# Patient Record
Sex: MALE | Race: WHITE | HISPANIC OR LATINO
[De-identification: names, ages, dates, MRNs, and addresses within clinical notes are randomized per-mention and may not be internally consistent; named-entity substitution may affect disease eponyms.]

---

## 2017-01-10 ENCOUNTER — APPOINTMENT (OUTPATIENT)
Dept: OTOLARYNGOLOGY | Facility: CLINIC | Age: 72
End: 2017-01-10

## 2017-01-10 VITALS — DIASTOLIC BLOOD PRESSURE: 73 MMHG | HEART RATE: 94 BPM | SYSTOLIC BLOOD PRESSURE: 152 MMHG | RESPIRATION RATE: 16 BRPM

## 2017-02-07 ENCOUNTER — APPOINTMENT (OUTPATIENT)
Dept: INTERNAL MEDICINE | Facility: CLINIC | Age: 72
End: 2017-02-07

## 2017-02-07 VITALS
OXYGEN SATURATION: 91 % | BODY MASS INDEX: 21.46 KG/M2 | RESPIRATION RATE: 16 BRPM | WEIGHT: 135 LBS | TEMPERATURE: 98.5 F | HEART RATE: 74 BPM | DIASTOLIC BLOOD PRESSURE: 56 MMHG | SYSTOLIC BLOOD PRESSURE: 93 MMHG

## 2017-02-28 ENCOUNTER — OTHER (OUTPATIENT)
Age: 72
End: 2017-02-28

## 2017-02-28 ENCOUNTER — MEDICATION RENEWAL (OUTPATIENT)
Age: 72
End: 2017-02-28

## 2017-02-28 LAB — BACTERIA SPT CULT: ABNORMAL

## 2017-02-28 RX ORDER — LEVOFLOXACIN 500 MG/1
500 TABLET, FILM COATED ORAL
Qty: 5 | Refills: 0 | Status: DISCONTINUED | COMMUNITY
Start: 2017-02-28 | End: 2017-02-28

## 2017-04-17 ENCOUNTER — OTHER (OUTPATIENT)
Age: 72
End: 2017-04-17

## 2017-04-17 ENCOUNTER — RX RENEWAL (OUTPATIENT)
Age: 72
End: 2017-04-17

## 2017-04-19 LAB — BACTERIA SPT CULT: ABNORMAL

## 2017-05-09 ENCOUNTER — APPOINTMENT (OUTPATIENT)
Dept: INTERNAL MEDICINE | Facility: CLINIC | Age: 72
End: 2017-05-09

## 2017-05-09 RX ORDER — ALENDRONATE SODIUM 70 MG/75ML
70 SOLUTION ORAL
Qty: 450 | Refills: 0 | Status: ACTIVE | COMMUNITY
Start: 2016-11-18

## 2017-05-09 RX ORDER — LEVOTHYROXINE SODIUM 0.1 MG/1
100 TABLET ORAL
Qty: 90 | Refills: 0 | Status: ACTIVE | COMMUNITY
Start: 2017-04-05

## 2017-05-09 RX ORDER — FERROUS SULFATE 220 (44)/5
220 (44 FE) SOLUTION, ORAL ORAL
Qty: 473 | Refills: 0 | Status: ACTIVE | COMMUNITY
Start: 2016-11-09

## 2017-05-10 VITALS
RESPIRATION RATE: 16 BRPM | DIASTOLIC BLOOD PRESSURE: 60 MMHG | HEART RATE: 86 BPM | SYSTOLIC BLOOD PRESSURE: 96 MMHG | OXYGEN SATURATION: 92 %

## 2017-05-22 ENCOUNTER — OTHER (OUTPATIENT)
Age: 72
End: 2017-05-22

## 2017-08-08 ENCOUNTER — APPOINTMENT (OUTPATIENT)
Dept: INTERNAL MEDICINE | Facility: CLINIC | Age: 72
End: 2017-08-08
Payer: MEDICARE

## 2017-08-08 PROCEDURE — 99214 OFFICE O/P EST MOD 30 MIN: CPT | Mod: 25

## 2017-08-08 PROCEDURE — 94727 GAS DIL/WSHOT DETER LNG VOL: CPT

## 2017-08-08 PROCEDURE — 94200 LUNG FUNCTION TEST (MBC/MVV): CPT | Mod: 59

## 2017-08-08 PROCEDURE — 94060 EVALUATION OF WHEEZING: CPT

## 2017-08-08 RX ORDER — PREDNISONE 10 MG/1
10 TABLET ORAL
Qty: 15 | Refills: 0 | Status: ACTIVE | COMMUNITY
Start: 2017-08-08 | End: 1900-01-01

## 2017-10-17 ENCOUNTER — APPOINTMENT (OUTPATIENT)
Dept: INTERNAL MEDICINE | Facility: CLINIC | Age: 72
End: 2017-10-17
Payer: MEDICARE

## 2017-10-17 ENCOUNTER — LABORATORY RESULT (OUTPATIENT)
Age: 72
End: 2017-10-17

## 2017-10-17 VITALS
HEART RATE: 78 BPM | OXYGEN SATURATION: 92 % | RESPIRATION RATE: 6 BRPM | DIASTOLIC BLOOD PRESSURE: 52 MMHG | SYSTOLIC BLOOD PRESSURE: 91 MMHG

## 2017-10-17 DIAGNOSIS — Z23 ENCOUNTER FOR IMMUNIZATION: ICD-10-CM

## 2017-10-17 PROCEDURE — G0008: CPT

## 2017-10-17 PROCEDURE — 94727 GAS DIL/WSHOT DETER LNG VOL: CPT

## 2017-10-17 PROCEDURE — 94200 LUNG FUNCTION TEST (MBC/MVV): CPT | Mod: 59

## 2017-10-17 PROCEDURE — 99214 OFFICE O/P EST MOD 30 MIN: CPT | Mod: 25

## 2017-10-17 PROCEDURE — 94060 EVALUATION OF WHEEZING: CPT

## 2017-10-17 PROCEDURE — G0009: CPT

## 2017-10-17 PROCEDURE — 90686 IIV4 VACC NO PRSV 0.5 ML IM: CPT

## 2017-10-17 PROCEDURE — 90732 PPSV23 VACC 2 YRS+ SUBQ/IM: CPT

## 2017-10-17 RX ORDER — PREDNISONE 10 MG/1
10 TABLET ORAL
Qty: 90 | Refills: 3 | Status: ACTIVE | COMMUNITY
Start: 2017-10-17 | End: 1900-01-01

## 2018-01-09 ENCOUNTER — APPOINTMENT (OUTPATIENT)
Dept: INTERNAL MEDICINE | Facility: CLINIC | Age: 73
End: 2018-01-09
Payer: MEDICARE

## 2018-01-09 ENCOUNTER — APPOINTMENT (OUTPATIENT)
Dept: INTERNAL MEDICINE | Facility: CLINIC | Age: 73
End: 2018-01-09

## 2018-01-09 VITALS
WEIGHT: 131 LBS | HEIGHT: 66.5 IN | DIASTOLIC BLOOD PRESSURE: 74 MMHG | TEMPERATURE: 97.9 F | HEART RATE: 106 BPM | SYSTOLIC BLOOD PRESSURE: 127 MMHG | OXYGEN SATURATION: 87 % | BODY MASS INDEX: 20.8 KG/M2

## 2018-01-09 DIAGNOSIS — J69.0 PNEUMONITIS DUE TO INHALATION OF FOOD AND VOMIT: ICD-10-CM

## 2018-01-09 PROCEDURE — 94200 LUNG FUNCTION TEST (MBC/MVV): CPT | Mod: 59

## 2018-01-09 PROCEDURE — 94727 GAS DIL/WSHOT DETER LNG VOL: CPT

## 2018-01-09 PROCEDURE — 99214 OFFICE O/P EST MOD 30 MIN: CPT | Mod: 25

## 2018-01-09 PROCEDURE — 94060 EVALUATION OF WHEEZING: CPT

## 2018-01-09 RX ORDER — LEVOFLOXACIN 500 MG/1
500 TABLET, FILM COATED ORAL
Qty: 8 | Refills: 0 | Status: DISCONTINUED | COMMUNITY
Start: 2017-02-07 | End: 2018-01-09

## 2018-01-09 RX ORDER — PREDNISONE 5 MG/1
5 TABLET ORAL DAILY
Qty: 60 | Refills: 3 | Status: ACTIVE | COMMUNITY
Start: 2018-01-09 | End: 1900-01-01

## 2018-01-09 RX ORDER — AMOXICILLIN AND CLAVULANATE POTASSIUM 600; 42.9 MG/5ML; MG/5ML
600-42.9 FOR SUSPENSION ORAL EVERY 8 HOURS
Qty: 125 | Refills: 0 | Status: DISCONTINUED | COMMUNITY
Start: 2017-02-21 | End: 2018-01-09

## 2018-01-09 RX ORDER — LEVOFLOXACIN 500 MG/1
500 TABLET, FILM COATED ORAL
Qty: 7 | Refills: 0 | Status: DISCONTINUED | COMMUNITY
Start: 2017-05-09 | End: 2018-01-09

## 2018-01-16 ENCOUNTER — APPOINTMENT (OUTPATIENT)
Dept: OTOLARYNGOLOGY | Facility: CLINIC | Age: 73
End: 2018-01-16
Payer: MEDICARE

## 2018-01-16 VITALS
SYSTOLIC BLOOD PRESSURE: 159 MMHG | BODY MASS INDEX: 20.96 KG/M2 | WEIGHT: 132 LBS | HEIGHT: 66.5 IN | RESPIRATION RATE: 16 BRPM | HEART RATE: 88 BPM | DIASTOLIC BLOOD PRESSURE: 76 MMHG

## 2018-01-16 DIAGNOSIS — J38.6 STENOSIS OF LARYNX: ICD-10-CM

## 2018-01-16 PROCEDURE — 31575 DIAGNOSTIC LARYNGOSCOPY: CPT

## 2018-01-16 PROCEDURE — 99214 OFFICE O/P EST MOD 30 MIN: CPT | Mod: 25

## 2019-01-18 ENCOUNTER — APPOINTMENT (OUTPATIENT)
Dept: OTOLARYNGOLOGY | Facility: CLINIC | Age: 74
End: 2019-01-18

## 2019-02-20 ENCOUNTER — APPOINTMENT (OUTPATIENT)
Dept: OTOLARYNGOLOGY | Facility: CLINIC | Age: 74
End: 2019-02-20
Payer: COMMERCIAL

## 2019-02-20 VITALS
BODY MASS INDEX: 22.23 KG/M2 | TEMPERATURE: 98.1 F | HEIGHT: 66.5 IN | HEART RATE: 97 BPM | SYSTOLIC BLOOD PRESSURE: 129 MMHG | WEIGHT: 140 LBS | DIASTOLIC BLOOD PRESSURE: 65 MMHG

## 2019-02-20 PROCEDURE — 99214 OFFICE O/P EST MOD 30 MIN: CPT | Mod: 25

## 2019-02-20 PROCEDURE — 31575 DIAGNOSTIC LARYNGOSCOPY: CPT

## 2019-02-20 NOTE — REASON FOR VISIT
[Subsequent Evaluation] : a subsequent evaluation for [FreeTextEntry2] : larynx cancer Pt completed chemo/RT 2001

## 2019-02-20 NOTE — PROCEDURE
[Topical Lidocaine] : topical lidocaine [Oxymetazoline HCl] : oxymetazoline HCl [Flexible Endoscope] : examined with the flexible endoscope [Serial Number: ___] : Serial Number: [unfilled] [Normal] : normal vallecula [Lesion(s)] : lesion(s) [de-identified] : aaron GARCIA   [de-identified] : laryngeal stenosis hx [de-identified] : stenosis w stable 2mm airway

## 2019-02-20 NOTE — HISTORY OF PRESENT ILLNESS
[de-identified] : larynx cancer Pt completed chemo/RT 2001. Pt O2 @Lnasal cannula for last 3+ years followed by pulmonologist . Pt peg dependent, stable weight.

## 2020-02-25 ENCOUNTER — APPOINTMENT (OUTPATIENT)
Dept: OTOLARYNGOLOGY | Facility: CLINIC | Age: 75
End: 2020-02-25

## 2020-06-23 ENCOUNTER — APPOINTMENT (OUTPATIENT)
Dept: OTOLARYNGOLOGY | Facility: CLINIC | Age: 75
End: 2020-06-23
Payer: MEDICARE

## 2020-06-23 VITALS
HEIGHT: 66.5 IN | HEART RATE: 82 BPM | TEMPERATURE: 97.8 F | BODY MASS INDEX: 21.12 KG/M2 | DIASTOLIC BLOOD PRESSURE: 67 MMHG | WEIGHT: 133 LBS | SYSTOLIC BLOOD PRESSURE: 195 MMHG

## 2020-06-23 PROCEDURE — 99214 OFFICE O/P EST MOD 30 MIN: CPT | Mod: 25

## 2020-06-23 PROCEDURE — 31575 DIAGNOSTIC LARYNGOSCOPY: CPT

## 2020-06-23 NOTE — PROCEDURE
[None] : none [Flexible Endoscope] : examined with the flexible endoscope [Normal] : normal [de-identified] : c LINCOLN\par \par no changes. 2-3 mm airway and cords forzen.  no masses [de-identified] : laryngeal stenosis [de-identified] : except as above

## 2021-06-08 ENCOUNTER — APPOINTMENT (OUTPATIENT)
Dept: OTOLARYNGOLOGY | Facility: CLINIC | Age: 76
End: 2021-06-08

## 2021-06-22 ENCOUNTER — APPOINTMENT (OUTPATIENT)
Dept: OTOLARYNGOLOGY | Facility: CLINIC | Age: 76
End: 2021-06-22
Payer: MEDICARE

## 2021-06-22 VITALS
RESPIRATION RATE: 18 BRPM | HEART RATE: 79 BPM | TEMPERATURE: 97.6 F | OXYGEN SATURATION: 96 % | DIASTOLIC BLOOD PRESSURE: 76 MMHG | SYSTOLIC BLOOD PRESSURE: 157 MMHG

## 2021-06-22 DIAGNOSIS — R13.12 DYSPHAGIA, OROPHARYNGEAL PHASE: ICD-10-CM

## 2021-06-22 DIAGNOSIS — J38.6 STENOSIS OF LARYNX: ICD-10-CM

## 2021-06-22 PROCEDURE — 99213 OFFICE O/P EST LOW 20 MIN: CPT | Mod: 25

## 2021-06-22 PROCEDURE — 99072 ADDL SUPL MATRL&STAF TM PHE: CPT

## 2021-06-22 PROCEDURE — 31575 DIAGNOSTIC LARYNGOSCOPY: CPT

## 2021-06-22 NOTE — HISTORY OF PRESENT ILLNESS
[None] : No associated symptoms are reported. [de-identified] : Mr. Caldera presents for follow up. History of larynx cancer Pt completed chemo/RT 2001.20 years out of treatment.\par  Pt O2L nasal cannula for  followed by pulmonologist . Pt peg dependent, stable weight.

## 2021-06-22 NOTE — PROCEDURE
[Hoarseness] : hoarseness not clearly evaluated by indirect laryngoscopy [Dysphagia] : dysphagia not clearly evaluated by indirect laryngoscopy [None] : none [Flexible Endoscope] : examined with the flexible endoscope [Normal] : normal vallecula [de-identified] : c LINCOLN\par \par  [de-identified] : stenosis stable

## 2021-07-07 ENCOUNTER — OUTPATIENT (OUTPATIENT)
Dept: OUTPATIENT SERVICES | Facility: HOSPITAL | Age: 76
LOS: 1 days | End: 2021-07-07
Payer: MEDICARE

## 2021-07-07 DIAGNOSIS — I25.10 ATHEROSCLEROTIC HEART DISEASE OF NATIVE CORONARY ARTERY WITHOUT ANGINA PECTORIS: ICD-10-CM

## 2021-07-07 DIAGNOSIS — I65.23 OCCLUSION AND STENOSIS OF BILATERAL CAROTID ARTERIES: ICD-10-CM

## 2021-07-07 DIAGNOSIS — R55 SYNCOPE AND COLLAPSE: ICD-10-CM

## 2021-07-07 PROCEDURE — 93306 TTE W/DOPPLER COMPLETE: CPT

## 2021-07-07 PROCEDURE — 93306 TTE W/DOPPLER COMPLETE: CPT | Mod: 26

## 2021-08-26 NOTE — H&P PST ADULT - ASSESSMENT
Assessment: 76y/o male former 90 pack/year smoker with history of COPD on home O2, B/L carotid artery stenosis, laryngeal cancer (S/P chemo and RT), PEG tube, recent aspiration pneumonia who stood up at home to go to the bathroom and had a syncopal episode, falling to the ground with LOC. He admits to severe SOB with severe PATEL, cough, wheeze, and BLE edema. His echo was unremarkable and B/L carotid dopplers showed moderate to severe B/L carotid artery stenosis.    ASA: 3  Mall: 2  ABR: 1.8%  COVID: Negative 10/25/2021    Problem List:   1. Unstable angina  ·	LHC and possible intervention. Consent obtained.  ·	Will start DAPT if PCI performed.  ·	IV: NS KVO  ·	Aspirin if not taken today.    2. Syncope with B/L carotid artery stenosis  ·	B/L carotid artery angiography.    Discharge Planning:   ·	Discharge today if no intervention performed.  ·	Discharge in AM if intervention performed.

## 2021-08-26 NOTE — H&P PST ADULT - OTHER CARE PROVIDERS
Dr Cheung Lopez Cheung ([facility], [address] [place] [id number], [phone])

## 2021-08-26 NOTE — H&P PST ADULT - PRIMARY CARE PROVIDER
Parish Beckman ([facility], [address] [place] [address], [place] [address], [phone])

## 2021-08-26 NOTE — H&P PST ADULT - NSICDXPASTMEDICALHX_GEN_ALL_CORE_FT
PAST MEDICAL HISTORY:  Encounter for gastrojejunal tube placement     Esophageal stricture     History of carotid stenosis     Prostate CA

## 2021-08-26 NOTE — H&P PST ADULT - HISTORY OF PRESENT ILLNESS
76 yo male with episode of syncope after standing up to go [place] bath room . His wife called 911 and gave him oxygen .   History of COPD O2 dependent.      Recent Carotid Doppler Bilateral internal artery stenosis of 50-69% on right and >70 % carotid stenosis.  Now presents for carotid angiogram.    74 yo male with episode of syncope after standing up to go [place] bath room . His wife called 911 and gave him oxygen .   History of COPD O2 dependent.      Recent Carotid Doppler Bilateral internal artery stenosis of 50-69% on right and >70 % carotid stenosis.  Now presents for carotid angiogram.       Symptoms:        Angina (Class):        Ischemic Symptoms:     Heart Failure:        Systolic/Diastolic/Combined:        NYHA Class (within 2 weeks):     Assessment of LVEF:       EF:        Assessed by:        Date:     Prior Cardiac Interventions:       PCI's:        CABG:     Noninvasive Testing:   Stress Test:        Protocol:        Duration of Exercise:        Symptoms:        EKG Changes:        DTS:        Myocardial Imaging:        Risk Assessment:     Echo:        LV:        RV:        LA:        RA:        Mitral Valve:        Aortic Valve:        Tricuspid Valve:        Pulmonic Valve:        Pericardium:     Antianginal Therapies:        Beta Blockers: N/A       Calcium Channel Blockers: N/A       Long Acting Nitrates: N/A       Ranexa: N/A    Associated Risk Factors:        Cerebrovascular Disease: N/A       Chronic Lung Disease: N/A       Peripheral Arterial Disease: N/A       Chronic Kidney Disease (if yes, what is GFR): N/A       Uncontrolled Diabetes (if yes, what is HgbA1C or FBS): N/A       Poorly Controlled Hypertension (if yes, what is SBP): N/A       Morbid Obesity (if yes, what is BMI): N/A       History of Recent Ventricular Arrhythmia: N/A       Inability to Ambulate Safely: N/A       Need for Therapeutic Anticoagulation: N/A       Antiplatelet or Contrast Allergy: N/A    Social History:        Marital:        Tobacco:        ETOH:        Caffeine:     ROS:   General: No fevers/chills, no fatigue  HEENT: No visual disturbances, no hearing loss, no headaches, no epistaxis.  CV: No chest pain, no PATEL, no palpitations, no edema, no orthopnea, no PND.  Respiratory: No dyspnea, no wheeze, no cough.  GI: no nausea/vomiting, no black/bloody stools.  : No hematuria  Musculoskeletal: No myalgias, no arthralgias, no back pain.  Neurologic: No weakness, no hemiparesis, no paresthesias, no seizures, no syncope/near syncope.    Vital Signs Last 24 Hrs  T(C): --  T(F): --  HR: --  BP: --  BP(mean): --  RR: --  SpO2: --    Physical Examination:   General: Awake, alert, speech clear, no acute distress.  HEENT: PERRL, EOMI.  Neck: No elevated JVP, no bruit, trachea midline.  Chest: CTA B/L, S1, S2, no murmur, RRR.  Abdomen: Soft, nontender, nondistended, normal bowel sounds.  Extremities: No edema, 2+ pulses X 4 extremities.  Neurologic: A&OX3, CN 2-12 grossly intact. 76y/o male former 90 pack/year smoker with history of COPD on home O2, B/L carotid artery stenosis, laryngeal cancer (S/P chemo and RT), PEG tube, recent aspiration pneumonia who stood up at home to go to the bathroom and had a syncopal episode, falling to the ground with LOC. He admits to severe SOB with severe PATEL, cough, wheeze, and BLE edema. His echo was unremarkable and B/L carotid dopplers showed moderate to severe B/L carotid artery stenosis.    Symptoms:        Angina (Class): N/A       Ischemic Symptoms: PATEL and fatigue (CCS class 3-4 anginal equivalent)    Heart Failure: N/A    Assessment of LVEF:       EF: 60-65%       Assessed by: Echo       Date: 7/7/2021    Prior Cardiac Interventions:       PCI's: N/A       CABG: N/A    Noninvasive Testing:   Stress Test: N/A    Echo: 7/7/2021  Left Ventricle: The left ventricular internal cavity size is normal. Global LV systolic function was normal. Left ventricular ejection fraction, by visual estimation, is 60 to 65%. Spectral Doppler shows impaired relaxation pattern of left ventricular myocardial filling (Grade I diastolic dysfunction).  Right Ventricle: Normal right ventricular size and function.  Left Atrium: The left atrium is normal in size.  Right Atrium: The right atrium is normal in size.  Pericardium: There is no evidence of pericardial effusion.  Mitral Valve: Mild thickening of the anterior and posterior mitral valve leaflets. Mild mitral valve regurgitation is seen.  Tricuspid Valve: Trivial tricuspid regurgitation is visualized. Adequate TR velocity was not obtained to accurately assess RVSP.  Aortic Valve: Normal trileaflet aortic valve with normal opening. The aortic valve is trileaflet. No evidence of aortic valve regurgitation is seen.  Pulmonic Valve: Structurally normal pulmonic valve, with normal leaflet excursion. Trace pulmonic valve regurgitation.  Aorta: The aortic root is normal in size and structure.  Pulmonary Artery: The main pulmonary artery is normal in size.  Venous: The inferior vena cava was normal sized, with respiratory size variation greater than 50%.    B/L Carotid Dopplers: 5/2021  ·	Elevated B/L ICA velocity with spectral broadening (L>R)c/w moderate to severe stenosis (approximately 50-69%) on the right and probably > 70% on the left.  ·	Antegrade flow is identified within both vertebral arteries.  ·	Elevated velocity in the left ECA c/w moderate to high grade stenosis.  ·	Significant B/L plaque (R>L).    Antianginal Therapies:        Beta Blockers: Metoprolol 12.5 mg BID       Calcium Channel Blockers: N/A       Long Acting Nitrates: N/A       Ranexa: N/A    Associated Risk Factors:        Cerebrovascular Disease: N/A       Chronic Lung Disease: N/A       Peripheral Arterial Disease: N/A       Chronic Kidney Disease (if yes, what is GFR): N/A       Uncontrolled Diabetes (if yes, what is HgbA1C or FBS): N/A       Poorly Controlled Hypertension (if yes, what is SBP): N/A       Morbid Obesity (if yes, what is BMI): N/A       History of Recent Ventricular Arrhythmia: N/A       Inability to Ambulate Safely: N/A       Need for Therapeutic Anticoagulation: N/A       Antiplatelet or Contrast Allergy: N/A    Social History:        Marital: , lives with wife       Tobacco: Former 3 PPD smoker for 30 years, quit 2000.       ETOH: None       Caffeine: None    ROS:   General: No fevers/chills, + fatigue  HEENT: No visual disturbances, no hearing loss, no headaches, occasional epistaxis.  CV: No chest pain, + PATEL, no palpitations, + edema, no orthopnea, no PND.  Respiratory: + dyspnea, + wheeze, + cough.  GI: no nausea/vomiting, no black/bloody stools.  : No hematuria  Musculoskeletal: No myalgias, no arthralgias, no back pain.  Neurologic: No weakness, no hemiparesis, + paresthesias, no seizures, + syncope.    T(C): 37.2 (10-28-21 @ 08:25), Max: 37.2 (10-28-21 @ 08:25)  HR: 107 (10-28-21 @ 08:25)  BP: 147/67 (10-28-21 @ 08:25)  RR: 22 (10-28-21 @ 08:25)  SpO2: 98% (10-28-21 @ 08:25)    Physical Examination:   General: Awake, alert, speech clear, no acute distress.  HEENT: PERRL, EOMI.  Neck: No elevated JVP, no bruit, trachea midline.  Chest: Rhonchi B/L, S1, S2, no murmur, RRR.  Abdomen: Soft, nontender, nondistended, normal bowel sounds. + PEG  Extremities: No edema, 2+ pulses X 4 extremities.  Neurologic: A&OX3, CN 2-12 grossly intact.

## 2021-10-20 PROBLEM — Z86.79 PERSONAL HISTORY OF OTHER DISEASES OF THE CIRCULATORY SYSTEM: Chronic | Status: ACTIVE | Noted: 2021-08-26

## 2021-10-20 PROBLEM — C61 MALIGNANT NEOPLASM OF PROSTATE: Chronic | Status: ACTIVE | Noted: 2021-08-26

## 2021-10-20 PROBLEM — K22.2 ESOPHAGEAL OBSTRUCTION: Chronic | Status: ACTIVE | Noted: 2021-08-26

## 2021-10-25 ENCOUNTER — APPOINTMENT (OUTPATIENT)
Dept: DISASTER EMERGENCY | Facility: CLINIC | Age: 76
End: 2021-10-25

## 2021-10-25 DIAGNOSIS — Z01.818 ENCOUNTER FOR OTHER PREPROCEDURAL EXAMINATION: ICD-10-CM

## 2021-10-26 LAB — SARS-COV-2 N GENE NPH QL NAA+PROBE: NOT DETECTED

## 2021-10-28 ENCOUNTER — TRANSCRIPTION ENCOUNTER (OUTPATIENT)
Age: 76
End: 2021-10-28

## 2021-10-28 ENCOUNTER — OUTPATIENT (OUTPATIENT)
Dept: OUTPATIENT SERVICES | Facility: HOSPITAL | Age: 76
LOS: 1 days | Discharge: HOME | End: 2021-10-28
Payer: MEDICARE

## 2021-10-28 ENCOUNTER — RESULT REVIEW (OUTPATIENT)
Age: 76
End: 2021-10-28

## 2021-10-28 VITALS
SYSTOLIC BLOOD PRESSURE: 143 MMHG | OXYGEN SATURATION: 97 % | RESPIRATION RATE: 18 BRPM | HEART RATE: 96 BPM | DIASTOLIC BLOOD PRESSURE: 64 MMHG

## 2021-10-28 VITALS
HEIGHT: 67 IN | WEIGHT: 184.97 LBS | HEART RATE: 107 BPM | RESPIRATION RATE: 22 BRPM | DIASTOLIC BLOOD PRESSURE: 67 MMHG | OXYGEN SATURATION: 98 % | TEMPERATURE: 99 F | SYSTOLIC BLOOD PRESSURE: 147 MMHG

## 2021-10-28 DIAGNOSIS — S72.90XA UNSPECIFIED FRACTURE OF UNSPECIFIED FEMUR, INITIAL ENCOUNTER FOR CLOSED FRACTURE: Chronic | ICD-10-CM

## 2021-10-28 DIAGNOSIS — Z98.890 OTHER SPECIFIED POSTPROCEDURAL STATES: Chronic | ICD-10-CM

## 2021-10-28 DIAGNOSIS — S36.113A LACERATION OF LIVER, UNSPECIFIED DEGREE, INITIAL ENCOUNTER: Chronic | ICD-10-CM

## 2021-10-28 DIAGNOSIS — Z93.1 GASTROSTOMY STATUS: Chronic | ICD-10-CM

## 2021-10-28 DIAGNOSIS — I65.23 OCCLUSION AND STENOSIS OF BILATERAL CAROTID ARTERIES: ICD-10-CM

## 2021-10-28 DIAGNOSIS — R94.39 ABNORMAL RESULT OF OTHER CARDIOVASCULAR FUNCTION STUDY: ICD-10-CM

## 2021-10-28 DIAGNOSIS — Z96.653 PRESENCE OF ARTIFICIAL KNEE JOINT, BILATERAL: Chronic | ICD-10-CM

## 2021-10-28 LAB
ALBUMIN SERPL ELPH-MCNC: 3.8 G/DL — SIGNIFICANT CHANGE UP (ref 3.3–5.2)
ALP SERPL-CCNC: 94 U/L — SIGNIFICANT CHANGE UP (ref 40–120)
ALT FLD-CCNC: 48 U/L — HIGH
ANION GAP SERPL CALC-SCNC: 13 MMOL/L — SIGNIFICANT CHANGE UP (ref 5–17)
AST SERPL-CCNC: 33 U/L — SIGNIFICANT CHANGE UP
BASOPHILS # BLD AUTO: 0.03 K/UL — SIGNIFICANT CHANGE UP (ref 0–0.2)
BASOPHILS NFR BLD AUTO: 0.4 % — SIGNIFICANT CHANGE UP (ref 0–2)
BILIRUB SERPL-MCNC: <0.2 MG/DL — LOW (ref 0.4–2)
BUN SERPL-MCNC: 27.4 MG/DL — HIGH (ref 8–20)
CALCIUM SERPL-MCNC: 9.3 MG/DL — SIGNIFICANT CHANGE UP (ref 8.6–10.2)
CHLORIDE SERPL-SCNC: 89 MMOL/L — LOW (ref 98–107)
CO2 SERPL-SCNC: 33 MMOL/L — HIGH (ref 22–29)
CREAT SERPL-MCNC: 1.1 MG/DL — SIGNIFICANT CHANGE UP (ref 0.5–1.3)
EOSINOPHIL # BLD AUTO: 0.13 K/UL — SIGNIFICANT CHANGE UP (ref 0–0.5)
EOSINOPHIL NFR BLD AUTO: 1.9 % — SIGNIFICANT CHANGE UP (ref 0–6)
GLUCOSE SERPL-MCNC: 130 MG/DL — HIGH (ref 70–99)
HCT VFR BLD CALC: 35.2 % — LOW (ref 39–50)
HGB BLD-MCNC: 10.9 G/DL — LOW (ref 13–17)
IMM GRANULOCYTES NFR BLD AUTO: 0.7 % — SIGNIFICANT CHANGE UP (ref 0–1.5)
LYMPHOCYTES # BLD AUTO: 1.36 K/UL — SIGNIFICANT CHANGE UP (ref 1–3.3)
LYMPHOCYTES # BLD AUTO: 20.2 % — SIGNIFICANT CHANGE UP (ref 13–44)
MAGNESIUM SERPL-MCNC: 2.1 MG/DL — SIGNIFICANT CHANGE UP (ref 1.6–2.6)
MCHC RBC-ENTMCNC: 28.9 PG — SIGNIFICANT CHANGE UP (ref 27–34)
MCHC RBC-ENTMCNC: 31 GM/DL — LOW (ref 32–36)
MCV RBC AUTO: 93.4 FL — SIGNIFICANT CHANGE UP (ref 80–100)
MONOCYTES # BLD AUTO: 0.66 K/UL — SIGNIFICANT CHANGE UP (ref 0–0.9)
MONOCYTES NFR BLD AUTO: 9.8 % — SIGNIFICANT CHANGE UP (ref 2–14)
NEUTROPHILS # BLD AUTO: 4.51 K/UL — SIGNIFICANT CHANGE UP (ref 1.8–7.4)
NEUTROPHILS NFR BLD AUTO: 67 % — SIGNIFICANT CHANGE UP (ref 43–77)
PLATELET # BLD AUTO: 272 K/UL — SIGNIFICANT CHANGE UP (ref 150–400)
POTASSIUM SERPL-MCNC: 4 MMOL/L — SIGNIFICANT CHANGE UP (ref 3.5–5.3)
POTASSIUM SERPL-SCNC: 4 MMOL/L — SIGNIFICANT CHANGE UP (ref 3.5–5.3)
PROT SERPL-MCNC: 9.3 G/DL — HIGH (ref 6.6–8.7)
RBC # BLD: 3.77 M/UL — LOW (ref 4.2–5.8)
RBC # FLD: 13.7 % — SIGNIFICANT CHANGE UP (ref 10.3–14.5)
SODIUM SERPL-SCNC: 135 MMOL/L — SIGNIFICANT CHANGE UP (ref 135–145)
WBC # BLD: 6.74 K/UL — SIGNIFICANT CHANGE UP (ref 3.8–10.5)
WBC # FLD AUTO: 6.74 K/UL — SIGNIFICANT CHANGE UP (ref 3.8–10.5)

## 2021-10-28 PROCEDURE — C1894: CPT

## 2021-10-28 PROCEDURE — 36415 COLL VENOUS BLD VENIPUNCTURE: CPT

## 2021-10-28 PROCEDURE — 36222 PLACE CATH CAROTID/INOM ART: CPT | Mod: 50

## 2021-10-28 PROCEDURE — C1760: CPT

## 2021-10-28 PROCEDURE — 83735 ASSAY OF MAGNESIUM: CPT

## 2021-10-28 PROCEDURE — C1887: CPT

## 2021-10-28 PROCEDURE — 93458 L HRT ARTERY/VENTRICLE ANGIO: CPT

## 2021-10-28 PROCEDURE — C1769: CPT

## 2021-10-28 PROCEDURE — 71045 X-RAY EXAM CHEST 1 VIEW: CPT

## 2021-10-28 PROCEDURE — 93005 ELECTROCARDIOGRAM TRACING: CPT

## 2021-10-28 PROCEDURE — 93458 L HRT ARTERY/VENTRICLE ANGIO: CPT | Mod: 26

## 2021-10-28 PROCEDURE — 93010 ELECTROCARDIOGRAM REPORT: CPT

## 2021-10-28 PROCEDURE — 85025 COMPLETE CBC W/AUTO DIFF WBC: CPT

## 2021-10-28 PROCEDURE — 80053 COMPREHEN METABOLIC PANEL: CPT

## 2021-10-28 PROCEDURE — 71045 X-RAY EXAM CHEST 1 VIEW: CPT | Mod: 26

## 2021-10-28 RX ORDER — ATORVASTATIN CALCIUM 80 MG/1
1 TABLET, FILM COATED ORAL
Qty: 30 | Refills: 0
Start: 2021-10-28 | End: 2021-11-26

## 2021-10-28 RX ORDER — SODIUM CHLORIDE 9 MG/ML
1000 INJECTION INTRAMUSCULAR; INTRAVENOUS; SUBCUTANEOUS
Refills: 0 | Status: DISCONTINUED | OUTPATIENT
Start: 2021-10-28 | End: 2021-11-11

## 2021-10-28 RX ORDER — LEVOTHYROXINE SODIUM 125 MCG
1 TABLET ORAL
Qty: 0 | Refills: 0 | DISCHARGE

## 2021-10-28 RX ORDER — ESOMEPRAZOLE MAGNESIUM 40 MG/1
1 CAPSULE, DELAYED RELEASE ORAL
Qty: 0 | Refills: 0 | DISCHARGE

## 2021-10-28 RX ADMIN — SODIUM CHLORIDE 50 MILLILITER(S): 9 INJECTION INTRAMUSCULAR; INTRAVENOUS; SUBCUTANEOUS at 13:36

## 2021-10-28 NOTE — DISCHARGE NOTE PROVIDER - CARE PROVIDER_API CALL
Mau Marroquin  [facility]  [address] [place] [address]  Phone: [phone]  Fax: (   )    -  Established Patient  Follow Up Time: 2 weeks

## 2021-10-28 NOTE — DISCHARGE NOTE PROVIDER - NSDCMRMEDTOKEN_GEN_ALL_CORE_FT
albuterol 2.5 mg/3 mL (0.083%) inhalation solution: 3 milliliter(s) inhaled every 6 hours, As Needed  aspirin 81 mg oral tablet, chewable: 1 tab(s) by gastrostomy tube once a day  atorvastatin 40 mg oral tablet: 1 tab(s) orally once a day   Cod Liver oral oil: 5 milliliter(s) by gastrostomy tube once a day  enteral feeding bolus  of jevity  1.2 aliza: 480 milliliter(s) by gastrostomy tube 4 times a day  ferrous sulfate 200 mg/5 mL oral elixir: 5 milliliter(s) by gastrostomy tube once a day  Flector Patch 1.3% topical film, extended release: Apply topically to affected area once a day  Florastor 250 mg oral capsule: 1 cap(s) by gastrostomy tube 4 times a day  guaiFENesin 200 mg/5 mL oral liquid: 400 milligram(s) by gastrostomy tube  levothyroxine 112 mcg (0.112 mg) oral tablet: 1 tab(s) by gastrostomy tube once a day  Metoprolol Tartrate 25 mg oral tablet: 0.5 tab(s) by gastrostomy tube 2 times a day  NexIUM 40 mg oral delayed release capsule: 1 cap(s) by gastrostomy tube once a day  Cactus Essentials Basic 1400 mg/5 mL oral liquid: 5 milliliter(s) by gastrostomy tube once a day  oxybutynin 5 mg/5 mL oral syrup: 5 milliliter(s) by gastrostomy tube 2 times a day  oxygen  2  to 3  liters/min via nasal cannula  as  needed:   predniSONE 5 mg oral tablet: 1 tab(s) by gastrostomy tube once a day  Spiriva HandiHaler 18 mcg inhalation capsule: 1 cap(s) inhaled once a day  Vitamin D3 10 mcg/mL (400 intl units/mL) oral liquid: 1 milliliter(s) by gastrostomy tube once a day

## 2021-10-28 NOTE — DISCHARGE NOTE PROVIDER - NSDCCPCAREPLAN_GEN_ALL_CORE_FT
PRINCIPAL DISCHARGE DIAGNOSIS  Diagnosis: Bilateral carotid artery stenosis  Assessment and Plan of Treatment:   Add Lipitor 40 mg daily  Continue aspirin 81 mg daily      SECONDARY DISCHARGE DIAGNOSES  Diagnosis: Nonocclusive coronary atherosclerosis of native coronary artery  Assessment and Plan of Treatment:   Add Lipitor 40 mg daily  Continue aspirin 81 mg daily

## 2021-10-28 NOTE — DISCHARGE NOTE NURSING/CASE MANAGEMENT/SOCIAL WORK - NSDPDISTO_GEN_ALL_CORE
Home Surgeon/Pathologist Verbiage (Will Incorporate Name Of Surgeon From Intro If Not Blank): operated in two distinct and integrated capacities as the surgeon and pathologist.

## 2021-10-28 NOTE — DISCHARGE NOTE PROVIDER - HOSPITAL COURSE
74y/o male former 90 pack/year smoker with history of COPD on home O2, B/L carotid artery stenosis, laryngeal cancer (S/P chemo and RT), PEG tube, recent aspiration pneumonia who stood up at home to go to the bathroom and had a syncopal episode, falling to the ground with LOC. He admits to severe SOB with severe PATEL, cough, wheeze, and BLE edema. His echo was unremarkable and B/L carotid dopplers showed moderate to severe B/L carotid artery stenosis. He had a LHC which showed nonobstructive CAD and a B/L carotid artery angiography which showed nonobstructive B/L carotid artery disease.

## 2021-10-28 NOTE — PROGRESS NOTE ADULT - SUBJECTIVE AND OBJECTIVE BOX
Department of Cardiology                                                                  [facility]/[place] [address] [address]                                                            Telephone: [phone]. Fax:[phone]                                                                           Cardiac Catheterization Note       Subjective:  75y  Male who had a left heart catheterization which showed (official report pending):       LM: No significant CAD       LAD: 20% proximal stenosis       LCX: No significant CAD       RCA: No significant CAD  Aortic Arch:        Type III aortic arch       Innominate: No significant atherosclerotic disease       Left Subclavian: No significant atherosclerotic disease       Left vertebral: No significant atherosclerotic disease       Right Proximal Subclavian: No significant atherosclerotic disease       Left Vertebral: No significant atherosclerotic disease  Left Carotid System:        LCCA: No significant atherosclerotic disease       LICA: 50-60% stenosis of the proximal segment       LECA: 60% stenosis of the proximal segment       Left Middle Cerebral: No significant atherosclerotic disease       Left Posterior Cerebral: Small, no significant atherosclerotic disease  Right Carotid System:        RCCA: No significant atherosclerotic disease       VICTOR MANUEL: 30-40% stenosis       RECA: Small, no significant atherosclerotic disease       Right Middle Cerebral Artery: No significant atherosclerotic disease       Right Anterior Cerebral Artery: No significant atherosclerotic disease         Access: Right femoral artery       Hemostasis: Mynx       Total Contrast: 123 mL Omnipaque    PAST MEDICAL & SURGICAL HISTORY:  Esophageal stricture  Prostate CA  History of carotid stenosis  Laryngeal carcinoma  COPD (chronic obstructive pulmonary disease)  Hypothyroidism  Aspiration pneumonia  Traumatic pneumothorax  Benign prostatic hyperplasia with urinary obstruction  Syncope and collapse  Microalbuminuria  Anemia  Hyperlipidemia, unspecified hyperlipidemia type  Femoral fracture  S/P percutaneous endoscopic gastrostomy (PEG) tube placement  Liver laceration  History of exploratory laparotomy  History of total bilateral knee replacement    FAMILY HISTORY:  Family history of hypertension (Mother)  Family history of cerebrovascular accident (CVA) (Mother)    Home Medications:  albuterol 2.5 mg/3 mL (0.083%) inhalation solution: 3 milliliter(s) inhaled every 6 hours, As Needed (28 Oct 2021 08:16)  aspirin 81 mg oral tablet, chewable: 1 tab(s) by gastrostomy tube once a day (28 Oct 2021 08:16)  Cod Liver oral oil: 5 milliliter(s) by gastrostomy tube once a day (28 Oct 2021 08:16)  enteral feeding bolus  of jevity  1.2 aliza: 480 milliliter(s) by gastrostomy tube 4 times a day (28 Oct 2021 08:16)  ferrous sulfate 200 mg/5 mL oral elixir: 5 milliliter(s) by gastrostomy tube once a day (28 Oct 2021 08:16)  Flector Patch 1.3% topical film, extended release: Apply topically to affected area once a day (28 Oct 2021 08:16)  Florastor 250 mg oral capsule: 1 cap(s) by gastrostomy tube 4 times a day (28 Oct 2021 08:16)  guaiFENesin 200 mg/5 mL oral liquid: 400 milligram(s) by gastrostomy tube (28 Oct 2021 08:16)  levothyroxine 112 mcg (0.112 mg) oral tablet: 1 tab(s) by gastrostomy tube once a day (28 Oct 2021 08:16)  Metoprolol Tartrate 25 mg oral tablet: 0.5 tab(s) by gastrostomy tube 2 times a day (28 Oct 2021 08:16)  NexIUM 40 mg oral delayed release capsule: 1 cap(s) by gastrostomy tube once a day (28 Oct 2021 08:16)  [place] Essentials Basic 1400 mg/5 mL oral liquid: 5 milliliter(s) by gastrostomy tube once a day (28 Oct 2021 08:16)  oxybutynin 5 mg/5 mL oral syrup: 5 milliliter(s) by gastrostomy tube 2 times a day (28 Oct 2021 08:16)  oxygen  2  to 3  liters/min via nasal cannula  as  needed:  (28 Oct 2021 08:16)  predniSONE 5 mg oral tablet: 1 tab(s) by gastrostomy tube once a day (28 Oct 2021 08:16)  Spiriva HandiHaler 18 mcg inhalation capsule: 1 cap(s) inhaled once a day (28 Oct 2021 08:16)  Vitamin D3 10 mcg/mL (400 intl units/mL) oral liquid: 1 milliliter(s) by gastrostomy tube once a day (28 Oct 2021 08:16)    HPI: 76y/o male former 90 pack/year smoker with history of COPD on home O2, B/L carotid artery stenosis, laryngeal cancer (S/P chemo and RT), PEG tube, recent aspiration pneumonia who stood up at home to go to the bathroom and had a syncopal episode, falling to the ground with LOC. He admits to severe SOB with severe PATEL, cough, wheeze, and BLE edema. His echo was unremarkable and B/L carotid dopplers showed moderate to severe B/L carotid artery stenosis.    General: No fatigue, no fevers/chills  Respiratory: No dyspnea, no cough, no wheeze  CV: No chest pain, no palpitations  Abd: No nausea  Neuro: No headache, no dizziness    No Known Allergies    Objective:  Vital Signs Last 24 Hrs  T(C): 37.2 (28 Oct 2021 08:25), Max: 37.2 (28 Oct 2021 08:25)  T(F): 98.9 (28 Oct 2021 08:25), Max: 98.9 (28 Oct 2021 08:25)  HR: 102 (28 Oct 2021 10:45) (102 - 107)  BP: 112/59 (28 Oct 2021 10:45) (105/60 - 147/67)  RR: 19 (28 Oct 2021 10:45) (19 - 22)  SpO2: 92% (28 Oct 2021 10:45) (88% - 98%)    General: Awake, alert, speech clear, in no acute distress.  Chest: CTA, S1, S2, no murmurs.  Abdomen, Soft, nondistended  Right Groin: Soft, no bleeding, no hematoma.  Extremities: No edema, + distal pulses.                          10.9   6.74  )-----------( 272      ( 28 Oct 2021 08:21 )             35.2     10-28    135  |  89    |  27.4  ----------------------------<  130  4.0   |  33.0  |  1.10    Ca    9.3      28 Oct 2021 08:21  Mg     2.1     10-28    TPro  9.3  /  Alb  3.8  /  TBili  <0.2  /  DBili  x   /  AST  33  /  ALT  48  /  AlkPhos  94  10-28                                                                              Department of Cardiology                                                                  [facility]/[place] [address] [address]                                                            Telephone: [phone]. Fax:[phone]                                                                           Cardiac Catheterization Note       Subjective:  75y  Male who had a left heart catheterization which showed (official report pending):       LM: No significant CAD       LAD: 20% proximal stenosis       LCX: No significant CAD       RCA: No significant CAD  Aortic Arch:        Type III aortic arch       Innominate: No significant atherosclerotic disease       Left Subclavian: No significant atherosclerotic disease       Left vertebral: No significant atherosclerotic disease       Right Proximal Subclavian: No significant atherosclerotic disease       Left Vertebral: No significant atherosclerotic disease  Left Carotid System:        LCCA: No significant atherosclerotic disease       LICA: 50-60% stenosis of the proximal segment       LECA: 60% stenosis of the proximal segment       Left Middle Cerebral: No significant atherosclerotic disease       Left Posterior Cerebral: Small, no significant atherosclerotic disease  Right Carotid System:        RCCA: No significant atherosclerotic disease       VICTOR MANUEL: 30-40% stenosis       RECA: Small, no significant atherosclerotic disease       Right Middle Cerebral Artery: No significant atherosclerotic disease       Right Anterior Cerebral Artery: No significant atherosclerotic disease         Access: Right femoral artery       Hemostasis: Mynx       Total Contrast: 123 mL Omnipaque    PAST MEDICAL & SURGICAL HISTORY:  Esophageal stricture  Prostate CA  History of carotid stenosis  Laryngeal carcinoma  COPD (chronic obstructive pulmonary disease)  Hypothyroidism  Aspiration pneumonia  Traumatic pneumothorax  Benign prostatic hyperplasia with urinary obstruction  Syncope and collapse  Microalbuminuria  Anemia  Hyperlipidemia, unspecified hyperlipidemia type  Femoral fracture  S/P percutaneous endoscopic gastrostomy (PEG) tube placement  Liver laceration  History of exploratory laparotomy  History of total bilateral knee replacement    FAMILY HISTORY:  Family history of hypertension (Mother)  Family history of cerebrovascular accident (CVA) (Mother)    Home Medications:  albuterol 2.5 mg/3 mL (0.083%) inhalation solution: 3 milliliter(s) inhaled every 6 hours, As Needed (28 Oct 2021 08:16)  aspirin 81 mg oral tablet, chewable: 1 tab(s) by gastrostomy tube once a day (28 Oct 2021 08:16)  Cod Liver oral oil: 5 milliliter(s) by gastrostomy tube once a day (28 Oct 2021 08:16)  enteral feeding bolus  of jevity  1.2 aliza: 480 milliliter(s) by gastrostomy tube 4 times a day (28 Oct 2021 08:16)  ferrous sulfate 200 mg/5 mL oral elixir: 5 milliliter(s) by gastrostomy tube once a day (28 Oct 2021 08:16)  Flector Patch 1.3% topical film, extended release: Apply topically to affected area once a day (28 Oct 2021 08:16)  Florastor 250 mg oral capsule: 1 cap(s) by gastrostomy tube 4 times a day (28 Oct 2021 08:16)  guaiFENesin 200 mg/5 mL oral liquid: 400 milligram(s) by gastrostomy tube (28 Oct 2021 08:16)  levothyroxine 112 mcg (0.112 mg) oral tablet: 1 tab(s) by gastrostomy tube once a day (28 Oct 2021 08:16)  Metoprolol Tartrate 25 mg oral tablet: 0.5 tab(s) by gastrostomy tube 2 times a day (28 Oct 2021 08:16)  NexIUM 40 mg oral delayed release capsule: 1 cap(s) by gastrostomy tube once a day (28 Oct 2021 08:16)  [place] Essentials Basic 1400 mg/5 mL oral liquid: 5 milliliter(s) by gastrostomy tube once a day (28 Oct 2021 08:16)  oxybutynin 5 mg/5 mL oral syrup: 5 milliliter(s) by gastrostomy tube 2 times a day (28 Oct 2021 08:16)  oxygen  2  to 3  liters/min via nasal cannula  as  needed:  (28 Oct 2021 08:16)  predniSONE 5 mg oral tablet: 1 tab(s) by gastrostomy tube once a day (28 Oct 2021 08:16)  Spiriva HandiHaler 18 mcg inhalation capsule: 1 cap(s) inhaled once a day (28 Oct 2021 08:16)  Vitamin D3 10 mcg/mL (400 intl units/mL) oral liquid: 1 milliliter(s) by gastrostomy tube once a day (28 Oct 2021 08:16)    HPI: 74y/o male former 90 pack/year smoker with history of COPD on home O2, B/L carotid artery stenosis, laryngeal cancer (S/P chemo and RT), PEG tube, recent aspiration pneumonia who stood up at home to go to the bathroom and had a syncopal episode, falling to the ground with LOC. He admits to severe SOB with severe PATEL, cough, wheeze, and BLE edema. His echo was unremarkable and B/L carotid dopplers showed moderate to severe B/L carotid artery stenosis.    General: No fatigue, no fevers/chills  Respiratory: + dyspnea, + cough, no wheeze  CV: No chest pain, no palpitations  Abd: No nausea  Neuro: No headache, no dizziness    No Known Allergies    Objective:  Vital Signs Last 24 Hrs  T(C): 37.2 (28 Oct 2021 08:25), Max: 37.2 (28 Oct 2021 08:25)  T(F): 98.9 (28 Oct 2021 08:25), Max: 98.9 (28 Oct 2021 08:25)  HR: 102 (28 Oct 2021 10:45) (102 - 107)  BP: 112/59 (28 Oct 2021 10:45) (105/60 - 147/67)  RR: 19 (28 Oct 2021 10:45) (19 - 22)  SpO2: 92% (28 Oct 2021 10:45) (88% - 98%)    General: Awake, alert, speech clear, in no acute distress.  Chest: + B/L rhonchi, S1, S2, no murmurs.  Abdomen, Soft, nondistended  Right Groin: Soft, no bleeding, no hematoma.  Extremities: No edema, + distal pulses.    CXR: B/L lower lobe infiltrates                        10.9   6.74  )-----------( 272      ( 28 Oct 2021 08:21 )             35.2     10-28    135  |  89    |  27.4  ----------------------------<  130  4.0   |  33.0  |  1.10    Ca    9.3      28 Oct 2021 08:21  Mg     2.1     10-28    TPro  9.3  /  Alb  3.8  /  TBili  <0.2  /  DBili  x   /  AST  33  /  ALT  48  /  AlkPhos  94  10-28

## 2021-10-28 NOTE — DISCHARGE NOTE PROVIDER - NSDCQMCOGNITION_NEU_ALL_CORE
----- Message from Marguerite Mendez sent at 2/22/2018  4:00 PM EST -----  Needs psychiatrist prescribed Clonazepam, Sertraline, Ambien and Seroquel    Marie Cazares
No difficulties

## 2021-10-28 NOTE — DISCHARGE NOTE PROVIDER - NSDCCPTREATMENT_GEN_ALL_CORE_FT
PRINCIPAL PROCEDURE  Procedure: Angiography of carotid and cerebral arteries  Findings and Treatment:   Aortic Arch:        Type III aortic arch       Innominate: No significant atherosclerotic disease       Left Subclavian: No significant atherosclerotic disease       Left vertebral: No significant atherosclerotic disease       Right Proximal Subclavian: No significant atherosclerotic disease       Left Vertebral: No significant atherosclerotic disease  Left Carotid System:        LCCA: No significant atherosclerotic disease       LICA: 50-60% stenosis of the proximal segment       LECA: 60% stenosis of the proximal segment       Left Middle Cerebral: No significant atherosclerotic disease       Left Posterior Cerebral: Small, no significant atherosclerotic disease  Right Carotid System:        RCCA: No significant atherosclerotic disease       VICTOR MANUEL: 30-40% stenosis       RECA: Small, no significant atherosclerotic disease       Right Middle Cerebral Artery: No significant atherosclerotic disease       Right Anterior Cerebral Artery: No significant atherosclerotic disease      SECONDARY PROCEDURE  Procedure: Left heart catheterization  Findings and Treatment:   LM: No significant CAD  LAD: 20% proximal stenosis  LCX: No significant CAD  RCA: No significant CAD

## 2021-10-28 NOTE — PROGRESS NOTE ADULT - ASSESSMENT
75y Male  Procedure: LHC    1. S/P LHC:   ·	Ambulate at: 12:30PM  ·	Groin precautions explained.  ·	Medications: Continue current medications.    2. Pneumonia  ·	Portable CXR    Discharge Planning:   ·	If OK, discharge home at: 2:30PM  ·	Follow up with: Dr. Mayo at [facility]     75y Male  Procedure: LHC    1. S/P LHC: Nonobstructive CAD  ·	Ambulate at: 12:30PM  ·	Groin precautions explained.  ·	Add Lipitor 40 mg daily  ·	Continue aspirin 81 mg daily    2. S/P carotid angiography, nonobstructive B/L carotid artery disease.  ·	Add Lipitor 40 mg daily  ·	Continue aspirin 81 mg daily    3. Pneumonia  ·	B/L lower lobe infiltrates seen of CXR, probably resolving aspiration pneumonia. Patient clinically stable.  ·	Will follow up with PMD and Pulmonary as an outpatient.     Discharge Planning:   ·	If OK, discharge home at: 2:30PM  ·	Follow up with: Dr. Mayo at [facility]

## 2021-10-28 NOTE — DISCHARGE NOTE NURSING/CASE MANAGEMENT/SOCIAL WORK - PATIENT PORTAL LINK FT
You can access the FollowMyHealth Patient Portal offered by [facility] by registering at the following website: [email or web address]. By joining [facility]’s FollowMyHealth portal, you will also be able to view your health information using other applications (apps) compatible with our system.

## 2021-10-28 NOTE — DISCHARGE NOTE PROVIDER - PROVIDER TOKENS
FREE:[LAST:[Lopez],FIRST:[Mau],PHONE:[phone]],FAX:[phone]   [phone]    -],ADDRESS:[[place] [address]],FOLLOWUP:[2 weeks],ESTABLISHEDPATIENT:[T]]

## 2022-01-11 ENCOUNTER — INPATIENT (INPATIENT)
Facility: HOSPITAL | Age: 77
LOS: 2 days | Discharge: HOME | DRG: 871 | End: 2022-01-14
Attending: INTERNAL MEDICINE | Admitting: INTERNAL MEDICINE
Payer: MEDICARE

## 2022-01-11 VITALS
SYSTOLIC BLOOD PRESSURE: 128 MMHG | DIASTOLIC BLOOD PRESSURE: 79 MMHG | RESPIRATION RATE: 12 BRPM | HEART RATE: 142 BPM | OXYGEN SATURATION: 97 % | HEIGHT: 67 IN | WEIGHT: 175.05 LBS

## 2022-01-11 DIAGNOSIS — Z96.653 PRESENCE OF ARTIFICIAL KNEE JOINT, BILATERAL: Chronic | ICD-10-CM

## 2022-01-11 DIAGNOSIS — J18.9 PNEUMONIA, UNSPECIFIED ORGANISM: ICD-10-CM

## 2022-01-11 DIAGNOSIS — Z98.890 OTHER SPECIFIED POSTPROCEDURAL STATES: Chronic | ICD-10-CM

## 2022-01-11 DIAGNOSIS — Z93.1 GASTROSTOMY STATUS: Chronic | ICD-10-CM

## 2022-01-11 DIAGNOSIS — S72.90XA UNSPECIFIED FRACTURE OF UNSPECIFIED FEMUR, INITIAL ENCOUNTER FOR CLOSED FRACTURE: Chronic | ICD-10-CM

## 2022-01-11 DIAGNOSIS — S36.113A LACERATION OF LIVER, UNSPECIFIED DEGREE, INITIAL ENCOUNTER: Chronic | ICD-10-CM

## 2022-01-11 PROBLEM — E78.5 HYPERLIPIDEMIA, UNSPECIFIED: Chronic | Status: ACTIVE | Noted: 2021-10-28

## 2022-01-11 PROBLEM — D64.9 ANEMIA, UNSPECIFIED: Chronic | Status: ACTIVE | Noted: 2021-10-28

## 2022-01-11 PROBLEM — E03.9 HYPOTHYROIDISM, UNSPECIFIED: Chronic | Status: ACTIVE | Noted: 2021-10-28

## 2022-01-11 PROBLEM — J69.0 PNEUMONITIS DUE TO INHALATION OF FOOD AND VOMIT: Chronic | Status: ACTIVE | Noted: 2021-10-28

## 2022-01-11 PROBLEM — N40.1 BENIGN PROSTATIC HYPERPLASIA WITH LOWER URINARY TRACT SYMPTOMS: Chronic | Status: ACTIVE | Noted: 2021-10-28

## 2022-01-11 PROBLEM — R55 SYNCOPE AND COLLAPSE: Chronic | Status: ACTIVE | Noted: 2021-10-28

## 2022-01-11 PROBLEM — R80.9 PROTEINURIA, UNSPECIFIED: Chronic | Status: ACTIVE | Noted: 2021-10-28

## 2022-01-11 PROBLEM — S27.0XXA TRAUMATIC PNEUMOTHORAX, INITIAL ENCOUNTER: Chronic | Status: ACTIVE | Noted: 2021-10-28

## 2022-01-11 PROBLEM — J44.9 CHRONIC OBSTRUCTIVE PULMONARY DISEASE, UNSPECIFIED: Chronic | Status: ACTIVE | Noted: 2021-10-28

## 2022-01-11 PROBLEM — C32.9 MALIGNANT NEOPLASM OF LARYNX, UNSPECIFIED: Chronic | Status: ACTIVE | Noted: 2021-10-28

## 2022-01-11 LAB
ALBUMIN SERPL ELPH-MCNC: 3.9 G/DL — SIGNIFICANT CHANGE UP (ref 3.3–5.2)
ALP SERPL-CCNC: 90 U/L — SIGNIFICANT CHANGE UP (ref 40–120)
ALT FLD-CCNC: 25 U/L — SIGNIFICANT CHANGE UP
ANION GAP SERPL CALC-SCNC: 12 MMOL/L — SIGNIFICANT CHANGE UP (ref 5–17)
ANION GAP SERPL CALC-SCNC: 12 MMOL/L — SIGNIFICANT CHANGE UP (ref 5–17)
APPEARANCE UR: CLEAR — SIGNIFICANT CHANGE UP
APTT BLD: 26.5 SEC — LOW (ref 27.5–35.5)
AST SERPL-CCNC: 38 U/L — SIGNIFICANT CHANGE UP
BASE EXCESS BLDA CALC-SCNC: 11.8 MMOL/L — HIGH (ref -2–3)
BASE EXCESS BLDV CALC-SCNC: 7.9 MMOL/L — HIGH (ref -2–3)
BASOPHILS # BLD AUTO: 0 K/UL — SIGNIFICANT CHANGE UP (ref 0–0.2)
BASOPHILS NFR BLD AUTO: 0 % — SIGNIFICANT CHANGE UP (ref 0–2)
BILIRUB SERPL-MCNC: 0.3 MG/DL — LOW (ref 0.4–2)
BILIRUB UR-MCNC: NEGATIVE — SIGNIFICANT CHANGE UP
BLOOD GAS COMMENTS ARTERIAL: SIGNIFICANT CHANGE UP
BUN SERPL-MCNC: 59.2 MG/DL — HIGH (ref 8–20)
BUN SERPL-MCNC: 61.9 MG/DL — HIGH (ref 8–20)
CA-I SERPL-SCNC: 1.07 MMOL/L — LOW (ref 1.15–1.33)
CALCIUM SERPL-MCNC: 8.2 MG/DL — LOW (ref 8.6–10.2)
CALCIUM SERPL-MCNC: 8.9 MG/DL — SIGNIFICANT CHANGE UP (ref 8.6–10.2)
CHLORIDE BLDV-SCNC: 98 MMOL/L — SIGNIFICANT CHANGE UP (ref 98–107)
CHLORIDE SERPL-SCNC: 93 MMOL/L — LOW (ref 98–107)
CHLORIDE SERPL-SCNC: 95 MMOL/L — LOW (ref 98–107)
CO2 SERPL-SCNC: 29 MMOL/L — SIGNIFICANT CHANGE UP (ref 22–29)
CO2 SERPL-SCNC: 29 MMOL/L — SIGNIFICANT CHANGE UP (ref 22–29)
COLOR SPEC: YELLOW — SIGNIFICANT CHANGE UP
CREAT SERPL-MCNC: 1.89 MG/DL — HIGH (ref 0.5–1.3)
CREAT SERPL-MCNC: 1.95 MG/DL — HIGH (ref 0.5–1.3)
DIFF PNL FLD: ABNORMAL
EOSINOPHIL # BLD AUTO: 0 K/UL — SIGNIFICANT CHANGE UP (ref 0–0.5)
EOSINOPHIL NFR BLD AUTO: 0 % — SIGNIFICANT CHANGE UP (ref 0–6)
EPI CELLS # UR: NEGATIVE — SIGNIFICANT CHANGE UP
GAS PNL BLDV: 135 MMOL/L — LOW (ref 136–145)
GAS PNL BLDV: SIGNIFICANT CHANGE UP
GLUCOSE BLDV-MCNC: 117 MG/DL — HIGH (ref 70–99)
GLUCOSE SERPL-MCNC: 128 MG/DL — HIGH (ref 70–99)
GLUCOSE SERPL-MCNC: 170 MG/DL — HIGH (ref 70–99)
GLUCOSE UR QL: NEGATIVE MG/DL — SIGNIFICANT CHANGE UP
HCO3 BLDA-SCNC: 35 MMOL/L — HIGH (ref 21–28)
HCO3 BLDV-SCNC: 33 MMOL/L — HIGH (ref 22–29)
HCT VFR BLD CALC: 37.6 % — LOW (ref 39–50)
HCT VFR BLDA CALC: 30 % — LOW (ref 39–51)
HGB BLD CALC-MCNC: 10 G/DL — LOW (ref 12.6–17.4)
HGB BLD-MCNC: 11.6 G/DL — LOW (ref 13–17)
HOROWITZ INDEX BLDA+IHG-RTO: SIGNIFICANT CHANGE UP
INR BLD: 1.15 RATIO — SIGNIFICANT CHANGE UP (ref 0.88–1.16)
KETONES UR-MCNC: NEGATIVE — SIGNIFICANT CHANGE UP
LACTATE BLDV-MCNC: 2.2 MMOL/L — HIGH (ref 0.5–2)
LACTATE BLDV-MCNC: 2.4 MMOL/L — HIGH (ref 0.5–2)
LACTATE BLDV-MCNC: 3.1 MMOL/L — HIGH (ref 0.5–2)
LEUKOCYTE ESTERASE UR-ACNC: NEGATIVE — SIGNIFICANT CHANGE UP
LYMPHOCYTES # BLD AUTO: 0.87 K/UL — LOW (ref 1–3.3)
LYMPHOCYTES # BLD AUTO: 7 % — LOW (ref 13–44)
MANUAL SMEAR VERIFICATION: SIGNIFICANT CHANGE UP
MCHC RBC-ENTMCNC: 28.9 PG — SIGNIFICANT CHANGE UP (ref 27–34)
MCHC RBC-ENTMCNC: 30.9 GM/DL — LOW (ref 32–36)
MCV RBC AUTO: 93.5 FL — SIGNIFICANT CHANGE UP (ref 80–100)
MONOCYTES # BLD AUTO: 0.74 K/UL — SIGNIFICANT CHANGE UP (ref 0–0.9)
MONOCYTES NFR BLD AUTO: 6 % — SIGNIFICANT CHANGE UP (ref 2–14)
NEUTROPHILS # BLD AUTO: 10.8 K/UL — HIGH (ref 1.8–7.4)
NEUTROPHILS NFR BLD AUTO: 86 % — HIGH (ref 43–77)
NEUTS BAND # BLD: 1 % — SIGNIFICANT CHANGE UP (ref 0–8)
NITRITE UR-MCNC: NEGATIVE — SIGNIFICANT CHANGE UP
NRBC # BLD: 0 /100 — SIGNIFICANT CHANGE UP (ref 0–0)
PCO2 BLDA: 46 MMHG — SIGNIFICANT CHANGE UP (ref 35–48)
PCO2 BLDV: 59 MMHG — HIGH (ref 42–55)
PH BLDA: 7.49 — HIGH (ref 7.35–7.45)
PH BLDV: 7.36 — SIGNIFICANT CHANGE UP (ref 7.32–7.43)
PH UR: 6 — SIGNIFICANT CHANGE UP (ref 5–8)
PLAT MORPH BLD: NORMAL — SIGNIFICANT CHANGE UP
PLATELET # BLD AUTO: 232 K/UL — SIGNIFICANT CHANGE UP (ref 150–400)
PO2 BLDA: 176 MMHG — HIGH (ref 83–108)
PO2 BLDV: 53 MMHG — HIGH (ref 25–45)
POTASSIUM BLDV-SCNC: 5 MMOL/L — SIGNIFICANT CHANGE UP (ref 3.5–5.1)
POTASSIUM SERPL-MCNC: 5 MMOL/L — SIGNIFICANT CHANGE UP (ref 3.5–5.3)
POTASSIUM SERPL-MCNC: 5.8 MMOL/L — HIGH (ref 3.5–5.3)
POTASSIUM SERPL-SCNC: 5 MMOL/L — SIGNIFICANT CHANGE UP (ref 3.5–5.3)
POTASSIUM SERPL-SCNC: 5.8 MMOL/L — HIGH (ref 3.5–5.3)
PROT SERPL-MCNC: 9.3 G/DL — HIGH (ref 6.6–8.7)
PROT UR-MCNC: 15
PROTHROM AB SERPL-ACNC: 13.3 SEC — SIGNIFICANT CHANGE UP (ref 10.6–13.6)
RAPID RVP RESULT: SIGNIFICANT CHANGE UP
RBC # BLD: 4.02 M/UL — LOW (ref 4.2–5.8)
RBC # FLD: 14.2 % — SIGNIFICANT CHANGE UP (ref 10.3–14.5)
RBC BLD AUTO: NORMAL — SIGNIFICANT CHANGE UP
RBC CASTS # UR COMP ASSIST: SIGNIFICANT CHANGE UP /HPF (ref 0–4)
SAO2 % BLDA: 99.3 % — HIGH (ref 94–98)
SAO2 % BLDV: 87.2 % — SIGNIFICANT CHANGE UP
SARS-COV-2 RNA SPEC QL NAA+PROBE: SIGNIFICANT CHANGE UP
SODIUM SERPL-SCNC: 134 MMOL/L — LOW (ref 135–145)
SODIUM SERPL-SCNC: 136 MMOL/L — SIGNIFICANT CHANGE UP (ref 135–145)
SP GR SPEC: 1.01 — SIGNIFICANT CHANGE UP (ref 1.01–1.02)
TROPONIN T SERPL-MCNC: 0.04 NG/ML — SIGNIFICANT CHANGE UP (ref 0–0.06)
TSH SERPL-MCNC: 3.71 UIU/ML — SIGNIFICANT CHANGE UP (ref 0.27–4.2)
UROBILINOGEN FLD QL: NEGATIVE MG/DL — SIGNIFICANT CHANGE UP
WBC # BLD: 12.41 K/UL — HIGH (ref 3.8–10.5)
WBC # FLD AUTO: 12.41 K/UL — HIGH (ref 3.8–10.5)
WBC UR QL: SIGNIFICANT CHANGE UP

## 2022-01-11 PROCEDURE — 99285 EMERGENCY DEPT VISIT HI MDM: CPT

## 2022-01-11 PROCEDURE — 74176 CT ABD & PELVIS W/O CONTRAST: CPT | Mod: 26,MA

## 2022-01-11 PROCEDURE — 99223 1ST HOSP IP/OBS HIGH 75: CPT

## 2022-01-11 PROCEDURE — 71045 X-RAY EXAM CHEST 1 VIEW: CPT | Mod: 26

## 2022-01-11 PROCEDURE — 93010 ELECTROCARDIOGRAM REPORT: CPT

## 2022-01-11 PROCEDURE — 71250 CT THORAX DX C-: CPT | Mod: 26,MA

## 2022-01-11 RX ORDER — ONDANSETRON 8 MG/1
4 TABLET, FILM COATED ORAL ONCE
Refills: 0 | Status: COMPLETED | OUTPATIENT
Start: 2022-01-11 | End: 2022-01-11

## 2022-01-11 RX ORDER — CEFEPIME 1 G/1
2000 INJECTION, POWDER, FOR SOLUTION INTRAMUSCULAR; INTRAVENOUS ONCE
Refills: 0 | Status: COMPLETED | OUTPATIENT
Start: 2022-01-11 | End: 2022-01-11

## 2022-01-11 RX ORDER — SODIUM CHLORIDE 9 MG/ML
1000 INJECTION, SOLUTION INTRAVENOUS
Refills: 0 | Status: COMPLETED | OUTPATIENT
Start: 2022-01-11 | End: 2022-01-12

## 2022-01-11 RX ORDER — CEFEPIME 1 G/1
INJECTION, POWDER, FOR SOLUTION INTRAMUSCULAR; INTRAVENOUS
Refills: 0 | Status: DISCONTINUED | OUTPATIENT
Start: 2022-01-11 | End: 2022-01-14

## 2022-01-11 RX ORDER — ACETAMINOPHEN 500 MG
975 TABLET ORAL ONCE
Refills: 0 | Status: COMPLETED | OUTPATIENT
Start: 2022-01-11 | End: 2022-01-11

## 2022-01-11 RX ORDER — PIPERACILLIN AND TAZOBACTAM 4; .5 G/20ML; G/20ML
3.38 INJECTION, POWDER, LYOPHILIZED, FOR SOLUTION INTRAVENOUS ONCE
Refills: 0 | Status: COMPLETED | OUTPATIENT
Start: 2022-01-11 | End: 2022-01-11

## 2022-01-11 RX ORDER — HYDROCORTISONE 20 MG
100 TABLET ORAL ONCE
Refills: 0 | Status: COMPLETED | OUTPATIENT
Start: 2022-01-11 | End: 2022-01-11

## 2022-01-11 RX ORDER — ACETAMINOPHEN 500 MG
325 TABLET ORAL ONCE
Refills: 0 | Status: DISCONTINUED | OUTPATIENT
Start: 2022-01-11 | End: 2022-01-11

## 2022-01-11 RX ORDER — SODIUM CHLORIDE 9 MG/ML
2500 INJECTION, SOLUTION INTRAVENOUS ONCE
Refills: 0 | Status: COMPLETED | OUTPATIENT
Start: 2022-01-11 | End: 2022-01-11

## 2022-01-11 RX ORDER — CEFEPIME 1 G/1
2000 INJECTION, POWDER, FOR SOLUTION INTRAMUSCULAR; INTRAVENOUS EVERY 12 HOURS
Refills: 0 | Status: DISCONTINUED | OUTPATIENT
Start: 2022-01-12 | End: 2022-01-14

## 2022-01-11 RX ADMIN — Medication 975 MILLIGRAM(S): at 09:45

## 2022-01-11 RX ADMIN — Medication 975 MILLIGRAM(S): at 10:15

## 2022-01-11 RX ADMIN — PIPERACILLIN AND TAZOBACTAM 3.38 GRAM(S): 4; .5 INJECTION, POWDER, LYOPHILIZED, FOR SOLUTION INTRAVENOUS at 11:00

## 2022-01-11 RX ADMIN — CEFEPIME 100 MILLIGRAM(S): 1 INJECTION, POWDER, FOR SOLUTION INTRAMUSCULAR; INTRAVENOUS at 18:49

## 2022-01-11 RX ADMIN — SODIUM CHLORIDE 2500 MILLILITER(S): 9 INJECTION, SOLUTION INTRAVENOUS at 10:21

## 2022-01-11 RX ADMIN — SODIUM CHLORIDE 2500 MILLILITER(S): 9 INJECTION, SOLUTION INTRAVENOUS at 11:30

## 2022-01-11 RX ADMIN — Medication 100 MILLIGRAM(S): at 15:05

## 2022-01-11 RX ADMIN — ONDANSETRON 4 MILLIGRAM(S): 8 TABLET, FILM COATED ORAL at 12:10

## 2022-01-11 RX ADMIN — PIPERACILLIN AND TAZOBACTAM 200 GRAM(S): 4; .5 INJECTION, POWDER, LYOPHILIZED, FOR SOLUTION INTRAVENOUS at 09:55

## 2022-01-11 NOTE — ED PROVIDER NOTE - NSICDXPASTMEDICALHX_GEN_ALL_CORE_FT
PAST MEDICAL HISTORY:  Anemia     Aspiration pneumonia     Benign prostatic hyperplasia with urinary obstruction     COPD (chronic obstructive pulmonary disease)     Esophageal stricture     History of carotid stenosis     Hyperlipidemia, unspecified hyperlipidemia type     Hypothyroidism     Laryngeal carcinoma     Microalbuminuria     Prostate CA     Syncope and collapse     Traumatic pneumothorax

## 2022-01-11 NOTE — ED PROVIDER NOTE - PHYSICAL EXAMINATION
Gen: increased work of breathing; lethargic  Head: normocephalic, atraumatic  EENT: EOMI, dry mucois membranes  Lung: +increased work of breathing; b/l crackles at the bases  CV: +tachy; no peripheral edema  Abd: soft, non-tender, non-distended, no rebound tenderness or guarding; midline abdominal scar; peg tube site without drainage  MSK:  no visible deformities, full range of motion in all 4 extremities  Neuro: A&Ox2, answering questions, following commands

## 2022-01-11 NOTE — H&P ADULT - HISTORY OF PRESENT ILLNESS
75 yo male former smoker with history of COPD on home O2, B/L carotid artery stenosis, laryngeal cancer (S/P chemo and RT), PEG tube, recent aspiration pneumonia (pseudomonas) treated at [facility] with zosyn/vanco for 7 days who presents from home after acute onset SOB. Yesterday had coughed up sputum followed by emesis. Today in acute respiratory distress. Patient came via EMS and initially placed on bipap 10/5/50% for spO2 of 80% but vomited again, was taken off and placed on HFNC at 50% now % SpO2. Patient with fever on arrival, leukocytosis and bilateral infiltrate on CT scan. Now being admitted for pneumonia.

## 2022-01-11 NOTE — ED PROVIDER NOTE - OBJECTIVE STATEMENT
76yM with pmh COPD on 2.5L home O2, laryngeal cancer s/p chemo and radiation, esophageal strictures, PEG tube, htn presenting with AMS. Patient found by family this morning with increased work of breathing and lethargic. Placed patient on 4L without improvement. Was noted by EMS to be satting in the 80s so placed on non-rebreather. Arrived to ED ~96%. Family reports patient was fine the night before. At baseline ambulated with walker unassisted and is A&Ox3 without dementia. Of note patient is poor historian regarding medical care per daughter. [place] [facility]. Has h/o ex-lab from [facility]. Vaccinated for COVID. No recent sick contacts.   PCP: Dr. Lucy Beckman

## 2022-01-11 NOTE — H&P ADULT - NSHPPHYSICALEXAM_GEN_ALL_CORE
PHYSICAL EXAM:    General: in no acute distress  Eyes: PERRLA, EOMI; conjunctiva and sclera clear  Head: Normocephalic; atraumatic  ENMT: No nasal discharge; airway clear  Neck: Supple; non tender; no masses  Respiratory: No wheezes, rales or rhonchi  Cardiovascular: Regular rate and rhythm. S1 and S2 Normal; No murmurs, gallops or rubs  Gastrointestinal: Soft non-tender non-distended; Normal bowel sounds  Genitourinary: No costovertebral angle tenderness  Extremities: Normal range of motion, No clubbing, cyanosis or edema  Vascular: Peripheral pulses palpable 2+ bilaterally  Neurological: Alert and oriented x4  Skin: Warm and dry. No acute rash  Lymph Nodes: No acute cervical adenopathy  Musculoskeletal: Normal gait, tone, without deformities  Psychiatric: Cooperative and appropriate PHYSICAL EXAM:  General: in no acute distress; frail appearing  Eyes: PERRLA, EOMI; conjunctiva and sclera clear  Head: Normocephalic; atraumatic  ENMT: No nasal discharge; airway clear; HFNC in place; good dentition  Neck: Supple; non tender; no masses  Respiratory: bilateral rhonchi  Cardiovascular: tachycardic rate and rhythm. No murmurs, gallops or rubs  Gastrointestinal: distended; decreased bowel sounds; non-tender  Genitourinary: No costovertebral angle tenderness  Extremities: Normal range of motion, No clubbing, cyanosis or edema  Vascular: Peripheral pulses palpable 2+ bilaterally  Neurological: Alert and oriented to place, year and , and name  Psychiatric: Cooperative and appropriate

## 2022-01-11 NOTE — H&P ADULT - ASSESSMENT
77 yo male former smoker with history of COPD on home O2, B/L carotid artery stenosis, laryngeal cancer (S/P chemo and RT), PEG tube, recent aspiration pneumonia (pseudomonas) treated at [facility] with zosyn/vanco for 7 days who presents from home after acute onset SOB. Yesterday had coughed up sputum followed by emesis. Today in acute respiratory distress. Patient came via EMS and initially placed on bipap 10/5/50% for spO2 of 80% but vomited again, was taken off and placed on HFNC at 50% now % SpO2. Patient with fever on arrival, leukocytosis and bilateral infiltrate on CT scan. Now being admitted for pneumonia.    #acute hypoxemic resp failure 2/2 bilateral pneumonia likely aspiration, possibly pseudomonas  - admit to tele  - continue cefepime for now  - MRSA swab - if positive add vancomycin coverage  - sputum culture if able  - blood cultures  - currently HFNC at 50% - wean as tolerates    #COPD on home O2  - will add methylprednisolone 40mg BID for now  - anticipate quick taper to prednisone    #MATHIEU possible ATN  - IV fluids for now  - repeat in the AM    #PEG due to esophageal strictures from prior chemo/radiation of laryngeal cancer  - on glucerna 1.2 aliza 5 feeds of 8oz a day  - will continue while here continuous rate for now    #bilateral carotid stenosis  - continue statin    #HTN  - continue home meds    #DVT ppx  - lovenox daily  #GI ppx  - protonix IV 40mg daily    confirmed full code with wife  wife is HCP as well - number in chart is accurate

## 2022-01-11 NOTE — ED ADULT NURSE REASSESSMENT NOTE - NS ED NURSE REASSESS COMMENT FT1
At 1200 pt signed he needed to vomit.  Bipap mask removed, pt vomited yellow thick emesis.  Rakes bilat, BP is low but stable, Dr Bañuelos aware.

## 2022-01-11 NOTE — ED ADULT TRIAGE NOTE - CHIEF COMPLAINT QUOTE
patient  was found by family at home with respiratory distress and AMS at 0600, GCS 13, patient has an extensive medical hx including peg tube in place, patient responds to voice but confused. MD called to bedside and brought to critical care.

## 2022-01-11 NOTE — ED ADULT NURSE NOTE - OBJECTIVE STATEMENT
Pt biba from home, as per family member pt has been very lethargic, at baseline pt amb and speaks with no problem (as per family).  Pt has a Gtube.  Pt's skin is dry and flaky.  Abd soft nondistended nontender, moving all ext well.  CM in place, ST at 130's.  Pt placed on Bipap, FiO2 is 40%, O2 satis 93-95%.

## 2022-01-11 NOTE — ED PROVIDER NOTE - NSICDXPASTSURGICALHX_GEN_ALL_CORE_FT
PAST SURGICAL HISTORY:  Femoral fracture     History of exploratory laparotomy     History of total bilateral knee replacement     Liver laceration     S/P percutaneous endoscopic gastrostomy (PEG) tube placement

## 2022-01-11 NOTE — ED PROVIDER NOTE - ATTENDING CONTRIBUTION TO CARE
I have personally performed a face to face medical and diagnostic evaluation of the patient. I have discussed with and reviewed the resident's note and agree with the History, ROS, Physical Exam and MDM unless otherwise indicated. A brief summary of my personal evaluation and impression can be found below.    75yo man PMH COPD on 2-3L NC, laryngeal cancer s/p chemo and radiation requiring trach, now decannulated, hx of multiple aspiration pna s/p PEG placement, HTN, HLD, hypothyroidism, possible prostate cancer being monitored without treatment presents with lethargy and increased work of breathing with minimal improvement with 4L NC and was brought in by EMS who reports pt was hypoxic to low 80% and placed on NRB with improvement. Pt was recently admitted to [facility] for bilateral aspiration PNA about 3 months ago. No sick contacts. Pt is vaccinated for covid.  At baseline, pt is A&Ox3 and ambulates independently    On exam, initial vitals were reviewed with tachycardia and hypoxia  Pt is chronically ill appearing in respiratory distress with retractions and belly breathing, NC/AT, clear eyes, dry mucous membranes, supple neck, RRR, pulses 2+ in all extremities, lungs with crackles bilaterally, abdomen soft, nontender, nondistended, no obvious joint deformities, pt is awake and alert and moving all extremities, no rash noted, well-healed midline abdominal scar    Concern for covid/URI vs PNA vs PE vs ACS. Pt was febrile. Likely sepsis 2/2 pna and blood work, xr, UA, abx and fluids started. Pt was placed on bipap for work of breathing with improvement.

## 2022-01-11 NOTE — ED ADULT NURSE NOTE - NSIMPLEMENTINTERV_GEN_ALL_ED
Implemented All Fall with Harm Risk Interventions:  [place] to call system. Call bell, personal items and telephone within reach. Instruct patient to call for assistance. Room bathroom lighting operational. Non-slip footwear when patient is off stretcher. Physically safe environment: no spills, clutter or unnecessary equipment. Stretcher in lowest position, wheels locked, appropriate side rails in place. Provide visual cue, wrist band, yellow gown, etc. Monitor gait and stability. Monitor for mental status changes and reorient to person, place, and time. Review medications for side effects contributing to fall risk. Reinforce activity limits and safety measures with patient and family. Provide visual clues: red socks.

## 2022-01-11 NOTE — ED PROVIDER NOTE - NSICDXFAMILYHX_GEN_ALL_CORE_FT
FAMILY HISTORY:  Mother  Still living? No  Family history of cerebrovascular accident (CVA), Age at diagnosis: Age Unknown  Family history of hypertension, Age at diagnosis: Age Unknown

## 2022-01-11 NOTE — H&P ADULT - NSHPREVIEWOFSYSTEMS_GEN_ALL_CORE
REVIEW OF SYSTEMS  General: denies weakness, malaise  Skin/Breast: no new rash  Ophthalmologic: no change in vision  ENMT: no dysphagia, throat pain or change in hearing  Respiratory and Thorax: no difficulty breathing or chest pain  Cardiovascular: no palpitations or PND, orthopnea  Gastrointestinal: no abdominal pain, normal bowel movement, no dark stools  Genitourinary: no difficulty urinating, no burning urination  Musculoskeletal: no myalgia/arthrlagia  Neurological: no weakness, numbness, change in gait  Psychiatric: no depression, anxiety  Hematology/Lymphatics: denies easy bruising or bleeding  Endocrine: no polyuria, polydipsia REVIEW OF SYSTEMS  General: denies weakness, malaise  Skin/Breast: no new rash  Ophthalmologic: no change in vision  ENMT: no dysphagia, throat pain or change in hearing  Respiratory and Thorax: +difficulty breathing; no chest pain  Cardiovascular: no palpitations or PND, orthopnea  Gastrointestinal: no abdominal pain, normal bowel movement, no dark stools  Genitourinary: no difficulty urinating, no burning urination  Musculoskeletal: no myalgia/arthrlagia  Neurological: no weakness, numbness, change in gait  Psychiatric: no depression, anxiety  Hematology/Lymphatics: denies easy bruising or bleeding  Endocrine: no polyuria, polydipsia

## 2022-01-11 NOTE — ED PROVIDER NOTE - CLINICAL SUMMARY MEDICAL DECISION MAKING FREE TEXT BOX
76yM with pmh COPD on 2.5L home O2, laryngeal cancer s/p chemo and radiation, esophageal strictures, PEG tube, htn presenting with AMS. Patient with AMS 2/2 acute respiratory distress and sepsis. Will obtain sepsis work-up, start zosyn for presumed pneumonia.

## 2022-01-11 NOTE — CONSULT NOTE ADULT - SUBJECTIVE AND OBJECTIVE BOX
Patient is a 76y old  Male who presents with a chief complaint of     HPI/BRIEF HOSPITAL COURSE: (Hx mostly obtained from ED physician and chart)     75 y/o  male was in [facility] until earlier this morning, when Family found him altered and his O2 low and increased home O2 and called 911. Upon arrival to [facility], he was placed on NRB and then on BiPAP while he received Zosyn, 2500 ml IV LR, Tylenol, Solu-cortef 100 mgIV, Zofran 4 mg IV. Patient had episode of Vomiting but jared, RN bedside and he was taken off of NIV on time prior to him aspirating; and placed on HFNC. WOB improved.   During our assessment, he claimed that he felt better, unsure what was going on but able to answer simple questions. Denied any fever, chills, SOB prior to arrival.   He has had 3 pneumonia in last 3-4 months.   ICU called to evaluate the patient.       PAST MEDICAL & SURGICAL HISTORY:  Esophageal stricture    Prostate CA    History of carotid stenosis    Laryngeal carcinoma    COPD (chronic obstructive pulmonary disease)    Hypothyroidism    Aspiration pneumonia    Traumatic pneumothorax    Benign prostatic hyperplasia with urinary obstruction    Syncope and collapse    Microalbuminuria    Anemia    Hyperlipidemia, unspecified hyperlipidemia type    Femoral fracture    S/P percutaneous endoscopic gastrostomy (PEG) tube placement    Liver laceration    History of exploratory laparotomy    History of total bilateral knee replacement    Allergies    No Known Allergies    FAMILY HISTORY:  Family history of hypertension (Mother)    Family history of cerebrovascular accident (CVA) (Mother)    Social History:  former smoker  no alcohol  lives with wife and children (11 Jan 2022 14:53)        Review of Systems: Besides being SOB and some nausea   CONSTITUTIONAL: No fever, chills, or fatigue  EYES: No eye pain, visual disturbances, or discharge  ENMT:  No difficulty hearing, tinnitus, vertigo; No sinus or throat pain  NECK: No pain or stiffness  RESPIRATORY: No cough, wheezing, chills or hemoptysis;  CARDIOVASCULAR: No chest pain, palpitations, dizziness, or leg swelling  GASTROINTESTINAL: No abdominal or epigastric pain. No hematemesis; No diarrhea or constipation. No melena or hematochezia.  GENITOURINARY: No dysuria, frequency, hematuria, or incontinence  NEUROLOGICAL: No headaches, memory loss, loss of strength, numbness, or tremors  SKIN: No itching, burning, rashes, or lesions   MUSCULOSKELETAL: No joint pain or swelling; No muscle, back, or extremity pain  PSYCHIATRIC: No depression, anxiety, mood swings, or difficulty sleeping        Physical Examination:    ICU Vital Signs Last 24 Hrs  T(C): 39.4 (11 Jan 2022 09:40), Max: 39.4 (11 Jan 2022 09:40)  T(F): 102.9 (11 Jan 2022 09:40), Max: 102.9 (11 Jan 2022 09:40)  HR: 115 (11 Jan 2022 12:10) (113 - 142)  BP: 115/62 (11 Jan 2022 12:10) (89/46 - 128/79)  BP(mean): --  ABP: --  ABP(mean): --  RR: 20 (11 Jan 2022 12:10) (12 - 22)  SpO2: 95% (11 Jan 2022 12:10) (91% - 97%)      General: Mild resp distress while being transitioned from BiPAP to HFNC    Neuro: AAO*2-3, No motor, sensory, or cranial nerve deficit    HEENT: Pupils equal, reactive to light, Oral mucosa moist     PULM: Clear to auscultation bilaterally, fine crackles at bases    CVS: Regular rhythm and increased rate    ABD: Soft, distended, nontender, normoactive bowel sounds, no CVA tenderness. Peg+    EXT: No b/l LE edema, nontender with pedal pulse palpable     SKIN: Warm and well perfused, no acute rashes           RADIOLOGY/ Microbiology/ Labs: reviewed     I have personally provided 60 minutes of critical care time excluding time spent on separate procedures

## 2022-01-11 NOTE — ED PROVIDER NOTE - PROGRESS NOTE DETAILS
MD Richi: spoke with Dr. Raymundo (pulmonologist) who states that due to laryngeal cancer he has stenosis and has hypoxia to 89% on room air and will desaturate to 79% with exertion. He had trach in place in past but no trach or stoma on arrival to ED. He also has possible pulmonary fibrosis.  Pt had nausea while on bipap and was given zofran, will transition pt to high flow NC.  MICU was consulted for evaluation. MD Richi: spoke with Dr. Raymundo (pulmonologist) who states that due to laryngeal cancer he has stenosis and has hypoxia to 89% on room air and will desaturate to 79% with exertion. He had trach in place in past but no trach or stoma on arrival to ED. He also has possible pulmonary fibrosis.  Pt had nausea while on bipap and was given zofran, will transition pt to high flow NC. Pt was transiently hypotensive. will check cortisol level and give dose of hydrocortisone as pt is chronically on steroids.  MICU was consulted for evaluation. MD Richi: spoke with Dr. Raymundo (pulmonologist) who states that due to laryngeal cancer he has stenosis and has hypoxia to 89% on room air and will desaturate to 79% with exertion. He had trach in place in past but no trach or stoma on arrival to ED. He also has possible pulmonary fibrosis.  Pt had nausea while on bipap and was given zofran, will transition pt to high flow NC. Pt was transiently hypotensive. will check cortisol level and give dose of hydrocortisone as pt is chronically on steroids.  Pt also has some abdominal distension, will obtain CT c/a/p.  MICU was consulted for evaluation. MICU recommending step down. CT with b/l pna. COVID pending. Patient admitted for sepsis. Currently sating 95% on High Flow. SBP 140s and HR improved to 100. Lyndsay Palafox. PGY-1

## 2022-01-12 LAB
ANION GAP SERPL CALC-SCNC: 11 MMOL/L — SIGNIFICANT CHANGE UP (ref 5–17)
BUN SERPL-MCNC: 43.2 MG/DL — HIGH (ref 8–20)
CALCIUM SERPL-MCNC: 8.9 MG/DL — SIGNIFICANT CHANGE UP (ref 8.6–10.2)
CHLORIDE SERPL-SCNC: 99 MMOL/L — SIGNIFICANT CHANGE UP (ref 98–107)
CO2 SERPL-SCNC: 30 MMOL/L — HIGH (ref 22–29)
CREAT SERPL-MCNC: 1.08 MG/DL — SIGNIFICANT CHANGE UP (ref 0.5–1.3)
CULTURE RESULTS: NO GROWTH — SIGNIFICANT CHANGE UP
GLUCOSE SERPL-MCNC: 109 MG/DL — HIGH (ref 70–99)
HCT VFR BLD CALC: 31.9 % — LOW (ref 39–50)
HCV AB S/CO SERPL IA: 0.31 S/CO — SIGNIFICANT CHANGE UP (ref 0–0.99)
HCV AB SERPL-IMP: SIGNIFICANT CHANGE UP
HGB BLD-MCNC: 9.8 G/DL — LOW (ref 13–17)
MCHC RBC-ENTMCNC: 28.2 PG — SIGNIFICANT CHANGE UP (ref 27–34)
MCHC RBC-ENTMCNC: 30.7 GM/DL — LOW (ref 32–36)
MCV RBC AUTO: 91.9 FL — SIGNIFICANT CHANGE UP (ref 80–100)
PLATELET # BLD AUTO: 184 K/UL — SIGNIFICANT CHANGE UP (ref 150–400)
POTASSIUM SERPL-MCNC: 4.5 MMOL/L — SIGNIFICANT CHANGE UP (ref 3.5–5.3)
POTASSIUM SERPL-SCNC: 4.5 MMOL/L — SIGNIFICANT CHANGE UP (ref 3.5–5.3)
RBC # BLD: 3.47 M/UL — LOW (ref 4.2–5.8)
RBC # FLD: 14.4 % — SIGNIFICANT CHANGE UP (ref 10.3–14.5)
SODIUM SERPL-SCNC: 140 MMOL/L — SIGNIFICANT CHANGE UP (ref 135–145)
SPECIMEN SOURCE: SIGNIFICANT CHANGE UP
WBC # BLD: 11.51 K/UL — HIGH (ref 3.8–10.5)
WBC # FLD AUTO: 11.51 K/UL — HIGH (ref 3.8–10.5)

## 2022-01-12 PROCEDURE — 99497 ADVNCD CARE PLAN 30 MIN: CPT | Mod: 25

## 2022-01-12 PROCEDURE — 74018 RADEX ABDOMEN 1 VIEW: CPT | Mod: 26

## 2022-01-12 PROCEDURE — 99222 1ST HOSP IP/OBS MODERATE 55: CPT

## 2022-01-12 PROCEDURE — 99233 SBSQ HOSP IP/OBS HIGH 50: CPT

## 2022-01-12 RX ORDER — LISINOPRIL 2.5 MG/1
10 TABLET ORAL DAILY
Refills: 0 | Status: DISCONTINUED | OUTPATIENT
Start: 2022-01-12 | End: 2022-01-14

## 2022-01-12 RX ORDER — OXYBUTYNIN CHLORIDE 5 MG
5 TABLET ORAL DAILY
Refills: 0 | Status: DISCONTINUED | OUTPATIENT
Start: 2022-01-12 | End: 2022-01-14

## 2022-01-12 RX ORDER — ACETAMINOPHEN 500 MG
650 TABLET ORAL EVERY 6 HOURS
Refills: 0 | Status: DISCONTINUED | OUTPATIENT
Start: 2022-01-12 | End: 2022-01-14

## 2022-01-12 RX ORDER — ATORVASTATIN CALCIUM 80 MG/1
40 TABLET, FILM COATED ORAL AT BEDTIME
Refills: 0 | Status: DISCONTINUED | OUTPATIENT
Start: 2022-01-12 | End: 2022-01-14

## 2022-01-12 RX ORDER — LIDOCAINE 4 G/100G
1 CREAM TOPICAL DAILY
Refills: 0 | Status: DISCONTINUED | OUTPATIENT
Start: 2022-01-12 | End: 2022-01-14

## 2022-01-12 RX ORDER — OXYBUTYNIN CHLORIDE 5 MG
5 TABLET ORAL DAILY
Refills: 0 | Status: DISCONTINUED | OUTPATIENT
Start: 2022-01-12 | End: 2022-01-12

## 2022-01-12 RX ORDER — PANTOPRAZOLE SODIUM 20 MG/1
40 TABLET, DELAYED RELEASE ORAL DAILY
Refills: 0 | Status: DISCONTINUED | OUTPATIENT
Start: 2022-01-12 | End: 2022-01-14

## 2022-01-12 RX ORDER — METOPROLOL TARTRATE 50 MG
12.5 TABLET ORAL
Refills: 0 | Status: DISCONTINUED | OUTPATIENT
Start: 2022-01-12 | End: 2022-01-14

## 2022-01-12 RX ORDER — ONDANSETRON 8 MG/1
4 TABLET, FILM COATED ORAL EVERY 4 HOURS
Refills: 0 | Status: DISCONTINUED | OUTPATIENT
Start: 2022-01-12 | End: 2022-01-14

## 2022-01-12 RX ORDER — ASPIRIN/CALCIUM CARB/MAGNESIUM 324 MG
81 TABLET ORAL DAILY
Refills: 0 | Status: DISCONTINUED | OUTPATIENT
Start: 2022-01-12 | End: 2022-01-14

## 2022-01-12 RX ORDER — TIOTROPIUM BROMIDE 18 UG/1
1 CAPSULE ORAL; RESPIRATORY (INHALATION) DAILY
Refills: 0 | Status: DISCONTINUED | OUTPATIENT
Start: 2022-01-12 | End: 2022-01-14

## 2022-01-12 RX ORDER — LEVOTHYROXINE SODIUM 125 MCG
112 TABLET ORAL DAILY
Refills: 0 | Status: DISCONTINUED | OUTPATIENT
Start: 2022-01-12 | End: 2022-01-14

## 2022-01-12 RX ADMIN — SODIUM CHLORIDE 100 MILLILITER(S): 9 INJECTION, SOLUTION INTRAVENOUS at 11:28

## 2022-01-12 RX ADMIN — Medication 10 MILLIGRAM(S): at 21:48

## 2022-01-12 RX ADMIN — PANTOPRAZOLE SODIUM 40 MILLIGRAM(S): 20 TABLET, DELAYED RELEASE ORAL at 14:40

## 2022-01-12 RX ADMIN — Medication 5 MILLIGRAM(S): at 12:49

## 2022-01-12 RX ADMIN — TIOTROPIUM BROMIDE 1 CAPSULE(S): 18 CAPSULE ORAL; RESPIRATORY (INHALATION) at 08:17

## 2022-01-12 RX ADMIN — LIDOCAINE 1 PATCH: 4 CREAM TOPICAL at 21:47

## 2022-01-12 RX ADMIN — SODIUM CHLORIDE 100 MILLILITER(S): 9 INJECTION, SOLUTION INTRAVENOUS at 03:57

## 2022-01-12 RX ADMIN — CEFEPIME 100 MILLIGRAM(S): 1 INJECTION, POWDER, FOR SOLUTION INTRAMUSCULAR; INTRAVENOUS at 18:48

## 2022-01-12 RX ADMIN — Medication 81 MILLIGRAM(S): at 12:49

## 2022-01-12 RX ADMIN — LISINOPRIL 10 MILLIGRAM(S): 2.5 TABLET ORAL at 14:58

## 2022-01-12 RX ADMIN — ATORVASTATIN CALCIUM 40 MILLIGRAM(S): 80 TABLET, FILM COATED ORAL at 21:48

## 2022-01-12 RX ADMIN — CEFEPIME 100 MILLIGRAM(S): 1 INJECTION, POWDER, FOR SOLUTION INTRAMUSCULAR; INTRAVENOUS at 06:30

## 2022-01-12 RX ADMIN — Medication 12.5 MILLIGRAM(S): at 18:48

## 2022-01-12 RX ADMIN — Medication 112 MICROGRAM(S): at 10:37

## 2022-01-12 NOTE — PROGRESS NOTE ADULT - SUBJECTIVE AND OBJECTIVE BOX
CC: Dyspnea  HPI:  77 yo male former smoker with history of COPD on home O2, B/L carotid artery stenosis, laryngeal cancer (S/P chemo and RT), PEG tube, recent aspiration pneumonia (pseudomonas) treated at [facility] with zosyn/vanco for 7 days who presents from home after acute onset SOB. Yesterday had coughed up sputum followed by emesis. Today in acute respiratory distress. Patient came via EMS and initially placed on bipap 10/5/50% for spO2 of 80% but vomited again, was taken off and placed on HFNC at 50% now % SpO2. Patient with fever on arrival, leukocytosis and bilateral infiltrate on CT scan. Now being admitted for pneumonia. (2022 14:53)    INTERVAL HPI/OVERNIGHT EVENTS:    Vital Signs Last 24 Hrs  T(C): 37.3 (2022 08:04), Max: 37.3 (2022 08:04)  T(F): 99.1 (2022 08:04), Max: 99.1 (2022 08:04)  HR: 125 (2022 09:20) (94 - 125)  BP: 198/80 (2022 08:04) (89/46 - 198/80)  BP(mean): --  RR: 20 (2022 09:20) (20 - 24)  SpO2: 95% (2022 09:20) (91% - 95%)  PHYSICAL EXAM:  General: in no acute distress  Eyes: PERRLA, EOMI; conjunctiva and sclera clear  Head: Normocephalic; atraumatic  ENMT: No nasal discharge; airway clear  Neck: Supple; non tender; no masses  Respiratory: No wheezes, rales or rhonchi  Cardiovascular: Regular rate and rhythm. S1 and S2 Normal; No murmurs, gallops or rubs  Gastrointestinal: Soft non-tender non-distended; Normal bowel sounds  Genitourinary: No costovertebral angle tenderness  Extremities: Normal range of motion, No clubbing, cyanosis or edema  Vascular: Peripheral pulses palpable 2+ bilaterally  Neurological: Alert and oriented x4  Skin: Warm and dry. No acute rash  Lymph Nodes: No acute cervical adenopathy  Musculoskeletal: Normal gait, tone, without deformities  Psychiatric: Cooperative and appropriate    I&O's Detail    2022 07:01  -  2022 07:00  --------------------------------------------------------  IN:    Lactated Ringers: 800 mL  Total IN: 800 mL    OUT:  Total OUT: 0 mL    Total NET: 800 mL      2022 07:01  -  2022 10:44  --------------------------------------------------------  IN:    Enteral Tube Flush: 30 mL    Jevity 1.2: 237 mL    Lactated Ringers: 300 mL  Total IN: 567 mL    OUT:    Voided (mL): 300 mL  Total OUT: 300 mL    Total NET: 267 mL          CARDIAC MARKERS ( 2022 09:35 )  x     / 0.04 ng/mL / x     / x     / x                                9.8    11.51 )-----------( 184      ( 2022 07:56 )             31.9     2022 07:56    140    |  99     |  43.2   ----------------------------<  109    4.5     |  30.0   |  1.08     Ca    8.9        2022 07:56    TPro  9.3    /  Alb  3.9    /  TBili  0.3    /  DBili  x      /  AST  38     /  ALT  25     /  AlkPhos  90     2022 09:35    PT/INR - ( 2022 09:35 )   PT: 13.3 sec;   INR: 1.15 ratio         PTT - ( 2022 09:35 )  PTT:26.5 sec  CAPILLARY BLOOD GLUCOSE        LIVER FUNCTIONS - ( 2022 09:35 )  Alb: 3.9 g/dL / Pro: 9.3 g/dL / ALK PHOS: 90 U/L / ALT: 25 U/L / AST: 38 U/L / GGT: x           Urinalysis Basic - ( 2022 12:27 )    Color: Yellow / Appearance: Clear / S.010 / pH: x  Gluc: x / Ketone: Negative  / Bili: Negative / Urobili: Negative mg/dL   Blood: x / Protein: 15 / Nitrite: Negative   Leuk Esterase: Negative / RBC: 0-2 /HPF / WBC 0-2   Sq Epi: x / Non Sq Epi: Negative / Bacteria: x        MEDICATIONS  (STANDING):  aspirin  chewable 81 milliGRAM(s) Oral daily  atorvastatin 40 milliGRAM(s) Oral at bedtime  cefepime   IVPB 2000 milliGRAM(s) IV Intermittent every 12 hours  cefepime   IVPB      lactated ringers. 1000 milliLiter(s) (100 mL/Hr) IV Continuous <Continuous>  levothyroxine 112 MICROGram(s) Oral daily  lidocaine   4% Patch 1 Patch Transdermal daily  metoprolol tartrate 12.5 milliGRAM(s) Oral two times a day  oxybutynin 5 milliGRAM(s) Oral daily  pantoprazole   Suspension 40 milliGRAM(s) Oral daily  predniSONE   Tablet 5 milliGRAM(s) Oral daily  tiotropium 18 MICROgram(s) Capsule 1 Capsule(s) Inhalation daily    MEDICATIONS  (PRN):      RADIOLOGY & ADDITIONAL TESTS: CC: Dyspnea  HPI:  74Patient is a 76 y o former smoker with history of COPD on home O2, B/L carotid artery stenosis, laryngeal cancer (S/P chemo and RT), PEG tube, recent aspiration pneumonia (pseudomonas tx w/ 7 days Zosyn) who presented from home after acute onset SOB and respiratory distress with an SpO2 of 80%. He was initially placed on bipap 10/5/50% but could not tolerate due to emesis.  He was placed on HFNC at 50% now % SpO2. Patient with fever on arrival, leukocytosis and bilateral infiltrate on CT scan. Now being admitted for pneumonia.    INTERVAL HPI/OVERNIGHT EVENTS:  Patient seen and examined at bedside, no acute events overnight.  Patient i mentating well, complains of pain at insertion site of PEG tube.  Attempt was made to down titrate high flow NC but patient did not tolerate.  Patient continues to endorse cough productive of sputum and emesis resembling tube feeds.  He reports abdominal distension but states it is common for him.      Vital Signs Last 24 Hrs  T(C): 37.3 (2022 08:04), Max: 37.3 (2022 08:04)  T(F): 99.1 (2022 08:04), Max: 99.1 (2022 08:04)  HR: 125 (2022 09:20) (94 - 125)  BP: 198/80 (2022 08:04) (89/46 - 198/80)  RR: 20 (2022 09:20) (20 - 24)  SpO2: 95% (2022 09:20) (91% - 95%)    PHYSICAL EXAM:  General: in no acute distress  Eyes: PERRLA, EOMI; conjunctiva and sclera clear  Head: Normocephalic; atraumatic  ENMT: No nasal discharge; airway clear  Neck: Supple; non tender; no masses  Respiratory: High Flow NC in place, Diffuse ronchi and crackles   Cardiovascular: Tachycardic, Regular rhythm. S1 and S2 Normal; No murmurs, gallops or rubs  Gastrointestinal: Distended abdomen, no tenderness on palpation, bowel sounds present.    Genitourinary: No costovertebral angle tenderness  Extremities: Normal range of motion, No clubbing, cyanosis or edema  Vascular: Peripheral pulses palpable 2+ bilaterally  Neurological: Alert and oriented x4  Skin: Warm and dry. No acute rash  Lymph Nodes: No acute cervical adenopathy  Musculoskeletal: Normal tone, without deformities  Psychiatric: Cooperative and appropriate    I&O's Detail    2022 07:01  -  2022 07:00  --------------------------------------------------------  IN:    Lactated Ringers: 800 mL  Total IN: 800 mL    OUT:  Total OUT: 0 mL    Total NET: 800 mL      2022 07:01  -  2022 10:44  --------------------------------------------------------  IN:    Enteral Tube Flush: 30 mL    Jevity 1.2: 237 mL    Lactated Ringers: 300 mL  Total IN: 567 mL    OUT:    Voided (mL): 300 mL  Total OUT: 300 mL    Total NET: 267 mL          CARDIAC MARKERS ( 2022 09:35 )  x     / 0.04 ng/mL / x     / x     / x                                9.8    11.51 )-----------( 184      ( 2022 07:56 )             31.9     2022 07:56    140    |  99     |  43.2   ----------------------------<  109    4.5     |  30.0   |  1.08     Ca    8.9        2022 07:56    TPro  9.3    /  Alb  3.9    /  TBili  0.3    /  DBili  x      /  AST  38     /  ALT  25     /  AlkPhos  90     2022 09:35    PT/INR - ( 2022 09:35 )   PT: 13.3 sec;   INR: 1.15 ratio         PTT - ( 2022 09:35 )  PTT:26.5 sec  CAPILLARY BLOOD GLUCOSE        LIVER FUNCTIONS - ( 2022 09:35 )  Alb: 3.9 g/dL / Pro: 9.3 g/dL / ALK PHOS: 90 U/L / ALT: 25 U/L / AST: 38 U/L / GGT: x           Urinalysis Basic - ( 2022 12:27 )    Color: Yellow / Appearance: Clear / S.010 / pH: x  Gluc: x / Ketone: Negative  / Bili: Negative / Urobili: Negative mg/dL   Blood: x / Protein: 15 / Nitrite: Negative   Leuk Esterase: Negative / RBC: 0-2 /HPF / WBC 0-2   Sq Epi: x / Non Sq Epi: Negative / Bacteria: x        MEDICATIONS  (STANDING):  aspirin  chewable 81 milliGRAM(s) Oral daily  atorvastatin 40 milliGRAM(s) Oral at bedtime  cefepime   IVPB 2000 milliGRAM(s) IV Intermittent every 12 hours  cefepime   IVPB      lactated ringers. 1000 milliLiter(s) (100 mL/Hr) IV Continuous <Continuous>  levothyroxine 112 MICROGram(s) Oral daily  lidocaine   4% Patch 1 Patch Transdermal daily  metoprolol tartrate 12.5 milliGRAM(s) Oral two times a day  oxybutynin 5 milliGRAM(s) Oral daily  pantoprazole   Suspension 40 milliGRAM(s) Oral daily  predniSONE   Tablet 5 milliGRAM(s) Oral daily  tiotropium 18 MICROgram(s) Capsule 1 Capsule(s) Inhalation daily    MEDICATIONS  (PRN):      RADIOLOGY & ADDITIONAL TESTS:

## 2022-01-12 NOTE — PATIENT PROFILE ADULT - FALL HARM RISK - RISK INTERVENTIONS

## 2022-01-12 NOTE — CONSULT NOTE ADULT - ASSESSMENT
1: Acute on chronic respiratory failure   2: Possible recurrent aspiration pneumonia/ pneumonitis with underlying esophageal strictures and also laryngeal cancer hx along with PEG+  3: Acute metabolic encephalopathy   4: MATHIEU on CKD-2  5: Hyperkalemia   6: Hyperglycemia   7: Metabolic acidosis       Recommend :     - Fall, aspiration precautions; Avoid sedative as possible; might need small dose anxiolytic while under duress   - S/p IVF bolus, would benefit form maintenance fluid; TTE (Hx of CAD, non obstructing); ASA and Lipitor  - HFNC, chest PT, bronchodilator therapy, IV steroid, PT, incentive spirometry as possible; early mobilization as possible   - NO tube feeds for now, Consider CT abd pelvis; Antiemetic as needed   - Avoid Nephrotoxic agents, Adjust medications for renal function, Close urinary output monitoring, UA/Renal imaging reviewed, Replacing electrolytes as needed with Goal K> 4, PO> 3, Mg> 2  - Blood cx, Sputum Cx, Legionella and Strep urinary ag, PCT, RVP with SARS COV-2 PCR; Empiric Meropenem and Vancomycin with multiple hospitalization and de-escalate based on cx  - Palliative care consult for addressing GOC; I tried asking patient but he got more anxious and hence wanted us to talk to his family  - Admit to SDU    Thank you for your consult.   Please call us back with any worsening clinical status or with concerns & questions.   We will Sign Off for now.     Shar Elena MD   Division of Critical Care Medicine  Department of Medicine   [facility]   Cell [phone]        
76M with oxygen dependent COPD, bilateral carotid stenosis, laryngeal cancer s/p chemo/RT, s/p PEG, recent aspiration pneumonia, now here with acute hypoxic respiratory failure due to recurrent pneumonia, on high flow, MATHIEU improving.     #1 Acute hypoxic respiratory failure - due to pneumonia with underlying copd. on antibiotics and steroids.   #2 Pneumonia - on antibiotics  #3 Dysphagia - PEG tube in place patient with potential aspiration events. NPO for now but will need to restart soon  #4 Encounter for palliative care - see goals of care. will reach out to wife as well in the next 24-48 hours.

## 2022-01-12 NOTE — PROGRESS NOTE ADULT - ASSESSMENT
1: Acute on chronic respiratory failure   - Hx of COPD on home O2 2L  - O2 Supplementation required to maintain SpO2> 88%; Continue HFNC  - Continue chest PT, and incentive spirometry  - Continue IV methylprednisone     2: Possible recurrent aspiration pneumonia/ pneumonitis   - Hx of laryngeal cancer with PEG tube  - Hx of pseudomonal Aspiration PNA tx w/ Zosyn  - Continues to have episodes of emesis, emesis resembles tube feed consistency  - Patient placed on Aspiration precautions  - Will keep patient NPO except for meds at this time  - FU KUB abdominal XRay  - Antiemetic as needed  - Continue Cefepime for G- and pseudomonal coverage  - Consider vancomycin if MRSA swab is positive        3: Acute metabolic encephalopathy   4: MATHIEU on CKD-2  5: Hyperkalemia   6: Hyperglycemia   7: Metabolic acidosis      Avoid sedative as possible; might need small dose anxiolytic while under duress   - S/p IVF bolus, would benefit form maintenance fluid; TTE (Hx of CAD, non obstructing); ASA and Lipitor      - Avoid Nephrotoxic agents, Adjust medications for renal function, Close urinary output monitoring, UA/Renal imaging reviewed, Replacing electrolytes as needed with Goal K> 4, PO> 3, Mg> 2  - Blood cx, Sputum Cx, Legionella and Strep urinary ag, PCT, RVP with SARS COV-2 PCR; Empiric Meropenem and Vancomycin with multiple hospitalization and de-escalate based on cx    #acute hypoxemic resp failure 2/2 bilateral pneumonia likely aspiration, possibly pseudomonas  - admit to tele  - continue cefepime for now  - MRSA swab - if positive add vancomycin coverage  - sputum culture if able  - blood cultures  - currently HFNC at 50% - wean as tolerates    #COPD on home O2  - will add methylprednisolone 40mg BID for now  - anticipate quick taper to prednisone    #MATHIEU possible ATN  - IV fluids for now  - repeat in the AM    #PEG due to esophageal strictures from prior chemo/radiation of laryngeal cancer  - on glucerna 1.2 aliza 5 feeds of 8oz a day  - will continue while here continuous rate for now    #bilateral carotid stenosis  - continue statin    #HTN  - continue home meds    #DVT ppx  - lovenox daily  #GI ppx  - protonix IV 40mg daily     1: Acute on chronic respiratory failure, COPD  - Hx of COPD on home O2 2L  - O2 Supplementation required to maintain SpO2> 88%; Continue HFNC, currently 50% FIO2, will wean as tolerated  - Continue chest PT, and incentive spirometry  - Continue prednisone 5 mg through PEG tube    2: Possible recurrent aspiration pneumonia/ pneumonitis   - Hx of laryngeal cancer with PEG tube  - Hx of pseudomonal Aspiration PNA tx w/ Zosyn  - Continues to have episodes of emesis, emesis resembles tube feed consistency  - Patient placed on Aspiration precautions  - Will keep patient NPO except for meds at this time  - FU KUB abdominal XRay  - Antiemetic as needed  - Continue Cefepime for G- and pseudomonal coverage  - Consider vancomycin if MRSA swab is positive, will defer at this time since WBC are trending down  - FU Blood Cx and Sputum Cx      3: Acute metabolic encephalopathy (Resolved)  - Avoid sedative as possible  - Likely due to acute hypoxia/ infection    4: MATHIEU, Hyperkalemia (Resolved)  - Avoid Nephrotoxic agents, Adjust medications for renal function  - Monitor Is & Os   - UA/Renal imaging reviewed  - K: 4.5, Cr: 1.08  - Replace electrolytes as needed with Goal K> 4, PO> 3, Mg> 2    5. PEG due to esophageal strictures from prior chemo/radiation of laryngeal cancer  - NPO due to likely aspiration PNA    6. bilateral carotid stenosis  - continue statin    7. HTN  - continue home meds  - Stable     8. DVT ppx  - lovenox daily    9. GI ppx  - protonix IV 40mg daily     1: Acute on chronic respiratory failure, COPD  - Hx of COPD on home O2 2L  - O2 Supplementation required to maintain SpO2> 88%; Continue HFNC, currently 50% FIO2, will wean as tolerated  - Continue chest PT, and incentive spirometry  - Continue prednisone 5 mg through PEG tube    2: Possible recurrent aspiration pneumonia/ pneumonitis   - Hx of laryngeal cancer with PEG tube  - Hx of pseudomonal Aspiration PNA tx w/ Zosyn  - Continues to have episodes of emesis, emesis resembles tube feed consistency  - Patient placed on Aspiration precautions  - Will keep patient NPO except for meds at this time  - FU KUB abdominal XRay  - Antiemetic as needed  - Continue Cefepime for G- and pseudomonal coverage  - Consider vancomycin if MRSA swab is positive, will defer at this time since WBC are trending down  - FU Blood Cx and Sputum Cx    3: Acute metabolic encephalopathy (Resolved)  - Avoid sedative as possible  - Likely due to acute hypoxia/ infection    4: MATHIEU, Hyperkalemia (Resolved)  - Avoid Nephrotoxic agents, Adjust medications for renal function  - Monitor Is & Os   - UA/Renal imaging reviewed  - K: 4.5, Cr: 1.08  - Replace electrolytes as needed with Goal K> 4, PO> 3, Mg> 2    5. PEG due to esophageal strictures from prior chemo/radiation of laryngeal cancer  - NPO due to likely aspiration PNA    6. bilateral carotid stenosis  - continue statin    7. HTN  - continue home meds  - Stable     8. DVT ppx  - lovenox daily    9. GI ppx  - protonix IV 40mg daily

## 2022-01-12 NOTE — GOALS OF CARE CONVERSATION - ADVANCED CARE PLANNING - CONVERSATION DETAILS
Conversation held with patient at bedside. he is alert and oriented x 4 and was able to tell me about his history and overall condition. I explored with him his thoughts reading advanced care planning and directives, he states at this time he is unsure and would need to further discuss with his family - wife in particular. He did given me permission to call his wife. Will touch base with her in the next 24-48 hours. He is clinically improving, remains on high flow but mental state improved. will continue to follow course.

## 2022-01-13 ENCOUNTER — TRANSCRIPTION ENCOUNTER (OUTPATIENT)
Age: 77
End: 2022-01-13

## 2022-01-13 LAB
ALBUMIN SERPL ELPH-MCNC: 2.7 G/DL — LOW (ref 3.3–5.2)
ALP SERPL-CCNC: 56 U/L — SIGNIFICANT CHANGE UP (ref 40–120)
ALT FLD-CCNC: 14 U/L — SIGNIFICANT CHANGE UP
ANION GAP SERPL CALC-SCNC: 11 MMOL/L — SIGNIFICANT CHANGE UP (ref 5–17)
AST SERPL-CCNC: 30 U/L — SIGNIFICANT CHANGE UP
BILIRUB SERPL-MCNC: <0.2 MG/DL — LOW (ref 0.4–2)
BUN SERPL-MCNC: 30.5 MG/DL — HIGH (ref 8–20)
CALCIUM SERPL-MCNC: 9.2 MG/DL — SIGNIFICANT CHANGE UP (ref 8.6–10.2)
CHLORIDE SERPL-SCNC: 99 MMOL/L — SIGNIFICANT CHANGE UP (ref 98–107)
CO2 SERPL-SCNC: 29 MMOL/L — SIGNIFICANT CHANGE UP (ref 22–29)
CREAT SERPL-MCNC: 0.95 MG/DL — SIGNIFICANT CHANGE UP (ref 0.5–1.3)
GLUCOSE SERPL-MCNC: 101 MG/DL — HIGH (ref 70–99)
HCT VFR BLD CALC: 29 % — LOW (ref 39–50)
HGB BLD-MCNC: 8.9 G/DL — LOW (ref 13–17)
MCHC RBC-ENTMCNC: 28.4 PG — SIGNIFICANT CHANGE UP (ref 27–34)
MCHC RBC-ENTMCNC: 30.7 GM/DL — LOW (ref 32–36)
MCV RBC AUTO: 92.7 FL — SIGNIFICANT CHANGE UP (ref 80–100)
MRSA PCR RESULT.: SIGNIFICANT CHANGE UP
PLATELET # BLD AUTO: 161 K/UL — SIGNIFICANT CHANGE UP (ref 150–400)
POTASSIUM SERPL-MCNC: 4.9 MMOL/L — SIGNIFICANT CHANGE UP (ref 3.5–5.3)
POTASSIUM SERPL-SCNC: 4.9 MMOL/L — SIGNIFICANT CHANGE UP (ref 3.5–5.3)
PROT SERPL-MCNC: 7.1 G/DL — SIGNIFICANT CHANGE UP (ref 6.6–8.7)
RBC # BLD: 3.13 M/UL — LOW (ref 4.2–5.8)
RBC # FLD: 14.5 % — SIGNIFICANT CHANGE UP (ref 10.3–14.5)
S AUREUS DNA NOSE QL NAA+PROBE: SIGNIFICANT CHANGE UP
SODIUM SERPL-SCNC: 139 MMOL/L — SIGNIFICANT CHANGE UP (ref 135–145)
WBC # BLD: 9.93 K/UL — SIGNIFICANT CHANGE UP (ref 3.8–10.5)
WBC # FLD AUTO: 9.93 K/UL — SIGNIFICANT CHANGE UP (ref 3.8–10.5)

## 2022-01-13 PROCEDURE — 99233 SBSQ HOSP IP/OBS HIGH 50: CPT

## 2022-01-13 PROCEDURE — 99497 ADVNCD CARE PLAN 30 MIN: CPT | Mod: 25

## 2022-01-13 RX ADMIN — CEFEPIME 100 MILLIGRAM(S): 1 INJECTION, POWDER, FOR SOLUTION INTRAMUSCULAR; INTRAVENOUS at 17:41

## 2022-01-13 RX ADMIN — Medication 81 MILLIGRAM(S): at 11:51

## 2022-01-13 RX ADMIN — Medication 12.5 MILLIGRAM(S): at 17:41

## 2022-01-13 RX ADMIN — TIOTROPIUM BROMIDE 1 CAPSULE(S): 18 CAPSULE ORAL; RESPIRATORY (INHALATION) at 09:32

## 2022-01-13 RX ADMIN — CEFEPIME 100 MILLIGRAM(S): 1 INJECTION, POWDER, FOR SOLUTION INTRAMUSCULAR; INTRAVENOUS at 05:46

## 2022-01-13 RX ADMIN — Medication 5 MILLIGRAM(S): at 11:51

## 2022-01-13 RX ADMIN — LIDOCAINE 1 PATCH: 4 CREAM TOPICAL at 11:50

## 2022-01-13 RX ADMIN — Medication 5 MILLIGRAM(S): at 05:27

## 2022-01-13 RX ADMIN — Medication 112 MICROGRAM(S): at 05:26

## 2022-01-13 RX ADMIN — ATORVASTATIN CALCIUM 40 MILLIGRAM(S): 80 TABLET, FILM COATED ORAL at 21:01

## 2022-01-13 RX ADMIN — Medication 12.5 MILLIGRAM(S): at 05:27

## 2022-01-13 NOTE — PROGRESS NOTE ADULT - ATTENDING COMMENTS
Attending Attestation:    I saw and examined the patient, and was physically present for the key portions of the Evaluation and Management service provided. I agree with the above history, physical, and plan with the following additions:    General: in no acute distress; frail appearing  Eyes: PERRLA, EOMI; conjunctiva and sclera clear  Head: Normocephalic; atraumatic  ENMT: No nasal discharge; airway clear; HFNC in place; good dentition  Neck: Supple; non tender; no masses  Respiratory: bilateral rhonchi  Cardiovascular: tachycardic rate and rhythm. No murmurs, gallops or rubs  Gastrointestinal: distended; decreased bowel sounds; non-tender  Genitourinary: No costovertebral angle tenderness  Extremities: Normal range of motion, No clubbing, cyanosis or edema  Vascular: Peripheral pulses palpable 2+ bilaterally  Neurological: Alert and oriented to place, year and , and name  Psychiatric: Cooperative and appropriate    Brief A/P:  77 yo male with history of laryngeal cancer s/p chemo/radiation complicated with esophageal stricture now with PEG, recurrent aspiration pneumonia (recently hospitalized in 2021 at [facility]). Patient now admitted with aspiration pneumonia on cefepime and vancomycin. Can discontinue vancomycin if MRSA negative. patient passing gas; will restart tube feeds but at smaller amount. Titrate O2 as allowed, discussed with RT.
Attending Attestation:    I saw and examined the patient, and was physically present for the key portions of the Evaluation and Management service provided. I agree with the above history, physical, and plan with the following additions:    General: in no acute distress; frail appearing  Eyes: PERRLA, EOMI; conjunctiva and sclera clear  Head: Normocephalic; atraumatic  ENMT: No nasal discharge; airway clear; HFNC in place; good dentition  Neck: Supple; non tender; no masses  Respiratory: bilateral rhonchi  Cardiovascular: tachycardic rate and rhythm. No murmurs, gallops or rubs  Gastrointestinal: distended; decreased bowel sounds; non-tender  Genitourinary: No costovertebral angle tenderness  Extremities: Normal range of motion, No clubbing, cyanosis or edema  Vascular: Peripheral pulses palpable 2+ bilaterally  Neurological: Alert and oriented to place, year and , and name  Psychiatric: Cooperative and appropriate    Brief A/P:  77 yo male with history of laryngeal cancer s/p chemo/radiation complicated with esophageal stricture now with PEG, recurrent aspiration pneumonia (recently hospitalized in 2021 at [facility]). Patient now admitted with aspiration pneumonia on cefepime and vancomycin. Can discontinue vancomycin if MRSA negative. KUB today with some stool in colon. Will give suppository tonite. Continue to wean O2 as allows. Palliative consult appreciated.

## 2022-01-13 NOTE — PROGRESS NOTE ADULT - SUBJECTIVE AND OBJECTIVE BOX
CC: Respiratory Distress/ PNA (12 Jan 2022 10:42)    HPI:  77 yo male former smoker with history of COPD on home O2, B/L carotid artery stenosis, laryngeal cancer (S/P chemo and RT), PEG tube, recent aspiration pneumonia (pseudomonas) treated at [facility] with zosyn/vanco for 7 days who presents from home after acute onset SOB. Yesterday had coughed up sputum followed by emesis. Today in acute respiratory distress. Patient came via EMS and initially placed on bipap 10/5/50% for spO2 of 80% but vomited again, was taken off and placed on HFNC at 50% now % SpO2. Patient with fever on arrival, leukocytosis and bilateral infiltrate on CT scan.  admitted for pneumonia. (11 Jan 2022 14:53)    INTERVAL HPI/OVERNIGHT EVENTS: No acute events overnight.  Patient seen and examined at bedside in no acute distress.  Patient states that he wants to go home but he understands that he cannot go home until oxygen has been weaned.  Currently still requiring HFNC.  Patient has not had a bowel movement since admission, but he refused suppository stating that he has not eaten so there would be nothing to stool. He is passing gas.  Patient denies abdominal pain, nausea.      Vital Signs Last 24 Hrs  T(C): 37.2 (13 Jan 2022 11:52), Max: 37.2 (12 Jan 2022 14:55)  T(F): 98.9 (13 Jan 2022 11:52), Max: 99 (12 Jan 2022 14:55)  HR: 93 (13 Jan 2022 11:52) (77 - 105)  BP: 154/67 (13 Jan 2022 11:52) (102/61 - 165/78)  RR: 18 (13 Jan 2022 11:52) (18 - 20)  SpO2: 95% (13 Jan 2022 11:52) (94% - 99%)    PHYSICAL EXAM:  General: in no acute distress  Eyes: PERRLA, EOMI; conjunctiva and sclera clear  Head: Normocephalic; atraumatic  ENMT: No nasal discharge; airway clear  Neck: Supple; non tender; no masses  Respiratory: HFNC in place, Diffuse ronchi and crackles  Cardiovascular: Regular rate and rhythm. S1 and S2 Normal; No murmurs, gallops or rubs  Gastrointestinal: Mildly distended, no tenderness on palpation.    Genitourinary: No costovertebral angle tenderness  Extremities: Normal range of motion, No clubbing, cyanosis or edema  Vascular: Peripheral pulses palpable 2+ bilaterally  Neurological: Alert and oriented x4  Skin: Warm and dry. No acute rash  Lymph Nodes: No acute cervical adenopathy  Musculoskeletal: Normal gait, tone, without deformities  Psychiatric: Cooperative and appropriate    I&O's Detail    12 Jan 2022 07:01  -  13 Jan 2022 07:00  --------------------------------------------------------  IN:    Enteral Tube Flush: 30 mL    Jevity 1.2: 237 mL    Lactated Ringers: 300 mL  Total IN: 567 mL    OUT:    Voided (mL): 300 mL  Total OUT: 300 mL    Total NET: 267 mL                                    8.9    9.93  )-----------( 161      ( 13 Jan 2022 11:20 )             29.0     13 Jan 2022 11:20    139    |  99     |  30.5   ----------------------------<  101    4.9     |  29.0   |  0.95     Ca    9.2        13 Jan 2022 11:20    TPro  7.1    /  Alb  2.7    /  TBili  <0.2   /  DBili  x      /  AST  30     /  ALT  14     /  AlkPhos  56     13 Jan 2022 11:20      CAPILLARY BLOOD GLUCOSE        LIVER FUNCTIONS - ( 13 Jan 2022 11:20 )  Alb: 2.7 g/dL / Pro: 7.1 g/dL / ALK PHOS: 56 U/L / ALT: 14 U/L / AST: 30 U/L / GGT: x               MEDICATIONS  (STANDING):  aspirin  chewable 81 milliGRAM(s) Oral daily  atorvastatin 40 milliGRAM(s) Oral at bedtime  bisacodyl Suppository 10 milliGRAM(s) Rectal at bedtime  cefepime   IVPB 2000 milliGRAM(s) IV Intermittent every 12 hours  cefepime   IVPB      levothyroxine 112 MICROGram(s) Oral daily  lidocaine   4% Patch 1 Patch Transdermal daily  lisinopril 10 milliGRAM(s) Enteral Tube daily  metoprolol tartrate 12.5 milliGRAM(s) Oral two times a day  oxybutynin 5 milliGRAM(s) Oral daily  pantoprazole   Suspension 40 milliGRAM(s) Oral daily  predniSONE   Tablet 5 milliGRAM(s) Oral daily  tiotropium 18 MICROgram(s) Capsule 1 Capsule(s) Inhalation daily    MEDICATIONS  (PRN):  acetaminophen  Suppository .. 650 milliGRAM(s) Rectal every 6 hours PRN Temp greater or equal to 38C (100.4F), Mild Pain (1 - 3)  ondansetron Injectable 4 milliGRAM(s) IV Push every 4 hours PRN Nausea and/or Vomiting      RADIOLOGY & ADDITIONAL TESTS:

## 2022-01-13 NOTE — DISCHARGE NOTE NURSING/CASE MANAGEMENT/SOCIAL WORK - NSDCFUADDAPPT_GEN_ALL_CORE_FT
AGREES TO MEDS THROUGH [facility] AND MEDS TO BEDS  FAMILY WOULD LIKE TO MAKE THEIR OWN PULMONARY FOLLOW UP APPT.

## 2022-01-13 NOTE — DISCHARGE NOTE NURSING/CASE MANAGEMENT/SOCIAL WORK - NSDCPEFALRISK_GEN_ALL_CORE
For information on Fall & Injury Prevention, visit: [email or web address] OR  [email or web address] OR  https://www.cdc.gov/steadi/patient.html

## 2022-01-13 NOTE — PROGRESS NOTE ADULT - ASSESSMENT
1: Acute on chronic respiratory failure, COPD  - Hx of COPD on home O2 2L  - O2 Supplementation required to maintain SpO2> 88%; Continue HFNC, currently 50% FIO2, will wean as tolerated, attempt was made yesterday but patient could not tolerate  - Continue chest PT, and incentive spirometry  - Continue prednisone 5 mg through PEG tube    2: Possible recurrent aspiration pneumonia/ pneumonitis, Sepsis present on admission  - Hx of laryngeal cancer with PEG tube  - Hx of pseudomonal Aspiration PNA tx w/ Zosyn  - No episodes of emesis today, will restart tube feed in bolus fashion to minimize risk of aspiration  - Continue Aspiration precautions  - KUB abdominal XRay shows nonobstructive bowel pattern  - Antiemetic as needed  - Continue Cefepime for G- and pseudomonal coverage  - Consider vancomycin if MRSA swab is positive, will defer at this time since WBC are trending down  - FU Blood Cx and Sputum Cx    3: Acute metabolic encephalopathy (Resolved)  - Avoid sedative as possible  - Likely due to acute hypoxia/ infection    4: MATHIEU, Hyperkalemia (Resolved)  - Avoid Nephrotoxic agents, Adjust medications for renal function  - Monitor Is & Os   - UA/Renal imaging reviewed  - K: 4.9, Cr: 0.95  - Replace electrolytes as needed with Goal K> 4, PO> 3, Mg> 2    5. PEG due to esophageal strictures from prior chemo/radiation of laryngeal cancer  - Tube feeds with jevity restarted today in bolus fashion 4x daily    6. bilateral carotid stenosis  - continue statin    7. HTN  - continue home meds  - Stable     8. DVT ppx  - lovenox daily    9. GI ppx  - protonix IV 40mg daily

## 2022-01-13 NOTE — DISCHARGE NOTE NURSING/CASE MANAGEMENT/SOCIAL WORK - PATIENT PORTAL LINK FT
You can access the FollowMyHealth Patient Portal offered by [facility] by registering at the following website: [email or web address]. By joining [facility]’s FollowMyHealth portal, you will also be able to view your health information using other applications (apps) compatible with our system.

## 2022-01-13 NOTE — CDI QUERY NOTE - NSCDIOTHERTXTBX_GEN_ALL_CORE_HH
Admitted with T 102.9, P 128, R 22, WBC 12.41, AMS    ED- Impression:  Principal Discharge Dx Sepsis due to pneumonia.  Patient with AMS 2/2 acute respiratory distress and sepsis. Will obtain sepsis work-up, start zosyn for presumed pneumonia.  SEPSIS – was this patient treated for sepsis? Yes.    H&P- #acute hypoxemic resp failure 2/2 bilateral pneumonia likely aspiration, possibly pseudomonas    1/12 Hospitalist- : Acute on chronic respiratory failure, COPD  Possible recurrent aspiration pneumonia/ pneumonitis   Acute metabolic encephalopathy (Resolved)  MATHIEU, Hyperkalemia (Resolved)    Please clarify the status of sepsis    1) Sepsis present on admission  2) Sepsis ruled out  3) Other

## 2022-01-14 ENCOUNTER — TRANSCRIPTION ENCOUNTER (OUTPATIENT)
Age: 77
End: 2022-01-14

## 2022-01-14 VITALS
TEMPERATURE: 99 F | RESPIRATION RATE: 18 BRPM | DIASTOLIC BLOOD PRESSURE: 52 MMHG | HEART RATE: 90 BPM | SYSTOLIC BLOOD PRESSURE: 96 MMHG | OXYGEN SATURATION: 99 %

## 2022-01-14 LAB
ANION GAP SERPL CALC-SCNC: 14 MMOL/L — SIGNIFICANT CHANGE UP (ref 5–17)
BUN SERPL-MCNC: 35 MG/DL — HIGH (ref 8–20)
CALCIUM SERPL-MCNC: 9 MG/DL — SIGNIFICANT CHANGE UP (ref 8.6–10.2)
CHLORIDE SERPL-SCNC: 98 MMOL/L — SIGNIFICANT CHANGE UP (ref 98–107)
CO2 SERPL-SCNC: 28 MMOL/L — SIGNIFICANT CHANGE UP (ref 22–29)
CREAT SERPL-MCNC: 0.92 MG/DL — SIGNIFICANT CHANGE UP (ref 0.5–1.3)
GLUCOSE BLDC GLUCOMTR-MCNC: 107 MG/DL — HIGH (ref 70–99)
GLUCOSE SERPL-MCNC: 168 MG/DL — HIGH (ref 70–99)
HCT VFR BLD CALC: 30.4 % — LOW (ref 39–50)
HGB BLD-MCNC: 9.2 G/DL — LOW (ref 13–17)
MAGNESIUM SERPL-MCNC: 1.8 MG/DL — SIGNIFICANT CHANGE UP (ref 1.8–2.6)
MCHC RBC-ENTMCNC: 28.1 PG — SIGNIFICANT CHANGE UP (ref 27–34)
MCHC RBC-ENTMCNC: 30.3 GM/DL — LOW (ref 32–36)
MCV RBC AUTO: 93 FL — SIGNIFICANT CHANGE UP (ref 80–100)
PHOSPHATE SERPL-MCNC: 2.7 MG/DL — SIGNIFICANT CHANGE UP (ref 2.4–4.7)
PLATELET # BLD AUTO: 190 K/UL — SIGNIFICANT CHANGE UP (ref 150–400)
POTASSIUM SERPL-MCNC: 4.4 MMOL/L — SIGNIFICANT CHANGE UP (ref 3.5–5.3)
POTASSIUM SERPL-SCNC: 4.4 MMOL/L — SIGNIFICANT CHANGE UP (ref 3.5–5.3)
RBC # BLD: 3.27 M/UL — LOW (ref 4.2–5.8)
RBC # FLD: 14.2 % — SIGNIFICANT CHANGE UP (ref 10.3–14.5)
SARS-COV-2 RNA SPEC QL NAA+PROBE: SIGNIFICANT CHANGE UP
SODIUM SERPL-SCNC: 140 MMOL/L — SIGNIFICANT CHANGE UP (ref 135–145)
WBC # BLD: 6.09 K/UL — SIGNIFICANT CHANGE UP (ref 3.8–10.5)
WBC # FLD AUTO: 6.09 K/UL — SIGNIFICANT CHANGE UP (ref 3.8–10.5)

## 2022-01-14 PROCEDURE — U0003: CPT

## 2022-01-14 PROCEDURE — 94760 N-INVAS EAR/PLS OXIMETRY 1: CPT

## 2022-01-14 PROCEDURE — 82435 ASSAY OF BLOOD CHLORIDE: CPT

## 2022-01-14 PROCEDURE — 82803 BLOOD GASES ANY COMBINATION: CPT

## 2022-01-14 PROCEDURE — 82962 GLUCOSE BLOOD TEST: CPT

## 2022-01-14 PROCEDURE — 99233 SBSQ HOSP IP/OBS HIGH 50: CPT

## 2022-01-14 PROCEDURE — 82533 TOTAL CORTISOL: CPT

## 2022-01-14 PROCEDURE — 87040 BLOOD CULTURE FOR BACTERIA: CPT

## 2022-01-14 PROCEDURE — 87641 MR-STAPH DNA AMP PROBE: CPT

## 2022-01-14 PROCEDURE — 85018 HEMOGLOBIN: CPT

## 2022-01-14 PROCEDURE — 85730 THROMBOPLASTIN TIME PARTIAL: CPT

## 2022-01-14 PROCEDURE — 71250 CT THORAX DX C-: CPT | Mod: MA

## 2022-01-14 PROCEDURE — 84100 ASSAY OF PHOSPHORUS: CPT

## 2022-01-14 PROCEDURE — 74018 RADEX ABDOMEN 1 VIEW: CPT

## 2022-01-14 PROCEDURE — 93005 ELECTROCARDIOGRAM TRACING: CPT

## 2022-01-14 PROCEDURE — 86803 HEPATITIS C AB TEST: CPT

## 2022-01-14 PROCEDURE — 80053 COMPREHEN METABOLIC PANEL: CPT

## 2022-01-14 PROCEDURE — 36415 COLL VENOUS BLD VENIPUNCTURE: CPT

## 2022-01-14 PROCEDURE — 96361 HYDRATE IV INFUSION ADD-ON: CPT

## 2022-01-14 PROCEDURE — 87640 STAPH A DNA AMP PROBE: CPT

## 2022-01-14 PROCEDURE — 84132 ASSAY OF SERUM POTASSIUM: CPT

## 2022-01-14 PROCEDURE — 84443 ASSAY THYROID STIM HORMONE: CPT

## 2022-01-14 PROCEDURE — 96365 THER/PROPH/DIAG IV INF INIT: CPT

## 2022-01-14 PROCEDURE — 71045 X-RAY EXAM CHEST 1 VIEW: CPT

## 2022-01-14 PROCEDURE — 85027 COMPLETE CBC AUTOMATED: CPT

## 2022-01-14 PROCEDURE — 99285 EMERGENCY DEPT VISIT HI MDM: CPT | Mod: 25

## 2022-01-14 PROCEDURE — 83735 ASSAY OF MAGNESIUM: CPT

## 2022-01-14 PROCEDURE — T1013: CPT

## 2022-01-14 PROCEDURE — 85610 PROTHROMBIN TIME: CPT

## 2022-01-14 PROCEDURE — 80048 BASIC METABOLIC PNL TOTAL CA: CPT

## 2022-01-14 PROCEDURE — U0005: CPT

## 2022-01-14 PROCEDURE — 96375 TX/PRO/DX INJ NEW DRUG ADDON: CPT

## 2022-01-14 PROCEDURE — 74176 CT ABD & PELVIS W/O CONTRAST: CPT | Mod: MA

## 2022-01-14 PROCEDURE — 84484 ASSAY OF TROPONIN QUANT: CPT

## 2022-01-14 PROCEDURE — 85014 HEMATOCRIT: CPT

## 2022-01-14 PROCEDURE — 83605 ASSAY OF LACTIC ACID: CPT

## 2022-01-14 PROCEDURE — 94640 AIRWAY INHALATION TREATMENT: CPT

## 2022-01-14 PROCEDURE — 85025 COMPLETE CBC W/AUTO DIFF WBC: CPT

## 2022-01-14 PROCEDURE — 0225U NFCT DS DNA&RNA 21 SARSCOV2: CPT

## 2022-01-14 PROCEDURE — 84295 ASSAY OF SERUM SODIUM: CPT

## 2022-01-14 PROCEDURE — 87086 URINE CULTURE/COLONY COUNT: CPT

## 2022-01-14 PROCEDURE — 81001 URINALYSIS AUTO W/SCOPE: CPT

## 2022-01-14 PROCEDURE — 82947 ASSAY GLUCOSE BLOOD QUANT: CPT

## 2022-01-14 PROCEDURE — 94660 CPAP INITIATION&MGMT: CPT

## 2022-01-14 PROCEDURE — 82330 ASSAY OF CALCIUM: CPT

## 2022-01-14 RX ORDER — LISINOPRIL 2.5 MG/1
1 TABLET ORAL
Qty: 30 | Refills: 0
Start: 2022-01-14 | End: 2022-02-12

## 2022-01-14 RX ORDER — CIPROFLOXACIN LACTATE 400MG/40ML
1 VIAL (ML) INTRAVENOUS
Qty: 3 | Refills: 0
Start: 2022-01-14 | End: 2022-01-16

## 2022-01-14 RX ORDER — ALBUTEROL 90 UG/1
3 AEROSOL, METERED ORAL
Qty: 0 | Refills: 0 | DISCHARGE

## 2022-01-14 RX ADMIN — LISINOPRIL 10 MILLIGRAM(S): 2.5 TABLET ORAL at 05:08

## 2022-01-14 RX ADMIN — Medication 81 MILLIGRAM(S): at 13:26

## 2022-01-14 RX ADMIN — Medication 5 MILLIGRAM(S): at 18:02

## 2022-01-14 RX ADMIN — Medication 5 MILLIGRAM(S): at 05:04

## 2022-01-14 RX ADMIN — CEFEPIME 100 MILLIGRAM(S): 1 INJECTION, POWDER, FOR SOLUTION INTRAMUSCULAR; INTRAVENOUS at 17:48

## 2022-01-14 RX ADMIN — CEFEPIME 100 MILLIGRAM(S): 1 INJECTION, POWDER, FOR SOLUTION INTRAMUSCULAR; INTRAVENOUS at 05:03

## 2022-01-14 RX ADMIN — PANTOPRAZOLE SODIUM 40 MILLIGRAM(S): 20 TABLET, DELAYED RELEASE ORAL at 13:30

## 2022-01-14 RX ADMIN — Medication 12.5 MILLIGRAM(S): at 17:48

## 2022-01-14 RX ADMIN — Medication 12.5 MILLIGRAM(S): at 05:08

## 2022-01-14 RX ADMIN — Medication 112 MICROGRAM(S): at 05:04

## 2022-01-14 RX ADMIN — LIDOCAINE 1 PATCH: 4 CREAM TOPICAL at 13:25

## 2022-01-14 NOTE — DISCHARGE NOTE PROVIDER - NSDCMRMEDTOKEN_GEN_ALL_CORE_FT
aspirin 81 mg oral tablet, chewable: 1 tab(s) by gastrostomy tube once a day  atorvastatin 40 mg oral tablet: 1 tab(s) orally once a day   bisacodyl 10 mg rectal suppository: 1 suppository(ies) rectal once a day (at bedtime)  Cod Liver oral oil: 5 milliliter(s) by gastrostomy tube once a day  enteral feeding bolus  of jevity  1.2 aliza: 237 milliliter(s) by gastrostomy tube 4 times a day  ferrous sulfate 200 mg/5 mL oral elixir: 5 milliliter(s) by gastrostomy tube once a day  Flector Patch 1.3% topical film, extended release: Apply topically to affected area once a day  Florastor 250 mg oral capsule: 1 cap(s) by gastrostomy tube 4 times a day  guaiFENesin 200 mg/5 mL oral liquid: 400 milligram(s) by gastrostomy tube  levoFLOXacin 750 mg oral tablet: 1 tab(s) orally once a day   levothyroxine 112 mcg (0.112 mg) oral tablet: 1 tab(s) by gastrostomy tube once a day  lisinopril 10 mg oral tablet: 1 tab(s) orally once a day  Metoprolol Tartrate 25 mg oral tablet: 0.5 tab(s) by gastrostomy tube 2 times a day  NexIUM 40 mg oral delayed release capsule: 1 cap(s) by gastrostomy tube once a day  Peterson Essentials Basic 1400 mg/5 mL oral liquid: 5 milliliter(s) by gastrostomy tube once a day  oxybutynin 5 mg/5 mL oral syrup: 5 milliliter(s) by gastrostomy tube 2 times a day  oxygen  2  to 3  liters/min via nasal cannula  as  needed:   predniSONE 5 mg oral tablet: 1 tab(s) by gastrostomy tube once a day  Spiriva HandiHaler 18 mcg inhalation capsule: 1 cap(s) inhaled once a day  Vitamin D3 10 mcg/mL (400 intl units/mL) oral liquid: 1 milliliter(s) by gastrostomy tube once a day

## 2022-01-14 NOTE — CHART NOTE - NSCHARTNOTEFT_GEN_A_CORE
palliative care social work note.    LIDIA contacted spouse Devorah to offer support and address coping with patients health issues as well as engage in advance care planning discussions. Devorah reviewed that she and patient have been dealing with Peg tube since 2011 after laryngeal Ca dx and treatments both chemo and RT. Devorah reports that he was functioning at home with home oxygen but since October and hospitalization at [facility] for aspiration there have been some changes. LIDIA inquired if during prior admissions she and spouse may have had discussions regarding end of life decisions in event of needing intubation or CPR. Devorah states that will not happen and he is coming home. LIDIA addressed the importance of the MOLST form for directives in the event of an emergency. Devorah states that she would then need to be called in lilia emergency but not willing to put into place directives at this time.

## 2022-01-14 NOTE — DISCHARGE NOTE PROVIDER - HOSPITAL COURSE
75 yo male former smoker with history of COPD on home O2, B/L carotid artery stenosis, laryngeal cancer (S/P chemo and RT), PEG tube, recent aspiration pneumonia (pseudomonas) treated at [facility] with zosyn/vanco for 7 days who presents from home after acute onset SOB. Yesterday had coughed up sputum followed by emesis. Today in acute respiratory distress. Patient came via EMS and initially placed on bipap 10/5/50% for spO2 of 80% but vomited again, was taken off and placed on HFNC at 50% now % SpO2. Patient with fever on arrival, leukocytosis and bilateral infiltrate on CT scan.  Patient was admitted for pneumonia.  He was treated with Cefepime and prednisone.  Patient was weaned off of HFNC and is currently tolerating 2L NC which is his baseline at home.  Of note patient had hyperkalemia and MATHIEU that resolved with fluid administration.      Patient is mentating well, no longer has SOB, and is breathing without difficulty.  He is tolerating his tube feeds without complication.  Patient is hemdodynamically stable for discharge and advised to follow up with PCP and Pulmonology.

## 2022-01-14 NOTE — DISCHARGE NOTE PROVIDER - NSDCCPCAREPLAN_GEN_ALL_CORE_FT
PRINCIPAL DISCHARGE DIAGNOSIS  Diagnosis: Sepsis due to pneumonia  Assessment and Plan of Treatment: You were treated with antibiotics.  Please follow up with your PCP and pulmonology for further management.  You will be discharged on oral antibiotics, Levofloxacin 750 mg.  You have also been prescribed prednisone 5 mg daily.      SECONDARY DISCHARGE DIAGNOSES  Diagnosis: Acute metabolic encephalopathy  Assessment and Plan of Treatment: You initially presented with confusion likely due to your infection.  This has resolved.    Diagnosis: MATHIEU (acute kidney injury)  Assessment and Plan of Treatment: Your kidney function was decreased initially likely due to your infection.  This has resolved

## 2022-01-14 NOTE — DISCHARGE NOTE PROVIDER - CARE PROVIDERS DIRECT ADDRESSES
[email or web address]

## 2022-01-14 NOTE — PROGRESS NOTE ADULT - ASSESSMENT
1: Acute on chronic respiratory failure, COPD  - Hx of COPD on home O2 2L  - O2 Supplementation required to maintain SpO2> 88%; Continue NC 2 L  - Continue chest PT, and incentive spirometry  - Continue prednisone 5 mg through PEG tube    2: Possible recurrent aspiration pneumonia/ pneumonitis, Sepsis present on admission  - Hx of laryngeal cancer with PEG tube  - Hx of pseudomonal Aspiration PNA tx w/ Zosyn  - No episodes of emesis today, will restart tube feed in bolus fashion to minimize risk of aspiration  - Continue Aspiration precautions  - KUB abdominal XRay shows nonobstructive bowel pattern  - Antiemetic as needed  - Continue Cefepime for G- and pseudomonal coverage, will be discharged on levofloxacin  - MRSA swab negative  - FU Blood Cx and Sputum Cx    3: Acute metabolic encephalopathy (Resolved)  - Avoid sedative as possible  - Likely due to acute hypoxia/ infection    4: MATHIEU, Hyperkalemia (Resolved)  - Avoid Nephrotoxic agents, Adjust medications for renal function  - Monitor Is & Os   - UA/Renal imaging reviewed  - Replace electrolytes as needed with Goal K> 4, PO> 3, Mg> 2    5. PEG due to esophageal strictures from prior chemo/radiation of laryngeal cancer  - Tube feeds with jevity restarted today in bolus fashion 4x daily  - tolerating well    6. bilateral carotid stenosis  - continue statin    7. HTN  - continue home meds  - Stable     8. DVT ppx  - lovenox daily    9. GI ppx  - protonix IV 40mg daily

## 2022-01-14 NOTE — DISCHARGE NOTE PROVIDER - ATTENDING DISCHARGE PHYSICAL EXAMINATION:
General: in no acute distress; frail appearing  Eyes: PERRLA, EOMI; conjunctiva and sclera clear  Head: Normocephalic; atraumatic  ENMT: No nasal discharge; airway clear; NC in place; good dentition  Neck: Supple; non tender; no masses  Respiratory: bilateral rhonchi  Cardiovascular: tachycardic rate and rhythm. No murmurs, gallops or rubs  Gastrointestinal: distended; decreased bowel sounds; non-tender  Genitourinary: No costovertebral angle tenderness  Extremities: Normal range of motion, No clubbing, cyanosis or edema  Vascular: Peripheral pulses palpable 2+ bilaterally  Neurological: Alert and oriented to place, year and , and name  Psychiatric: Cooperative and appropriate

## 2022-01-14 NOTE — DISCHARGE NOTE PROVIDER - CARE PROVIDER_API CALL
Reynaldo Raymundo  INTERNAL MEDICINE  [address] [place] [address]  Phone: [phone]  Fax: [phone]  Follow Up Time:    Wesley Robertson (DO)  Critical Care Medicine; Internal Medicine; Pulmonary Disease  [address] [place] [address]  Phone: [phone]  Fax: [phone]  Follow Up Time:

## 2022-01-16 LAB
CORTICOSTEROID BINDING GLOBULIN RESULT: 1.7 MG/DL — SIGNIFICANT CHANGE UP
CORTIS F/TOTAL MFR SERPL: 54 % — SIGNIFICANT CHANGE UP
CORTIS SERPL-MCNC: 25 UG/DL — HIGH
CORTISOL, FREE RESULT: 14 UG/DL — HIGH
CULTURE RESULTS: SIGNIFICANT CHANGE UP
CULTURE RESULTS: SIGNIFICANT CHANGE UP
SPECIMEN SOURCE: SIGNIFICANT CHANGE UP
SPECIMEN SOURCE: SIGNIFICANT CHANGE UP

## 2022-04-07 NOTE — ASU PATIENT PROFILE, ADULT - TEACHING/LEARNING LEARNING PREFERENCES
verbal instruction/written material Hydroxyzine Counseling: Patient advised that the medication is sedating and not to drive a car after taking this medication.  Patient informed of potential adverse effects including but not limited to dry mouth, urinary retention, and blurry vision.  The patient verbalized understanding of the proper use and possible adverse effects of hydroxyzine.  All of the patient's questions and concerns were addressed.

## 2022-04-08 ENCOUNTER — INPATIENT (INPATIENT)
Facility: HOSPITAL | Age: 77
LOS: 18 days | Discharge: HOME | DRG: 871 | End: 2022-04-27
Attending: FAMILY MEDICINE | Admitting: INTERNAL MEDICINE
Payer: MEDICARE

## 2022-04-08 VITALS — HEIGHT: 67 IN

## 2022-04-08 DIAGNOSIS — S36.113A LACERATION OF LIVER, UNSPECIFIED DEGREE, INITIAL ENCOUNTER: Chronic | ICD-10-CM

## 2022-04-08 DIAGNOSIS — S72.90XA UNSPECIFIED FRACTURE OF UNSPECIFIED FEMUR, INITIAL ENCOUNTER FOR CLOSED FRACTURE: Chronic | ICD-10-CM

## 2022-04-08 DIAGNOSIS — Z93.1 GASTROSTOMY STATUS: Chronic | ICD-10-CM

## 2022-04-08 DIAGNOSIS — Z98.890 OTHER SPECIFIED POSTPROCEDURAL STATES: Chronic | ICD-10-CM

## 2022-04-08 DIAGNOSIS — J96.01 ACUTE RESPIRATORY FAILURE WITH HYPOXIA: ICD-10-CM

## 2022-04-08 DIAGNOSIS — Z96.653 PRESENCE OF ARTIFICIAL KNEE JOINT, BILATERAL: Chronic | ICD-10-CM

## 2022-04-08 LAB
ALBUMIN SERPL ELPH-MCNC: 3.5 G/DL — SIGNIFICANT CHANGE UP (ref 3.3–5.2)
ALP SERPL-CCNC: 107 U/L — SIGNIFICANT CHANGE UP (ref 40–120)
ALT FLD-CCNC: 31 U/L — SIGNIFICANT CHANGE UP
ANION GAP SERPL CALC-SCNC: 11 MMOL/L — SIGNIFICANT CHANGE UP (ref 5–17)
ANION GAP SERPL CALC-SCNC: 13 MMOL/L — SIGNIFICANT CHANGE UP (ref 5–17)
ANION GAP SERPL CALC-SCNC: 13 MMOL/L — SIGNIFICANT CHANGE UP (ref 5–17)
APPEARANCE UR: CLEAR — SIGNIFICANT CHANGE UP
AST SERPL-CCNC: 33 U/L — SIGNIFICANT CHANGE UP
BACTERIA # UR AUTO: ABNORMAL
BASE EXCESS BLDA CALC-SCNC: 8.1 MMOL/L — HIGH (ref -2–3)
BASE EXCESS BLDV CALC-SCNC: 8.5 MMOL/L — HIGH (ref -2–3)
BASOPHILS # BLD AUTO: 0.03 K/UL — SIGNIFICANT CHANGE UP (ref 0–0.2)
BASOPHILS NFR BLD AUTO: 0.3 % — SIGNIFICANT CHANGE UP (ref 0–2)
BILIRUB SERPL-MCNC: <0.2 MG/DL — LOW (ref 0.4–2)
BILIRUB UR-MCNC: NEGATIVE — SIGNIFICANT CHANGE UP
BLD GP AB SCN SERPL QL: SIGNIFICANT CHANGE UP
BLOOD GAS COMMENTS ARTERIAL: SIGNIFICANT CHANGE UP
BUN SERPL-MCNC: 64.3 MG/DL — HIGH (ref 8–20)
BUN SERPL-MCNC: 68.8 MG/DL — HIGH (ref 8–20)
BUN SERPL-MCNC: 75.3 MG/DL — HIGH (ref 8–20)
CA-I SERPL-SCNC: 1.13 MMOL/L — LOW (ref 1.15–1.33)
CALCIUM SERPL-MCNC: 8.1 MG/DL — LOW (ref 8.6–10.2)
CALCIUM SERPL-MCNC: 8.2 MG/DL — LOW (ref 8.6–10.2)
CALCIUM SERPL-MCNC: 8.9 MG/DL — SIGNIFICANT CHANGE UP (ref 8.6–10.2)
CHLORIDE BLDV-SCNC: 95 MMOL/L — LOW (ref 98–107)
CHLORIDE SERPL-SCNC: 92 MMOL/L — LOW (ref 98–107)
CHLORIDE SERPL-SCNC: 94 MMOL/L — LOW (ref 98–107)
CHLORIDE SERPL-SCNC: 95 MMOL/L — LOW (ref 98–107)
CO2 SERPL-SCNC: 28 MMOL/L — SIGNIFICANT CHANGE UP (ref 22–29)
CO2 SERPL-SCNC: 29 MMOL/L — SIGNIFICANT CHANGE UP (ref 22–29)
CO2 SERPL-SCNC: 31 MMOL/L — HIGH (ref 22–29)
COLOR SPEC: YELLOW — SIGNIFICANT CHANGE UP
CREAT SERPL-MCNC: 1.5 MG/DL — HIGH (ref 0.5–1.3)
CREAT SERPL-MCNC: 1.53 MG/DL — HIGH (ref 0.5–1.3)
CREAT SERPL-MCNC: 1.57 MG/DL — HIGH (ref 0.5–1.3)
D DIMER BLD IA.RAPID-MCNC: 304 NG/ML DDU — HIGH
DIFF PNL FLD: ABNORMAL
EGFR: 46 ML/MIN/1.73M2 — LOW
EGFR: 47 ML/MIN/1.73M2 — LOW
EGFR: 49 ML/MIN/1.73M2 — LOW
EOSINOPHIL # BLD AUTO: 0 K/UL — SIGNIFICANT CHANGE UP (ref 0–0.5)
EOSINOPHIL NFR BLD AUTO: 0 % — SIGNIFICANT CHANGE UP (ref 0–6)
EPI CELLS # UR: SIGNIFICANT CHANGE UP
GAS PNL BLDA: SIGNIFICANT CHANGE UP
GAS PNL BLDV: 136 MMOL/L — SIGNIFICANT CHANGE UP (ref 136–145)
GAS PNL BLDV: SIGNIFICANT CHANGE UP
GLUCOSE BLDC GLUCOMTR-MCNC: 104 MG/DL — HIGH (ref 70–99)
GLUCOSE BLDV-MCNC: 171 MG/DL — HIGH (ref 70–99)
GLUCOSE SERPL-MCNC: 174 MG/DL — HIGH (ref 70–99)
GLUCOSE SERPL-MCNC: 181 MG/DL — HIGH (ref 70–99)
GLUCOSE SERPL-MCNC: 230 MG/DL — HIGH (ref 70–99)
GLUCOSE UR QL: NEGATIVE MG/DL — SIGNIFICANT CHANGE UP
HCO3 BLDA-SCNC: 34 MMOL/L — HIGH (ref 21–28)
HCO3 BLDV-SCNC: 35 MMOL/L — HIGH (ref 22–29)
HCT VFR BLD CALC: 30.6 % — LOW (ref 39–50)
HCT VFR BLDA CALC: 29 % — SIGNIFICANT CHANGE UP
HGB BLD CALC-MCNC: 9.5 G/DL — LOW (ref 12.6–17.4)
HGB BLD-MCNC: 9.1 G/DL — LOW (ref 13–17)
HOROWITZ INDEX BLDA+IHG-RTO: SIGNIFICANT CHANGE UP
IMM GRANULOCYTES NFR BLD AUTO: 0.7 % — SIGNIFICANT CHANGE UP (ref 0–1.5)
KETONES UR-MCNC: NEGATIVE — SIGNIFICANT CHANGE UP
LACTATE BLDV-MCNC: 3.7 MMOL/L — HIGH (ref 0.5–2)
LEUKOCYTE ESTERASE UR-ACNC: NEGATIVE — SIGNIFICANT CHANGE UP
LYMPHOCYTES # BLD AUTO: 0.35 K/UL — LOW (ref 1–3.3)
LYMPHOCYTES # BLD AUTO: 3.1 % — LOW (ref 13–44)
MAGNESIUM SERPL-MCNC: 1.8 MG/DL — SIGNIFICANT CHANGE UP (ref 1.6–2.6)
MCHC RBC-ENTMCNC: 28.4 PG — SIGNIFICANT CHANGE UP (ref 27–34)
MCHC RBC-ENTMCNC: 29.7 GM/DL — LOW (ref 32–36)
MCV RBC AUTO: 95.6 FL — SIGNIFICANT CHANGE UP (ref 80–100)
MONOCYTES # BLD AUTO: 0.93 K/UL — HIGH (ref 0–0.9)
MONOCYTES NFR BLD AUTO: 8.3 % — SIGNIFICANT CHANGE UP (ref 2–14)
NEUTROPHILS # BLD AUTO: 9.76 K/UL — HIGH (ref 1.8–7.4)
NEUTROPHILS NFR BLD AUTO: 87.6 % — HIGH (ref 43–77)
NITRITE UR-MCNC: NEGATIVE — SIGNIFICANT CHANGE UP
NT-PROBNP SERPL-SCNC: 394 PG/ML — HIGH (ref 0–300)
PCO2 BLDA: 61 MMHG — HIGH (ref 35–48)
PCO2 BLDV: 71 MMHG — HIGH (ref 42–55)
PH BLDA: 7.35 — SIGNIFICANT CHANGE UP (ref 7.35–7.45)
PH BLDV: 7.3 — LOW (ref 7.32–7.43)
PH UR: 6.5 — SIGNIFICANT CHANGE UP (ref 5–8)
PHOSPHATE SERPL-MCNC: 3 MG/DL — SIGNIFICANT CHANGE UP (ref 2.4–4.7)
PLATELET # BLD AUTO: 191 K/UL — SIGNIFICANT CHANGE UP (ref 150–400)
PO2 BLDA: 74 MMHG — LOW (ref 83–108)
PO2 BLDV: 82 MMHG — HIGH (ref 25–45)
POTASSIUM BLDV-SCNC: 5.2 MMOL/L — HIGH (ref 3.5–5.1)
POTASSIUM SERPL-MCNC: 5.3 MMOL/L — SIGNIFICANT CHANGE UP (ref 3.5–5.3)
POTASSIUM SERPL-MCNC: 5.5 MMOL/L — HIGH (ref 3.5–5.3)
POTASSIUM SERPL-MCNC: 6 MMOL/L — HIGH (ref 3.5–5.3)
POTASSIUM SERPL-SCNC: 5.3 MMOL/L — SIGNIFICANT CHANGE UP (ref 3.5–5.3)
POTASSIUM SERPL-SCNC: 5.5 MMOL/L — HIGH (ref 3.5–5.3)
POTASSIUM SERPL-SCNC: 6 MMOL/L — HIGH (ref 3.5–5.3)
PROT SERPL-MCNC: 8 G/DL — SIGNIFICANT CHANGE UP (ref 6.6–8.7)
PROT UR-MCNC: 30 MG/DL
RAPID RVP RESULT: SIGNIFICANT CHANGE UP
RBC # BLD: 3.2 M/UL — LOW (ref 4.2–5.8)
RBC # FLD: 14.6 % — HIGH (ref 10.3–14.5)
RBC CASTS # UR COMP ASSIST: SIGNIFICANT CHANGE UP /HPF (ref 0–4)
SAO2 % BLDA: 97.6 % — SIGNIFICANT CHANGE UP (ref 94–98)
SAO2 % BLDV: 97.4 % — SIGNIFICANT CHANGE UP
SARS-COV-2 RNA SPEC QL NAA+PROBE: SIGNIFICANT CHANGE UP
SODIUM SERPL-SCNC: 135 MMOL/L — SIGNIFICANT CHANGE UP (ref 135–145)
SODIUM SERPL-SCNC: 135 MMOL/L — SIGNIFICANT CHANGE UP (ref 135–145)
SODIUM SERPL-SCNC: 136 MMOL/L — SIGNIFICANT CHANGE UP (ref 135–145)
SP GR SPEC: 1.01 — SIGNIFICANT CHANGE UP (ref 1.01–1.02)
TROPONIN T SERPL-MCNC: 0.03 NG/ML — SIGNIFICANT CHANGE UP (ref 0–0.06)
UROBILINOGEN FLD QL: NEGATIVE MG/DL — SIGNIFICANT CHANGE UP
WBC # BLD: 11.15 K/UL — HIGH (ref 3.8–10.5)
WBC # FLD AUTO: 11.15 K/UL — HIGH (ref 3.8–10.5)
WBC UR QL: SIGNIFICANT CHANGE UP /HPF (ref 0–5)

## 2022-04-08 PROCEDURE — 36556 INSERT NON-TUNNEL CV CATH: CPT

## 2022-04-08 PROCEDURE — 93010 ELECTROCARDIOGRAM REPORT: CPT

## 2022-04-08 PROCEDURE — 36680 INSERT NEEDLE BONE CAVITY: CPT

## 2022-04-08 PROCEDURE — 74176 CT ABD & PELVIS W/O CONTRAST: CPT | Mod: 26,MA

## 2022-04-08 PROCEDURE — 95720 EEG PHY/QHP EA INCR W/VEEG: CPT

## 2022-04-08 PROCEDURE — 99291 CRITICAL CARE FIRST HOUR: CPT | Mod: 25

## 2022-04-08 PROCEDURE — 71250 CT THORAX DX C-: CPT | Mod: 26,MA

## 2022-04-08 PROCEDURE — 70450 CT HEAD/BRAIN W/O DYE: CPT | Mod: 26,MA

## 2022-04-08 PROCEDURE — 31500 INSERT EMERGENCY AIRWAY: CPT

## 2022-04-08 PROCEDURE — 76937 US GUIDE VASCULAR ACCESS: CPT | Mod: 26

## 2022-04-08 PROCEDURE — 72125 CT NECK SPINE W/O DYE: CPT | Mod: 26,MA

## 2022-04-08 PROCEDURE — 71045 X-RAY EXAM CHEST 1 VIEW: CPT | Mod: 26

## 2022-04-08 RX ORDER — CHLORHEXIDINE GLUCONATE 213 G/1000ML
1 SOLUTION TOPICAL
Refills: 0 | Status: DISCONTINUED | OUTPATIENT
Start: 2022-04-08 | End: 2022-04-27

## 2022-04-08 RX ORDER — SODIUM CHLORIDE 9 MG/ML
10 INJECTION INTRAMUSCULAR; INTRAVENOUS; SUBCUTANEOUS
Refills: 0 | Status: DISCONTINUED | OUTPATIENT
Start: 2022-04-08 | End: 2022-04-22

## 2022-04-08 RX ORDER — SODIUM CHLORIDE 9 MG/ML
1000 INJECTION, SOLUTION INTRAVENOUS
Refills: 0 | Status: DISCONTINUED | OUTPATIENT
Start: 2022-04-08 | End: 2022-04-14

## 2022-04-08 RX ORDER — PIPERACILLIN AND TAZOBACTAM 4; .5 G/20ML; G/20ML
3.38 INJECTION, POWDER, LYOPHILIZED, FOR SOLUTION INTRAVENOUS EVERY 8 HOURS
Refills: 0 | Status: COMPLETED | OUTPATIENT
Start: 2022-04-08 | End: 2022-04-15

## 2022-04-08 RX ORDER — LEVOTHYROXINE SODIUM 125 MCG
80 TABLET ORAL AT BEDTIME
Refills: 0 | Status: DISCONTINUED | OUTPATIENT
Start: 2022-04-08 | End: 2022-04-08

## 2022-04-08 RX ORDER — LEVOTHYROXINE SODIUM 125 MCG
112 TABLET ORAL AT BEDTIME
Refills: 0 | Status: DISCONTINUED | OUTPATIENT
Start: 2022-04-08 | End: 2022-04-08

## 2022-04-08 RX ORDER — ENOXAPARIN SODIUM 100 MG/ML
40 INJECTION SUBCUTANEOUS EVERY 24 HOURS
Refills: 0 | Status: DISCONTINUED | OUTPATIENT
Start: 2022-04-08 | End: 2022-04-08

## 2022-04-08 RX ORDER — INSULIN LISPRO 100/ML
VIAL (ML) SUBCUTANEOUS EVERY 6 HOURS
Refills: 0 | Status: DISCONTINUED | OUTPATIENT
Start: 2022-04-08 | End: 2022-04-27

## 2022-04-08 RX ORDER — LEVOTHYROXINE SODIUM 125 MCG
112 TABLET ORAL DAILY
Refills: 0 | Status: DISCONTINUED | OUTPATIENT
Start: 2022-04-08 | End: 2022-04-21

## 2022-04-08 RX ORDER — SODIUM CHLORIDE 9 MG/ML
500 INJECTION, SOLUTION INTRAVENOUS ONCE
Refills: 0 | Status: COMPLETED | OUTPATIENT
Start: 2022-04-08 | End: 2022-04-08

## 2022-04-08 RX ORDER — DICLOFENAC SODIUM 30 MG/G
1 GEL TOPICAL
Qty: 0 | Refills: 0 | DISCHARGE

## 2022-04-08 RX ORDER — INSULIN HUMAN 100 [IU]/ML
10 INJECTION, SOLUTION SUBCUTANEOUS ONCE
Refills: 0 | Status: COMPLETED | OUTPATIENT
Start: 2022-04-08 | End: 2022-04-08

## 2022-04-08 RX ORDER — SODIUM CHLORIDE 9 MG/ML
1000 INJECTION INTRAMUSCULAR; INTRAVENOUS; SUBCUTANEOUS ONCE
Refills: 0 | Status: COMPLETED | OUTPATIENT
Start: 2022-04-08 | End: 2022-04-08

## 2022-04-08 RX ORDER — TIOTROPIUM BROMIDE 18 UG/1
1 CAPSULE ORAL; RESPIRATORY (INHALATION)
Qty: 0 | Refills: 0 | DISCHARGE

## 2022-04-08 RX ORDER — VANCOMYCIN HCL 1 G
1000 VIAL (EA) INTRAVENOUS ONCE
Refills: 0 | Status: COMPLETED | OUTPATIENT
Start: 2022-04-08 | End: 2022-04-08

## 2022-04-08 RX ORDER — SODIUM CHLORIDE 9 MG/ML
2100 INJECTION INTRAMUSCULAR; INTRAVENOUS; SUBCUTANEOUS ONCE
Refills: 0 | Status: COMPLETED | OUTPATIENT
Start: 2022-04-08 | End: 2022-04-08

## 2022-04-08 RX ORDER — DEXTROSE 50 % IN WATER 50 %
25 SYRINGE (ML) INTRAVENOUS ONCE
Refills: 0 | Status: DISCONTINUED | OUTPATIENT
Start: 2022-04-08 | End: 2022-04-27

## 2022-04-08 RX ORDER — ETOMIDATE 2 MG/ML
20 INJECTION INTRAVENOUS ONCE
Refills: 0 | Status: COMPLETED | OUTPATIENT
Start: 2022-04-08 | End: 2022-04-08

## 2022-04-08 RX ORDER — SODIUM CHLORIDE 9 MG/ML
1000 INJECTION, SOLUTION INTRAVENOUS
Refills: 0 | Status: DISCONTINUED | OUTPATIENT
Start: 2022-04-08 | End: 2022-04-27

## 2022-04-08 RX ORDER — FENTANYL CITRATE 50 UG/ML
50 INJECTION INTRAVENOUS ONCE
Refills: 0 | Status: DISCONTINUED | OUTPATIENT
Start: 2022-04-08 | End: 2022-04-08

## 2022-04-08 RX ORDER — DEXTROSE 50 % IN WATER 50 %
50 SYRINGE (ML) INTRAVENOUS ONCE
Refills: 0 | Status: COMPLETED | OUTPATIENT
Start: 2022-04-08 | End: 2022-04-08

## 2022-04-08 RX ORDER — DEXTROSE 50 % IN WATER 50 %
12.5 SYRINGE (ML) INTRAVENOUS ONCE
Refills: 0 | Status: DISCONTINUED | OUTPATIENT
Start: 2022-04-08 | End: 2022-04-27

## 2022-04-08 RX ORDER — SACCHAROMYCES BOULARDII 250 MG
1 POWDER IN PACKET (EA) ORAL
Qty: 0 | Refills: 0 | DISCHARGE

## 2022-04-08 RX ORDER — GLUCAGON INJECTION, SOLUTION 0.5 MG/.1ML
1 INJECTION, SOLUTION SUBCUTANEOUS ONCE
Refills: 0 | Status: DISCONTINUED | OUTPATIENT
Start: 2022-04-08 | End: 2022-04-27

## 2022-04-08 RX ORDER — CHOLECALCIFEROL (VITAMIN D3) 125 MCG
1 CAPSULE ORAL
Qty: 0 | Refills: 0 | DISCHARGE

## 2022-04-08 RX ORDER — DEXTROSE 50 % IN WATER 50 %
15 SYRINGE (ML) INTRAVENOUS ONCE
Refills: 0 | Status: DISCONTINUED | OUTPATIENT
Start: 2022-04-08 | End: 2022-04-27

## 2022-04-08 RX ORDER — METOPROLOL TARTRATE 50 MG
0.5 TABLET ORAL
Qty: 0 | Refills: 0 | DISCHARGE

## 2022-04-08 RX ORDER — VASOPRESSIN 20 [USP'U]/ML
0.04 INJECTION INTRAVENOUS
Qty: 50 | Refills: 0 | Status: DISCONTINUED | OUTPATIENT
Start: 2022-04-08 | End: 2022-04-08

## 2022-04-08 RX ORDER — ESOMEPRAZOLE MAGNESIUM 40 MG/1
1 CAPSULE, DELAYED RELEASE ORAL
Qty: 0 | Refills: 0 | DISCHARGE

## 2022-04-08 RX ORDER — CHLORHEXIDINE GLUCONATE 213 G/1000ML
15 SOLUTION TOPICAL EVERY 12 HOURS
Refills: 0 | Status: DISCONTINUED | OUTPATIENT
Start: 2022-04-08 | End: 2022-04-09

## 2022-04-08 RX ORDER — NOREPINEPHRINE BITARTRATE/D5W 8 MG/250ML
0.05 PLASTIC BAG, INJECTION (ML) INTRAVENOUS
Qty: 8 | Refills: 0 | Status: DISCONTINUED | OUTPATIENT
Start: 2022-04-08 | End: 2022-04-08

## 2022-04-08 RX ORDER — LEVOTHYROXINE SODIUM 125 MCG
112 TABLET ORAL DAILY
Refills: 0 | Status: DISCONTINUED | OUTPATIENT
Start: 2022-04-08 | End: 2022-04-08

## 2022-04-08 RX ORDER — ACETAMINOPHEN 500 MG
650 TABLET ORAL EVERY 6 HOURS
Refills: 0 | Status: DISCONTINUED | OUTPATIENT
Start: 2022-04-08 | End: 2022-04-27

## 2022-04-08 RX ORDER — CHLORHEXIDINE GLUCONATE 213 G/1000ML
1 SOLUTION TOPICAL DAILY
Refills: 0 | Status: DISCONTINUED | OUTPATIENT
Start: 2022-04-08 | End: 2022-04-22

## 2022-04-08 RX ORDER — PROPOFOL 10 MG/ML
10 INJECTION, EMULSION INTRAVENOUS
Qty: 1000 | Refills: 0 | Status: DISCONTINUED | OUTPATIENT
Start: 2022-04-08 | End: 2022-04-08

## 2022-04-08 RX ORDER — SODIUM ZIRCONIUM CYCLOSILICATE 10 G/10G
10 POWDER, FOR SUSPENSION ORAL DAILY
Refills: 0 | Status: DISCONTINUED | OUTPATIENT
Start: 2022-04-08 | End: 2022-04-10

## 2022-04-08 RX ORDER — PANTOPRAZOLE SODIUM 20 MG/1
40 TABLET, DELAYED RELEASE ORAL DAILY
Refills: 0 | Status: DISCONTINUED | OUTPATIENT
Start: 2022-04-08 | End: 2022-04-10

## 2022-04-08 RX ORDER — HYDROCORTISONE 20 MG
50 TABLET ORAL EVERY 6 HOURS
Refills: 0 | Status: DISCONTINUED | OUTPATIENT
Start: 2022-04-08 | End: 2022-04-10

## 2022-04-08 RX ORDER — NOREPINEPHRINE BITARTRATE/D5W 8 MG/250ML
0.2 PLASTIC BAG, INJECTION (ML) INTRAVENOUS
Qty: 16 | Refills: 0 | Status: DISCONTINUED | OUTPATIENT
Start: 2022-04-08 | End: 2022-04-08

## 2022-04-08 RX ORDER — ROCURONIUM BROMIDE 10 MG/ML
100 VIAL (ML) INTRAVENOUS ONCE
Refills: 0 | Status: COMPLETED | OUTPATIENT
Start: 2022-04-08 | End: 2022-04-08

## 2022-04-08 RX ORDER — SODIUM BICARBONATE 1 MEQ/ML
50 SYRINGE (ML) INTRAVENOUS ONCE
Refills: 0 | Status: COMPLETED | OUTPATIENT
Start: 2022-04-08 | End: 2022-04-08

## 2022-04-08 RX ORDER — PIPERACILLIN AND TAZOBACTAM 4; .5 G/20ML; G/20ML
3.38 INJECTION, POWDER, LYOPHILIZED, FOR SOLUTION INTRAVENOUS ONCE
Refills: 0 | Status: COMPLETED | OUTPATIENT
Start: 2022-04-08 | End: 2022-04-08

## 2022-04-08 RX ORDER — HEPARIN SODIUM 5000 [USP'U]/ML
5000 INJECTION INTRAVENOUS; SUBCUTANEOUS EVERY 12 HOURS
Refills: 0 | Status: DISCONTINUED | OUTPATIENT
Start: 2022-04-08 | End: 2022-04-17

## 2022-04-08 RX ORDER — ASPIRIN/CALCIUM CARB/MAGNESIUM 324 MG
1 TABLET ORAL
Qty: 0 | Refills: 0 | DISCHARGE

## 2022-04-08 RX ORDER — FENTANYL CITRATE 50 UG/ML
50 INJECTION INTRAVENOUS EVERY 4 HOURS
Refills: 0 | Status: DISCONTINUED | OUTPATIENT
Start: 2022-04-08 | End: 2022-04-09

## 2022-04-08 RX ADMIN — PANTOPRAZOLE SODIUM 40 MILLIGRAM(S): 20 TABLET, DELAYED RELEASE ORAL at 13:15

## 2022-04-08 RX ADMIN — PIPERACILLIN AND TAZOBACTAM 25 GRAM(S): 4; .5 INJECTION, POWDER, LYOPHILIZED, FOR SOLUTION INTRAVENOUS at 20:10

## 2022-04-08 RX ADMIN — CHLORHEXIDINE GLUCONATE 1 APPLICATION(S): 213 SOLUTION TOPICAL at 13:37

## 2022-04-08 RX ADMIN — Medication 100 MILLIGRAM(S): at 08:40

## 2022-04-08 RX ADMIN — SODIUM CHLORIDE 2100 MILLILITER(S): 9 INJECTION INTRAMUSCULAR; INTRAVENOUS; SUBCUTANEOUS at 11:28

## 2022-04-08 RX ADMIN — SODIUM CHLORIDE 1000 MILLILITER(S): 9 INJECTION INTRAMUSCULAR; INTRAVENOUS; SUBCUTANEOUS at 09:04

## 2022-04-08 RX ADMIN — CHLORHEXIDINE GLUCONATE 15 MILLILITER(S): 213 SOLUTION TOPICAL at 18:11

## 2022-04-08 RX ADMIN — FENTANYL CITRATE 50 MICROGRAM(S): 50 INJECTION INTRAVENOUS at 23:52

## 2022-04-08 RX ADMIN — Medication 2: at 20:00

## 2022-04-08 RX ADMIN — Medication 50 MILLIGRAM(S): at 13:15

## 2022-04-08 RX ADMIN — SODIUM ZIRCONIUM CYCLOSILICATE 10 GRAM(S): 10 POWDER, FOR SUSPENSION ORAL at 20:56

## 2022-04-08 RX ADMIN — PIPERACILLIN AND TAZOBACTAM 200 GRAM(S): 4; .5 INJECTION, POWDER, LYOPHILIZED, FOR SOLUTION INTRAVENOUS at 11:28

## 2022-04-08 RX ADMIN — Medication 50 MILLILITER(S): at 20:08

## 2022-04-08 RX ADMIN — Medication 6.44 MICROGRAM(S)/KG/MIN: at 17:45

## 2022-04-08 RX ADMIN — FENTANYL CITRATE 50 MICROGRAM(S): 50 INJECTION INTRAVENOUS at 23:47

## 2022-04-08 RX ADMIN — INSULIN HUMAN 10 UNIT(S): 100 INJECTION, SOLUTION SUBCUTANEOUS at 20:13

## 2022-04-08 RX ADMIN — PROPOFOL 4.12 MICROGRAM(S)/KG/MIN: 10 INJECTION, EMULSION INTRAVENOUS at 21:38

## 2022-04-08 RX ADMIN — HEPARIN SODIUM 5000 UNIT(S): 5000 INJECTION INTRAVENOUS; SUBCUTANEOUS at 18:09

## 2022-04-08 RX ADMIN — Medication 650 MILLIGRAM(S): at 16:53

## 2022-04-08 RX ADMIN — Medication 50 MILLIEQUIVALENT(S): at 20:08

## 2022-04-08 RX ADMIN — PROPOFOL 4.12 MICROGRAM(S)/KG/MIN: 10 INJECTION, EMULSION INTRAVENOUS at 09:04

## 2022-04-08 RX ADMIN — Medication 50 MILLIGRAM(S): at 18:10

## 2022-04-08 RX ADMIN — Medication 650 MILLIGRAM(S): at 18:00

## 2022-04-08 RX ADMIN — SODIUM CHLORIDE 500 MILLILITER(S): 9 INJECTION, SOLUTION INTRAVENOUS at 13:50

## 2022-04-08 RX ADMIN — SODIUM CHLORIDE 75 MILLILITER(S): 9 INJECTION, SOLUTION INTRAVENOUS at 20:44

## 2022-04-08 RX ADMIN — SODIUM CHLORIDE 500 MILLILITER(S): 9 INJECTION, SOLUTION INTRAVENOUS at 17:44

## 2022-04-08 RX ADMIN — Medication 250 MILLIGRAM(S): at 13:36

## 2022-04-08 RX ADMIN — ETOMIDATE 20 MILLIGRAM(S): 2 INJECTION INTRAVENOUS at 08:39

## 2022-04-08 NOTE — ED PROVIDER NOTE - PHYSICAL EXAMINATION
Constitutional: Ill appearing, unresponsive  ENMT: Airway patent nasal mucosa clear. Mouth with copious secretions. Throat has no vesicles no oropharyngeal exudates and uvula is midline. No blood in the oropharynx  EYES: clear bilaterally, pupils equal, round and reactive to light  Cardiac: Regular rate, regular rhythm. Heart sounds S1, S2. No murmurs, rubs or gallops. Good capillary refill, 2+ pulses, no peripheral edema  Respiratory: Lungs with diffuse coarse rhonchi, no use of accessory muscles, satting 85% on RA in no distress  Gastrointestinal: Abdomen nondistended, non-tender, no rebound guarding or peritoneal signs, pEG tube without signs of erythma edema or fluctuance  Genitourinary: No CVA tenderness, pelvis nontender, bladder nondistended  Musculoskeletal: Spine appears normal, range of motion is not limited, no muscle or joint tenderness  Neurological: Unresponsive, intubated  Skin: Skin normal color for race, warm, dry and intact, No evidence of rash

## 2022-04-08 NOTE — H&P ADULT - NS PANP COMMENT GEN_ALL_CORE FT
77 yo male with hx of laryngeal cancer s/p chemotherapy/ RT, dysphagia s/p button G-tube, COPD, carotid stenosis, presented to the ED with acute respiratory failure due to bibasilar pneumonia, likely aspiration.  Patient was intubated in the ED.  Will admit to MICU, empiric abx, mechanical ventilation, will try to liberate from vent in next 24-48 hours if possible.   Daughter updated at bedside, all questions answered.

## 2022-04-08 NOTE — ED PROVIDER NOTE - ATTENDING CONTRIBUTION TO CARE
76yoM; with PMH signif for COPD(on Home O2), b/l Carotid Artery Stenosis, Laryngeal Ca(s/p chemo and radiation), s/p PEG, recent aspiration PNA; now p/w unresponsive episode. found unresponsive this morning, found to be hypoxic.   General:   severe resp distress  Head:     NC/AT, EOMI, oral mucosa dry/tachy  Neck:     trachea midline  Lungs:   decreased bs b/l lungs with diffuse rhonchi  CVS:     tachycardic, no m/g/r  Abd:     +BS, s/nt/nd, no organomegaly  Ext:    central pulses intact  Neuro: unresponsive  A/P:  76yoM; with PMH signif COPD(on Home O2), b/l Carotid Artery Stenosis, Laryngeal Ca(s/p chemo and radiation), s/p PEG, recent aspiration PNA; now p/w unresponsive with respiratory failure  -intubated immediately upon arrival  -stat ct showed acute ich, found multifocal pna, will admit to icu for further care.    Upon my evaluation, this patient had a high probability of imminent or life-threatening deterioration due to ACUTE RESPIRATORY FAILURE , which required my direct attention, intervention, and personal management.    I have personally provided 40 minutes of critical care time exclusive of time spent on separately billable procedures.  Time includes review of laboratory data, radiology results, discussion with consultants, and monitoring for potential decompensation.  Interventions were performed as documented.

## 2022-04-08 NOTE — ED PROVIDER NOTE - CLINICAL SUMMARY MEDICAL DECISION MAKING FREE TEXT BOX
Patient is a 77 yo male with PMHx COPD on home O2, b/l carotid artery stenosis, laryngeal cancer s/p chemo and RT, PEG tube, aspiration PNA BIBA from home after being found unresponsive. Patient in respiratory failure, arrives being ventilated via ambu bag, GCS 6. Patient unresponsive, intubated with Etomidate and Brandon, Propofol started, confirmed with CXR, bilateral breath sounds heard, bilateral coarse rhonchi. ECG WNL, troponin to r/o ACS. Pan scan CT to r/o acute intracranial hemorrhage vs. fractures vs. PNA vs. Cancer. Sepsis labs ordered, Vanc/Zosyn given, BCX/UCX/UA to r/o infection, IVF given. BNP to r/o HF, RVP sent. Will continue to monitor. MICU consulted.

## 2022-04-08 NOTE — PATIENT PROFILE ADULT - FALL HARM RISK - HARM RISK INTERVENTIONS

## 2022-04-08 NOTE — ED PROVIDER NOTE - CARE PLAN
1 Principal Discharge DX:	Acute respiratory failure with hypoxia   Principal Discharge DX:	Acute respiratory failure with hypoxia  Secondary Diagnosis:	Multifocal pneumonia

## 2022-04-08 NOTE — ED PROVIDER NOTE - OBJECTIVE STATEMENT
Patient is a 77 yo male with PMHx COPD on home O2, b/l carotid artery stenosis, laryngeal cancer s/p chemo and RT, PEG tube, aspiration PNA BIBA from home after being found unresponsive. Patient in respiratory failure, arrives being ventilated via ambu bag, GCS 6. Patient unresponsive, intubated with Etomidate and Brandon, Propofol started.

## 2022-04-08 NOTE — H&P ADULT - NSHPPHYSICALEXAM_GEN_ALL_CORE
General: Laying in hospital bed in no acute distress, intubated and sedated on vent.   HEENT: PERRLA, oral mucosa moist  Heart: S1 & S2  Lung: Rhonchi at the bases  Abdominal: Soft, non tender to palpation, peg tube in place   EXT: Nontender, no edema   Skin: Cool and dry  Neuro: Unable to assess sedated on vent

## 2022-04-08 NOTE — H&P ADULT - HISTORY OF PRESENT ILLNESS
Pt is a 77 y/o M pmhx of COPD on home O2, B/l carotid artery stenosis, laryngeal CA s/p chemo and radiation, PEG tube, who was BIBA after being found unresponsive at home w/ SpO2 in the 30's, Intubated upon arrival to ED and started on propofol gtt for sedation. ICU consulted for hypoxic respiratory failure requiring intubation.

## 2022-04-08 NOTE — ED PROCEDURE NOTE - CPROC ED INFUS LINE DETAIL1
Ultrasound guidance was used during placement./The guidewire was recovered./All lumen(s) aspirated and flushed without difficulty.

## 2022-04-08 NOTE — H&P ADULT - ASSESSMENT
Pt is a 77 y/o M pmhx of COPD on home O2, B/l carotid artery stenosis, laryngeal CA s/p chemo and radiation, PEG tube, who was BIBA after being found unresponsive at home w/ SpO2 in the 30's, Intubated upon arrival to ED and started on propofol gtt for sedation. ICU consulted for hypoxic respiratory failure requiring intubation.     1. Acute hypoxic respiratory failure secondary to suspected ASP PNA   2. MATHIEU     Plan:   Admit to ICU for further management and eval  Neuro: Sedated on vent with propofol gtt, Tylenol ordered for fever PRN. CTH negative for any acute pathology.   CV: No active issues, continue to monitor MAP, maintain >65 for optimal perfusion.   Pulm: Intubated in ED for oxygenation, will titrate FiO2 as tolerated to maintain SpO2 >92%. Sedated on vent, Received 1 dose of vancomycin and zosyn in ED. Will continue on zosyn for suspected ASP PNA.   GI: Diet: NPO Protonix for GI PPX   Renal: MATHIEU avoid nephrotoxic meds, monitor U/O >0.5cc/kg  Endo: Glucose control <180, Mag >2, phos>4 for adequate arrythmia suppression   Heme: Heparin for DVT ppx in setting of MATHIEU  ID: Suspected ASP PNA, blood, urine and sputum cultures sent, received 1 dose of vancomycin and zosyn in ED will continue on Zosyn moving forward, narrow based off cultures.       48 minutes assessing presenting problems of acute illness, which pose high probability of life threatening deterioration or end organ damage/dysfunction, as well as medical decision making including initiating plan of care, reviewing data, reviewing radiologic exams, discussing with multidisciplinary team,  discussing goals of care with patient/family, and writing this note.  Non-inclusive of procedures performed.       Case discussed w/ attending Dr. Rowell.

## 2022-04-09 LAB
A1C WITH ESTIMATED AVERAGE GLUCOSE RESULT: 6.1 % — HIGH (ref 4–5.6)
ANION GAP SERPL CALC-SCNC: 12 MMOL/L — SIGNIFICANT CHANGE UP (ref 5–17)
BUN SERPL-MCNC: 50.1 MG/DL — HIGH (ref 8–20)
CALCIUM SERPL-MCNC: 8.6 MG/DL — SIGNIFICANT CHANGE UP (ref 8.6–10.2)
CHLORIDE SERPL-SCNC: 96 MMOL/L — LOW (ref 98–107)
CO2 SERPL-SCNC: 31 MMOL/L — HIGH (ref 22–29)
CREAT SERPL-MCNC: 1.17 MG/DL — SIGNIFICANT CHANGE UP (ref 0.5–1.3)
CULTURE RESULTS: NO GROWTH — SIGNIFICANT CHANGE UP
EGFR: 65 ML/MIN/1.73M2 — SIGNIFICANT CHANGE UP
ESTIMATED AVERAGE GLUCOSE: 128 MG/DL — HIGH (ref 68–114)
GLUCOSE BLDC GLUCOMTR-MCNC: 128 MG/DL — HIGH (ref 70–99)
GLUCOSE BLDC GLUCOMTR-MCNC: 147 MG/DL — HIGH (ref 70–99)
GLUCOSE BLDC GLUCOMTR-MCNC: 159 MG/DL — HIGH (ref 70–99)
GLUCOSE BLDC GLUCOMTR-MCNC: 160 MG/DL — HIGH (ref 70–99)
GLUCOSE SERPL-MCNC: 135 MG/DL — HIGH (ref 70–99)
GRAM STN FLD: SIGNIFICANT CHANGE UP
HCT VFR BLD CALC: 27 % — LOW (ref 39–50)
HGB BLD-MCNC: 8.4 G/DL — LOW (ref 13–17)
HIV 1 & 2 AB SERPL IA.RAPID: SIGNIFICANT CHANGE UP
MAGNESIUM SERPL-MCNC: 2 MG/DL — SIGNIFICANT CHANGE UP (ref 1.6–2.6)
MCHC RBC-ENTMCNC: 28.6 PG — SIGNIFICANT CHANGE UP (ref 27–34)
MCHC RBC-ENTMCNC: 31.1 GM/DL — LOW (ref 32–36)
MCV RBC AUTO: 91.8 FL — SIGNIFICANT CHANGE UP (ref 80–100)
MRSA PCR RESULT.: SIGNIFICANT CHANGE UP
PHOSPHATE SERPL-MCNC: 2.1 MG/DL — LOW (ref 2.4–4.7)
PLATELET # BLD AUTO: 173 K/UL — SIGNIFICANT CHANGE UP (ref 150–400)
POTASSIUM SERPL-MCNC: 4.4 MMOL/L — SIGNIFICANT CHANGE UP (ref 3.5–5.3)
POTASSIUM SERPL-SCNC: 4.4 MMOL/L — SIGNIFICANT CHANGE UP (ref 3.5–5.3)
RBC # BLD: 2.94 M/UL — LOW (ref 4.2–5.8)
RBC # FLD: 14.9 % — HIGH (ref 10.3–14.5)
S AUREUS DNA NOSE QL NAA+PROBE: SIGNIFICANT CHANGE UP
SODIUM SERPL-SCNC: 139 MMOL/L — SIGNIFICANT CHANGE UP (ref 135–145)
SPECIMEN SOURCE: SIGNIFICANT CHANGE UP
SPECIMEN SOURCE: SIGNIFICANT CHANGE UP
WBC # BLD: 11.45 K/UL — HIGH (ref 3.8–10.5)
WBC # FLD AUTO: 11.45 K/UL — HIGH (ref 3.8–10.5)

## 2022-04-09 PROCEDURE — 95718 EEG PHYS/QHP 2-12 HR W/VEEG: CPT

## 2022-04-09 RX ORDER — LABETALOL HCL 100 MG
100 TABLET ORAL EVERY 6 HOURS
Refills: 0 | Status: DISCONTINUED | OUTPATIENT
Start: 2022-04-09 | End: 2022-04-11

## 2022-04-09 RX ORDER — HYDROMORPHONE HYDROCHLORIDE 2 MG/ML
1 INJECTION INTRAMUSCULAR; INTRAVENOUS; SUBCUTANEOUS EVERY 4 HOURS
Refills: 0 | Status: DISCONTINUED | OUTPATIENT
Start: 2022-04-09 | End: 2022-04-09

## 2022-04-09 RX ORDER — DEXMEDETOMIDINE HYDROCHLORIDE IN 0.9% SODIUM CHLORIDE 4 UG/ML
0.1 INJECTION INTRAVENOUS
Qty: 200 | Refills: 0 | Status: DISCONTINUED | OUTPATIENT
Start: 2022-04-09 | End: 2022-04-09

## 2022-04-09 RX ORDER — LABETALOL HCL 100 MG
10 TABLET ORAL ONCE
Refills: 0 | Status: COMPLETED | OUTPATIENT
Start: 2022-04-09 | End: 2022-04-09

## 2022-04-09 RX ORDER — HYDROMORPHONE HYDROCHLORIDE 2 MG/ML
0.5 INJECTION INTRAMUSCULAR; INTRAVENOUS; SUBCUTANEOUS EVERY 4 HOURS
Refills: 0 | Status: DISCONTINUED | OUTPATIENT
Start: 2022-04-09 | End: 2022-04-09

## 2022-04-09 RX ADMIN — HYDROMORPHONE HYDROCHLORIDE 1 MILLIGRAM(S): 2 INJECTION INTRAMUSCULAR; INTRAVENOUS; SUBCUTANEOUS at 05:00

## 2022-04-09 RX ADMIN — Medication 100 MILLIGRAM(S): at 17:29

## 2022-04-09 RX ADMIN — SODIUM ZIRCONIUM CYCLOSILICATE 10 GRAM(S): 10 POWDER, FOR SUSPENSION ORAL at 12:05

## 2022-04-09 RX ADMIN — CHLORHEXIDINE GLUCONATE 1 APPLICATION(S): 213 SOLUTION TOPICAL at 11:19

## 2022-04-09 RX ADMIN — Medication 10 MILLIGRAM(S): at 14:50

## 2022-04-09 RX ADMIN — PIPERACILLIN AND TAZOBACTAM 25 GRAM(S): 4; .5 INJECTION, POWDER, LYOPHILIZED, FOR SOLUTION INTRAVENOUS at 21:32

## 2022-04-09 RX ADMIN — DEXMEDETOMIDINE HYDROCHLORIDE IN 0.9% SODIUM CHLORIDE 1.72 MICROGRAM(S)/KG/HR: 4 INJECTION INTRAVENOUS at 02:30

## 2022-04-09 RX ADMIN — HYDROMORPHONE HYDROCHLORIDE 1 MILLIGRAM(S): 2 INJECTION INTRAMUSCULAR; INTRAVENOUS; SUBCUTANEOUS at 04:45

## 2022-04-09 RX ADMIN — Medication 10 MILLIGRAM(S): at 10:07

## 2022-04-09 RX ADMIN — Medication 50 MILLIGRAM(S): at 00:27

## 2022-04-09 RX ADMIN — PIPERACILLIN AND TAZOBACTAM 25 GRAM(S): 4; .5 INJECTION, POWDER, LYOPHILIZED, FOR SOLUTION INTRAVENOUS at 13:50

## 2022-04-09 RX ADMIN — Medication 112 MICROGRAM(S): at 05:15

## 2022-04-09 RX ADMIN — CHLORHEXIDINE GLUCONATE 1 APPLICATION(S): 213 SOLUTION TOPICAL at 05:14

## 2022-04-09 RX ADMIN — FENTANYL CITRATE 50 MICROGRAM(S): 50 INJECTION INTRAVENOUS at 00:28

## 2022-04-09 RX ADMIN — Medication 50 MILLIGRAM(S): at 17:28

## 2022-04-09 RX ADMIN — CHLORHEXIDINE GLUCONATE 15 MILLILITER(S): 213 SOLUTION TOPICAL at 05:14

## 2022-04-09 RX ADMIN — Medication 2: at 11:30

## 2022-04-09 RX ADMIN — PANTOPRAZOLE SODIUM 40 MILLIGRAM(S): 20 TABLET, DELAYED RELEASE ORAL at 11:19

## 2022-04-09 RX ADMIN — HEPARIN SODIUM 5000 UNIT(S): 5000 INJECTION INTRAVENOUS; SUBCUTANEOUS at 05:15

## 2022-04-09 RX ADMIN — Medication 50 MILLIGRAM(S): at 05:15

## 2022-04-09 RX ADMIN — FENTANYL CITRATE 50 MICROGRAM(S): 50 INJECTION INTRAVENOUS at 00:40

## 2022-04-09 RX ADMIN — HEPARIN SODIUM 5000 UNIT(S): 5000 INJECTION INTRAVENOUS; SUBCUTANEOUS at 17:28

## 2022-04-09 RX ADMIN — Medication 50 MILLIGRAM(S): at 11:19

## 2022-04-09 RX ADMIN — PIPERACILLIN AND TAZOBACTAM 25 GRAM(S): 4; .5 INJECTION, POWDER, LYOPHILIZED, FOR SOLUTION INTRAVENOUS at 05:18

## 2022-04-09 NOTE — PROGRESS NOTE ADULT - SUBJECTIVE AND OBJECTIVE BOX
Patient is a 76y old  Male who presents with a chief complaint of Hypoxic respiratory failure (2022 11:18)      BRIEF HOSPITAL COURSE: Pt is a 75 y/o M pmhx of COPD on home O2, B/l carotid artery stenosis, laryngeal CA s/p chemo and radiation, PEG tube, who was BIBA after being found unresponsive at home w/ SpO2 in the 30's, Intubated upon arrival to ED and started on propofol gtt for sedation. ICU consulted for hypoxic respiratory failure requiring intubation.     Events last 24 hours: Started on Levophed to maintain MAP > 65, A line placed, titrated down now off pressors, following commands off sedation. HD stable.     PAST MEDICAL & SURGICAL HISTORY:  Esophageal stricture    Prostate CA    History of carotid stenosis    Laryngeal carcinoma    COPD (chronic obstructive pulmonary disease)    Hypothyroidism    Aspiration pneumonia    Traumatic pneumothorax    Benign prostatic hyperplasia with urinary obstruction    Syncope and collapse    Microalbuminuria    Anemia    Hyperlipidemia, unspecified hyperlipidemia type    Femoral fracture    S/P percutaneous endoscopic gastrostomy (PEG) tube placement    Liver laceration    History of exploratory laparotomy    History of total bilateral knee replacement  b/l femur        Review of Systems: Unable to perform due to pt intubated on vent.       Medications:  piperacillin/tazobactam IVPB.. 3.375 Gram(s) IV Intermittent every 8 hours  acetaminophen     Tablet .. 650 milliGRAM(s) Oral every 6 hours PRN  dexMEDEtomidine Infusion 0.1 MICROgram(s)/kG/Hr IV Continuous <Continuous>  HYDROmorphone  Injectable 0.5 milliGRAM(s) IV Push every 4 hours PRN  HYDROmorphone  Injectable 1 milliGRAM(s) IV Push every 4 hours PRN  heparin   Injectable 5000 Unit(s) SubCutaneous every 12 hours  pantoprazole  Injectable 40 milliGRAM(s) IV Push daily  dextrose 50% Injectable 25 Gram(s) IV Push once  dextrose 50% Injectable 12.5 Gram(s) IV Push once  dextrose 50% Injectable 25 Gram(s) IV Push once  dextrose Oral Gel 15 Gram(s) Oral once PRN  glucagon  Injectable 1 milliGRAM(s) IntraMuscular once  hydrocortisone sodium succinate Injectable 50 milliGRAM(s) IV Push every 6 hours  insulin lispro (ADMELOG) corrective regimen sliding scale   SubCutaneous every 6 hours  levothyroxine 112 MICROGram(s) Oral daily  dextrose 5%. 1000 milliLiter(s) IV Continuous <Continuous>  dextrose 5%. 1000 milliLiter(s) IV Continuous <Continuous>  multiple electrolytes Injection Type 1 1000 milliLiter(s) IV Continuous <Continuous>  sodium chloride 0.9% lock flush 10 milliLiter(s) IV Push every 1 hour PRN  chlorhexidine 0.12% Liquid 15 milliLiter(s) Oral Mucosa every 12 hours  chlorhexidine 2% Cloths 1 Application(s) Topical <User Schedule>  chlorhexidine 2% Cloths 1 Application(s) Topical daily  sodium zirconium cyclosilicate 10 Gram(s) Oral daily      Mode: CPAP with PS  FiO2: 30  PEEP: 5  PS: 7      ICU Vital Signs Last 24 Hrs  T(C): 37.4 (2022 10:50), Max: 38.5 (2022 17:15)  T(F): 99.3 (2022 10:50), Max: 101.3 (2022 17:15)  HR: 95 (2022 10:50) (77 - 110)  BP: 198/116 (2022 10:00) (44/33 - 198/116)  BP(mean): 136 (2022 10:00) (39 - 149)  ABP: 172/62 (2022 10:50) (105/40 - 200/65)  ABP(mean): 106 (2022 10:50) (61 - 125)  RR: 21 (2022 10:50) (16 - 28)  SpO2: 88% (2022 10:50) (88% - 100%)      ABG - ( 2022 14:56 )  pH, Arterial: 7.350 pH, Blood: x     /  pCO2: 61    /  pO2: 74    / HCO3: 34    / Base Excess: 8.1   /  SaO2: 97.6                I&O's Detail    2022 07:01  -  2022 07:00  --------------------------------------------------------  IN:    IV PiggyBack: 175 mL    IV PiggyBack: 250 mL    Lactated Ringers Bolus: 1000 mL    multiple electrolytes Injection Type 1.: 825 mL    Norepinephrine: 132.7 mL    Propofol: 2.1 mL  Total IN: 2384.8 mL    OUT:    Indwelling Catheter - Urethral (mL): 1850 mL  Total OUT: 1850 mL    Total NET: 534.8 mL      2022 07:01  -  2022 11:01  --------------------------------------------------------  IN:    IV PiggyBack: 25 mL    multiple electrolytes Injection Type 1.: 225 mL  Total IN: 250 mL    OUT:    Dexmedetomidine: 0 mL    Indwelling Catheter - Urethral (mL): 600 mL  Total OUT: 600 mL    Total NET: -350 mL            LABS:                        8.4    11.45 )-----------( 173      ( 2022 02:51 )             27.0     04-09    139  |  96<L>  |  50.1<H>  ----------------------------<  135<H>  4.4   |  31.0<H>  |  1.17    Ca    8.6      2022 02:51  Phos  2.1     04-09  Mg     2.0     04-09    TPro  8.0  /  Alb  3.5  /  TBili  <0.2<L>  /  DBili  x   /  AST  33  /  ALT  31  /  AlkPhos  107  04-08      CARDIAC MARKERS ( 2022 09:02 )  x     / 0.03 ng/mL / x     / x     / x          CAPILLARY BLOOD GLUCOSE      POCT Blood Glucose.: 128 mg/dL (2022 05:22)      Urinalysis Basic - ( 2022 10:48 )    Color: Yellow / Appearance: Clear / S.010 / pH: x  Gluc: x / Ketone: Negative  / Bili: Negative / Urobili: Negative mg/dL   Blood: x / Protein: 30 mg/dL / Nitrite: Negative   Leuk Esterase: Negative / RBC: 0-2 /HPF / WBC 0-2 /HPF   Sq Epi: x / Non Sq Epi: Occasional / Bacteria: Few      CULTURES:  Rapid RVP Result: NotDetec (22 @ 09:01)      Physical Examination:    General: Pt laying in hospital bed in NAD, follows commands, alert.     HEENT: PERRLA, oral mucosa moist     PULM: Rhonchi at the bases     CVS: S1 & S2     ABD: Soft, nondistended, nontender, normoactive bowel sounds, no masses    EXT: No edema, nontender    SKIN: Warm and well perfused    NEURO: On vent off sedation following commands       RADIOLOGY:   < from: CT Abdomen and Pelvis No Cont (22 @ 09:52) >  ACC: [id number] EXAM:  CT ABDOMEN AND PELVIS                        ACC: [id number] EXAM:  CT CHEST                        PROCEDURE DATE:  2022    INTERPRETATION:  CLINICAL INFORMATION: Sepsis. Hypoxia.  COMPARISON: 2022.  CONTRAST/COMPLICATIONS:  IV Contrast: NONE  Oral Contrast: NONE  Complications: None reported at time of study completion  PROCEDURE:  CT of the Chest, Abdomen and Pelvis was performed.  Sagittal and coronal reformats were performed.  FINDINGS:  CHEST:  LUNGS AND LARGE AIRWAYS: Endotracheal tube with tip above the idania.   Patchy and nodular opacities in both lungs, predominantly in the lower   lobes. Post radiation changes in the medial lung apices.  PLEURA: Unchanged large extrapleural nodules, measuring up to 1.7 x 0.8   cm on the left (series 4, image 76).  VESSELS: Atherosclerotic changes of the aorta.  HEART: Heart size is normal. No pericardial effusion.  MEDIASTINUM AND NIECY: Similar appearance of prominent mediastinal lymph   nodes.  CHEST WALL AND LOWER NECK: Within normal limits.  ABDOMEN AND PELVIS:  LIVER: Within normal limits.  BILE DUCTS: Left-sided pneumobilia.  GALLBLADDER: Unchanged large amount of high density material in the lumen   in the gallbladder, possiblylarge stones.  SPLEEN: Capsular calcification.  PANCREAS: Within normal limits.  ADRENALS: Within normal limits.  KIDNEYS/URETERS: Within normal limits.  BLADDER: Contains air, small amount of fluid, and a Hood catheter   balloon.  REPRODUCTIVE ORGANS: Prostate is enlarged.  BOWEL: Enteric tube with tip terminating in the stomach. Percutaneous   gastrostomy tube. No bowel obstruction. Appendix is normal.  PERITONEUM: No ascites.  VESSELS: Atherosclerotic changes. Left femoral artery approachcentral   venous catheter terminating in the left common iliac vein.  RETROPERITONEUM/LYMPH NODES: No lymphadenopathy.  ABDOMINAL WALL: Within normal limits.  BONES: Degenerative changes. Intramedullary rods in both femurs.  IMPRESSION:  Multifocalpneumonia, worse in the lower lobes.  Unchanged large left extrapleural nodules.  --- End of Report ---  JAYLA SMITH MD; Attending Radiologist  This document has been electronically signed. 2022 10:05AM

## 2022-04-09 NOTE — CHART NOTE - NSCHARTNOTEFT_GEN_A_CORE
Pt is a 75 y/o M pmhx of COPD on home O2, B/l carotid artery stenosis, laryngeal CA s/p chemo and radiation, PEG tube, who was BIBA after being found unresponsive at home w/ SpO2 in the 30's, Intubated upon arrival to ED and started on propofol gtt for sedation. ICU consulted for hypoxic respiratory failure requiring intubation. Started on Levophed to maintain MAP > 65, A line placed, titrated down now off pressors, off sedation. Extubated this morning. Now awake, alert, oriented. CT shows multifocal PNA, Sputum cultures showed PMNs, few gram-negative rods. Pt on zosyn for aspiration PNA. EEG preliminary report abnormal, concerning for epileptogenic foci. Stable for downgrade to medical floor bed.

## 2022-04-09 NOTE — PROGRESS NOTE ADULT - ASSESSMENT
Pt is a 75 y/o M pmhx of COPD on home O2, B/l carotid artery stenosis, laryngeal CA s/p chemo and radiation, PEG tube, who was BIBA after being found unresponsive at home w/ SpO2 in the 30's, Intubated upon arrival to ED and started on propofol gtt for sedation. ICU consulted for hypoxic respiratory failure requiring intubation.     1. Acute hypoxic respiratory failure secondary to ASP PNA   2. MATHIEU secondary to septic shock state   3. Septic shock since resolved    Plan:     Neuro: Off sedation following commands, hold sedation in prep for extubation, Will use IV Tylenol as needed for fevers  CV: Off pressors since last evening, will continue to monitor and restart if needed to maintain a MAP >65.   Pulm: On vent, SAT/ABT this AM, consider extubation if pt tolerates SBT.   GI: Diet: NPO , Protonix for GI PPX   Renal: MATHIEU in setting of RF and septic shock since resolved, continue to trend, maintain U/O >0.5cc/kg   Endo: Glucose control <180  Heme: Heparin SQ for DVT PPX   ID: Zosyn for suspected aspiration PNA, continue to trend cultures for growth and narrow abx.       Case discussed w/ attending Dr. Rowell

## 2022-04-09 NOTE — EEG REPORT - NS EEG TEXT BOX
[facility]   COMPREHENSIVE EPILEPSY CENTER   REPORT OF LONG-TERM VIDEO EEG     [facility]: [address], [place] [address], Ph#: [phone]  [facility]: [address], [place] [address], Ph#: [phone]  [facility]: [address] [place] [address], Ph#: [phone]    Patient Name: KIMBERLY LAI  Age and : 76y ([birth year])  MRN #: [id number]  Location: [place] [address]  Referring Physician: Rosalio Rowell    Start Time/Date: 15:47 on 2022  End Time/Date: 08:00 on 2022  Duration: 16:10    _____________________________________________________________  STUDY INFORMATION    EEG Recording Technique:  The patient underwent continuous Video-EEG monitoring, using Telemetry System hardware on the XLTek Digital System. EEG and video data were stored on a computer hard drive with important events saved in digital archive files. The material was reviewed by a physician (electroencephalographer / epileptologist) on a daily basis. David and seizure detection algorithms were utilized and reviewed. An EEG Technician attended to the patient, and was available throughout daytime work hours.  The epilepsy center neurologist was available in person or on call 24-hours per day.    EEG Placement and Labeling of Electrodes:  The EEG was performed utilizing 20 channel referential EEG connections (coronal over temporal over parasagittal montage) using all standard 10-20 electrode placements with EKG, with additional electrodes placed in the inferior temporal region using the modified 10-10 montage electrode placements for elective admissions, or if deemed necessary. Recording was at a sampling rate of 256 samples per second per channel. Time synchronized digital video recording was done simultaneously with EEG recording. A low light infrared camera was used for low light recording.     _____________________________________________________________  HISTORY    Patient is a 76y old  Male who presents with a chief complaint of Hypoxic respiratory failure (2022 10:51)      PERTINENT MEDICATION:  none  _____________________________________________________________  STUDY INTERPRETATION    Findings: The background was continuous, spontaneously variable and reactive. for most of the recording diffuse theta and polymorphic delta activity was present. During periodis of maximal arousal, 7 Hz PDR could be seen    Background Slowing:  Diffuse theta and polymorphic delta slowing.    Focal Slowing:   None were present.    Sleep Background:  Stage II sleep transients were not recorded.    Other Non-Epileptiform Findings:  None were present    Interictal Epileptiform Activity:   occasional left frontal and frontotemporal sharp waves   occasional right frontotemporal sharp waves  rare bifrontal sharp waves    Events:  Clinical events: None recorded.  Seizures: None recorded.    Activation Procedures:   Hyperventilation was not performed.    Photic stimulation was not performed.     Artifacts:  Intermittent myogenic and movement artifacts were noted.    ECG:  The heart rate on single channel ECG was predominantly between 100-110 BPM.    _____________________________________________________________  EEG SUMMARY/CLASSIFICATION      Abnormal EEG in an intubated patient.  - occasional left frontal and frontotemporal sharp waves   - occasional right frontotemporal sharp waves  - rare bifrontal sharp waves  - Moderate generalized slowing.    _____________________________________________________________  EEG IMPRESSION/CLINICAL CORRELATE    Abnormal EEG study.  Potential multifocal epileptogenic foci  Moderate nonspecific diffuse or multifocal cerebral dysfunction.   No seizure seen.    Juan Carlos Garcia MD PGY-5  Epilepsy Fellow    This Preliminary report is based on fellow review. Final report pending attending review.    Reading Room: [phone]  On Call Service After Hours: [phone] [facility]   COMPREHENSIVE EPILEPSY CENTER   REPORT OF LONG-TERM VIDEO EEG     [facility]: [address], [place] [address], Ph#: [phone]  [facility]: [address], [place] [address], Ph#: [phone]  [facility]: [address] [place] [address], Ph#: [phone]    Patient Name: KIMBERLY LAI  Age and : 76y ([birth year])  MRN #: [id number]  Location: [place] [address]  Referring Physician: Rosalio Rowell    Start Time/Date: 15:47 on 2022  End Time/Date: 08:00 on 2022  Duration: 16:10    _____________________________________________________________  STUDY INFORMATION    EEG Recording Technique:  The patient underwent continuous Video-EEG monitoring, using Telemetry System hardware on the XLTek Digital System. EEG and video data were stored on a computer hard drive with important events saved in digital archive files. The material was reviewed by a physician (electroencephalographer / epileptologist) on a daily basis. David and seizure detection algorithms were utilized and reviewed. An EEG Technician attended to the patient, and was available throughout daytime work hours.  The epilepsy center neurologist was available in person or on call 24-hours per day.    EEG Placement and Labeling of Electrodes:  The EEG was performed utilizing 20 channel referential EEG connections (coronal over temporal over parasagittal montage) using all standard 10-20 electrode placements with EKG, with additional electrodes placed in the inferior temporal region using the modified 10-10 montage electrode placements for elective admissions, or if deemed necessary. Recording was at a sampling rate of 256 samples per second per channel. Time synchronized digital video recording was done simultaneously with EEG recording. A low light infrared camera was used for low light recording.     _____________________________________________________________  HISTORY    Patient is a 76y old  Male who presents with a chief complaint of Hypoxic respiratory failure (2022 10:51)      PERTINENT MEDICATION:  none  _____________________________________________________________  STUDY INTERPRETATION    Findings: The background was continuous, spontaneously variable and reactive. for most of the recording diffuse theta and polymorphic delta activity was present. During periodis of maximal arousal, 7 Hz PDR could be seen    Background Slowing:  Diffuse theta and polymorphic delta slowing.    Focal Slowing:   None were present.    Sleep Background:  Stage II sleep transients were not recorded.    Other Non-Epileptiform Findings:  None were present    Interictal Epileptiform Activity:   occasional left frontal and frontotemporal sharp waves   occasional right frontotemporal sharp waves  rare bifrontal sharp waves    Events:  Clinical events: None recorded.  Seizures: None recorded.    Activation Procedures:   Hyperventilation was not performed.    Photic stimulation was not performed.     Artifacts:  Intermittent myogenic and movement artifacts were noted.    ECG:  The heart rate on single channel ECG was predominantly between 100-110 BPM.    _____________________________________________________________  EEG SUMMARY/CLASSIFICATION      Abnormal EEG in an intubated patient.  - occasional left frontal and frontotemporal sharp waves   - occasional right frontotemporal sharp waves  - rare bifrontal sharp waves  - Moderate generalized slowing.    _____________________________________________________________  EEG IMPRESSION/CLINICAL CORRELATE    Abnormal EEG study.  Potential multifocal epileptogenic foci  Moderate nonspecific diffuse or multifocal cerebral dysfunction.   No seizure seen.    Juan Carlos Garcia MD PGY-5  Epilepsy Fellow        Reading Room: [phone]  On Call Service After Hours: [phone]

## 2022-04-10 LAB
-  COAGULASE NEGATIVE STAPHYLOCOCCUS: SIGNIFICANT CHANGE UP
ANION GAP SERPL CALC-SCNC: 12 MMOL/L — SIGNIFICANT CHANGE UP (ref 5–17)
BASOPHILS # BLD AUTO: 0.02 K/UL — SIGNIFICANT CHANGE UP (ref 0–0.2)
BASOPHILS NFR BLD AUTO: 0.2 % — SIGNIFICANT CHANGE UP (ref 0–2)
BUN SERPL-MCNC: 34.7 MG/DL — HIGH (ref 8–20)
CALCIUM SERPL-MCNC: 8.8 MG/DL — SIGNIFICANT CHANGE UP (ref 8.6–10.2)
CHLORIDE SERPL-SCNC: 96 MMOL/L — LOW (ref 98–107)
CO2 SERPL-SCNC: 33 MMOL/L — HIGH (ref 22–29)
CREAT SERPL-MCNC: 1.03 MG/DL — SIGNIFICANT CHANGE UP (ref 0.5–1.3)
EGFR: 76 ML/MIN/1.73M2 — SIGNIFICANT CHANGE UP
EOSINOPHIL # BLD AUTO: 0 K/UL — SIGNIFICANT CHANGE UP (ref 0–0.5)
EOSINOPHIL NFR BLD AUTO: 0 % — SIGNIFICANT CHANGE UP (ref 0–6)
GLUCOSE BLDC GLUCOMTR-MCNC: 134 MG/DL — HIGH (ref 70–99)
GLUCOSE BLDC GLUCOMTR-MCNC: 135 MG/DL — HIGH (ref 70–99)
GLUCOSE BLDC GLUCOMTR-MCNC: 143 MG/DL — HIGH (ref 70–99)
GLUCOSE BLDC GLUCOMTR-MCNC: 154 MG/DL — HIGH (ref 70–99)
GLUCOSE SERPL-MCNC: 134 MG/DL — HIGH (ref 70–99)
GRAM STN FLD: SIGNIFICANT CHANGE UP
HCT VFR BLD CALC: 28.2 % — LOW (ref 39–50)
HGB BLD-MCNC: 8.4 G/DL — LOW (ref 13–17)
IMM GRANULOCYTES NFR BLD AUTO: 0.9 % — SIGNIFICANT CHANGE UP (ref 0–1.5)
LYMPHOCYTES # BLD AUTO: 0.97 K/UL — LOW (ref 1–3.3)
LYMPHOCYTES # BLD AUTO: 9.8 % — LOW (ref 13–44)
MAGNESIUM SERPL-MCNC: 1.8 MG/DL — SIGNIFICANT CHANGE UP (ref 1.6–2.6)
MCHC RBC-ENTMCNC: 28.2 PG — SIGNIFICANT CHANGE UP (ref 27–34)
MCHC RBC-ENTMCNC: 29.8 GM/DL — LOW (ref 32–36)
MCV RBC AUTO: 94.6 FL — SIGNIFICANT CHANGE UP (ref 80–100)
METHOD TYPE: SIGNIFICANT CHANGE UP
MONOCYTES # BLD AUTO: 0.83 K/UL — SIGNIFICANT CHANGE UP (ref 0–0.9)
MONOCYTES NFR BLD AUTO: 8.4 % — SIGNIFICANT CHANGE UP (ref 2–14)
NEUTROPHILS # BLD AUTO: 8.02 K/UL — HIGH (ref 1.8–7.4)
NEUTROPHILS NFR BLD AUTO: 80.7 % — HIGH (ref 43–77)
ORGANISM # SPEC MICROSCOPIC CNT: SIGNIFICANT CHANGE UP
PHOSPHATE SERPL-MCNC: 3.5 MG/DL — SIGNIFICANT CHANGE UP (ref 2.4–4.7)
PLATELET # BLD AUTO: 173 K/UL — SIGNIFICANT CHANGE UP (ref 150–400)
POTASSIUM SERPL-MCNC: 3.9 MMOL/L — SIGNIFICANT CHANGE UP (ref 3.5–5.3)
POTASSIUM SERPL-SCNC: 3.9 MMOL/L — SIGNIFICANT CHANGE UP (ref 3.5–5.3)
RBC # BLD: 2.98 M/UL — LOW (ref 4.2–5.8)
RBC # FLD: 15 % — HIGH (ref 10.3–14.5)
SODIUM SERPL-SCNC: 141 MMOL/L — SIGNIFICANT CHANGE UP (ref 135–145)
SPECIMEN SOURCE: SIGNIFICANT CHANGE UP
WBC # BLD: 9.93 K/UL — SIGNIFICANT CHANGE UP (ref 3.8–10.5)
WBC # FLD AUTO: 9.93 K/UL — SIGNIFICANT CHANGE UP (ref 3.8–10.5)

## 2022-04-10 PROCEDURE — 99233 SBSQ HOSP IP/OBS HIGH 50: CPT

## 2022-04-10 PROCEDURE — 95720 EEG PHY/QHP EA INCR W/VEEG: CPT

## 2022-04-10 RX ORDER — OXYBUTYNIN CHLORIDE 5 MG
5 TABLET ORAL
Refills: 0 | Status: DISCONTINUED | OUTPATIENT
Start: 2022-04-10 | End: 2022-04-10

## 2022-04-10 RX ORDER — HYDRALAZINE HCL 50 MG
10 TABLET ORAL EVERY 6 HOURS
Refills: 0 | Status: DISCONTINUED | OUTPATIENT
Start: 2022-04-10 | End: 2022-04-22

## 2022-04-10 RX ORDER — PANTOPRAZOLE SODIUM 20 MG/1
40 TABLET, DELAYED RELEASE ORAL DAILY
Refills: 0 | Status: DISCONTINUED | OUTPATIENT
Start: 2022-04-10 | End: 2022-04-10

## 2022-04-10 RX ORDER — OXYBUTYNIN CHLORIDE 5 MG
5 TABLET ORAL
Refills: 0 | Status: DISCONTINUED | OUTPATIENT
Start: 2022-04-10 | End: 2022-04-27

## 2022-04-10 RX ORDER — ATORVASTATIN CALCIUM 80 MG/1
40 TABLET, FILM COATED ORAL AT BEDTIME
Refills: 0 | Status: DISCONTINUED | OUTPATIENT
Start: 2022-04-10 | End: 2022-04-27

## 2022-04-10 RX ORDER — FERROUS SULFATE 325(65) MG
300 TABLET ORAL DAILY
Refills: 0 | Status: DISCONTINUED | OUTPATIENT
Start: 2022-04-10 | End: 2022-04-27

## 2022-04-10 RX ORDER — ASPIRIN/CALCIUM CARB/MAGNESIUM 324 MG
81 TABLET ORAL DAILY
Refills: 0 | Status: DISCONTINUED | OUTPATIENT
Start: 2022-04-10 | End: 2022-04-20

## 2022-04-10 RX ORDER — CHOLECALCIFEROL (VITAMIN D3) 125 MCG
1000 CAPSULE ORAL DAILY
Refills: 0 | Status: DISCONTINUED | OUTPATIENT
Start: 2022-04-10 | End: 2022-04-27

## 2022-04-10 RX ADMIN — PIPERACILLIN AND TAZOBACTAM 25 GRAM(S): 4; .5 INJECTION, POWDER, LYOPHILIZED, FOR SOLUTION INTRAVENOUS at 06:28

## 2022-04-10 RX ADMIN — PIPERACILLIN AND TAZOBACTAM 25 GRAM(S): 4; .5 INJECTION, POWDER, LYOPHILIZED, FOR SOLUTION INTRAVENOUS at 22:31

## 2022-04-10 RX ADMIN — Medication 112 MICROGRAM(S): at 05:04

## 2022-04-10 RX ADMIN — SODIUM ZIRCONIUM CYCLOSILICATE 10 GRAM(S): 10 POWDER, FOR SUSPENSION ORAL at 11:46

## 2022-04-10 RX ADMIN — Medication 1000 UNIT(S): at 13:29

## 2022-04-10 RX ADMIN — PANTOPRAZOLE SODIUM 40 MILLIGRAM(S): 20 TABLET, DELAYED RELEASE ORAL at 11:46

## 2022-04-10 RX ADMIN — Medication 50 MILLIGRAM(S): at 00:01

## 2022-04-10 RX ADMIN — CHLORHEXIDINE GLUCONATE 1 APPLICATION(S): 213 SOLUTION TOPICAL at 13:23

## 2022-04-10 RX ADMIN — Medication 100 MILLIGRAM(S): at 00:02

## 2022-04-10 RX ADMIN — Medication 81 MILLIGRAM(S): at 13:28

## 2022-04-10 RX ADMIN — ATORVASTATIN CALCIUM 40 MILLIGRAM(S): 80 TABLET, FILM COATED ORAL at 22:30

## 2022-04-10 RX ADMIN — Medication 5 MILLIGRAM(S): at 17:24

## 2022-04-10 RX ADMIN — Medication 100 MILLIGRAM(S): at 05:04

## 2022-04-10 RX ADMIN — Medication 100 MILLIGRAM(S): at 11:47

## 2022-04-10 RX ADMIN — Medication 50 MILLIGRAM(S): at 06:29

## 2022-04-10 RX ADMIN — CHLORHEXIDINE GLUCONATE 1 APPLICATION(S): 213 SOLUTION TOPICAL at 06:33

## 2022-04-10 RX ADMIN — PANTOPRAZOLE SODIUM 40 MILLIGRAM(S): 20 TABLET, DELAYED RELEASE ORAL at 13:29

## 2022-04-10 RX ADMIN — HEPARIN SODIUM 5000 UNIT(S): 5000 INJECTION INTRAVENOUS; SUBCUTANEOUS at 05:05

## 2022-04-10 RX ADMIN — Medication 50 MILLIGRAM(S): at 18:30

## 2022-04-10 RX ADMIN — HEPARIN SODIUM 5000 UNIT(S): 5000 INJECTION INTRAVENOUS; SUBCUTANEOUS at 17:24

## 2022-04-10 RX ADMIN — Medication 300 MILLIGRAM(S): at 13:25

## 2022-04-10 RX ADMIN — Medication 50 MILLIGRAM(S): at 13:24

## 2022-04-10 RX ADMIN — Medication 2: at 00:01

## 2022-04-10 RX ADMIN — PIPERACILLIN AND TAZOBACTAM 25 GRAM(S): 4; .5 INJECTION, POWDER, LYOPHILIZED, FOR SOLUTION INTRAVENOUS at 13:24

## 2022-04-10 NOTE — PROGRESS NOTE ADULT - SUBJECTIVE AND OBJECTIVE BOX
TRANSFER NOTE / ICU DOWNGRADE    Acute respiratory failure with hypoxia    HPI:  Pt is a 77 y/o M pmhx of COPD on home O2, B/l carotid artery stenosis, laryngeal CA s/p chemo and radiation, PEG tube, who was BIBA after being found unresponsive at home w/ SpO2 in the 30's, Intubated upon arrival to ED and started on propofol gtt for sedation. ICU consulted for hypoxic respiratory failure requiring intubation.  (08 Apr 2022 11:18)    Hospital Course:  76 year old male with history of Laryngeal Cancer s/p Chemo + RT, PEG Tube, COPD on home oxygen, Carotid Stenosis, Non Obstructive CAD, HTN, HLD, Prediabetes / ? DM 2 and Hypothyroidism presented with unresponsiveness. Oxygen saturation at home in 30s. Intubated in ER and admitted to ICU with Hypoxic Respiratory Failure secondary. Found to be in Septic Shock secondary to Aspiration Pneumonia. Started on Levophed and later weaned off. He was extubated on 4/9/22. EEG preliminary report with potential multifocal epileptogenic foci. He is medically stable for downgrade to Medicine Service.     Interval History:  Patient was seen and examined at bedside around 8:15 am with .   Slightly lethargic.   Breathing is improving.   Denies chest pain, palpitations, headache, dizziness, visual symptoms, nausea, vomiting or abdominal pain.    ROS:  As per interval history otherwise unremarkable.    PHYSICAL EXAM:  Vital Signs   T(C): 36.9 (10 Apr 2022 11:06), Max: 37.7 (09 Apr 2022 16:00)  T(F): 98.5 (10 Apr 2022 11:06), Max: 99.9 (09 Apr 2022 16:00)  HR: 79 (10 Apr 2022 11:06) (79 - 99)  BP: 175/76 (10 Apr 2022 11:06) (95/44 - 184/78)  BP(mean): 63 (09 Apr 2022 19:00) (59 - 95)  RR: 18 (10 Apr 2022 11:06) (18 - 21)  SpO2: 98% (10 Apr 2022 11:06) (90% - 100%)  General: Elderly male lying in bed comfortably. No acute distress  HEENT: EOMI. Clear conjunctivae. Moist mucus membrane. NGT in place.   Neck: Supple.   Chest: Decreased air entry at bases. Fine rhonchi. No wheezing or rales. No chest wall tenderness.  Heart: Normal S1 & S2. RRR.   Abdomen: Soft. Non-tender. Non-distended. + BS. G-tube in place.   Ext: No pedal edema. No calf tenderness   Neuro: Awake but slightly lethargic. No focal deficit. Speech soft.   Skin: Warm and Dry  Psychiatry: Normal mood and affect    I&O's Summary    09 Apr 2022 07:01  -  10 Apr 2022 07:00  --------------------------------------------------------  IN: 450 mL / OUT: 2825 mL / NET: -2375 mL    LABS:  CAPILLARY BLOOD GLUCOSE  POCT Blood Glucose.: 135 mg/dL (10 Apr 2022 11:42)  POCT Blood Glucose.: 143 mg/dL (10 Apr 2022 08:22)  POCT Blood Glucose.: 134 mg/dL (10 Apr 2022 06:34)  POCT Blood Glucose.: 160 mg/dL (09 Apr 2022 23:53)  POCT Blood Glucose.: 147 mg/dL (09 Apr 2022 17:40)                      8.4    9.93  )-----------( 173      ( 10 Apr 2022 06:45 )             28.2     04-10    141  |  96<L>  |  34.7<H>  ----------------------------<  134<H>  3.9   |  33.0<H>  |  1.03    Ca    8.8      10 Apr 2022 06:45  Phos  3.5     04-10  Mg     1.8     04-10    RADIOLOGY & ADDITIONAL STUDIES:  Reviewed     MEDICATIONS  (STANDING):  chlorhexidine 2% Cloths 1 Application(s) Topical <User Schedule>  chlorhexidine 2% Cloths 1 Application(s) Topical daily  dextrose 5%. 1000 milliLiter(s) (100 mL/Hr) IV Continuous <Continuous>  dextrose 5%. 1000 milliLiter(s) (50 mL/Hr) IV Continuous <Continuous>  dextrose 50% Injectable 25 Gram(s) IV Push once  dextrose 50% Injectable 12.5 Gram(s) IV Push once  dextrose 50% Injectable 25 Gram(s) IV Push once  glucagon  Injectable 1 milliGRAM(s) IntraMuscular once  heparin   Injectable 5000 Unit(s) SubCutaneous every 12 hours  hydrocortisone sodium succinate Injectable 50 milliGRAM(s) IV Push every 6 hours  insulin lispro (ADMELOG) corrective regimen sliding scale   SubCutaneous every 6 hours  labetalol 100 milliGRAM(s) Oral every 6 hours  levothyroxine 112 MICROGram(s) Oral daily  multiple electrolytes Injection Type 1 1000 milliLiter(s) (75 mL/Hr) IV Continuous <Continuous>  pantoprazole  Injectable 40 milliGRAM(s) IV Push daily  piperacillin/tazobactam IVPB.. 3.375 Gram(s) IV Intermittent every 8 hours  sodium zirconium cyclosilicate 10 Gram(s) Oral daily    MEDICATIONS  (PRN):  acetaminophen     Tablet .. 650 milliGRAM(s) Oral every 6 hours PRN Temp greater or equal to 38C (100.4F)  dextrose Oral Gel 15 Gram(s) Oral once PRN Blood Glucose LESS THAN 70 milliGRAM(s)/deciliter  sodium chloride 0.9% lock flush 10 milliLiter(s) IV Push every 1 hour PRN Pre/post blood products, medications, blood draw, and to maintain line patency

## 2022-04-10 NOTE — PROGRESS NOTE ADULT - ASSESSMENT
76 year old male with history of Laryngeal Cancer s/p Chemo + RT, PEG Tube, COPD on home oxygen, Carotid Stenosis, Non Obstructive CAD, HTN, HLD, Prediabetes / ? DM 2 and Hypothyroidism presented with unresponsiveness. Oxygen saturation at home in 30s. Intubated in ER and admitted to ICU with Hypoxic Respiratory Failure secondary. Found to be in Septic Shock secondary to Aspiration Pneumonia. Started on Levophed and later weaned off. He was extubated on 4/9/22. EEG preliminary report with potential multifocal epileptogenic foci. He is medically stable for downgrade to Medicine Service.     1) Acute Respiratory Failure with Hypoxia   - Likely due to aspiration pneumonia  - s/p extubation   - Aspiration precautions  - Continue Zosyn     2) Septic Shock  - Likely due to aspiration pneumonia  - Resolved  - Off Levophed  - Continue Zosyn    3) MATHIEU  - Likely due to above  - Resolving   - Monitor renal function     4) History of Laryngeal Cancer  - s/p Chemo + RT  - Outpatient follow up  - Resume tube feeding     5) COPD  - No exacerbation   - Continue supplemental oxygen     6) Prediabetes / ? DM 2  - HbA1c 6.1  - Accu checks and ISS    7) HTN  - Continue Labetalol    8) Hypothyroidism  - Continue Synthroid     DVT Prophylaxis -- Heparin SC    Dispo: Home in 2-3 days.     Updated patient's daughter.  76 year old male with history of Laryngeal Cancer s/p Chemo + RT, PEG Tube, COPD on home oxygen, Carotid Stenosis, Non Obstructive CAD, HTN, HLD, Prediabetes / ? DM 2 and Hypothyroidism presented with unresponsiveness. Oxygen saturation at home in 30s. Intubated in ER and admitted to ICU with Hypoxic Respiratory Failure secondary. Found to be in Septic Shock secondary to Aspiration Pneumonia. Started on Levophed and later weaned off. He was extubated on 4/9/22. EEG preliminary report with potential multifocal epileptogenic foci. He is medically stable for downgrade to Medicine Service.     1) Acute Respiratory Failure with Hypoxia   - Likely due to aspiration pneumonia  - s/p extubation   - Aspiration precautions  - Continue Zosyn     2) Septic Shock  - Likely due to aspiration pneumonia  - Resolved  - Off Levophed  - Continue Zosyn    3) MATHIEU  - Likely due to above  - Resolving   - Monitor renal function     4) Hyperkalemia  - Hold ACEI  - s/p Lokelma  - Resolved     5) History of Laryngeal Cancer  - s/p Chemo + RT  - Outpatient follow up  - Resume tube feeding     6) COPD  - No exacerbation   - Continue supplemental oxygen     7) Prediabetes / ? DM 2  - HbA1c 6.1  - Accu checks and ISS    8) HTN  - Continue Labetalol    9) Hypothyroidism  - Continue Synthroid     10) Abnormal EEG  - cvEEG  - Epilepsy Consult     DVT Prophylaxis -- Heparin SC    Dispo: Home in 2-3 days.     Updated patient's daughter.

## 2022-04-10 NOTE — EEG REPORT - NS EEG TEXT BOX
[facility]   COMPREHENSIVE EPILEPSY CENTER   REPORT OF LONG-TERM VIDEO EEG     [facility]: [address], [place] [address], Ph#: [phone]  [facility]: [address], [place] [address], Ph#: [phone]  [facility]: [address] [place] [address], Ph#: [phone]    Patient Name: KIMBERLY LAI  Age and : 76y ([birth year])  MRN #: [id number]  Location: [place] [address]  Referring Physician: Rosalio Rowell    Start Time/Date: 08:00 on 2022  End Time/Date: 19: on 2022  Duration: 11:19    _____________________________________________________________  STUDY INFORMATION    EEG Recording Technique:  The patient underwent continuous Video-EEG monitoring, using Telemetry System hardware on the XLTek Digital System. EEG and video data were stored on a computer hard drive with important events saved in digital archive files. The material was reviewed by a physician (electroencephalographer / epileptologist) on a daily basis. David and seizure detection algorithms were utilized and reviewed. An EEG Technician attended to the patient, and was available throughout daytime work hours.  The epilepsy center neurologist was available in person or on call 24-hours per day.    EEG Placement and Labeling of Electrodes:  The EEG was performed utilizing 20 channel referential EEG connections (coronal over temporal over parasagittal montage) using all standard 10-20 electrode placements with EKG, with additional electrodes placed in the inferior temporal region using the modified 10-10 montage electrode placements for elective admissions, or if deemed necessary. Recording was at a sampling rate of 256 samples per second per channel. Time synchronized digital video recording was done simultaneously with EEG recording. A low light infrared camera was used for low light recording.     _____________________________________________________________  HISTORY    Patient is a 76y old  Male who presents with a chief complaint of Hypoxic respiratory failure (2022 10:51)      PERTINENT MEDICATION:  none  _____________________________________________________________  STUDY INTERPRETATION    Findings: The background was continuous, spontaneously variable and reactive. for most of the recording diffuse theta and polymorphic delta activity was present. During periodis of maximal arousal, 7 Hz PDR could be seen    Background Slowing:  Diffuse theta and polymorphic delta slowing.    Focal Slowing:   None were present.    Sleep Background:  Stage II sleep transients were not recorded.    Other Non-Epileptiform Findings:  None were present    Interictal Epileptiform Activity:   occasional left frontal and frontotemporal sharp waves   occasional right frontotemporal sharp waves  rare bifrontal sharp waves    Events:  Clinical events: None recorded.  Seizures: None recorded.    Activation Procedures:   Hyperventilation was not performed.    Photic stimulation was not performed.     Artifacts:  Intermittent myogenic and movement artifacts were noted.    ECG:  The heart rate on single channel ECG was predominantly between 100-110 BPM.    _____________________________________________________________  EEG SUMMARY/CLASSIFICATION      Abnormal EEG in an intubated patient.  - occasional left frontal and frontotemporal sharp waves   - occasional right frontotemporal sharp waves  - rare bifrontal sharp waves  - Moderate generalized slowing.    _____________________________________________________________  EEG IMPRESSION/CLINICAL CORRELATE    Abnormal EEG study.  Potential multifocal epileptogenic foci  Moderate nonspecific diffuse or multifocal cerebral dysfunction.   No seizure seen.    Juan Carlos Garcia MD PGY-5  Epilepsy Fellow    This Preliminary report is based on fellow review. Final report pending attending review.    Reading Room: [phone]  On Call Service After Hours: [phone] [facility]   COMPREHENSIVE EPILEPSY CENTER   REPORT OF LONG-TERM VIDEO EEG     [facility]: [address], [place] [address], Ph#: [phone]  [facility]: [address], [place] [address], Ph#: [phone]  [facility]: [address] [place] [address], Ph#: [phone]    Patient Name: KIMBERLY LAI  Age and : 76y ([birth year])  MRN #: [id number]  Location: [place] [address]  Referring Physician: Rosalio Rowell    Start Time/Date: 08:00 on 2022  End Time/Date: 19: on 2022  Duration: 11:19    _____________________________________________________________  STUDY INFORMATION    EEG Recording Technique:  The patient underwent continuous Video-EEG monitoring, using Telemetry System hardware on the XLTek Digital System. EEG and video data were stored on a computer hard drive with important events saved in digital archive files. The material was reviewed by a physician (electroencephalographer / epileptologist) on a daily basis. David and seizure detection algorithms were utilized and reviewed. An EEG Technician attended to the patient, and was available throughout daytime work hours.  The epilepsy center neurologist was available in person or on call 24-hours per day.    EEG Placement and Labeling of Electrodes:  The EEG was performed utilizing 20 channel referential EEG connections (coronal over temporal over parasagittal montage) using all standard 10-20 electrode placements with EKG, with additional electrodes placed in the inferior temporal region using the modified 10-10 montage electrode placements for elective admissions, or if deemed necessary. Recording was at a sampling rate of 256 samples per second per channel. Time synchronized digital video recording was done simultaneously with EEG recording. A low light infrared camera was used for low light recording.     _____________________________________________________________  HISTORY    Patient is a 76y old  Male who presents with a chief complaint of Hypoxic respiratory failure (2022 10:51)      PERTINENT MEDICATION:  none  _____________________________________________________________  STUDY INTERPRETATION    Findings: The background was continuous, spontaneously variable and reactive. for most of the recording diffuse theta and polymorphic delta activity was present. During periodis of maximal arousal, 7 Hz PDR could be seen    Background Slowing:  Diffuse theta and polymorphic delta slowing.    Focal Slowing:   None were present.    Sleep Background:  Stage II sleep transients were not recorded.    Other Non-Epileptiform Findings:  None were present    Interictal Epileptiform Activity:   occasional left frontal and frontotemporal sharp waves   occasional right frontotemporal sharp waves  rare bifrontal sharp waves    Events:  Clinical events: None recorded.  Seizures: None recorded.    Activation Procedures:   Hyperventilation was not performed.    Photic stimulation was not performed.     Artifacts:  Intermittent myogenic and movement artifacts were noted.    ECG:  The heart rate on single channel ECG was predominantly between 100-110 BPM.    _____________________________________________________________  EEG SUMMARY/CLASSIFICATION      Abnormal EEG in an intubated patient.  - occasional left frontal and frontotemporal sharp waves   - occasional right frontotemporal sharp waves  - rare bifrontal sharp waves  - Moderate generalized slowing.    _____________________________________________________________  EEG IMPRESSION/CLINICAL CORRELATE    Abnormal EEG study.  Potential multifocal epileptogenic foci  Moderate nonspecific diffuse or multifocal cerebral dysfunction.   No seizure seen.    Juan Carlos Garcia MD PGY-5  Epilepsy Fellow        Reading Room: [phone]  On Call Service After Hours: [phone]

## 2022-04-11 LAB
-  AMIKACIN: SIGNIFICANT CHANGE UP
-  AMOXICILLIN/CLAVULANIC ACID: SIGNIFICANT CHANGE UP
-  AMPICILLIN/SULBACTAM: SIGNIFICANT CHANGE UP
-  AMPICILLIN: SIGNIFICANT CHANGE UP
-  AZTREONAM: SIGNIFICANT CHANGE UP
-  CEFAZOLIN: SIGNIFICANT CHANGE UP
-  CEFEPIME: SIGNIFICANT CHANGE UP
-  CEFOXITIN: SIGNIFICANT CHANGE UP
-  CEFTRIAXONE: SIGNIFICANT CHANGE UP
-  CIPROFLOXACIN: SIGNIFICANT CHANGE UP
-  ERTAPENEM: SIGNIFICANT CHANGE UP
-  GENTAMICIN: SIGNIFICANT CHANGE UP
-  IMIPENEM: SIGNIFICANT CHANGE UP
-  LEVOFLOXACIN: SIGNIFICANT CHANGE UP
-  MEROPENEM: SIGNIFICANT CHANGE UP
-  PIPERACILLIN/TAZOBACTAM: SIGNIFICANT CHANGE UP
-  TOBRAMYCIN: SIGNIFICANT CHANGE UP
-  TRIMETHOPRIM/SULFAMETHOXAZOLE: SIGNIFICANT CHANGE UP
ANION GAP SERPL CALC-SCNC: 13 MMOL/L — SIGNIFICANT CHANGE UP (ref 5–17)
BASOPHILS # BLD AUTO: 0.02 K/UL — SIGNIFICANT CHANGE UP (ref 0–0.2)
BASOPHILS NFR BLD AUTO: 0.3 % — SIGNIFICANT CHANGE UP (ref 0–2)
BUN SERPL-MCNC: 44.1 MG/DL — HIGH (ref 8–20)
CALCIUM SERPL-MCNC: 8.5 MG/DL — LOW (ref 8.6–10.2)
CHLORIDE SERPL-SCNC: 95 MMOL/L — LOW (ref 98–107)
CO2 SERPL-SCNC: 33 MMOL/L — HIGH (ref 22–29)
CREAT SERPL-MCNC: 1.01 MG/DL — SIGNIFICANT CHANGE UP (ref 0.5–1.3)
CULTURE RESULTS: SIGNIFICANT CHANGE UP
EGFR: 78 ML/MIN/1.73M2 — SIGNIFICANT CHANGE UP
EOSINOPHIL # BLD AUTO: 0 K/UL — SIGNIFICANT CHANGE UP (ref 0–0.5)
EOSINOPHIL NFR BLD AUTO: 0 % — SIGNIFICANT CHANGE UP (ref 0–6)
GLUCOSE BLDC GLUCOMTR-MCNC: 122 MG/DL — HIGH (ref 70–99)
GLUCOSE BLDC GLUCOMTR-MCNC: 123 MG/DL — HIGH (ref 70–99)
GLUCOSE BLDC GLUCOMTR-MCNC: 131 MG/DL — HIGH (ref 70–99)
GLUCOSE BLDC GLUCOMTR-MCNC: 139 MG/DL — HIGH (ref 70–99)
GLUCOSE BLDC GLUCOMTR-MCNC: 169 MG/DL — HIGH (ref 70–99)
GLUCOSE SERPL-MCNC: 130 MG/DL — HIGH (ref 70–99)
GRAM STN FLD: SIGNIFICANT CHANGE UP
HCT VFR BLD CALC: 29.2 % — LOW (ref 39–50)
HGB BLD-MCNC: 8.9 G/DL — LOW (ref 13–17)
IMM GRANULOCYTES NFR BLD AUTO: 1.2 % — SIGNIFICANT CHANGE UP (ref 0–1.5)
LYMPHOCYTES # BLD AUTO: 0.89 K/UL — LOW (ref 1–3.3)
LYMPHOCYTES # BLD AUTO: 11.7 % — LOW (ref 13–44)
MAGNESIUM SERPL-MCNC: 1.9 MG/DL — SIGNIFICANT CHANGE UP (ref 1.6–2.6)
MCHC RBC-ENTMCNC: 28.6 PG — SIGNIFICANT CHANGE UP (ref 27–34)
MCHC RBC-ENTMCNC: 30.5 GM/DL — LOW (ref 32–36)
MCV RBC AUTO: 93.9 FL — SIGNIFICANT CHANGE UP (ref 80–100)
METHOD TYPE: SIGNIFICANT CHANGE UP
MONOCYTES # BLD AUTO: 0.93 K/UL — HIGH (ref 0–0.9)
MONOCYTES NFR BLD AUTO: 12.2 % — SIGNIFICANT CHANGE UP (ref 2–14)
NEUTROPHILS # BLD AUTO: 5.67 K/UL — SIGNIFICANT CHANGE UP (ref 1.8–7.4)
NEUTROPHILS NFR BLD AUTO: 74.6 % — SIGNIFICANT CHANGE UP (ref 43–77)
ORGANISM # SPEC MICROSCOPIC CNT: SIGNIFICANT CHANGE UP
ORGANISM # SPEC MICROSCOPIC CNT: SIGNIFICANT CHANGE UP
PLATELET # BLD AUTO: 198 K/UL — SIGNIFICANT CHANGE UP (ref 150–400)
POTASSIUM SERPL-MCNC: 3.1 MMOL/L — LOW (ref 3.5–5.3)
POTASSIUM SERPL-SCNC: 3.1 MMOL/L — LOW (ref 3.5–5.3)
RBC # BLD: 3.11 M/UL — LOW (ref 4.2–5.8)
RBC # FLD: 15.1 % — HIGH (ref 10.3–14.5)
SODIUM SERPL-SCNC: 141 MMOL/L — SIGNIFICANT CHANGE UP (ref 135–145)
SPECIMEN SOURCE: SIGNIFICANT CHANGE UP
WBC # BLD: 7.6 K/UL — SIGNIFICANT CHANGE UP (ref 3.8–10.5)
WBC # FLD AUTO: 7.6 K/UL — SIGNIFICANT CHANGE UP (ref 3.8–10.5)

## 2022-04-11 PROCEDURE — 95813 EEG EXTND MNTR 61-119 MIN: CPT | Mod: 26

## 2022-04-11 PROCEDURE — 99222 1ST HOSP IP/OBS MODERATE 55: CPT

## 2022-04-11 PROCEDURE — 99233 SBSQ HOSP IP/OBS HIGH 50: CPT

## 2022-04-11 RX ORDER — POTASSIUM CHLORIDE 20 MEQ
40 PACKET (EA) ORAL EVERY 4 HOURS
Refills: 0 | Status: COMPLETED | OUTPATIENT
Start: 2022-04-11 | End: 2022-04-11

## 2022-04-11 RX ORDER — SODIUM CHLORIDE 9 MG/ML
1000 INJECTION, SOLUTION INTRAVENOUS
Refills: 0 | Status: COMPLETED | OUTPATIENT
Start: 2022-04-11 | End: 2022-04-11

## 2022-04-11 RX ORDER — PANTOPRAZOLE SODIUM 20 MG/1
40 TABLET, DELAYED RELEASE ORAL ONCE
Refills: 0 | Status: COMPLETED | OUTPATIENT
Start: 2022-04-11 | End: 2022-04-11

## 2022-04-11 RX ORDER — METOPROLOL TARTRATE 50 MG
25 TABLET ORAL
Refills: 0 | Status: DISCONTINUED | OUTPATIENT
Start: 2022-04-11 | End: 2022-04-12

## 2022-04-11 RX ADMIN — Medication 1000 UNIT(S): at 11:38

## 2022-04-11 RX ADMIN — PIPERACILLIN AND TAZOBACTAM 25 GRAM(S): 4; .5 INJECTION, POWDER, LYOPHILIZED, FOR SOLUTION INTRAVENOUS at 06:29

## 2022-04-11 RX ADMIN — HEPARIN SODIUM 5000 UNIT(S): 5000 INJECTION INTRAVENOUS; SUBCUTANEOUS at 18:29

## 2022-04-11 RX ADMIN — CHLORHEXIDINE GLUCONATE 1 APPLICATION(S): 213 SOLUTION TOPICAL at 06:33

## 2022-04-11 RX ADMIN — PIPERACILLIN AND TAZOBACTAM 25 GRAM(S): 4; .5 INJECTION, POWDER, LYOPHILIZED, FOR SOLUTION INTRAVENOUS at 14:30

## 2022-04-11 RX ADMIN — Medication 300 MILLIGRAM(S): at 11:38

## 2022-04-11 RX ADMIN — Medication 100 MILLIGRAM(S): at 00:40

## 2022-04-11 RX ADMIN — Medication 40 MILLIEQUIVALENT(S): at 11:38

## 2022-04-11 RX ADMIN — HEPARIN SODIUM 5000 UNIT(S): 5000 INJECTION INTRAVENOUS; SUBCUTANEOUS at 06:28

## 2022-04-11 RX ADMIN — Medication 81 MILLIGRAM(S): at 11:38

## 2022-04-11 RX ADMIN — Medication 25 MILLIGRAM(S): at 11:38

## 2022-04-11 RX ADMIN — ATORVASTATIN CALCIUM 40 MILLIGRAM(S): 80 TABLET, FILM COATED ORAL at 21:17

## 2022-04-11 RX ADMIN — Medication 100 MILLIGRAM(S): at 06:29

## 2022-04-11 RX ADMIN — Medication 40 MILLIEQUIVALENT(S): at 15:50

## 2022-04-11 RX ADMIN — Medication 5 MILLIGRAM(S): at 06:29

## 2022-04-11 RX ADMIN — PANTOPRAZOLE SODIUM 40 MILLIGRAM(S): 20 TABLET, DELAYED RELEASE ORAL at 18:29

## 2022-04-11 RX ADMIN — CHLORHEXIDINE GLUCONATE 1 APPLICATION(S): 213 SOLUTION TOPICAL at 11:37

## 2022-04-11 RX ADMIN — Medication 112 MICROGRAM(S): at 06:28

## 2022-04-11 RX ADMIN — PIPERACILLIN AND TAZOBACTAM 25 GRAM(S): 4; .5 INJECTION, POWDER, LYOPHILIZED, FOR SOLUTION INTRAVENOUS at 21:17

## 2022-04-11 RX ADMIN — Medication 5 MILLIGRAM(S): at 18:29

## 2022-04-11 RX ADMIN — SODIUM CHLORIDE 75 MILLILITER(S): 9 INJECTION, SOLUTION INTRAVENOUS at 12:02

## 2022-04-11 RX ADMIN — Medication 2: at 00:36

## 2022-04-11 NOTE — PROGRESS NOTE ADULT - ASSESSMENT
76 year old male with history of Laryngeal Cancer s/p Chemo + RT, PEG Tube, COPD on home oxygen, Carotid Stenosis, Non Obstructive CAD, HTN, HLD, Prediabetes / ? DM 2 and Hypothyroidism presented with unresponsiveness. Oxygen saturation at home in 30s. Intubated in ER and admitted to ICU with Hypoxic Respiratory Failure secondary. Found to be in Septic Shock secondary to Aspiration Pneumonia. Started on Levophed and later weaned off. He was extubated on 4/9/22. EEG preliminary report with potential multifocal epileptogenic foci. He is medically stable for downgrade to Medicine Service.     1) Acute Respiratory Failure with Hypoxia   - Likely due to aspiration pneumonia  - Sputum culture with E. Coli  - s/p extubation on 4/9/22  - Aspiration precautions  - Continue Zosyn     2) Septic Shock  - Likely due to aspiration pneumonia  - Resolved  - Off Levophed and Hydrocortisone   - Continue Zosyn    3) MATHIEU  - Likely due to above  - Resolving   - Monitor renal function     4) Hyperkalemia  - Now with hypokalemia  - ACEI on hold   - s/p Lokelma  - KCl 40 mEq x 2    5) History of Laryngeal Cancer  - s/p Chemo + RT  - Outpatient follow up  - Resumed tube feeding     6) COPD  - No exacerbation   - Continue supplemental oxygen     7) Prediabetes / ? DM 2  - HbA1c 6.1  - Accu checks and ISS    8) HTN  - Switched Labetalol to Metoprolol (home med)  - Enalapril on hold due to hyperkalemia     9) Hypothyroidism  - Continue Synthroid     10) Abnormal EEG  - Repeat EEG with mild to moderate nonspecific diffuse or multifocal cerebral dysfunction. No epileptiform pattern or seizure seen.  - No intervention as per Epilepsy.     11) Coag Neg Staph Bacteremia  - Likely contaminant  - Follow repeat cultures     DVT Prophylaxis -- Heparin SC    Dispo: Home in 48 hours.      Updated patient's daughter at bedside.

## 2022-04-11 NOTE — CONSULT NOTE ADULT - SUBJECTIVE AND OBJECTIVE BOX
[facility]                                                                     MD Katrin Maya,                                               Epilepsy Consult #: [phone])                                               Office: [address] [place] [address], [address]                                                 Phone: [phone]; Fax: [phone]                            ==============================================    EPILEPSY INITIAL CONSULT NOTE      ADMITTING DIAGNOSIS: Acute respiratory failure with hypoxia        HPI:  Pt is a 77 y/o M pmhx of COPD on home O2, B/l carotid artery stenosis, laryngeal CA s/p chemo and radiation, PEG tube, who was BIBA after being found unresponsive at home w/ SpO2 in the 30's, Intubated upon arrival to ED and started on propofol gtt for sedation. ICU consulted for hypoxic respiratory failure requiring intubation.  (08 Apr 2022 11:18)    Hospital Course:  76 year old male with history of Laryngeal Cancer s/p Chemo + RT, PEG Tube, COPD on home oxygen, Carotid Stenosis, Non Obstructive CAD, HTN, HLD, Prediabetes / ? DM 2 and Hypothyroidism presented with unresponsiveness. Oxygen saturation at home in 30s. Intubated in ER and admitted to ICU with Hypoxic Respiratory Failure secondary. Found to be in Septic Shock secondary to Aspiration Pneumonia. Started on Levophed and later weaned off. He was extubated on 4/9/22. EEG preliminary report with potential multifocal epileptogenic foci. He is medically stable for downgrade to Medicine Service. (Internal Medicine)      Epilepsy consulted for multifocal sharp wave discharges seen on cvEEG during the initial 24hours of recording, but resolved on subsequent cvEEG 2 days later. No seizure on cvEEG per EEG report. Patient and daughter denied any history of seizure or seizure-like activity in the past.         PMH:  Esophageal stricture    Encounter for gastrojejunal tube placement    Prostate CA    History of carotid stenosis    Laryngeal carcinoma    Pneumonia    COPD (chronic obstructive pulmonary disease)    Hypothyroidism    Aspiration pneumonia    Traumatic pneumothorax    Femoral fracture    Syncope    Benign prostatic hyperplasia with urinary obstruction    Syncope and collapse    Microalbuminuria    Anemia    Hyperlipidemia, unspecified hyperlipidemia type        PSH:  Femoral fracture    S/P percutaneous endoscopic gastrostomy (PEG) tube placement    Liver laceration    History of exploratory laparotomy    History of total bilateral knee replacement  b/l femur        MEDICATION:  acetaminophen     Tablet .. 650 milliGRAM(s) Oral every 6 hours PRN Temp greater or equal to 38C (100.4F)  aspirin  chewable 81 milliGRAM(s) Oral daily  atorvastatin 40 milliGRAM(s) Oral at bedtime  chlorhexidine 2% Cloths 1 Application(s) Topical <User Schedule>  chlorhexidine 2% Cloths 1 Application(s) Topical daily  cholecalciferol 1000 Unit(s) Oral daily  dextrose 5%. 1000 milliLiter(s) (100 mL/Hr) IV Continuous <Continuous>  dextrose 5%. 1000 milliLiter(s) (50 mL/Hr) IV Continuous <Continuous>  dextrose 50% Injectable 25 Gram(s) IV Push once  dextrose 50% Injectable 12.5 Gram(s) IV Push once  dextrose 50% Injectable 25 Gram(s) IV Push once  dextrose Oral Gel 15 Gram(s) Oral once PRN Blood Glucose LESS THAN 70 milliGRAM(s)/deciliter  ferrous    sulfate Liquid 300 milliGRAM(s) Enteral Tube daily  glucagon  Injectable 1 milliGRAM(s) IntraMuscular once  heparin   Injectable 5000 Unit(s) SubCutaneous every 12 hours  hydrALAZINE Injectable 10 milliGRAM(s) IV Push every 6 hours PRN If SBP > 160  insulin lispro (ADMELOG) corrective regimen sliding scale   SubCutaneous every 6 hours  levothyroxine 112 MICROGram(s) Oral daily  metoprolol tartrate 25 milliGRAM(s) Oral two times a day  multiple electrolytes Injection Type 1 1000 milliLiter(s) (75 mL/Hr) IV Continuous <Continuous>  oxybutynin 5 milliGRAM(s) Oral two times a day  piperacillin/tazobactam IVPB.. 3.375 Gram(s) IV Intermittent every 8 hours  potassium chloride   Powder 40 milliEquivalent(s) Oral every 4 hours  sodium chloride 0.9% lock flush 10 milliLiter(s) IV Push every 1 hour PRN Pre/post blood products, medications, blood draw, and to maintain line patency    ALLERGIES:  No Known Allergies    FH:  Family history of hypertension (Mother)  Family history of cerebrovascular accident (CVA) (Mother)      SH:  No EtOH, tobacco, illicit drugs.      ROS:  Generalized fatigue and SOB, otherwise negative for skin, eyes, ENT, cardiovascular, gastrointestinal, genitourinary, musculoskeletal, neurological, psychiatric, hematology/lymphatics, endocrine, allergic/immunologic.    VITALS:  T(C): 36.8 (04-11-22 @ 11:30), Max: 36.8 (04-10-22 @ 20:03)  HR: 81 (04-11-22 @ 11:30) (73 - 86)  BP: 146/71 (04-11-22 @ 11:30) (110/57 - 159/70)  ABP: --  RR: 18 (04-11-22 @ 04:00) (18 - 18)  SpO2: 96% (04-11-22 @ 04:00) (95% - 97%)  CVP(cm H2O): --    GENERAL PHYSICAL EXAM:  GEN: no distress  HEENT: NCAT, OP clear  EYES: sclera white, conjunctiva clear, no nystagmus  NECK: supple  CV: RRR, no murmur     		  PULM: CTAB, no wheezing  ABD: soft, +BS, NT  EXT: peripheral pulse intact, no cyanosis  SKIN: warm, dry    NEUROLOGICAL EXAM:  Mental Status  Orientation: awake and alert   Language: clear     Cranial Nerves  II: full visual fields intact   III, IV, VI: PERRL, EOMI  V, VII: facial sensation and movement intact and symmetric   VIII: hearing intact   IX, X: uvula midline, soft palate elevates normally   XI: BL shoulder shrug intact   XII: tongue midline    Motor  4+ in all BUE and BLE                 No pronator drift    Sensation  Intact to light touch and pinprick in all 4 EXTs    Reflex  2+ in BL biceps and patella                                    Plantar responses downward bilaterally    Coordination  Normal FTN bilaterally    Gait  Deferred      LABS:                          8.9    7.60  )-----------( 198      ( 11 Apr 2022 05:43 )             29.2     04-11    141  |  95<L>  |  44.1<H>  ----------------------------<  130<H>  3.1<L>   |  33.0<H>  |  1.01    Ca    8.5<L>      11 Apr 2022 05:43  Phos  3.5     04-10  Mg     1.9     04-11      CAPILLARY BLOOD GLUCOSE      POCT Blood Glucose.: 131 mg/dL (11 Apr 2022 11:36)  POCT Blood Glucose.: 122 mg/dL (11 Apr 2022 08:00)  POCT Blood Glucose.: 123 mg/dL (11 Apr 2022 06:32)  POCT Blood Glucose.: 169 mg/dL (11 Apr 2022 00:34)  POCT Blood Glucose.: 154 mg/dL (10 Apr 2022 17:19)        OTHER IMAGING AND STUDIES:    [facility] [place] 4/10 - 4/11/22:  EEG SUMMARY/CLASSIFICATION    Abnormal EEG in the awake, drowsy and asleep states.  - Mild to moderate generalized slowing.    _____________________________________________________________  EEG IMPRESSION/CLINICAL CORRELATE    Abnormal EEG study.  1. Mild to moderate nonspecific diffuse or multifocal cerebral dysfunction.   2. No epileptiform pattern or seizure seen.        [facility] [place] 4/8 - 4/9/22:  EEG SUMMARY/CLASSIFICATION      Abnormal EEG in an intubated patient.  - occasional left frontal and frontotemporal sharp waves   - occasional right frontotemporal sharp waves  - rare bifrontal sharp waves  - Moderate generalized slowing.    _____________________________________________________________  EEG IMPRESSION/CLINICAL CORRELATE    Abnormal EEG study.  Potential multifocal epileptogenic foci  Moderate nonspecific diffuse or multifocal cerebral dysfunction.   No seizure seen.      < from: CT Head No Cont (04.08.22 @ 09:46) >  IMPRESSION:  NO EVIDENCE OF AN ACUTE INTRACRANIAL HEMORRHAGE, MIDLINE SHIFT, OR   HYDROCEPHALUS. MODERATE ATROPHY WITH MILD WHITE MATTER ISCHEMIC CHANGES.  NO EVIDENCE OF AN ACUTE CERVICAL SPINE FRACTURE.

## 2022-04-11 NOTE — EEG REPORT - NS EEG TEXT BOX
[facility]   COMPREHENSIVE EPILEPSY CENTER   REPORT OF LONG-TERM VIDEO EEG     [facility]: [address], [place] [address], Ph#: [phone]  [facility]: [id number], [place] [address], Ph#: [phone]  [facility]: [address] [place] [address], Ph#: [phone]    Patient Name: KIMBERLY LAI  Age and : 76y ([birth year])  MRN #: [id number]  Location: [facility] 5TWR 5206 02  Referring Physician: Dennis Mercado    Start Time/Date: 08:00 on 22  End Time/Date: 11:15 on 22  Duration: 3h 13m     _____________________________________________________________  STUDY INFORMATION    EEG Recording Technique:  The patient underwent continuous Video-EEG monitoring, using Telemetry System hardware on the XLTek Digital System. EEG and video data were stored on a computer hard drive with important events saved in digital archive files. The material was reviewed by a physician (electroencephalographer / epileptologist) on a daily basis. David and seizure detection algorithms were utilized and reviewed. An EEG Technician attended to the patient, and was available throughout daytime work hours.  The epilepsy center neurologist was available in person or on call 24-hours per day.    EEG Placement and Labeling of Electrodes:  The EEG was performed utilizing 20 channel referential EEG connections (coronal over temporal over parasagittal montage) using all standard 10-20 electrode placements with EKG, with additional electrodes placed in the inferior temporal region using the modified 10-10 montage electrode placements for elective admissions, or if deemed necessary. Recording was at a sampling rate of 256 samples per second per channel. Time synchronized digital video recording was done simultaneously with EEG recording. A low light infrared camera was used for low light recording.     _____________________________________________________________  HISTORY    Patient is a 76y old  Male who presents with a chief complaint of Hypoxic respiratory failure (10 Apr 2022 12:04)      PERTINENT MEDICATION:  none    _____________________________________________________________  STUDY INTERPRETATION    Findings: The background was continuous and reactive. During wakefulness, the posterior dominant rhythm consisted of symmetric, well-modulated 7 Hz activity, with amplitude to 30 uV, that attenuated to eye opening.      Background Slowing:  None were present.    Focal Slowing:   None were present.    Sleep Background:  Drowsiness was characterized by fragmentation, attenuation, and slowing of the background activity.    Stage II sleep transients were not recorded.     Other Non-Epileptiform Findings:  None were present.    Interictal Epileptiform Activity:   None were present.    Events:  No event or seizure recorded.    Activation Procedures:   Hyperventilation was not performed.    Photic stimulation was not performed.     Artifacts:  Intermittent myogenic and movement artifacts were noted.    ECG:  The heart rate on single channel ECG was predominantly between 70-80 BPM.    _____________________________________________________________  EEG SUMMARY/CLASSIFICATION    Abnormal EEG in the awake, drowsy states.  - Mild generalized slowing.    _____________________________________________________________  EEG IMPRESSION/CLINICAL CORRELATE    Abnormal EEG study.  1. Mild nonspecific diffuse or multifocal cerebral dysfunction.   2. No epileptiform pattern or seizure seen.    _____________________________________________________________    Gabriel James MD  Director, Epilepsy/EMU - [facility]

## 2022-04-11 NOTE — PROGRESS NOTE ADULT - SUBJECTIVE AND OBJECTIVE BOX
Acute respiratory failure with hypoxia    HPI:  Pt is a 75 y/o M pmhx of COPD on home O2, B/l carotid artery stenosis, laryngeal CA s/p chemo and radiation, PEG tube, who was BIBA after being found unresponsive at home w/ SpO2 in the 30's, Intubated upon arrival to ED and started on propofol gtt for sedation. ICU consulted for hypoxic respiratory failure requiring intubation.  (08 Apr 2022 11:18)    Interval History:  Patient was seen and examined at bedside around 10 am. Daughter at bedside who helped with translation.   Feels better.  Breathing is improving.   Denies chest pain, palpitations, headache, dizziness, visual symptoms, nausea, vomiting or abdominal pain.  Successful voiding trial.     ROS:  As per interval history otherwise unremarkable.    PHYSICAL EXAM:  Vital Signs   T(C): 36.6 (11 Apr 2022 04:00), Max: 36.8 (10 Apr 2022 20:03)  T(F): 97.9 (11 Apr 2022 04:00), Max: 98.3 (10 Apr 2022 20:03)  HR: 76 (11 Apr 2022 04:00) (73 - 86)  BP: 159/70 (11 Apr 2022 04:00) (110/57 - 159/70)  RR: 18 (11 Apr 2022 04:00) (18 - 18)  SpO2: 96% (11 Apr 2022 04:00) (95% - 97%)  General: Elderly male lying in bed comfortably. No acute distress  HEENT: EOMI. Clear conjunctivae. Moist mucus membrane.   Neck: Supple.   Chest: Decreased air entry at bases. No wheezing, rales or rhonchi. No chest wall tenderness.  Heart: Normal S1 & S2. RRR.   Abdomen: Soft. Non-tender. Non-distended. + BS. G-tube in place.   Ext: No pedal edema. No calf tenderness   Neuro: Awake and alert. No focal deficit. Speech soft.   Skin: Warm and Dry  Psychiatry: Normal mood and affect    I&O's Summary    10 Apr 2022 07:01  -  11 Apr 2022 07:00  --------------------------------------------------------  IN: 360 mL / OUT: 0 mL / NET: 360 mL    LABS:  CAPILLARY BLOOD GLUCOSE  POCT Blood Glucose.: 131 mg/dL (11 Apr 2022 11:36)  POCT Blood Glucose.: 122 mg/dL (11 Apr 2022 08:00)  POCT Blood Glucose.: 123 mg/dL (11 Apr 2022 06:32)  POCT Blood Glucose.: 169 mg/dL (11 Apr 2022 00:34)  POCT Blood Glucose.: 154 mg/dL (10 Apr 2022 17:19)                      8.9    7.60  )-----------( 198      ( 11 Apr 2022 05:43 )             29.2     04-11    141  |  95<L>  |  44.1<H>  ----------------------------<  130<H>  3.1<L>   |  33.0<H>  |  1.01    Ca    8.5<L>      11 Apr 2022 05:43  Phos  3.5     04-10  Mg     1.9     04-11    Blood Culture: 04-08 @ 21:50  Organism --  Gram Stain Blood -- Gram Stain   Numerous polymorphonuclear leukocytes per low power field  Rare Squamous epithelial cells per low power field  Few Gram Negative Rods seen per oil power field  Specimen Source .Sputum Sputum  Culture-Blood --    04-08 @ 16:21  Organism --  Gram Stain Blood -- Gram Stain --  Specimen Source Clean Catch Clean Catch (Midstream)  Culture-Blood --    04-08 @ 10:13  Organism --  Gram Stain Blood -- Gram Stain --  Specimen Source .Blood Blood-Peripheral  Culture-Blood --    04-08 @ 10:12  Organism Blood Culture PCR  Gram Stain Blood -- Gram Stain   Growth in aerobic bottle: Gram Positive Cocci in Clusters  ***Blood Panel PCR results on this specimen are available  approximately 3 hours after the Gram stain result.***  Gram stain, PCR, and/or culture results may not always  correspond due to difference in methodologies.  ************************************************************  This PCR assay was performed using April[employer].  The following targets are tested for: Enterococcus,  vancomycin resistant enterococci, Listeria monocytogenes,  coagulase negative staphylococci, S. aureus,  methicillin resistant S. aureus, Streptococcus agalactiae  (Group B), S. pneumoniae, S. pyogenes (Group A),  Acinetobacter baumannii, Enterobacter cloacae, E. coli,  Klebsiella oxytoca, K. pneumoniae, Proteus sp.,  Serratia marcescens, Haemophilus influenzae,  Neisseria meningitidis, Pseudomonas aeruginosa, Candida  albicans, C. glabrata, C krusei, C parapsilosis,  C. tropicalis and the KPC resistance gene.  Gram Stain and BCID performed by:  [facility]  [address] [place] [id number]  .  TYPE: (C=Critical, N=Notification, A=Abnormal) C  TESTS:  _ GS  DATE/TIME CALLED: _ 04/10/2022 09:12:58  CALLED TO: Iraida Alexander RN  READ BACK (2 Patient Identifiers)(Y/N): _ Y  READ BACK VALUES (Y/N): _ Y  CALLED BY: Iraida Negron  Specimen Source .Blood Blood-Peripheral  Culture-Blood --    RADIOLOGY & ADDITIONAL STUDIES:  Reviewed     MEDICATIONS  (STANDING):  aspirin  chewable 81 milliGRAM(s) Oral daily  atorvastatin 40 milliGRAM(s) Oral at bedtime  chlorhexidine 2% Cloths 1 Application(s) Topical <User Schedule>  chlorhexidine 2% Cloths 1 Application(s) Topical daily  cholecalciferol 1000 Unit(s) Oral daily  dextrose 5%. 1000 milliLiter(s) (100 mL/Hr) IV Continuous <Continuous>  dextrose 5%. 1000 milliLiter(s) (50 mL/Hr) IV Continuous <Continuous>  dextrose 50% Injectable 25 Gram(s) IV Push once  dextrose 50% Injectable 12.5 Gram(s) IV Push once  dextrose 50% Injectable 25 Gram(s) IV Push once  ferrous    sulfate Liquid 300 milliGRAM(s) Enteral Tube daily  glucagon  Injectable 1 milliGRAM(s) IntraMuscular once  heparin   Injectable 5000 Unit(s) SubCutaneous every 12 hours  insulin lispro (ADMELOG) corrective regimen sliding scale   SubCutaneous every 6 hours  levothyroxine 112 MICROGram(s) Oral daily  metoprolol tartrate 25 milliGRAM(s) Oral two times a day  multiple electrolytes Injection Type 1 1000 milliLiter(s) (75 mL/Hr) IV Continuous <Continuous>  oxybutynin 5 milliGRAM(s) Oral two times a day  piperacillin/tazobactam IVPB.. 3.375 Gram(s) IV Intermittent every 8 hours  potassium chloride   Powder 40 milliEquivalent(s) Oral every 4 hours    MEDICATIONS  (PRN):  acetaminophen     Tablet .. 650 milliGRAM(s) Oral every 6 hours PRN Temp greater or equal to 38C (100.4F)  dextrose Oral Gel 15 Gram(s) Oral once PRN Blood Glucose LESS THAN 70 milliGRAM(s)/deciliter  hydrALAZINE Injectable 10 milliGRAM(s) IV Push every 6 hours PRN If SBP > 160  sodium chloride 0.9% lock flush 10 milliLiter(s) IV Push every 1 hour PRN Pre/post blood products, medications, blood draw, and to maintain line patency

## 2022-04-11 NOTE — CHART NOTE - NSCHARTNOTEFT_GEN_A_CORE
Date 4/11/2022    [place] [address] [place] [address]      To Whom It May Concern,     I'm writing on behalf of KIMBERLY LAI to inform you that he was been admitted to [facility], since 4/8/22. I can not disclose the nature of his admission. For any questions/inquiries please call [phone].     Wesly Rosales PA-C

## 2022-04-11 NOTE — EEG REPORT - NS EEG TEXT BOX
[facility]   COMPREHENSIVE EPILEPSY CENTER   REPORT OF LONG-TERM VIDEO EEG     [facility]: [address], [place] [address], Ph#: [phone]  [facility]: [id number], [place] [address], Ph#: [phone]  [facility]: [address] [place] [address], Ph#: [phone]    Patient Name: KIMBERLY LAI  Age and : 76y ([birth year])  MRN #: [id number]  Location: [facility] 5TWR 5206 02  Referring Physician: Dennis Mercado    Start Time/Date: 10:45 on 04-10-22  End Time/Date: 08:00 on 22  Duration: 21h 13m     _____________________________________________________________  STUDY INFORMATION    EEG Recording Technique:  The patient underwent continuous Video-EEG monitoring, using Telemetry System hardware on the XLTek Digital System. EEG and video data were stored on a computer hard drive with important events saved in digital archive files. The material was reviewed by a physician (electroencephalographer / epileptologist) on a daily basis. David and seizure detection algorithms were utilized and reviewed. An EEG Technician attended to the patient, and was available throughout daytime work hours.  The epilepsy center neurologist was available in person or on call 24-hours per day.    EEG Placement and Labeling of Electrodes:  The EEG was performed utilizing 20 channel referential EEG connections (coronal over temporal over parasagittal montage) using all standard 10-20 electrode placements with EKG, with additional electrodes placed in the inferior temporal region using the modified 10-10 montage electrode placements for elective admissions, or if deemed necessary. Recording was at a sampling rate of 256 samples per second per channel. Time synchronized digital video recording was done simultaneously with EEG recording. A low light infrared camera was used for low light recording.     _____________________________________________________________  HISTORY    Patient is a 76y old  Male who presents with a chief complaint of Hypoxic respiratory failure (10 Apr 2022 12:04)      PERTINENT MEDICATION:  none    _____________________________________________________________  STUDY INTERPRETATION    Findings: The background was continuous and reactive. During wakefulness, the posterior dominant rhythm consisted of symmetric, well-modulated 7 Hz activity, with amplitude to 30 uV, that attenuated to eye opening.      Background Slowing:  Excess diffuse polymorphic delta slowing.    Focal Slowing:   None were present.    Sleep Background:  Drowsiness was characterized by fragmentation, attenuation, and slowing of the background activity.    Sleep was characterized by the presence of vertex waves, symmetric sleep spindles and K-complexes.    Other Non-Epileptiform Findings:  None were present.    Interictal Epileptiform Activity:   None were present.    Events:  No event or seizure recorded.    Activation Procedures:   Hyperventilation was not performed.    Photic stimulation was not performed.     Artifacts:  Intermittent myogenic and movement artifacts were noted.    ECG:  The heart rate on single channel ECG was predominantly between 70-80 BPM.    _____________________________________________________________  EEG SUMMARY/CLASSIFICATION    Abnormal EEG in the awake, drowsy and asleep states.  - Mild to moderate generalized slowing.    _____________________________________________________________  EEG IMPRESSION/CLINICAL CORRELATE    Abnormal EEG study.  1. Mild to moderate nonspecific diffuse or multifocal cerebral dysfunction.   2. No epileptiform pattern or seizure seen.    _____________________________________________________________    Gabriel James MD  Director, Epilepsy/EMU - [facility]

## 2022-04-11 NOTE — CONSULT NOTE ADULT - ASSESSMENT
This is a 77yo RH male with a history of COPD on home O2, B/l carotid artery stenosis, laryngeal CA s/p chemo and radiation, PEG tube who was BIBA after being found unresponsive at home w/ SpO2 in the 30's. Personally reviewed all imagines, labs, EEG and other studies.    Impression:  1. Abnormal EEG: Multifocal sharp wave discharges seen on cvEEG during the initial 24hours of recording, but resolved on subsequent cvEEG 2 days later. No seizure on cvEEG per EEG report. Transient multifocal sharp wave discharges likely related to hypoxic brain injury in setting of acute respiratory failure.  2. Acute respiratory failure likely d/t aspiration pna  3. Septic shock: Resolved.    Recommendation:  - discontinue cvEEG  - no indication for antiseizure medication at this time  - abx  - medical management and support care per primary team       Thank you for allowing Epilepsy to participate in the care of this patient.   ______________________  Gabriel James MD   Director, Epilepsy/EMU - [facility]   Epilepsy Consult #: [phone] ([phone])

## 2022-04-12 LAB
ANION GAP SERPL CALC-SCNC: 10 MMOL/L — SIGNIFICANT CHANGE UP (ref 5–17)
BUN SERPL-MCNC: 38.3 MG/DL — HIGH (ref 8–20)
CALCIUM SERPL-MCNC: 8.9 MG/DL — SIGNIFICANT CHANGE UP (ref 8.6–10.2)
CHLORIDE SERPL-SCNC: 101 MMOL/L — SIGNIFICANT CHANGE UP (ref 98–107)
CO2 SERPL-SCNC: 34 MMOL/L — HIGH (ref 22–29)
CREAT SERPL-MCNC: 0.93 MG/DL — SIGNIFICANT CHANGE UP (ref 0.5–1.3)
CULTURE RESULTS: SIGNIFICANT CHANGE UP
CULTURE RESULTS: SIGNIFICANT CHANGE UP
EGFR: 86 ML/MIN/1.73M2 — SIGNIFICANT CHANGE UP
GLUCOSE BLDC GLUCOMTR-MCNC: 124 MG/DL — HIGH (ref 70–99)
GLUCOSE BLDC GLUCOMTR-MCNC: 149 MG/DL — HIGH (ref 70–99)
GLUCOSE BLDC GLUCOMTR-MCNC: 152 MG/DL — HIGH (ref 70–99)
GLUCOSE BLDC GLUCOMTR-MCNC: 173 MG/DL — HIGH (ref 70–99)
GLUCOSE BLDC GLUCOMTR-MCNC: 196 MG/DL — HIGH (ref 70–99)
GLUCOSE SERPL-MCNC: 163 MG/DL — HIGH (ref 70–99)
MAGNESIUM SERPL-MCNC: 1.9 MG/DL — SIGNIFICANT CHANGE UP (ref 1.6–2.6)
ORGANISM # SPEC MICROSCOPIC CNT: SIGNIFICANT CHANGE UP
POTASSIUM SERPL-MCNC: 4.2 MMOL/L — SIGNIFICANT CHANGE UP (ref 3.5–5.3)
POTASSIUM SERPL-SCNC: 4.2 MMOL/L — SIGNIFICANT CHANGE UP (ref 3.5–5.3)
SODIUM SERPL-SCNC: 145 MMOL/L — SIGNIFICANT CHANGE UP (ref 135–145)
SPECIMEN SOURCE: SIGNIFICANT CHANGE UP

## 2022-04-12 PROCEDURE — 99233 SBSQ HOSP IP/OBS HIGH 50: CPT

## 2022-04-12 PROCEDURE — 71045 X-RAY EXAM CHEST 1 VIEW: CPT | Mod: 26

## 2022-04-12 RX ORDER — ACETYLCYSTEINE 200 MG/ML
4 VIAL (ML) MISCELLANEOUS EVERY 6 HOURS
Refills: 0 | Status: COMPLETED | OUTPATIENT
Start: 2022-04-12 | End: 2022-04-12

## 2022-04-12 RX ORDER — PANTOPRAZOLE SODIUM 20 MG/1
40 TABLET, DELAYED RELEASE ORAL DAILY
Refills: 0 | Status: DISCONTINUED | OUTPATIENT
Start: 2022-04-12 | End: 2022-04-12

## 2022-04-12 RX ORDER — PANTOPRAZOLE SODIUM 20 MG/1
40 TABLET, DELAYED RELEASE ORAL DAILY
Refills: 0 | Status: DISCONTINUED | OUTPATIENT
Start: 2022-04-12 | End: 2022-04-23

## 2022-04-12 RX ORDER — LACTOBACILLUS ACIDOPHILUS 100MM CELL
1 CAPSULE ORAL
Refills: 0 | Status: DISCONTINUED | OUTPATIENT
Start: 2022-04-12 | End: 2022-04-25

## 2022-04-12 RX ORDER — METOPROLOL TARTRATE 50 MG
25 TABLET ORAL EVERY 8 HOURS
Refills: 0 | Status: DISCONTINUED | OUTPATIENT
Start: 2022-04-12 | End: 2022-04-14

## 2022-04-12 RX ORDER — ALBUTEROL 90 UG/1
2.5 AEROSOL, METERED ORAL EVERY 6 HOURS
Refills: 0 | Status: DISCONTINUED | OUTPATIENT
Start: 2022-04-12 | End: 2022-04-13

## 2022-04-12 RX ADMIN — Medication 4 MILLILITER(S): at 21:54

## 2022-04-12 RX ADMIN — ATORVASTATIN CALCIUM 40 MILLIGRAM(S): 80 TABLET, FILM COATED ORAL at 22:08

## 2022-04-12 RX ADMIN — CHLORHEXIDINE GLUCONATE 1 APPLICATION(S): 213 SOLUTION TOPICAL at 05:16

## 2022-04-12 RX ADMIN — Medication 5 MILLIGRAM(S): at 05:16

## 2022-04-12 RX ADMIN — Medication 4 MILLILITER(S): at 09:56

## 2022-04-12 RX ADMIN — HEPARIN SODIUM 5000 UNIT(S): 5000 INJECTION INTRAVENOUS; SUBCUTANEOUS at 05:15

## 2022-04-12 RX ADMIN — Medication 112 MICROGRAM(S): at 05:16

## 2022-04-12 RX ADMIN — Medication 2: at 05:15

## 2022-04-12 RX ADMIN — Medication 300 MILLIGRAM(S): at 13:23

## 2022-04-12 RX ADMIN — ALBUTEROL 2.5 MILLIGRAM(S): 90 AEROSOL, METERED ORAL at 15:33

## 2022-04-12 RX ADMIN — HEPARIN SODIUM 5000 UNIT(S): 5000 INJECTION INTRAVENOUS; SUBCUTANEOUS at 18:12

## 2022-04-12 RX ADMIN — Medication 81 MILLIGRAM(S): at 13:23

## 2022-04-12 RX ADMIN — PIPERACILLIN AND TAZOBACTAM 25 GRAM(S): 4; .5 INJECTION, POWDER, LYOPHILIZED, FOR SOLUTION INTRAVENOUS at 05:16

## 2022-04-12 RX ADMIN — PIPERACILLIN AND TAZOBACTAM 25 GRAM(S): 4; .5 INJECTION, POWDER, LYOPHILIZED, FOR SOLUTION INTRAVENOUS at 22:08

## 2022-04-12 RX ADMIN — Medication 1000 UNIT(S): at 13:22

## 2022-04-12 RX ADMIN — Medication 25 MILLIGRAM(S): at 18:15

## 2022-04-12 RX ADMIN — ALBUTEROL 2.5 MILLIGRAM(S): 90 AEROSOL, METERED ORAL at 09:56

## 2022-04-12 RX ADMIN — PIPERACILLIN AND TAZOBACTAM 25 GRAM(S): 4; .5 INJECTION, POWDER, LYOPHILIZED, FOR SOLUTION INTRAVENOUS at 14:26

## 2022-04-12 RX ADMIN — CHLORHEXIDINE GLUCONATE 1 APPLICATION(S): 213 SOLUTION TOPICAL at 13:22

## 2022-04-12 RX ADMIN — Medication 1 TABLET(S): at 18:12

## 2022-04-12 RX ADMIN — Medication 4 MILLILITER(S): at 15:33

## 2022-04-12 RX ADMIN — Medication 5 MILLIGRAM(S): at 18:13

## 2022-04-12 RX ADMIN — Medication 2: at 00:05

## 2022-04-12 RX ADMIN — Medication 2: at 23:29

## 2022-04-12 RX ADMIN — Medication 25 MILLIGRAM(S): at 05:16

## 2022-04-12 RX ADMIN — ALBUTEROL 2.5 MILLIGRAM(S): 90 AEROSOL, METERED ORAL at 21:54

## 2022-04-12 NOTE — CHART NOTE - NSCHARTNOTEFT_GEN_A_CORE
Pt O2 dependent at home 2L  Pt on 3 L NC in hospital  Pt desat to 86%   O2 put up to 4 L NC O2 sat 93%   used  Pt without complaints, denies SOB or difficulty breathing   Breathing easy and unlabored  NAD A+OX3  VSS B/P 142/63 P 91 R 18 PO 93% T 98.6  Pt declines exam, CXR, labs  Continue to monitor

## 2022-04-12 NOTE — PROGRESS NOTE ADULT - ASSESSMENT
76 year old male with history of Laryngeal Cancer s/p Chemo + RT, PEG Tube, COPD on home oxygen, Carotid Stenosis, Non Obstructive CAD, HTN, HLD, Prediabetes / ? DM 2 and Hypothyroidism presented with unresponsiveness. Oxygen saturation at home in 30s. Intubated in ER and admitted to ICU with Hypoxic Respiratory Failure secondary. Found to be in Septic Shock secondary to Aspiration Pneumonia. Started on Levophed and later weaned off. He was extubated on 4/9/22. EEG preliminary report with potential multifocal epileptogenic foci. He is medically stable for downgrade to Medicine Service.     1) Acute Respiratory Failure with Hypoxia   - Likely due to aspiration pneumonia  - Sputum culture with E. Coli  - s/p extubation on 4/9/22  - Aspiration precautions  - Continue Zosyn   - Added nebs  - Incentive Spirometry  - Chest PT  - Pulmonary Consult     2) Septic Shock  - Likely due to aspiration pneumonia  - Resolved  - Off Levophed and Hydrocortisone   - Continue Zosyn    3) MATHIEU  - Likely due to above  - Resolving   - Monitor renal function     4) Hyperkalemia then Hypokalemia  - ACEI on hold   - s/p Lokelma  - KCl 40 mEq x 2  - Resolved     5) History of Laryngeal Cancer  - s/p Chemo + RT  - Outpatient follow up  - Resumed tube feeding     6) COPD  - No exacerbation   - Continue supplemental oxygen     7) Prediabetes / ? DM 2  - HbA1c 6.1  - Accu checks and ISS    8) HTN  - Switched Labetalol to Metoprolol (home med)  - Enalapril on hold due to hyperkalemia     9) Hypothyroidism  - Continue Synthroid     10) Abnormal EEG  - Repeat EEG with mild to moderate nonspecific diffuse or multifocal cerebral dysfunction. No epileptiform pattern or seizure seen.  - No intervention as per Epilepsy.     11) Coag Neg Staph Bacteremia  - Likely contaminant  - Follow repeat cultures     DVT Prophylaxis -- Heparin SC    Dispo: Home in 2-3 days.       Updated patient's daughter at bedside.

## 2022-04-13 DIAGNOSIS — J69.0 PNEUMONITIS DUE TO INHALATION OF FOOD AND VOMIT: ICD-10-CM

## 2022-04-13 DIAGNOSIS — J44.9 CHRONIC OBSTRUCTIVE PULMONARY DISEASE, UNSPECIFIED: ICD-10-CM

## 2022-04-13 DIAGNOSIS — A41.9 SEPSIS, UNSPECIFIED ORGANISM: ICD-10-CM

## 2022-04-13 LAB
ANION GAP SERPL CALC-SCNC: 10 MMOL/L — SIGNIFICANT CHANGE UP (ref 5–17)
ANISOCYTOSIS BLD QL: SLIGHT — SIGNIFICANT CHANGE UP
BASE EXCESS BLDA CALC-SCNC: 19.8 MMOL/L — HIGH (ref -2–3)
BASOPHILS # BLD AUTO: 0.35 K/UL — HIGH (ref 0–0.2)
BASOPHILS NFR BLD AUTO: 3 % — HIGH (ref 0–2)
BLOOD GAS COMMENTS ARTERIAL: SIGNIFICANT CHANGE UP
BUN SERPL-MCNC: 37.2 MG/DL — HIGH (ref 8–20)
CALCIUM SERPL-MCNC: 8.8 MG/DL — SIGNIFICANT CHANGE UP (ref 8.6–10.2)
CHLORIDE SERPL-SCNC: 97 MMOL/L — LOW (ref 98–107)
CO2 SERPL-SCNC: 36 MMOL/L — HIGH (ref 22–29)
CREAT SERPL-MCNC: 0.89 MG/DL — SIGNIFICANT CHANGE UP (ref 0.5–1.3)
EGFR: 90 ML/MIN/1.73M2 — SIGNIFICANT CHANGE UP
EOSINOPHIL # BLD AUTO: 0.23 K/UL — SIGNIFICANT CHANGE UP (ref 0–0.5)
EOSINOPHIL NFR BLD AUTO: 2 % — SIGNIFICANT CHANGE UP (ref 0–6)
GLUCOSE BLDC GLUCOMTR-MCNC: 139 MG/DL — HIGH (ref 70–99)
GLUCOSE BLDC GLUCOMTR-MCNC: 154 MG/DL — HIGH (ref 70–99)
GLUCOSE BLDC GLUCOMTR-MCNC: 178 MG/DL — HIGH (ref 70–99)
GLUCOSE BLDC GLUCOMTR-MCNC: 185 MG/DL — HIGH (ref 70–99)
GLUCOSE SERPL-MCNC: 135 MG/DL — HIGH (ref 70–99)
HCO3 BLDA-SCNC: 45 MMOL/L — CRITICAL HIGH (ref 21–28)
HCT VFR BLD CALC: 33 % — LOW (ref 39–50)
HGB BLD-MCNC: 9.5 G/DL — LOW (ref 13–17)
HOROWITZ INDEX BLDA+IHG-RTO: SIGNIFICANT CHANGE UP
LYMPHOCYTES # BLD AUTO: 2.31 K/UL — SIGNIFICANT CHANGE UP (ref 1–3.3)
LYMPHOCYTES # BLD AUTO: 20 % — SIGNIFICANT CHANGE UP (ref 13–44)
MACROCYTES BLD QL: SLIGHT — SIGNIFICANT CHANGE UP
MAGNESIUM SERPL-MCNC: 2.1 MG/DL — SIGNIFICANT CHANGE UP (ref 1.8–2.6)
MANUAL SMEAR VERIFICATION: SIGNIFICANT CHANGE UP
MCHC RBC-ENTMCNC: 28.5 PG — SIGNIFICANT CHANGE UP (ref 27–34)
MCHC RBC-ENTMCNC: 28.8 GM/DL — LOW (ref 32–36)
MCV RBC AUTO: 99.1 FL — SIGNIFICANT CHANGE UP (ref 80–100)
MICROCYTES BLD QL: SLIGHT — SIGNIFICANT CHANGE UP
MONOCYTES # BLD AUTO: 1.04 K/UL — HIGH (ref 0–0.9)
MONOCYTES NFR BLD AUTO: 9 % — SIGNIFICANT CHANGE UP (ref 2–14)
MYELOCYTES NFR BLD: 1 % — HIGH (ref 0–0)
NEUTROPHILS # BLD AUTO: 7.51 K/UL — HIGH (ref 1.8–7.4)
NEUTROPHILS NFR BLD AUTO: 65 % — SIGNIFICANT CHANGE UP (ref 43–77)
NRBC # BLD: 0 /100 — SIGNIFICANT CHANGE UP (ref 0–0)
OVALOCYTES BLD QL SMEAR: SLIGHT — SIGNIFICANT CHANGE UP
PCO2 BLDA: 74 MMHG — CRITICAL HIGH (ref 35–48)
PH BLDA: 7.39 — SIGNIFICANT CHANGE UP (ref 7.35–7.45)
PLAT MORPH BLD: NORMAL — SIGNIFICANT CHANGE UP
PLATELET # BLD AUTO: 249 K/UL — SIGNIFICANT CHANGE UP (ref 150–400)
PO2 BLDA: 286 MMHG — HIGH (ref 83–108)
POIKILOCYTOSIS BLD QL AUTO: SLIGHT — SIGNIFICANT CHANGE UP
POLYCHROMASIA BLD QL SMEAR: SIGNIFICANT CHANGE UP
POTASSIUM SERPL-MCNC: 4 MMOL/L — SIGNIFICANT CHANGE UP (ref 3.5–5.3)
POTASSIUM SERPL-SCNC: 4 MMOL/L — SIGNIFICANT CHANGE UP (ref 3.5–5.3)
RBC # BLD: 3.33 M/UL — LOW (ref 4.2–5.8)
RBC # FLD: 15.4 % — HIGH (ref 10.3–14.5)
RBC BLD AUTO: ABNORMAL
SAO2 % BLDA: 100 % — HIGH (ref 94–98)
SMUDGE CELLS # BLD: PRESENT — SIGNIFICANT CHANGE UP
SODIUM SERPL-SCNC: 143 MMOL/L — SIGNIFICANT CHANGE UP (ref 135–145)
WBC # BLD: 11.56 K/UL — HIGH (ref 3.8–10.5)
WBC # FLD AUTO: 11.56 K/UL — HIGH (ref 3.8–10.5)

## 2022-04-13 PROCEDURE — 71045 X-RAY EXAM CHEST 1 VIEW: CPT | Mod: 26

## 2022-04-13 PROCEDURE — 99223 1ST HOSP IP/OBS HIGH 75: CPT

## 2022-04-13 PROCEDURE — 99233 SBSQ HOSP IP/OBS HIGH 50: CPT

## 2022-04-13 RX ORDER — SODIUM CHLORIDE 9 MG/ML
1000 INJECTION INTRAMUSCULAR; INTRAVENOUS; SUBCUTANEOUS
Refills: 0 | Status: DISCONTINUED | OUTPATIENT
Start: 2022-04-13 | End: 2022-04-15

## 2022-04-13 RX ORDER — MORPHINE SULFATE 50 MG/1
1 CAPSULE, EXTENDED RELEASE ORAL ONCE
Refills: 0 | Status: DISCONTINUED | OUTPATIENT
Start: 2022-04-13 | End: 2022-04-13

## 2022-04-13 RX ORDER — ESOMEPRAZOLE MAGNESIUM 40 MG/1
1 CAPSULE, DELAYED RELEASE ORAL
Qty: 0 | Refills: 0 | DISCHARGE

## 2022-04-13 RX ORDER — ACETYLCYSTEINE 200 MG/ML
4 VIAL (ML) MISCELLANEOUS EVERY 6 HOURS
Refills: 0 | Status: DISCONTINUED | OUTPATIENT
Start: 2022-04-13 | End: 2022-04-14

## 2022-04-13 RX ORDER — IPRATROPIUM/ALBUTEROL SULFATE 18-103MCG
3 AEROSOL WITH ADAPTER (GRAM) INHALATION EVERY 6 HOURS
Refills: 0 | Status: DISCONTINUED | OUTPATIENT
Start: 2022-04-13 | End: 2022-04-19

## 2022-04-13 RX ORDER — ALBUTEROL 90 UG/1
3 AEROSOL, METERED ORAL
Qty: 0 | Refills: 0 | DISCHARGE

## 2022-04-13 RX ADMIN — MORPHINE SULFATE 1 MILLIGRAM(S): 50 CAPSULE, EXTENDED RELEASE ORAL at 05:50

## 2022-04-13 RX ADMIN — Medication 3 MILLILITER(S): at 13:38

## 2022-04-13 RX ADMIN — ALBUTEROL 2.5 MILLIGRAM(S): 90 AEROSOL, METERED ORAL at 03:54

## 2022-04-13 RX ADMIN — Medication 40 MILLIGRAM(S): at 18:34

## 2022-04-13 RX ADMIN — Medication 125 MILLIGRAM(S): at 09:16

## 2022-04-13 RX ADMIN — Medication 3 MILLILITER(S): at 09:03

## 2022-04-13 RX ADMIN — Medication 112 MICROGRAM(S): at 05:36

## 2022-04-13 RX ADMIN — MORPHINE SULFATE 1 MILLIGRAM(S): 50 CAPSULE, EXTENDED RELEASE ORAL at 05:36

## 2022-04-13 RX ADMIN — CHLORHEXIDINE GLUCONATE 1 APPLICATION(S): 213 SOLUTION TOPICAL at 14:36

## 2022-04-13 RX ADMIN — Medication 25 MILLIGRAM(S): at 18:34

## 2022-04-13 RX ADMIN — Medication 300 MILLIGRAM(S): at 19:06

## 2022-04-13 RX ADMIN — CHLORHEXIDINE GLUCONATE 1 APPLICATION(S): 213 SOLUTION TOPICAL at 05:46

## 2022-04-13 RX ADMIN — Medication 25 MILLIGRAM(S): at 03:15

## 2022-04-13 RX ADMIN — Medication 25 MILLIGRAM(S): at 09:36

## 2022-04-13 RX ADMIN — HEPARIN SODIUM 5000 UNIT(S): 5000 INJECTION INTRAVENOUS; SUBCUTANEOUS at 18:21

## 2022-04-13 RX ADMIN — Medication 4 MILLILITER(S): at 13:38

## 2022-04-13 RX ADMIN — Medication 5 MILLIGRAM(S): at 05:36

## 2022-04-13 RX ADMIN — Medication 2: at 13:30

## 2022-04-13 RX ADMIN — Medication 81 MILLIGRAM(S): at 13:08

## 2022-04-13 RX ADMIN — PIPERACILLIN AND TAZOBACTAM 25 GRAM(S): 4; .5 INJECTION, POWDER, LYOPHILIZED, FOR SOLUTION INTRAVENOUS at 14:23

## 2022-04-13 RX ADMIN — Medication 2: at 23:02

## 2022-04-13 RX ADMIN — Medication 1 TABLET(S): at 05:36

## 2022-04-13 RX ADMIN — Medication 1000 UNIT(S): at 13:08

## 2022-04-13 RX ADMIN — PIPERACILLIN AND TAZOBACTAM 25 GRAM(S): 4; .5 INJECTION, POWDER, LYOPHILIZED, FOR SOLUTION INTRAVENOUS at 05:37

## 2022-04-13 RX ADMIN — Medication 2: at 18:21

## 2022-04-13 RX ADMIN — PANTOPRAZOLE SODIUM 40 MILLIGRAM(S): 20 TABLET, DELAYED RELEASE ORAL at 13:08

## 2022-04-13 RX ADMIN — Medication 3 MILLILITER(S): at 23:00

## 2022-04-13 RX ADMIN — ATORVASTATIN CALCIUM 40 MILLIGRAM(S): 80 TABLET, FILM COATED ORAL at 22:38

## 2022-04-13 RX ADMIN — HEPARIN SODIUM 5000 UNIT(S): 5000 INJECTION INTRAVENOUS; SUBCUTANEOUS at 05:36

## 2022-04-13 RX ADMIN — PIPERACILLIN AND TAZOBACTAM 25 GRAM(S): 4; .5 INJECTION, POWDER, LYOPHILIZED, FOR SOLUTION INTRAVENOUS at 22:38

## 2022-04-13 RX ADMIN — Medication 1 TABLET(S): at 18:17

## 2022-04-13 RX ADMIN — Medication 4 MILLILITER(S): at 20:38

## 2022-04-13 RX ADMIN — Medication 5 MILLIGRAM(S): at 19:06

## 2022-04-13 NOTE — CONSULT NOTE ADULT - SUBJECTIVE AND OBJECTIVE BOX
PULMONARY PROGRESS NOTE      [id number]    Patient is a 76y old  Male who presents with a chief complaint of Hypoxic respiratory failure (12 Apr 2022 15:35)      INTERVAL HPI/OVERNIGHT EVENTS:  76-year-old male with a history of stage IV chronic obstructive lung disease by O2 criteria.  Patient previously followed by Dr. Blas as an outpatient with a last recorded spirometry in 2018 with a forced vital capacity of 1.53 L (44%) and an FEV1 of 1.18 L (43% of predicted).  Patient has a history of laryngeal CA status post chemoradiation as well as carotid stenosis.  Found to be unresponsive at home with saturations in the 30 and intubated on arrival.  Patient was admitted to the ICU placed on propofol with a provisional diagnosis of an aspiration pneumonia and shock.  He subsequently responded and was eventually extubated and transferred to the medical floor.  MEDICATIONS  (STANDING):  acetylcysteine 10%  Inhalation 4 milliLiter(s) Inhalation every 6 hours  albuterol/ipratropium for Nebulization 3 milliLiter(s) Nebulizer every 6 hours  aspirin  chewable 81 milliGRAM(s) Oral daily  atorvastatin 40 milliGRAM(s) Oral at bedtime  chlorhexidine 2% Cloths 1 Application(s) Topical <User Schedule>  chlorhexidine 2% Cloths 1 Application(s) Topical daily  cholecalciferol 1000 Unit(s) Oral daily  dextrose 5%. 1000 milliLiter(s) (50 mL/Hr) IV Continuous <Continuous>  dextrose 5%. 1000 milliLiter(s) (100 mL/Hr) IV Continuous <Continuous>  dextrose 50% Injectable 25 Gram(s) IV Push once  dextrose 50% Injectable 12.5 Gram(s) IV Push once  dextrose 50% Injectable 25 Gram(s) IV Push once  ferrous    sulfate Liquid 300 milliGRAM(s) Enteral Tube daily  glucagon  Injectable 1 milliGRAM(s) IntraMuscular once  heparin   Injectable 5000 Unit(s) SubCutaneous every 12 hours  insulin lispro (ADMELOG) corrective regimen sliding scale   SubCutaneous every 6 hours  lactobacillus acidophilus 1 Tablet(s) Oral two times a day  levothyroxine 112 MICROGram(s) Oral daily  methylPREDNISolone sodium succinate Injectable 40 milliGRAM(s) IV Push every 8 hours  metoprolol tartrate 25 milliGRAM(s) Oral every 8 hours  multiple electrolytes Injection Type 1 1000 milliLiter(s) (75 mL/Hr) IV Continuous <Continuous>  oxybutynin 5 milliGRAM(s) Oral two times a day  pantoprazole  Injectable 40 milliGRAM(s) IV Push daily  piperacillin/tazobactam IVPB.. 3.375 Gram(s) IV Intermittent every 8 hours      MEDICATIONS  (PRN):  acetaminophen     Tablet .. 650 milliGRAM(s) Oral every 6 hours PRN Temp greater or equal to 38C (100.4F)  dextrose Oral Gel 15 Gram(s) Oral once PRN Blood Glucose LESS THAN 70 milliGRAM(s)/deciliter  hydrALAZINE Injectable 10 milliGRAM(s) IV Push every 6 hours PRN If SBP > 160  sodium chloride 0.9% lock flush 10 milliLiter(s) IV Push every 1 hour PRN Pre/post blood products, medications, blood draw, and to maintain line patency      Allergies    No Known Allergies    Intolerances        PAST MEDICAL & SURGICAL HISTORY:  Esophageal stricture    Prostate CA    History of carotid stenosis    Laryngeal carcinoma    COPD (chronic obstructive pulmonary disease)    Hypothyroidism    Aspiration pneumonia    Traumatic pneumothorax    Benign prostatic hyperplasia with urinary obstruction    Syncope and collapse    Microalbuminuria    Anemia    Hyperlipidemia, unspecified hyperlipidemia type    Femoral fracture    S/P percutaneous endoscopic gastrostomy (PEG) tube placement    Liver laceration    History of exploratory laparotomy    History of total bilateral knee replacement  b/l femur        SOCIAL HISTORY  Smoking History:       REVIEW OF SYSTEMS:    CONSTITUTIONAL:  No distress    HEENT:  Eyes:  No diplopia or blurred vision. ENT:  No earache, sore throat or runny nose.    CARDIOVASCULAR:  No pressure, squeezing, tightness, heaviness or aching about the chest; no palpitations.    RESPIRATORY:  above    GASTROINTESTINAL:  No nausea, vomiting or diarrhea.    GENITOURINARY:  No dysuria, frequency or urgency.    NEUROLOGIC:  No paresthesias, fasciculations, seizures or weakness.    Extremities: No cyanosis, clubbing or edema    PSYCHIATRIC:  No disorder of thought or mood.    Vital Signs Last 24 Hrs  T(C): 36.6 (13 Apr 2022 04:37), Max: 37 (12 Apr 2022 16:39)  T(F): 97.9 (13 Apr 2022 04:37), Max: 98.6 (12 Apr 2022 16:39)  HR: 89 (13 Apr 2022 09:21) (75 - 98)  BP: 130/50 (13 Apr 2022 09:21) (118/60 - 162/64)  BP(mean): 97 (13 Apr 2022 03:15) (79 - 97)  RR: 18 (13 Apr 2022 09:21) (18 - 19)  SpO2: 91% (13 Apr 2022 09:21) (91% - 98%)    PHYSICAL EXAMINATION:    GENERAL: The patient is awake and alert in no apparent distress.     HEENT: Head is normocephalic and atraumatic. Extraocular muscles are intact. Mucous membranes are moist.    NECK: Supple.    LUNGS: Clear to auscultation without wheezing, rales or rhonchi; respirations unlabored    HEART: Regular rate and rhythm without murmur.    ABDOMEN: Soft, nontender, and nondistended.      EXTREMITIES: Without any cyanosis, clubbing, rash, lesions or edema.    NEUROLOGIC: Grossly intact.    LABS:                        9.5    11.56 )-----------( 249      ( 13 Apr 2022 05:58 )             33.0     04-13    143  |  97<L>  |  37.2<H>  ----------------------------<  135<H>  4.0   |  36.0<H>  |  0.89    Ca    8.8      13 Apr 2022 05:58  Mg     2.1     04-13          ABG - ( 13 Apr 2022 11:27 )  pH, Arterial: 7.390 pH, Blood: x     /  pCO2: 74    /  pO2: 286   / HCO3: 45    / Base Excess: 19.8  /  SaO2: 100.0                           MICROBIOLOGY:    RADIOLOGY & ADDITIONAL STUDIES:    < from: CT Chest No Cont (04.08.22 @ 09:51) >    ACC: [id number] EXAM:  CT ABDOMEN AND PELVIS                        ACC: [id number] EXAM:  CT CHEST                          PROCEDURE DATE:  04/08/2022          INTERPRETATION:  CLINICAL INFORMATION: Sepsis. Hypoxia.    COMPARISON: January 11, 2022.    CONTRAST/COMPLICATIONS:  IV Contrast: NONE  Oral Contrast: NONE  Complications: None reported at time of study completion    PROCEDURE:  CT of the Chest, Abdomen and Pelvis was performed.  Sagittal and coronal reformats were performed.    FINDINGS:  CHEST:  LUNGS AND LARGE AIRWAYS: Endotracheal tube with tip above the idania.   Patchy and nodular opacities in both lungs, predominantly in the lower   lobes. Post radiation changes in the medial lung apices.  PLEURA: Unchanged large extrapleural nodules, measuring up to 1.7 x 0.8   cm on the left (series 4, image 76).  VESSELS: Atherosclerotic changes of the aorta.  HEART: Heart size is normal. No pericardial effusion.  MEDIASTINUM AND NIECY: Similar appearance of prominent mediastinal lymph   nodes.  CHEST WALL AND LOWER NECK: Within normal limits.    ABDOMEN AND PELVIS:  LIVER: Within normal limits.  BILE DUCTS: Left-sided pneumobilia.  GALLBLADDER: Unchanged large amount of high density material in the lumen   in the gallbladder, possiblylarge stones.  SPLEEN: Capsular calcification.  PANCREAS: Within normal limits.  ADRENALS: Within normal limits.  KIDNEYS/URETERS: Within normal limits.    BLADDER: Contains air, small amount of fluid, and a Hood catheter   balloon.  REPRODUCTIVE ORGANS: Prostate is enlarged.    BOWEL: Enteric tube with tip terminating in the stomach. Percutaneous   gastrostomy tube. No bowel obstruction. Appendix is normal.  PERITONEUM: No ascites.  VESSELS: Atherosclerotic changes. Left femoral artery approachcentral   venous catheter terminating in the left common iliac vein.  RETROPERITONEUM/LYMPH NODES: No lymphadenopathy.  ABDOMINAL WALL: Within normal limits.  BONES: Degenerative changes. Intramedullary rods in both femurs.    IMPRESSION:  Multifocalpneumonia, worse in the lower lobes.    Unchanged large left extrapleural nodules.    --- End of Report ---            JAYLA SMITH MD; Attending Radiologist  This document has been electronically signed. Apr 8 2022 10:05AM    < end of copied text >  ECHO:  < from: TTE Echo Complete w/o Contrast w/ Doppler (07.07.21 @ 13:17) >      Summary:   1. Left ventricular ejection fraction, by visual estimation, is 60 to 65%.   2. Normal global left ventricular systolic function.   3. Spectral Doppler shows impaired relaxation pattern of left ventricular myocardial filling (Grade I diastolic dysfunction).   4. Mild mitral valve regurgitation.   5. Mild thickening of the anterior and posterior mitral valve leaflets.    MD Zoey Electronically signed on 7/7/2021 at 7:38:07 PM    < end of copied text >   PULMONARY PROGRESS NOTE      [id number]    Patient is a 76y old  Male who presents with a chief complaint of Hypoxic respiratory failure (12 Apr 2022 15:35)      INTERVAL HPI/OVERNIGHT EVENTS:  76-year-old male with a history of stage IV chronic obstructive lung disease by O2 criteria.  Patient previously followed by Dr. Blas as an outpatient with a last recorded spirometry in 2018 with a forced vital capacity of 1.53 L (44%) and an FEV1 of 1.18 L (43% of predicted).  Patient has a history of laryngeal CA status post chemoradiation as well as carotid stenosis with peg due to swallowing difficulty and fed TID.  Found to be unresponsive at home with saturations in the 30 and intubated on arrival.  Patient was admitted to the ICU placed on propofol with a provisional diagnosis of an aspiration pneumonia and shock.  He subsequently responded and was eventually extubated and transferred to the medical floor.    Awake alert this pm on NCO. Daughter noted desaturations with sleep without snoring.   WHeeze and cough today    MEDICATIONS  (STANDING):  acetylcysteine 10%  Inhalation 4 milliLiter(s) Inhalation every 6 hours  albuterol/ipratropium for Nebulization 3 milliLiter(s) Nebulizer every 6 hours  aspirin  chewable 81 milliGRAM(s) Oral daily  atorvastatin 40 milliGRAM(s) Oral at bedtime  chlorhexidine 2% Cloths 1 Application(s) Topical <User Schedule>  chlorhexidine 2% Cloths 1 Application(s) Topical daily  cholecalciferol 1000 Unit(s) Oral daily  dextrose 5%. 1000 milliLiter(s) (50 mL/Hr) IV Continuous <Continuous>  dextrose 5%. 1000 milliLiter(s) (100 mL/Hr) IV Continuous <Continuous>  dextrose 50% Injectable 25 Gram(s) IV Push once  dextrose 50% Injectable 12.5 Gram(s) IV Push once  dextrose 50% Injectable 25 Gram(s) IV Push once  ferrous    sulfate Liquid 300 milliGRAM(s) Enteral Tube daily  glucagon  Injectable 1 milliGRAM(s) IntraMuscular once  heparin   Injectable 5000 Unit(s) SubCutaneous every 12 hours  insulin lispro (ADMELOG) corrective regimen sliding scale   SubCutaneous every 6 hours  lactobacillus acidophilus 1 Tablet(s) Oral two times a day  levothyroxine 112 MICROGram(s) Oral daily  methylPREDNISolone sodium succinate Injectable 40 milliGRAM(s) IV Push every 8 hours  metoprolol tartrate 25 milliGRAM(s) Oral every 8 hours  multiple electrolytes Injection Type 1 1000 milliLiter(s) (75 mL/Hr) IV Continuous <Continuous>  oxybutynin 5 milliGRAM(s) Oral two times a day  pantoprazole  Injectable 40 milliGRAM(s) IV Push daily  piperacillin/tazobactam IVPB.. 3.375 Gram(s) IV Intermittent every 8 hours      MEDICATIONS  (PRN):  acetaminophen     Tablet .. 650 milliGRAM(s) Oral every 6 hours PRN Temp greater or equal to 38C (100.4F)  dextrose Oral Gel 15 Gram(s) Oral once PRN Blood Glucose LESS THAN 70 milliGRAM(s)/deciliter  hydrALAZINE Injectable 10 milliGRAM(s) IV Push every 6 hours PRN If SBP > 160  sodium chloride 0.9% lock flush 10 milliLiter(s) IV Push every 1 hour PRN Pre/post blood products, medications, blood draw, and to maintain line patency      Allergies    No Known Allergies    Intolerances        PAST MEDICAL & SURGICAL HISTORY:  Esophageal stricture    Prostate CA    History of carotid stenosis    Laryngeal carcinoma    COPD (chronic obstructive pulmonary disease)    Hypothyroidism    Aspiration pneumonia    Traumatic pneumothorax    Benign prostatic hyperplasia with urinary obstruction    Syncope and collapse    Microalbuminuria    Anemia    Hyperlipidemia, unspecified hyperlipidemia type    Femoral fracture    S/P percutaneous endoscopic gastrostomy (PEG) tube placement    Liver laceration    History of exploratory laparotomy    History of total bilateral knee replacement  b/l femur        SOCIAL HISTORY  Smoking History:       REVIEW OF SYSTEMS:    unavailable      Vital Signs Last 24 Hrs  T(C): 36.6 (13 Apr 2022 04:37), Max: 37 (12 Apr 2022 16:39)  T(F): 97.9 (13 Apr 2022 04:37), Max: 98.6 (12 Apr 2022 16:39)  HR: 89 (13 Apr 2022 09:21) (75 - 98)  BP: 130/50 (13 Apr 2022 09:21) (118/60 - 162/64)  BP(mean): 97 (13 Apr 2022 03:15) (79 - 97)  RR: 18 (13 Apr 2022 09:21) (18 - 19)  SpO2: 91% (13 Apr 2022 09:21) (91% - 98%)    PHYSICAL EXAMINATION:    GENERAL: The patient is awake and alert in no apparent distress.     HEENT: Head is normocephalic and atraumatic. Extraocular muscles are intact. Mucous membranes are moist.    NECK: Supple. scarring due to RT    LUNGS: scattered rhonchi with wheezes bilateral    HEART: Regular rate and rhythm without murmur.    ABDOMEN: Soft, nontender, and nondistended.  PEG    EXTREMITIES: Without any cyanosis, clubbing, rash, lesions or edema.    NEUROLOGIC: Grossly intact.    LABS:                        9.5    11.56 )-----------( 249      ( 13 Apr 2022 05:58 )             33.0     04-13    143  |  97<L>  |  37.2<H>  ----------------------------<  135<H>  4.0   |  36.0<H>  |  0.89    Ca    8.8      13 Apr 2022 05:58  Mg     2.1     04-13          ABG - ( 13 Apr 2022 11:27 )  pH, Arterial: 7.390 pH, Blood: x     /  pCO2: 74    /  pO2: 286   / HCO3: 45    / Base Excess: 19.8  /  SaO2: 100.0                           MICROBIOLOGY:    RADIOLOGY & ADDITIONAL STUDIES:    < from: CT Chest No Cont (04.08.22 @ 09:51) >    ACC: [id number] EXAM:  CT ABDOMEN AND PELVIS                        ACC: [id number] EXAM:  CT CHEST                          PROCEDURE DATE:  04/08/2022          INTERPRETATION:  CLINICAL INFORMATION: Sepsis. Hypoxia.    COMPARISON: January 11, 2022.    CONTRAST/COMPLICATIONS:  IV Contrast: NONE  Oral Contrast: NONE  Complications: None reported at time of study completion    PROCEDURE:  CT of the Chest, Abdomen and Pelvis was performed.  Sagittal and coronal reformats were performed.    FINDINGS:  CHEST:  LUNGS AND LARGE AIRWAYS: Endotracheal tube with tip above the idania.   Patchy and nodular opacities in both lungs, predominantly in the lower   lobes. Post radiation changes in the medial lung apices.  PLEURA: Unchanged large extrapleural nodules, measuring up to 1.7 x 0.8   cm on the left (series 4, image 76).  VESSELS: Atherosclerotic changes of the aorta.  HEART: Heart size is normal. No pericardial effusion.  MEDIASTINUM AND NIECY: Similar appearance of prominent mediastinal lymph   nodes.  CHEST WALL AND LOWER NECK: Within normal limits.    ABDOMEN AND PELVIS:  LIVER: Within normal limits.  BILE DUCTS: Left-sided pneumobilia.  GALLBLADDER: Unchanged large amount of high density material in the lumen   in the gallbladder, possiblylarge stones.  SPLEEN: Capsular calcification.  PANCREAS: Within normal limits.  ADRENALS: Within normal limits.  KIDNEYS/URETERS: Within normal limits.    BLADDER: Contains air, small amount of fluid, and a Hood catheter   balloon.  REPRODUCTIVE ORGANS: Prostate is enlarged.    BOWEL: Enteric tube with tip terminating in the stomach. Percutaneous   gastrostomy tube. No bowel obstruction. Appendix is normal.  PERITONEUM: No ascites.  VESSELS: Atherosclerotic changes. Left femoral artery approachcentral   venous catheter terminating in the left common iliac vein.  RETROPERITONEUM/LYMPH NODES: No lymphadenopathy.  ABDOMINAL WALL: Within normal limits.  BONES: Degenerative changes. Intramedullary rods in both femurs.    IMPRESSION:  Multifocalpneumonia, worse in the lower lobes.    Unchanged large left extrapleural nodules.    --- End of Report ---            JAYLA SMITH MD; Attending Radiologist  This document has been electronically signed. Apr 8 2022 10:05AM    < end of copied text >  ECHO:  < from: TTE Echo Complete w/o Contrast w/ Doppler (07.07.21 @ 13:17) >      Summary:   1. Left ventricular ejection fraction, by visual estimation, is 60 to 65%.   2. Normal global left ventricular systolic function.   3. Spectral Doppler shows impaired relaxation pattern of left ventricular myocardial filling (Grade I diastolic dysfunction).   4. Mild mitral valve regurgitation.   5. Mild thickening of the anterior and posterior mitral valve leaflets.    MD Zoey Electronically signed on 7/7/2021 at 7:38:07 PM    < end of copied text >

## 2022-04-13 NOTE — CONSULT NOTE ADULT - ASSESSMENT
76-year-old male with a history of stage IV chronic obstructive lung disease by O2 criteria with severe obstructive and restrictive disease on last pulmonary function test.  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  Status post aspiration pneumonia.  Findings on CAT scan most likely on the basis of infected bulla rather than cavitary pneumonia.  Desaturations noted may simply be on the basis of shunting through the regions of aspiration with sleep.  No clinical history to suggest obstructive sleep apnea.    Plan:  1.  Agree with current antibiotics  2.  Empiric CPAP of 8 with O2 and sleep monitoring saturations  3.  Duo nebulization  4.  Continue empiric steroids  5.  Encourage cough and pulmonary hygiene

## 2022-04-13 NOTE — PROGRESS NOTE ADULT - ASSESSMENT
76 year old male with history of Laryngeal Cancer s/p Chemo + RT, PEG Tube, COPD on home oxygen, Carotid Stenosis, Non Obstructive CAD, HTN, HLD, Prediabetes / ? DM 2 and Hypothyroidism presented with unresponsiveness. Oxygen saturation at home in 30s. Intubated in ER and admitted to ICU with Hypoxic Respiratory Failure secondary. Found to be in Septic Shock secondary to Aspiration Pneumonia. Started on Levophed and later weaned off. He was extubated on 4/9/22. EEG preliminary report with potential multifocal epileptogenic foci. He was downgraded to Medicine Service on 4/10/22.     1) Acute Respiratory Failure with Hypoxia   - Likely due to aspiration pneumonia  - Sputum culture with E. Coli  - s/p extubation on 4/9/22  - Aspiration precautions. Holding tube feeding for now.  - Continue Zosyn   - Added nebs  - Incentive Spirometry  - Chest PT  - Pulmonary recommendations pending     2) COPD  - Now with exacerbation   - Continue supplemental oxygen via NRB, wean down as tolerated   - Start Solumedrol 125 mg x 1 (given) then 40 mg q 8 hours  - DuoNebs q 6 hours   - Pulmonary recommendations pending     3) Septic Shock  - Likely due to aspiration pneumonia  - Resolved  - Off Levophed and Hydrocortisone   - Continue Zosyn    4) MATHIEU  - Likely due to above  - Resolving   - Monitor renal function     5) Hyperkalemia then Hypokalemia  - ACEI on hold   - s/p Lokelma  - KCl 40 mEq x 2  - Resolved     6) History of Laryngeal Cancer  - s/p Chemo + RT  - Outpatient follow up  - Resumed tube feeding but holding today due to suspected aspiration event this morning      7) Prediabetes / ? DM 2  - HbA1c 6.1  - Accu checks and ISS    8) HTN  - Switched Labetalol to Metoprolol (home med)  - Enalapril on hold due to hyperkalemia     9) Hypothyroidism  - Continue Synthroid     10) Abnormal EEG  - Repeat EEG with mild to moderate nonspecific diffuse or multifocal cerebral dysfunction. No epileptiform pattern or seizure seen.  - No intervention as per Epilepsy.     11) Coag Neg Staph Bacteremia  - Likely contaminant  - Follow repeat cultures     DVT Prophylaxis -- Heparin SC    Dispo: Home once stable.       Updated patient's daughter at bedside.

## 2022-04-13 NOTE — CONSULT NOTE ADULT - TIME BILLING
Assessing presenting problems of acute illness, as well as medical decision making including initiating plan of care, obtaining history, examining the patient, reviewing data, reviewing radiologic exams, coordinating care with multidisciplinary team and writing this note.
Chart reviewed both inpatient and outpatient including PACS.  Case discussed with patient's wife and daughter who are present as well as a friend whom permission was granted who is a radiologist at [facility], [place]

## 2022-04-14 LAB
ANION GAP SERPL CALC-SCNC: 9 MMOL/L — SIGNIFICANT CHANGE UP (ref 5–17)
ANISOCYTOSIS BLD QL: SLIGHT — SIGNIFICANT CHANGE UP
BASE EXCESS BLDA CALC-SCNC: 16 MMOL/L — HIGH (ref -2–3)
BASO STIPL BLD QL SMEAR: PRESENT — SIGNIFICANT CHANGE UP
BASOPHILS # BLD AUTO: 0 K/UL — SIGNIFICANT CHANGE UP (ref 0–0.2)
BASOPHILS NFR BLD AUTO: 0 % — SIGNIFICANT CHANGE UP (ref 0–2)
BLOOD GAS COMMENTS ARTERIAL: SIGNIFICANT CHANGE UP
BUN SERPL-MCNC: 40.8 MG/DL — HIGH (ref 8–20)
CALCIUM SERPL-MCNC: 8.9 MG/DL — SIGNIFICANT CHANGE UP (ref 8.6–10.2)
CHLORIDE SERPL-SCNC: 99 MMOL/L — SIGNIFICANT CHANGE UP (ref 98–107)
CO2 SERPL-SCNC: 34 MMOL/L — HIGH (ref 22–29)
CREAT SERPL-MCNC: 0.85 MG/DL — SIGNIFICANT CHANGE UP (ref 0.5–1.3)
EGFR: 91 ML/MIN/1.73M2 — SIGNIFICANT CHANGE UP
EOSINOPHIL # BLD AUTO: 0 K/UL — SIGNIFICANT CHANGE UP (ref 0–0.5)
EOSINOPHIL NFR BLD AUTO: 0 % — SIGNIFICANT CHANGE UP (ref 0–6)
GAS PNL BLDA: SIGNIFICANT CHANGE UP
GIANT PLATELETS BLD QL SMEAR: PRESENT — SIGNIFICANT CHANGE UP
GLUCOSE BLDC GLUCOMTR-MCNC: 142 MG/DL — HIGH (ref 70–99)
GLUCOSE BLDC GLUCOMTR-MCNC: 211 MG/DL — HIGH (ref 70–99)
GLUCOSE BLDC GLUCOMTR-MCNC: 220 MG/DL — HIGH (ref 70–99)
GLUCOSE SERPL-MCNC: 187 MG/DL — HIGH (ref 70–99)
HCO3 BLDA-SCNC: 41 MMOL/L — HIGH (ref 21–28)
HCT VFR BLD CALC: 27.7 % — LOW (ref 39–50)
HGB BLD-MCNC: 8 G/DL — LOW (ref 13–17)
HOROWITZ INDEX BLDA+IHG-RTO: SIGNIFICANT CHANGE UP
HYPOCHROMIA BLD QL: SLIGHT — SIGNIFICANT CHANGE UP
LYMPHOCYTES # BLD AUTO: 0.19 K/UL — LOW (ref 1–3.3)
LYMPHOCYTES # BLD AUTO: 0.9 % — LOW (ref 13–44)
MACROCYTES BLD QL: SLIGHT — SIGNIFICANT CHANGE UP
MANUAL SMEAR VERIFICATION: SIGNIFICANT CHANGE UP
MCHC RBC-ENTMCNC: 28.7 PG — SIGNIFICANT CHANGE UP (ref 27–34)
MCHC RBC-ENTMCNC: 28.9 GM/DL — LOW (ref 32–36)
MCV RBC AUTO: 99.3 FL — SIGNIFICANT CHANGE UP (ref 80–100)
METAMYELOCYTES # FLD: 1 % — HIGH (ref 0–0)
MONOCYTES # BLD AUTO: 0.21 K/UL — SIGNIFICANT CHANGE UP (ref 0–0.9)
MONOCYTES NFR BLD AUTO: 1 % — LOW (ref 2–14)
NEUTROPHILS # BLD AUTO: 20.05 K/UL — HIGH (ref 1.8–7.4)
NEUTROPHILS NFR BLD AUTO: 96.1 % — HIGH (ref 43–77)
NEUTS BAND # BLD: 1 % — SIGNIFICANT CHANGE UP (ref 0–8)
OVALOCYTES BLD QL SMEAR: SLIGHT — SIGNIFICANT CHANGE UP
PCO2 BLDA: 64 MMHG — HIGH (ref 35–48)
PH BLDA: 7.41 — SIGNIFICANT CHANGE UP (ref 7.35–7.45)
PLAT MORPH BLD: NORMAL — SIGNIFICANT CHANGE UP
PLATELET # BLD AUTO: 231 K/UL — SIGNIFICANT CHANGE UP (ref 150–400)
PO2 BLDA: 104 MMHG — SIGNIFICANT CHANGE UP (ref 83–108)
POIKILOCYTOSIS BLD QL AUTO: SLIGHT — SIGNIFICANT CHANGE UP
POLYCHROMASIA BLD QL SMEAR: SIGNIFICANT CHANGE UP
POTASSIUM SERPL-MCNC: 4.4 MMOL/L — SIGNIFICANT CHANGE UP (ref 3.5–5.3)
POTASSIUM SERPL-SCNC: 4.4 MMOL/L — SIGNIFICANT CHANGE UP (ref 3.5–5.3)
RBC # BLD: 2.79 M/UL — LOW (ref 4.2–5.8)
RBC # FLD: 15.2 % — HIGH (ref 10.3–14.5)
RBC BLD AUTO: ABNORMAL
SAO2 % BLDA: 99.1 % — HIGH (ref 94–98)
SMUDGE CELLS # BLD: PRESENT — SIGNIFICANT CHANGE UP
SODIUM SERPL-SCNC: 142 MMOL/L — SIGNIFICANT CHANGE UP (ref 135–145)
WBC # BLD: 20.65 K/UL — HIGH (ref 3.8–10.5)
WBC # FLD AUTO: 20.65 K/UL — HIGH (ref 3.8–10.5)

## 2022-04-14 PROCEDURE — 99232 SBSQ HOSP IP/OBS MODERATE 35: CPT

## 2022-04-14 PROCEDURE — 99233 SBSQ HOSP IP/OBS HIGH 50: CPT

## 2022-04-14 RX ORDER — METOPROLOL TARTRATE 50 MG
12.5 TABLET ORAL EVERY 8 HOURS
Refills: 0 | Status: DISCONTINUED | OUTPATIENT
Start: 2022-04-14 | End: 2022-04-15

## 2022-04-14 RX ORDER — SODIUM CHLORIDE 9 MG/ML
500 INJECTION INTRAMUSCULAR; INTRAVENOUS; SUBCUTANEOUS ONCE
Refills: 0 | Status: COMPLETED | OUTPATIENT
Start: 2022-04-14 | End: 2022-04-14

## 2022-04-14 RX ADMIN — Medication 1 TABLET(S): at 05:46

## 2022-04-14 RX ADMIN — CHLORHEXIDINE GLUCONATE 1 APPLICATION(S): 213 SOLUTION TOPICAL at 12:22

## 2022-04-14 RX ADMIN — HEPARIN SODIUM 5000 UNIT(S): 5000 INJECTION INTRAVENOUS; SUBCUTANEOUS at 05:46

## 2022-04-14 RX ADMIN — Medication 300 MILLIGRAM(S): at 12:16

## 2022-04-14 RX ADMIN — Medication 5 MILLIGRAM(S): at 05:46

## 2022-04-14 RX ADMIN — SODIUM CHLORIDE 500 MILLILITER(S): 9 INJECTION INTRAMUSCULAR; INTRAVENOUS; SUBCUTANEOUS at 01:10

## 2022-04-14 RX ADMIN — Medication 3 MILLILITER(S): at 09:51

## 2022-04-14 RX ADMIN — Medication 1 TABLET(S): at 18:30

## 2022-04-14 RX ADMIN — Medication 3 MILLILITER(S): at 15:59

## 2022-04-14 RX ADMIN — PIPERACILLIN AND TAZOBACTAM 25 GRAM(S): 4; .5 INJECTION, POWDER, LYOPHILIZED, FOR SOLUTION INTRAVENOUS at 23:14

## 2022-04-14 RX ADMIN — Medication 81 MILLIGRAM(S): at 12:16

## 2022-04-14 RX ADMIN — PANTOPRAZOLE SODIUM 40 MILLIGRAM(S): 20 TABLET, DELAYED RELEASE ORAL at 12:17

## 2022-04-14 RX ADMIN — Medication 4 MILLILITER(S): at 03:13

## 2022-04-14 RX ADMIN — Medication 40 MILLIGRAM(S): at 10:04

## 2022-04-14 RX ADMIN — PIPERACILLIN AND TAZOBACTAM 25 GRAM(S): 4; .5 INJECTION, POWDER, LYOPHILIZED, FOR SOLUTION INTRAVENOUS at 05:51

## 2022-04-14 RX ADMIN — Medication 12.5 MILLIGRAM(S): at 23:14

## 2022-04-14 RX ADMIN — Medication 3 MILLILITER(S): at 05:19

## 2022-04-14 RX ADMIN — Medication 12.5 MILLIGRAM(S): at 14:21

## 2022-04-14 RX ADMIN — HEPARIN SODIUM 5000 UNIT(S): 5000 INJECTION INTRAVENOUS; SUBCUTANEOUS at 18:30

## 2022-04-14 RX ADMIN — Medication 3 MILLILITER(S): at 21:23

## 2022-04-14 RX ADMIN — Medication 1000 UNIT(S): at 12:17

## 2022-04-14 RX ADMIN — Medication 112 MICROGRAM(S): at 05:45

## 2022-04-14 RX ADMIN — Medication 4: at 12:21

## 2022-04-14 RX ADMIN — Medication 40 MILLIGRAM(S): at 03:03

## 2022-04-14 RX ADMIN — PIPERACILLIN AND TAZOBACTAM 25 GRAM(S): 4; .5 INJECTION, POWDER, LYOPHILIZED, FOR SOLUTION INTRAVENOUS at 14:25

## 2022-04-14 RX ADMIN — Medication 40 MILLIGRAM(S): at 18:30

## 2022-04-14 RX ADMIN — Medication 5 MILLIGRAM(S): at 18:30

## 2022-04-14 RX ADMIN — ATORVASTATIN CALCIUM 40 MILLIGRAM(S): 80 TABLET, FILM COATED ORAL at 23:14

## 2022-04-14 RX ADMIN — Medication 4: at 18:30

## 2022-04-14 NOTE — DIETITIAN NUTRITION RISK NOTIFICATION - TREATMENT: THE FOLLOWING DIET HAS BEEN RECOMMENDED
Diet, NPO with Tube Feed:   Tube Feeding Modality: Gastrostomy  Glucerna 1.5 Heath (GLUCERNA1.5)  Total Volume for 24 Hours (mL): 1200  Bolus  Total Volume of Bolus (mL):  300  Total # of Feeds: 4  Tube Feed Frequency: Every 6 hours   Tube Feed Start Time: 12:00  Bolus Feed Rate (mL per Hour): 300   Bolus Feed Duration (in Hours): 1  Free Water Flush  Bolus   Total Volume per Flush (mL): 150   Frequency: Every 6 Hours   Total Daily Volume of Flush (mL): 600    Start Time: 13:00 (04-10-22 @ 10:44) [Active]

## 2022-04-14 NOTE — PROGRESS NOTE ADULT - SUBJECTIVE AND OBJECTIVE BOX
PULMONARY PROGRESS NOTE      KIMBERLY LAIROD-[id number]    Patient is a 76y old  Male who presents with a chief complaint of Hypoxic respiratory failure (14 Apr 2022 10:45)      INTERVAL HPI/OVERNIGHT EVENTS:    MEDICATIONS  (STANDING):  albuterol/ipratropium for Nebulization 3 milliLiter(s) Nebulizer every 6 hours  aspirin  chewable 81 milliGRAM(s) Oral daily  atorvastatin 40 milliGRAM(s) Oral at bedtime  chlorhexidine 2% Cloths 1 Application(s) Topical <User Schedule>  chlorhexidine 2% Cloths 1 Application(s) Topical daily  cholecalciferol 1000 Unit(s) Oral daily  dextrose 5%. 1000 milliLiter(s) (50 mL/Hr) IV Continuous <Continuous>  dextrose 5%. 1000 milliLiter(s) (100 mL/Hr) IV Continuous <Continuous>  dextrose 50% Injectable 25 Gram(s) IV Push once  dextrose 50% Injectable 12.5 Gram(s) IV Push once  dextrose 50% Injectable 25 Gram(s) IV Push once  ferrous    sulfate Liquid 300 milliGRAM(s) Enteral Tube daily  glucagon  Injectable 1 milliGRAM(s) IntraMuscular once  heparin   Injectable 5000 Unit(s) SubCutaneous every 12 hours  insulin lispro (ADMELOG) corrective regimen sliding scale   SubCutaneous every 6 hours  lactobacillus acidophilus 1 Tablet(s) Oral two times a day  levothyroxine 112 MICROGram(s) Oral daily  methylPREDNISolone sodium succinate Injectable 40 milliGRAM(s) IV Push every 8 hours  metoprolol tartrate 12.5 milliGRAM(s) Oral every 8 hours  oxybutynin 5 milliGRAM(s) Oral two times a day  pantoprazole  Injectable 40 milliGRAM(s) IV Push daily  piperacillin/tazobactam IVPB.. 3.375 Gram(s) IV Intermittent every 8 hours  sodium chloride 0.9%. 1000 milliLiter(s) (100 mL/Hr) IV Continuous <Continuous>      MEDICATIONS  (PRN):  acetaminophen     Tablet .. 650 milliGRAM(s) Oral every 6 hours PRN Temp greater or equal to 38C (100.4F)  dextrose Oral Gel 15 Gram(s) Oral once PRN Blood Glucose LESS THAN 70 milliGRAM(s)/deciliter  hydrALAZINE Injectable 10 milliGRAM(s) IV Push every 6 hours PRN If SBP > 160  sodium chloride 0.9% lock flush 10 milliLiter(s) IV Push every 1 hour PRN Pre/post blood products, medications, blood draw, and to maintain line patency      Allergies    No Known Allergies    Intolerances        PAST MEDICAL & SURGICAL HISTORY:  Esophageal stricture    Prostate CA    History of carotid stenosis    Laryngeal carcinoma    Hypothyroidism    Aspiration pneumonia    Traumatic pneumothorax    Benign prostatic hyperplasia with urinary obstruction    Syncope and collapse    Microalbuminuria    Anemia    Hyperlipidemia, unspecified hyperlipidemia type    COPD (chronic obstructive pulmonary disease)    Femoral fracture    S/P percutaneous endoscopic gastrostomy (PEG) tube placement    Liver laceration    History of exploratory laparotomy    History of total bilateral knee replacement  b/l femur        SOCIAL HISTORY  Smoking History:       REVIEW OF SYSTEMS:    CONSTITUTIONAL:  No distress    HEENT:  Eyes:  No diplopia or blurred vision. ENT:  No earache, sore throat or runny nose.    CARDIOVASCULAR:  No pressure, squeezing, tightness, heaviness or aching about the chest; no palpitations.    RESPIRATORY:  No cough, shortness of breath, PND or orthopnea. Mild SOBOE    GASTROINTESTINAL:  No nausea, vomiting or diarrhea.    GENITOURINARY:  No dysuria, frequency or urgency.    NEUROLOGIC:  No paresthesias, fasciculations, seizures or weakness.    Extremities: No cyanosis, clubbing or edema    PSYCHIATRIC:  No disorder of thought or mood.    Vital Signs Last 24 Hrs  T(C): 36.5 (14 Apr 2022 07:47), Max: 36.9 (13 Apr 2022 14:39)  T(F): 97.7 (14 Apr 2022 07:47), Max: 98.4 (13 Apr 2022 14:39)  HR: 94 (14 Apr 2022 09:52) (70 - 97)  BP: 147/62 (14 Apr 2022 04:10) (103/40 - 155/56)  BP(mean): 83 (14 Apr 2022 04:10) (61 - 89)  RR: 22 (14 Apr 2022 04:10) (20 - 25)  SpO2: 98% (14 Apr 2022 10:14) (94% - 100%)    PHYSICAL EXAMINATION:    GENERAL: The patient is awake and alert in no apparent distress.     HEENT: Head is normocephalic and atraumatic. Extraocular muscles are intact. Mucous membranes are moist.    NECK: Supple.    LUNGS: Clear to auscultation without wheezing, rales or rhonchi; respirations unlabored    HEART: Regular rate and rhythm without murmur.    ABDOMEN: Soft, nontender, and nondistended.      EXTREMITIES: Without any cyanosis, clubbing, rash, lesions or edema.    NEUROLOGIC: Grossly intact.    LABS:                        8.0    20.65 )-----------( 231      ( 14 Apr 2022 08:35 )             27.7     04-14    142  |  99  |  40.8<H>  ----------------------------<  187<H>  4.4   |  34.0<H>  |  0.85    Ca    8.9      14 Apr 2022 08:35  Mg     2.1     04-13          ABG - ( 14 Apr 2022 05:59 )  pH, Arterial: 7.410 pH, Blood: x     /  pCO2: 64    /  pO2: 104   / HCO3: 41    / Base Excess: 16.0  /  SaO2: 99.1                            MICROBIOLOGY:    RADIOLOGY & ADDITIONAL STUDIES: PULMONARY PROGRESS NOTE      KIMBERLY LAIROD-[id number]    Patient is a 76y old  Male who presents with a chief complaint of Hypoxic respiratory failure (14 Apr 2022 10:45)      INTERVAL HPI/OVERNIGHT EVENTS:  Awake alert in no distress.  Patient was compliant with CPAP last p.m.  Family present during this evaluation with discussion with his daughter on the telephone  MEDICATIONS  (STANDING):  albuterol/ipratropium for Nebulization 3 milliLiter(s) Nebulizer every 6 hours  aspirin  chewable 81 milliGRAM(s) Oral daily  atorvastatin 40 milliGRAM(s) Oral at bedtime  chlorhexidine 2% Cloths 1 Application(s) Topical <User Schedule>  chlorhexidine 2% Cloths 1 Application(s) Topical daily  cholecalciferol 1000 Unit(s) Oral daily  dextrose 5%. 1000 milliLiter(s) (50 mL/Hr) IV Continuous <Continuous>  dextrose 5%. 1000 milliLiter(s) (100 mL/Hr) IV Continuous <Continuous>  dextrose 50% Injectable 25 Gram(s) IV Push once  dextrose 50% Injectable 12.5 Gram(s) IV Push once  dextrose 50% Injectable 25 Gram(s) IV Push once  ferrous    sulfate Liquid 300 milliGRAM(s) Enteral Tube daily  glucagon  Injectable 1 milliGRAM(s) IntraMuscular once  heparin   Injectable 5000 Unit(s) SubCutaneous every 12 hours  insulin lispro (ADMELOG) corrective regimen sliding scale   SubCutaneous every 6 hours  lactobacillus acidophilus 1 Tablet(s) Oral two times a day  levothyroxine 112 MICROGram(s) Oral daily  methylPREDNISolone sodium succinate Injectable 40 milliGRAM(s) IV Push every 8 hours  metoprolol tartrate 12.5 milliGRAM(s) Oral every 8 hours  oxybutynin 5 milliGRAM(s) Oral two times a day  pantoprazole  Injectable 40 milliGRAM(s) IV Push daily  piperacillin/tazobactam IVPB.. 3.375 Gram(s) IV Intermittent every 8 hours  sodium chloride 0.9%. 1000 milliLiter(s) (100 mL/Hr) IV Continuous <Continuous>      MEDICATIONS  (PRN):  acetaminophen     Tablet .. 650 milliGRAM(s) Oral every 6 hours PRN Temp greater or equal to 38C (100.4F)  dextrose Oral Gel 15 Gram(s) Oral once PRN Blood Glucose LESS THAN 70 milliGRAM(s)/deciliter  hydrALAZINE Injectable 10 milliGRAM(s) IV Push every 6 hours PRN If SBP > 160  sodium chloride 0.9% lock flush 10 milliLiter(s) IV Push every 1 hour PRN Pre/post blood products, medications, blood draw, and to maintain line patency      Allergies    No Known Allergies    Intolerances        PAST MEDICAL & SURGICAL HISTORY:  Esophageal stricture    Prostate CA    History of carotid stenosis    Laryngeal carcinoma    Hypothyroidism    Aspiration pneumonia    Traumatic pneumothorax    Benign prostatic hyperplasia with urinary obstruction    Syncope and collapse    Microalbuminuria    Anemia    Hyperlipidemia, unspecified hyperlipidemia type    COPD (chronic obstructive pulmonary disease)    Femoral fracture    S/P percutaneous endoscopic gastrostomy (PEG) tube placement    Liver laceration    History of exploratory laparotomy    History of total bilateral knee replacement  b/l femur        SOCIAL HISTORY  Smoking History:       REVIEW OF SYSTEMS:    CONSTITUTIONAL:  No distress    HEENT:  Eyes:  No diplopia or blurred vision. ENT:  No earache, sore throat or runny nose.    CARDIOVASCULAR:  No pressure, squeezing, tightness, heaviness or aching about the chest; no palpitations.    RESPIRATORY:  above    GASTROINTESTINAL:  No nausea, vomiting or diarrhea.    GENITOURINARY:  No dysuria, frequency or urgency.    NEUROLOGIC:  No paresthesias, fasciculations, seizures or weakness.    Extremities: No cyanosis, clubbing or edema    PSYCHIATRIC:  No disorder of thought or mood.    Vital Signs Last 24 Hrs  T(C): 36.5 (14 Apr 2022 07:47), Max: 36.9 (13 Apr 2022 14:39)  T(F): 97.7 (14 Apr 2022 07:47), Max: 98.4 (13 Apr 2022 14:39)  HR: 94 (14 Apr 2022 09:52) (70 - 97)  BP: 147/62 (14 Apr 2022 04:10) (103/40 - 155/56)  BP(mean): 83 (14 Apr 2022 04:10) (61 - 89)  RR: 22 (14 Apr 2022 04:10) (20 - 25)  SpO2: 98% (14 Apr 2022 10:14) (94% - 100%)    PHYSICAL EXAMINATION:    GENERAL: The patient is awake and alert in no apparent distress.     HEENT: Head is normocephalic and atraumatic. Extraocular muscles are intact. Mucous membranes are moist.    NECK: Supple. RT scarring    LUNGS: Clear to auscultation without wheezing, rales or rhonchi; respirations unlabored    HEART: Regular rate and rhythm without murmur.    ABDOMEN: Soft, nontender, and nondistended.  PEG    EXTREMITIES: Without any cyanosis, clubbing, rash, lesions or edema.    NEUROLOGIC: Grossly intact.    LABS:                        8.0    20.65 )-----------( 231      ( 14 Apr 2022 08:35 )             27.7     04-14    142  |  99  |  40.8<H>  ----------------------------<  187<H>  4.4   |  34.0<H>  |  0.85    Ca    8.9      14 Apr 2022 08:35  Mg     2.1     04-13          ABG - ( 14 Apr 2022 05:59 )  pH, Arterial: 7.410 pH, Blood: x     /  pCO2: 64    /  pO2: 104   / HCO3: 41    / Base Excess: 16.0  /  SaO2: 99.1                            MICROBIOLOGY:    RADIOLOGY & ADDITIONAL STUDIES:  ACC: [id number] EXAM:  CT ABDOMEN AND PELVIS                        ACC: [id number] EXAM:  CT CHEST                          PROCEDURE DATE:  04/08/2022          INTERPRETATION:  CLINICAL INFORMATION: Sepsis. Hypoxia.    COMPARISON: January 11, 2022.    CONTRAST/COMPLICATIONS:  IV Contrast: NONE  Oral Contrast: NONE  Complications: None reported at time of study completion    PROCEDURE:  CT of the Chest, Abdomen and Pelvis was performed.  Sagittal and coronal reformats were performed.    FINDINGS:  CHEST:  LUNGS AND LARGE AIRWAYS: Endotracheal tube with tip above the idania.   Patchy and nodular opacities in both lungs, predominantly in the lower   lobes. Post radiation changes in the medial lung apices.  PLEURA: Unchanged large extrapleural nodules, measuring up to 1.7 x 0.8   cm on the left (series 4, image 76).  VESSELS: Atherosclerotic changes of the aorta.  HEART: Heart size is normal. No pericardial effusion.  MEDIASTINUM AND NIECY: Similar appearance of prominent mediastinal lymph   nodes.  CHEST WALL AND LOWER NECK: Within normal limits.    ABDOMEN AND PELVIS:  LIVER: Within normal limits.  BILE DUCTS: Left-sided pneumobilia.  GALLBLADDER: Unchanged large amount of high density material in the lumen   in the gallbladder, possiblylarge stones.  SPLEEN: Capsular calcification.  PANCREAS: Within normal limits.  ADRENALS: Within normal limits.  KIDNEYS/URETERS: Within normal limits.    BLADDER: Contains air, small amount of fluid, and a Hood catheter   balloon.  REPRODUCTIVE ORGANS: Prostate is enlarged.    BOWEL: Enteric tube with tip terminating in the stomach. Percutaneous   gastrostomy tube. No bowel obstruction. Appendix is normal.  PERITONEUM: No ascites.  VESSELS: Atherosclerotic changes. Left femoral artery approachcentral   venous catheter terminating in the left common iliac vein.  RETROPERITONEUM/LYMPH NODES: No lymphadenopathy.  ABDOMINAL WALL: Within normal limits.  BONES: Degenerative changes. Intramedullary rods in both femurs.    IMPRESSION:  Multifocalpneumonia, worse in the lower lobes.    Unchanged large left extrapleural nodules.    --- End of Report ---            JAYLA SMITH MD; Attending Radiologist  This document has been electronically signed. Apr 8 2022 10:05AM    < end of copied text >  ECHO:  < from: TTE Echo Complete w/o Contrast w/ Doppler (07.07.21 @ 13:17) >      Summary:   1. Left ventricular ejection fraction, by visual estimation, is 60 to 65%.   2. Normal global left ventricular systolic function.   3. Spectral Doppler shows impaired relaxation pattern of left ventricular myocardial filling (Grade I diastolic dysfunction).   4. Mild mitral valve regurgitation.   5. Mild thickening of the anterior and posterior mitral valve leaflets.    MD Zoey Electronically signed on 7/7/2021 at 7:38:07 PM    < from: Xray Chest 1 View- PORTABLE-Urgent (Xray Chest 1 View- PORTABLE-Urgent .) (04.13.22 @ 06:22) >    ACC: [id number] EXAM:  XR CHEST PORTABLE URGENT 1V                        ACC: [id number] EXAM:  XR CHEST PORTABLE URGENT 1V                          PROCEDURE DATE:  04/12/2022          INTERPRETATION:  Portable chest radiograph    CLINICAL INFORMATION: Dyspnea, shortness of breath.    TECHNIQUE:  Portable  AP chest radiograph.    COMPARISON: 4/8/2022 chest .    FINDINGS:  CATHETERS AND TUBES: None    PULMONARY: Residual LEFT basilar airspace consolidation/lower lobe   atelectasis and/or mild bilateral effusions obscuring LEFT diaphragm   contour. Upper zones clear. There are diffuse vascular congestion.  .   No pneumothorax.    HEART/VASCULAR: The heart is mildly enlarged in transverse diameter.  .    BONES: Visualized osseous structures are intact.    IMPRESSION:   LEFT basilar airspace consolidation and/or bilateral mild   effusions obscuring LEFT diaphragm contour.  Cardiomegaly..    FOLLOW-UP AP PORTABLE CHEST RADIOGRAPH 4/13/2022 AT 5:16 AM:  No significant change.    --- End of Report ---            JAZMÍN SAHU MD; Attending Radiologist  This document has been electronically signed. Apr 13 2022  7:10PM    < end of copied text >

## 2022-04-14 NOTE — DIETITIAN INITIAL EVALUATION ADULT - ETIOLOGY
related to inadequate protein calorie intake in the setting of acute respiratory failure/ asp pna/ septic shock/ COPD

## 2022-04-14 NOTE — CHART NOTE - NSCHARTNOTEFT_GEN_A_CORE
Medicine PA-  Cd initially @0030 for hypotension 103/40, pt resting and asymptomatic. Pt taking lopressor 25mg q8hr, last dose 1800.     Vital Signs Last 24 Hrs  T(C): 36.2 (14 Apr 2022 00:00), Max: 36.9 (13 Apr 2022 14:39)  T(F): 97.2 (14 Apr 2022 00:00), Max: 98.4 (13 Apr 2022 14:39)  HR: 93 (14 Apr 2022 04:10) (70 - 93)  BP: 147/62 (14 Apr 2022 04:10) (103/40 - 155/56)  BP(mean): 83 (14 Apr 2022 04:10) (61 - 89)  RR: 22 (14 Apr 2022 04:10) (18 - 25)  SpO2: 98% (14 Apr 2022 04:10) (91% - 100%)    >Hypotension  -ordered NS 500cc bolus and BP improved 122/49 (MAP 73)  -hold next dose of lopressor and continue to monitor.

## 2022-04-14 NOTE — DIETITIAN INITIAL EVALUATION ADULT - ENTERAL
increase enteral bolus to 340ml q 6 hrs to provide 1360ml, 2040 kcal, 114g protein, 1034ml free water. additional free water per MD discretion

## 2022-04-14 NOTE — PROGRESS NOTE ADULT - SUBJECTIVE AND OBJECTIVE BOX
HPI  Pt is a 75yo M admitted to [facility] for COPD, aspiration pna s/p intubation.   Pt was seen in the chair with family members at bedside. Overnight noted to be hypotension with dizziness. Pt states symptom resolved.     Vital Signs Last 24 Hrs  T(C): 36.5 (14 Apr 2022 07:47), Max: 36.9 (13 Apr 2022 14:39)  T(F): 97.7 (14 Apr 2022 07:47), Max: 98.4 (13 Apr 2022 14:39)  HR: 94 (14 Apr 2022 09:52) (70 - 97)  BP: 147/62 (14 Apr 2022 04:10) (103/40 - 155/56)  BP(mean): 83 (14 Apr 2022 04:10) (61 - 89)  RR: 22 (14 Apr 2022 04:10) (20 - 25)  SpO2: 98% (14 Apr 2022 10:14) (94% - 100%)    I&O's Summary    13 Apr 2022 07:01  -  14 Apr 2022 07:00  --------------------------------------------------------  IN: 1200 mL / OUT: 800 mL / NET: 400 mL        CAPILLARY BLOOD GLUCOSE      POCT Blood Glucose.: 142 mg/dL (14 Apr 2022 05:11)  POCT Blood Glucose.: 178 mg/dL (13 Apr 2022 22:36)  POCT Blood Glucose.: 185 mg/dL (13 Apr 2022 18:07)  POCT Blood Glucose.: 154 mg/dL (13 Apr 2022 13:10)      PHYSICAL EXAM:    General: Elderly male sitting in bed comfortably. No acute distress but looks tired.   HEENT: EOMI. Clear conjunctivae. Moist mucus membrane.   Neck: Supple.   Chest: Decreased air entry at bases. Diffuse rhonchi. No wheezing or rales. No chest wall tenderness.  Heart: Normal S1 & S2. RRR.   Abdomen: Soft. Non-tender. Non-distended. + BS. G-tube in place.   Ext: No pedal edema. No calf tenderness   Neuro: Awake and alert. No focal deficit. Speech soft.   Skin: Warm and Dry  Psychiatry: Normal mood and affect    MEDICATIONS:  MEDICATIONS  (STANDING):  albuterol/ipratropium for Nebulization 3 milliLiter(s) Nebulizer every 6 hours  aspirin  chewable 81 milliGRAM(s) Oral daily  atorvastatin 40 milliGRAM(s) Oral at bedtime  chlorhexidine 2% Cloths 1 Application(s) Topical <User Schedule>  chlorhexidine 2% Cloths 1 Application(s) Topical daily  cholecalciferol 1000 Unit(s) Oral daily  dextrose 5%. 1000 milliLiter(s) (50 mL/Hr) IV Continuous <Continuous>  dextrose 5%. 1000 milliLiter(s) (100 mL/Hr) IV Continuous <Continuous>  dextrose 50% Injectable 25 Gram(s) IV Push once  dextrose 50% Injectable 12.5 Gram(s) IV Push once  dextrose 50% Injectable 25 Gram(s) IV Push once  ferrous    sulfate Liquid 300 milliGRAM(s) Enteral Tube daily  glucagon  Injectable 1 milliGRAM(s) IntraMuscular once  heparin   Injectable 5000 Unit(s) SubCutaneous every 12 hours  insulin lispro (ADMELOG) corrective regimen sliding scale   SubCutaneous every 6 hours  lactobacillus acidophilus 1 Tablet(s) Oral two times a day  levothyroxine 112 MICROGram(s) Oral daily  methylPREDNISolone sodium succinate Injectable 40 milliGRAM(s) IV Push every 8 hours  metoprolol tartrate 12.5 milliGRAM(s) Oral every 8 hours  oxybutynin 5 milliGRAM(s) Oral two times a day  pantoprazole  Injectable 40 milliGRAM(s) IV Push daily  piperacillin/tazobactam IVPB.. 3.375 Gram(s) IV Intermittent every 8 hours  sodium chloride 0.9%. 1000 milliLiter(s) (100 mL/Hr) IV Continuous <Continuous>      LABS: All Labs Reviewed:                        8.0    20.65 )-----------( 231      ( 14 Apr 2022 08:35 )             27.7     04-14    142  |  99  |  40.8<H>  ----------------------------<  187<H>  4.4   |  34.0<H>  |  0.85    Ca    8.9      14 Apr 2022 08:35  Mg     2.1     04-13            Blood Culture:     RADIOLOGY/EKG:    DVT PPX:    ADVANCED DIRECTIVE:    DISPOSITION:

## 2022-04-14 NOTE — DIETITIAN INITIAL EVALUATION ADULT - NSFNSGIIOFT_GEN_A_CORE
04-13-22 @ 07:01  -  04-14-22 @ 07:00  --------------------------------------------------------  OUT:  Total OUT: 0 mL    Total NET: 300 mL      04-14-22 @ 07:01  -  04-14-22 @ 14:51  --------------------------------------------------------  OUT:  Total OUT: 0 mL    Total NET: 280 mL

## 2022-04-14 NOTE — PROGRESS NOTE ADULT - ASSESSMENT
76 year old male with history of Laryngeal Cancer s/p Chemo + RT, PEG Tube, COPD on home oxygen, Carotid Stenosis, Non Obstructive CAD, HTN, HLD, Prediabetes / ? DM 2 and Hypothyroidism presented with unresponsiveness. Oxygen saturation at home in 30s. Intubated in ER and admitted to ICU with Hypoxic Respiratory Failure secondary. Found to be in Septic Shock secondary to Aspiration Pneumonia. Started on Levophed and later weaned off. He was extubated on 4/9/22. EEG preliminary report with potential multifocal epileptogenic foci. He was downgraded to Medicine Service on 4/10/22.     *Acute Respiratory Failure with Hypoxia   Likely due to aspiration pneumonia  Sputum culture with E. Coli  s/p extubation on 4/9/22  Aspiration precautions. Holding tube feeding for now.  Continue Zosyn   Added nebs  Incentive Spirometry  Chest PT  Pulmonary consult appreciated     *COPD  Now with exacerbation   Continue supplemental oxygen via NRB, currently on NC and BIPAP overnight   Solumedrol 40 mg q 8 hours  DuoNebs q 6 hours   Pulmonary consult appreciated     *Septic Shock  Likely due to aspiration pneumonia  Resolved  Off Levophed and Hydrocortisone   Continue Zosyn    *MATHIEU  Likely due to above  Resolving   Monitor renal function     *Hyperkalemia then Hypokalemia  ACEI on hold   s/p Lokelma   KCl 40 mEq x 2  Resolved     *History of Laryngeal Cancer  s/p Chemo + RT  Outpatient follow up  Resumed tube feeding     *Prediabetes / ? DM 2  HbA1c 6.1  Accu checks and ISS    *HTN now with hyoptension   Metoprolol decresed to 12.5 q8hr   Enalapril on hold due to hyperkalemia     *Hypothyroidism  Continue Synthroid     *Abnormal EEG  Repeat EEG with mild to moderate nonspecific diffuse or multifocal cerebral dysfunction. No epileptiform pattern or seizure seen.  No intervention as per Epilepsy.     *Coag Neg Staph Bacteremia  Likely contaminant  Follow repeat cultures orfered today     *Anemia  HH trending down slowly   f/u stool guaiac     DVT Prophylaxis -- Heparin SC    Dispo: Home once stable.       Updated patient's daughter at bedside.

## 2022-04-14 NOTE — DIETITIAN INITIAL EVALUATION ADULT - OTHER INFO
76 year old male with history of Laryngeal Cancer s/p Chemo + RT, PEG Tube, COPD on home oxygen, Carotid Stenosis, Non Obstructive CAD, HTN, HLD, Prediabetes / ? DM 2 and Hypothyroidism presented with unresponsiveness. Oxygen saturation at home in 30s. Intubated in ER and admitted to ICU with Hypoxic Respiratory Failure secondary. Found to be in Septic Shock secondary to Aspiration Pneumonia. Started on Levophed and later weaned off. He was extubated on 4/9/22. EEG preliminary report with potential multifocal epileptogenic foci. He was downgraded to Medicine Service on 4/10/22.   Acute Respiratory Failure with Hypoxia     Pt receiving enteral nutrition at home. Current regimen meeting low end protein energy needs estimate. Upon physical examination, pt presenting with muscle and fat losses. Pt likely unable to meet increased nutrient needs of stress factors ( complicated pmhx) on current TF regimen

## 2022-04-14 NOTE — DIETITIAN INITIAL EVALUATION ADULT - NS FNS DIET ORDER
Diet, NPO with Tube Feed:   Tube Feeding Modality: Gastrostomy  Glucerna 1.5 Heath (GLUCERNA1.5)  Total Volume for 24 Hours (mL): 1200  Bolus  Total Volume of Bolus (mL):  300  Total # of Feeds: 4  Tube Feed Frequency: Every 6 hours   Tube Feed Start Time: 12:00  Bolus Feed Rate (mL per Hour): 300   Bolus Feed Duration (in Hours): 1  Free Water Flush  Bolus   Total Volume per Flush (mL): 150   Frequency: Every 6 Hours   Total Daily Volume of Flush (mL): 600    Start Time: 13:00 (04-10-22 @ 10:44)

## 2022-04-14 NOTE — DIETITIAN INITIAL EVALUATION ADULT - PERTINENT MEDS FT
MEDICATIONS  (STANDING):  albuterol/ipratropium for Nebulization 3 milliLiter(s) Nebulizer every 6 hours  aspirin  chewable 81 milliGRAM(s) Oral daily  atorvastatin 40 milliGRAM(s) Oral at bedtime  chlorhexidine 2% Cloths 1 Application(s) Topical <User Schedule>  chlorhexidine 2% Cloths 1 Application(s) Topical daily  cholecalciferol 1000 Unit(s) Oral daily  dextrose 5%. 1000 milliLiter(s) (50 mL/Hr) IV Continuous <Continuous>  dextrose 5%. 1000 milliLiter(s) (100 mL/Hr) IV Continuous <Continuous>  dextrose 50% Injectable 25 Gram(s) IV Push once  dextrose 50% Injectable 12.5 Gram(s) IV Push once  dextrose 50% Injectable 25 Gram(s) IV Push once  ferrous    sulfate Liquid 300 milliGRAM(s) Enteral Tube daily  glucagon  Injectable 1 milliGRAM(s) IntraMuscular once  heparin   Injectable 5000 Unit(s) SubCutaneous every 12 hours  insulin lispro (ADMELOG) corrective regimen sliding scale   SubCutaneous every 6 hours  lactobacillus acidophilus 1 Tablet(s) Oral two times a day  levothyroxine 112 MICROGram(s) Oral daily  methylPREDNISolone sodium succinate Injectable 40 milliGRAM(s) IV Push every 8 hours  metoprolol tartrate 12.5 milliGRAM(s) Oral every 8 hours  oxybutynin 5 milliGRAM(s) Oral two times a day  pantoprazole  Injectable 40 milliGRAM(s) IV Push daily  piperacillin/tazobactam IVPB.. 3.375 Gram(s) IV Intermittent every 8 hours  sodium chloride 0.9%. 1000 milliLiter(s) (100 mL/Hr) IV Continuous <Continuous>    MEDICATIONS  (PRN):  acetaminophen     Tablet .. 650 milliGRAM(s) Oral every 6 hours PRN Temp greater or equal to 38C (100.4F)  dextrose Oral Gel 15 Gram(s) Oral once PRN Blood Glucose LESS THAN 70 milliGRAM(s)/deciliter  hydrALAZINE Injectable 10 milliGRAM(s) IV Push every 6 hours PRN If SBP > 160  sodium chloride 0.9% lock flush 10 milliLiter(s) IV Push every 1 hour PRN Pre/post blood products, medications, blood draw, and to maintain line patency

## 2022-04-14 NOTE — DIETITIAN INITIAL EVALUATION ADULT - ORAL INTAKE PTA/DIET HISTORY
Pt receiving 300ml of Glucerna 1.5  q 6 for a total of 1200ml 1800kcal,100g protein and micronutrients, 912 ml free water- 600ml additional free water flush noted

## 2022-04-14 NOTE — PROGRESS NOTE ADULT - ASSESSMENT
76-year-old male with a history of stage IV chronic obstructive lung disease by O2 criteria with severe obstructive and restrictive disease on last pulmonary function test.  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  Status post aspiration pneumonia.  Findings on CAT scan most likely on the basis of infected bulla rather than cavitary pneumonia.  Desaturations noted may simply be on the basis of shunting through the regions of aspiration with sleep and improved with CPAP.   No clinical history to suggest obstructive sleep apnea. Bronchospasm resolved  Mixed metabolic alkalosis and respiratory acidosis.     Plan:  1.  Agree with current antibiotics  2.  Empiric CPAP of 8 with O2 and sleep monitoring saturations  3.  Duo nebulization  4.  change to PO steroids  5.  Encourage cough and pulmonary hygiene

## 2022-04-14 NOTE — DIETITIAN INITIAL EVALUATION ADULT - PERTINENT LABORATORY DATA
04-14    142  |  99  |  40.8<H>  ----------------------------<  187<H>  4.4   |  34.0<H>  |  0.85    Ca    8.9      14 Apr 2022 08:35  Mg     2.1     04-13    POCT Blood Glucose.: 211 mg/dL (04-14-22 @ 12:19)  A1C with Estimated Average Glucose Result: 6.1 % (04-09-22 @ 02:51)

## 2022-04-15 ENCOUNTER — TRANSCRIPTION ENCOUNTER (OUTPATIENT)
Age: 77
End: 2022-04-15

## 2022-04-15 DIAGNOSIS — D72.829 ELEVATED WHITE BLOOD CELL COUNT, UNSPECIFIED: ICD-10-CM

## 2022-04-15 DIAGNOSIS — R06.02 SHORTNESS OF BREATH: ICD-10-CM

## 2022-04-15 DIAGNOSIS — R91.8 OTHER NONSPECIFIC ABNORMAL FINDING OF LUNG FIELD: ICD-10-CM

## 2022-04-15 DIAGNOSIS — J96.12 CHRONIC RESPIRATORY FAILURE WITH HYPERCAPNIA: ICD-10-CM

## 2022-04-15 LAB
ANION GAP SERPL CALC-SCNC: 7 MMOL/L — SIGNIFICANT CHANGE UP (ref 5–17)
APPEARANCE UR: CLEAR — SIGNIFICANT CHANGE UP
BILIRUB UR-MCNC: NEGATIVE — SIGNIFICANT CHANGE UP
BUN SERPL-MCNC: 48.8 MG/DL — HIGH (ref 8–20)
CALCIUM SERPL-MCNC: 8.9 MG/DL — SIGNIFICANT CHANGE UP (ref 8.6–10.2)
CHLORIDE SERPL-SCNC: 97 MMOL/L — LOW (ref 98–107)
CO2 SERPL-SCNC: 37 MMOL/L — HIGH (ref 22–29)
COLOR SPEC: YELLOW — SIGNIFICANT CHANGE UP
CREAT SERPL-MCNC: 0.83 MG/DL — SIGNIFICANT CHANGE UP (ref 0.5–1.3)
DIFF PNL FLD: NEGATIVE — SIGNIFICANT CHANGE UP
EGFR: 92 ML/MIN/1.73M2 — SIGNIFICANT CHANGE UP
GLUCOSE BLDC GLUCOMTR-MCNC: 149 MG/DL — HIGH (ref 70–99)
GLUCOSE BLDC GLUCOMTR-MCNC: 165 MG/DL — HIGH (ref 70–99)
GLUCOSE BLDC GLUCOMTR-MCNC: 188 MG/DL — HIGH (ref 70–99)
GLUCOSE SERPL-MCNC: 155 MG/DL — HIGH (ref 70–99)
GLUCOSE UR QL: NEGATIVE MG/DL — SIGNIFICANT CHANGE UP
HCT VFR BLD CALC: 26 % — LOW (ref 39–50)
HGB BLD-MCNC: 7.8 G/DL — LOW (ref 13–17)
KETONES UR-MCNC: NEGATIVE — SIGNIFICANT CHANGE UP
LEUKOCYTE ESTERASE UR-ACNC: NEGATIVE — SIGNIFICANT CHANGE UP
MAGNESIUM SERPL-MCNC: 2.1 MG/DL — SIGNIFICANT CHANGE UP (ref 1.6–2.6)
MCHC RBC-ENTMCNC: 28.9 PG — SIGNIFICANT CHANGE UP (ref 27–34)
MCHC RBC-ENTMCNC: 30 GM/DL — LOW (ref 32–36)
MCV RBC AUTO: 96.3 FL — SIGNIFICANT CHANGE UP (ref 80–100)
NITRITE UR-MCNC: NEGATIVE — SIGNIFICANT CHANGE UP
PH UR: 6.5 — SIGNIFICANT CHANGE UP (ref 5–8)
PHOSPHATE SERPL-MCNC: 2.7 MG/DL — SIGNIFICANT CHANGE UP (ref 2.4–4.7)
PLATELET # BLD AUTO: 259 K/UL — SIGNIFICANT CHANGE UP (ref 150–400)
POTASSIUM SERPL-MCNC: 4.6 MMOL/L — SIGNIFICANT CHANGE UP (ref 3.5–5.3)
POTASSIUM SERPL-SCNC: 4.6 MMOL/L — SIGNIFICANT CHANGE UP (ref 3.5–5.3)
PROT UR-MCNC: NEGATIVE — SIGNIFICANT CHANGE UP
RBC # BLD: 2.7 M/UL — LOW (ref 4.2–5.8)
RBC # FLD: 15.7 % — HIGH (ref 10.3–14.5)
SARS-COV-2 RNA SPEC QL NAA+PROBE: SIGNIFICANT CHANGE UP
SODIUM SERPL-SCNC: 140 MMOL/L — SIGNIFICANT CHANGE UP (ref 135–145)
SP GR SPEC: 1.01 — SIGNIFICANT CHANGE UP (ref 1.01–1.02)
UROBILINOGEN FLD QL: NEGATIVE MG/DL — SIGNIFICANT CHANGE UP
WBC # BLD: 16.56 K/UL — HIGH (ref 3.8–10.5)
WBC # FLD AUTO: 16.56 K/UL — HIGH (ref 3.8–10.5)

## 2022-04-15 PROCEDURE — 99233 SBSQ HOSP IP/OBS HIGH 50: CPT

## 2022-04-15 PROCEDURE — 99222 1ST HOSP IP/OBS MODERATE 55: CPT

## 2022-04-15 PROCEDURE — 99232 SBSQ HOSP IP/OBS MODERATE 35: CPT

## 2022-04-15 PROCEDURE — 74019 RADEX ABDOMEN 2 VIEWS: CPT | Mod: 26

## 2022-04-15 PROCEDURE — 99239 HOSP IP/OBS DSCHRG MGMT >30: CPT

## 2022-04-15 RX ORDER — SENNA PLUS 8.6 MG/1
2 TABLET ORAL AT BEDTIME
Refills: 0 | Status: DISCONTINUED | OUTPATIENT
Start: 2022-04-15 | End: 2022-04-27

## 2022-04-15 RX ORDER — POLYETHYLENE GLYCOL 3350 17 G/17G
17 POWDER, FOR SOLUTION ORAL DAILY
Refills: 0 | Status: DISCONTINUED | OUTPATIENT
Start: 2022-04-15 | End: 2022-04-27

## 2022-04-15 RX ADMIN — SENNA PLUS 2 TABLET(S): 8.6 TABLET ORAL at 20:29

## 2022-04-15 RX ADMIN — Medication 2: at 02:42

## 2022-04-15 RX ADMIN — HEPARIN SODIUM 5000 UNIT(S): 5000 INJECTION INTRAVENOUS; SUBCUTANEOUS at 06:28

## 2022-04-15 RX ADMIN — Medication 40 MILLIGRAM(S): at 11:16

## 2022-04-15 RX ADMIN — Medication 1000 UNIT(S): at 16:06

## 2022-04-15 RX ADMIN — Medication 1 TABLET(S): at 16:08

## 2022-04-15 RX ADMIN — Medication 3 MILLILITER(S): at 05:42

## 2022-04-15 RX ADMIN — CHLORHEXIDINE GLUCONATE 1 APPLICATION(S): 213 SOLUTION TOPICAL at 06:00

## 2022-04-15 RX ADMIN — Medication 1 TABLET(S): at 05:57

## 2022-04-15 RX ADMIN — CHLORHEXIDINE GLUCONATE 1 APPLICATION(S): 213 SOLUTION TOPICAL at 10:00

## 2022-04-15 RX ADMIN — Medication 40 MILLIGRAM(S): at 02:41

## 2022-04-15 RX ADMIN — HEPARIN SODIUM 5000 UNIT(S): 5000 INJECTION INTRAVENOUS; SUBCUTANEOUS at 16:07

## 2022-04-15 RX ADMIN — Medication 81 MILLIGRAM(S): at 16:06

## 2022-04-15 RX ADMIN — ATORVASTATIN CALCIUM 40 MILLIGRAM(S): 80 TABLET, FILM COATED ORAL at 20:30

## 2022-04-15 RX ADMIN — POLYETHYLENE GLYCOL 3350 17 GRAM(S): 17 POWDER, FOR SOLUTION ORAL at 16:08

## 2022-04-15 RX ADMIN — Medication 5 MILLIGRAM(S): at 16:08

## 2022-04-15 RX ADMIN — Medication 12.5 MILLIGRAM(S): at 05:57

## 2022-04-15 RX ADMIN — PIPERACILLIN AND TAZOBACTAM 25 GRAM(S): 4; .5 INJECTION, POWDER, LYOPHILIZED, FOR SOLUTION INTRAVENOUS at 05:57

## 2022-04-15 RX ADMIN — Medication 40 MILLIGRAM(S): at 16:07

## 2022-04-15 RX ADMIN — Medication 5 MILLIGRAM(S): at 06:23

## 2022-04-15 RX ADMIN — Medication 112 MICROGRAM(S): at 05:58

## 2022-04-15 RX ADMIN — Medication 3 MILLILITER(S): at 22:17

## 2022-04-15 RX ADMIN — Medication 3 MILLILITER(S): at 08:54

## 2022-04-15 RX ADMIN — Medication 300 MILLIGRAM(S): at 16:06

## 2022-04-15 RX ADMIN — Medication 3 MILLILITER(S): at 14:34

## 2022-04-15 RX ADMIN — PANTOPRAZOLE SODIUM 40 MILLIGRAM(S): 20 TABLET, DELAYED RELEASE ORAL at 11:15

## 2022-04-15 NOTE — DISCHARGE NOTE PROVIDER - HOSPITAL COURSE
76 year old male with history of Laryngeal Cancer s/p Chemo + RT, PEG Tube, COPD on home oxygen, Carotid Stenosis, Non Obstructive CAD, HTN, HLD, Prediabetes / ? DM 2 and Hypothyroidism presented with unresponsiveness. Oxygen saturation at home in 30s. Intubated in ER and admitted to ICU with Hypoxic Respiratory Failure secondary. Pt was found to be in Septic Shock secondary to Aspiration Pneumonia. Sputum culture +ecoli and pt treated zosyn. Pt was started on Levophed and later weaned off. He was extubated on 4/9/22. EEG preliminary report with potential multifocal epileptogenic foci. He was downgraded to Medicine Service on 4/10/22.          *COPD  Now with exacerbation   Continue supplemental oxygen via NRB, currently on NC and BIPAP overnight   Solumedrol 40 mg q 8 hours  DuoNebs q 6 hours   Pulmonary consult appreciated     *Septic Shock  Likely due to aspiration pneumonia  Resolved  Off Levophed and Hydrocortisone   Continue Zosyn    *MATHIEU  Likely due to above  Resolving   Monitor renal function     *Hyperkalemia then Hypokalemia  ACEI on hold   s/p Lokelma   KCl 40 mEq x 2  Resolved     *History of Laryngeal Cancer  s/p Chemo + RT  Outpatient follow up  Resumed tube feeding     *Prediabetes / ? DM 2  HbA1c 6.1  Accu checks and ISS    *HTN now with hyoptension   Metoprolol decresed to 12.5 q8hr   Enalapril on hold due to hyperkalemia     *Hypothyroidism  Continue Synthroid     *Abnormal EEG  Repeat EEG with mild to moderate nonspecific diffuse or multifocal cerebral dysfunction. No epileptiform pattern or seizure seen.  No intervention as per Epilepsy.     *Coag Neg Staph Bacteremia  Likely contaminant  Follow repeat cultures orfered today     *Anemia  HH trending down slowly   f/u stool guaiac     DVT Prophylaxis -- Heparin SC    Dispo: Home once stable. 76 year old male with history of Laryngeal Cancer s/p Chemo + RT, PEG Tube, COPD on home oxygen, Carotid Stenosis, Non Obstructive CAD, HTN, HLD, Prediabetes / ? DM 2 and Hypothyroidism presented with unresponsiveness. Oxygen saturation at home in 30s. Intubated in ER and admitted to ICU with Hypoxic Respiratory Failure secondary. Pt was found to be in Septic Shock secondary to Aspiration Pneumonia. Sputum culture +ecoli and pt treated zosyn. Pt was started on Levophed and later weaned off. Hospital course c/b COPD exacerbation, pt initiated on steroid taper. Pulmonary was consulted. Pt was administered nebs with benefit. Pt later improved and was extubated on 4/9/22. EEG preliminary report with potential multifocal epileptogenic foci. He was downgraded to Medicine Service on 4/10/22.  Pt has since clinically improved and is medically stable for discharge.     All electrolyte abnormalities were monitored carefully and repleted as necessary during this hospitalization. At the time of discharge patient was hemodynamically stable and amenable to all terms of discharge.       *Abnormal EEG  Repeat EEG with mild to moderate nonspecific diffuse or multifocal cerebral dysfunction. No epileptiform pattern or seizure seen.  No intervention as per Epilepsy.     *Coag Neg Staph Bacteremia  Likely contaminant  Follow repeat cultures orfered today     *Anemia  HH trending down slowly   f/u stool guaiac     DVT Prophylaxis -- Heparin SC    Dispo: Home once stable. 76 year old male with history of Laryngeal Cancer s/p Chemo + RT, PEG Tube, COPD on home oxygen, Carotid Stenosis, Non Obstructive CAD, HTN, HLD, Prediabetes / ? DM 2 and Hypothyroidism presented with unresponsiveness. Oxygen saturation at home in 30s. Intubated in ER and admitted to ICU with Hypoxic Respiratory Failure secondary. Pt was found to be in Septic Shock secondary to Aspiration Pneumonia. Sputum culture +ecoli and pt treated zosyn. Pt was started on Levophed and later weaned off. Hospital course c/b COPD exacerbation, pt initiated on steroid taper. Pulmonary was consulted. Pt was administered nebs with benefit. Pt later improved and was extubated on 4/9/22. EEG preliminary report with potential multifocal epileptogenic foci. He was downgraded to Medicine Service on 4/10/22.  Pt has since clinically improved and is medically stable for discharge.     All electrolyte abnormalities were monitored carefully and repleted as necessary during this hospitalization. At the time of discharge patient was hemodynamically stable and amenable to all terms of discharge.      76 year old male with history of Laryngeal Cancer s/p Chemo + RT, PEG Tube, COPD on home oxygen, Carotid Stenosis, Non Obstructive CAD, HTN, HLD, Prediabetes / ? DM 2 and Hypothyroidism presented with unresponsiveness. Oxygen saturation at home in 30s. Intubated in ER and admitted to ICU with Hypoxic Respiratory Failure secondary. Pt was found to be in Septic Shock secondary to Aspiration Pneumonia. Sputum culture +ecoli and pt treated zosyn. Pt was started on Levophed and later weaned off. Hospital course c/b COPD exacerbation, pt initiated on steroid taper. Pulmonary was consulted. Pt was administered nebs with benefit. Pt later improved and was extubated on 4/9/22.  He was downgraded to Medicine Service on 4/10/22.  Pt has since clinically improved and         75 y/o Kiswahili speaking M w/ history of Laryngeal Cancer s/p Chemo + RT, PEG Tube, COPD on home oxygen 2LNC, Carotid Stenosis, Non Obstructive CAD, HTN, HLD, Prediabetes / ? DM 2 and Hypothyroidism presented with unresponsiveness. Oxygen saturation at home in 30s. Intubated in ER and Initially admitted to ICU with septic shock w/hypoxic respiratory failure. Found to be in Septic Shock secondary to Aspiration Pneumonia. Started on Levophed and later weaned off. He was extubated on 4/9/22. EEG preliminary report with potential multifocal epileptogenic foci. He was downgraded to Medicine Service on 4/10/22.  Since downgrade hospital course has been complicated by intermittent hypoxia likely due to continued worsening aspiration. Pt is currently on zosyn . E coli in sputum cx sent from 4/20/22, pseudomonas 4/23/22 update from sputum .Change back to Zosyn 4/24/22 x 7 days. NO ORAL option for pseudomonas - RESISTANT TO Levaquin and Cipro. BP is soft. pt is on po steroid tapering dose, continue Zosyn till 04/30. DC ACEI,cut down bb 12.5 mg bid hold parameter. Pt's stool positive blood,hb trended down. ASA chnage to 81 mg. Gi rec colonoscopy but pt refused any intervention,understood risk. On PPI bid. EEG preliminary report with potential multifocal epileptogenic foci.  Pt s medically stable for discharge.     All electrolyte abnormalities were monitored carefully and repleted as necessary during this hospitalization. At the time of discharge patient was hemodynamically stable and amenable to all terms of discharge.

## 2022-04-15 NOTE — DISCHARGE NOTE PROVIDER - CARE PROVIDERS DIRECT ADDRESSES
[email or web address]

## 2022-04-15 NOTE — DISCHARGE NOTE PROVIDER - PROVIDER TOKENS
PROVIDER:[TOKEN:[[id number]],FOLLOWUP:[1 week]],FREE:[LAST:[PCP],PHONE:[(   )    -],FAX:[(   )    -],FOLLOWUP:[1 week]],PROVIDER:[TOKEN:[[id number]],FOLLOWUP:[1 week]]

## 2022-04-15 NOTE — DISCHARGE NOTE PROVIDER - CARE PROVIDER_API CALL
Demetrius Hutchison)  Critical Care Medicine; Internal Medicine; Pulmonary Disease  [address], [address]  [place] [address]  Phone: [phone]  Fax: [phone]  Follow Up Time: 1 week    PCP,   Phone: (   )    -  Fax: (   )    -  Follow Up Time: 1 week    Jacob, Gabriel ALONSO)  EEGEpilepsy; Neurology  [address] [place] [address]  Phone: [phone]  Fax: [phone]  Follow Up Time: 1 week

## 2022-04-15 NOTE — CONSULT NOTE ADULT - ASSESSMENT
The gastrostomy tube is only 1 week old and was replaced by his regular gastroenterologist in [place].  The family does note some element of leakage from around the tube of gastric contents and feed either with or shortly after a food bolus but are uncertain as to whether or not the amount of leakage has increased recently.  Nevertheless there is no evidence of any cellulitis around the tube and the site appears clean and dry at this time.  I am not at all familiar with this type of tube and I am hesitant to replace it at this time as a result.  I would simply recommend that he be given more frequent small volume bolus feeds and that he stay upright for at least 1 hour after.  The G-tube site should be kept clean and dry.  Otherwise the tube is functioning well and I would simply recommend that he follow-up with his gastroenterologist after discharge for further evaluation.  If the leakage becomes a significant problem he may wish to replace the tube with a somewhat larger tube.  I will leave this to his discretion.  We will see only as needed your request.

## 2022-04-15 NOTE — PROGRESS NOTE ADULT - ASSESSMENT
76 year old male with history of Laryngeal Cancer s/p Chemo + RT, PEG Tube, COPD on home oxygen, Carotid Stenosis, Non Obstructive CAD, HTN, HLD, Prediabetes / ? DM 2 and Hypothyroidism presented with unresponsiveness. Oxygen saturation at home in 30s. Intubated in ER and admitted to ICU with Hypoxic Respiratory Failure secondary. Found to be in Septic Shock secondary to Aspiration Pneumonia. Started on Levophed and later weaned off. He was extubated on 4/9/22. EEG preliminary report with potential multifocal epileptogenic foci. He was downgraded to Medicine Service on 4/10/22.     *Acute Respiratory Failure with Hypoxia   Likely due to aspiration pneumonia  Sputum culture with E. Coli  s/p extubation on 4/9/22  Aspiration precautions. Holding tube feeding for now.  Continue Zosyn   Added nebs  Incentive Spirometry  Chest PT  Pulmonary consult appreciated     *COPD  Now with exacerbation   Continue supplemental oxygen via NRB, currently on NC and BIPAP overnight   Solumedrol 40 mg q 8 hours  DuoNebs q 6 hours   Pulmonary consult appreciated     *Peg tube   leakage noted   GI consult     *Septic Shock  Likely due to aspiration pneumonia  Resolved  Off Levophed and Hydrocortisone   Continue Zosyn    *MATHIEU  Likely due to above  Resolving   Monitor renal function     *Hyperkalemia then Hypokalemia  ACEI on hold   s/p Lokelma   KCl 40 mEq x 2  Resolved     *History of Laryngeal Cancer  s/p Chemo + RT  Outpatient follow up  Resumed tube feeding     *Prediabetes / ? DM 2  HbA1c 6.1  Accu checks and ISS    *HTN now with hyoptension   DC metoprolol for now   Enalapril on hold due to hyperkalemia     *Hypothyroidism  Continue Synthroid     *ABD distension  f/u Xray of abd   Hold Tube feed for now     *Abnormal EEG  Repeat EEG with mild to moderate nonspecific diffuse or multifocal cerebral dysfunction. No epileptiform pattern or seizure seen.  No intervention as per Epilepsy.     *Coag Neg Staph Bacteremia  Likely contaminant  Follow repeat cultures 4/12 neg to date    *Anemia  HH trending down slowly   f/u stool guaiac     DVT Prophylaxis -- Heparin SC    Dispo: [facility] once stable     Updated patient's daughter at bedside.

## 2022-04-15 NOTE — DISCHARGE NOTE PROVIDER - NSDCCPCAREPLAN_GEN_ALL_CORE_FT
PRINCIPAL DISCHARGE DIAGNOSIS  Diagnosis: Acute respiratory failure with hypoxia  Assessment and Plan of Treatment: - Maintain aspiration precautions  - Nebs as needed  - Follow up with Pulmonary in 1 week      SECONDARY DISCHARGE DIAGNOSES  Diagnosis: COPD exacerbation  Assessment and Plan of Treatment: - Continue with steroid taper as directed  - Follow up with Pulmonary in 1 week    Diagnosis: Septic shock  Assessment and Plan of Treatment: - S/p septic shock  - Resolved  - Pt was treated with iv abx during hospitalization   - Follow up with PCP in 1 week    Diagnosis: Abnormal EEG  Assessment and Plan of Treatment: - Repeat EEG with mild to moderate nonspecific diffuse or multifocal cerebral dysfunction. No epileptiform pattern or seizure seen.  - No intervention as per Epilepsy.   - Follow up with Dr. James in 1 week

## 2022-04-15 NOTE — DISCHARGE NOTE PROVIDER - NSDCFUSCHEDAPPT_GEN_ALL_CORE_FT
KIMBERLY LAI ; 06/14/2022 ; NPP Otolaryng [address] [place] [address] Jus Tejada  [facility]  Otolaryng [address]  Scheduled Appointment: 06/14/2022

## 2022-04-15 NOTE — CHART NOTE - NSCHARTNOTEFT_GEN_A_CORE
Consult for TF recommendation    Currently receiving Glucerna 1.5 @300mL/hr q 6 to provide 1200ml 1800kcal,100g protein and >100% DRI for vitamins and minerals, 912 ml free water- 600ml additional free water flush noted.    As per staff, pt experiencing abdominal discomfort. Consider switching to a semi-elemental formula. Recommendations below. Refer to full-assessment from 4/14. RD to follow up.    Recommend Pivot 1.5 @300mL/hr q 6hrs to provide 1200ml, 1800kcal, 112.6g of pro, >100% DRI for vitamins and minerals, additional free water per MD discretion.

## 2022-04-15 NOTE — PROGRESS NOTE ADULT - ASSESSMENT
Stage IV COPD  Multilobar pneumonia  Chronic compensated hypercarbic respiratory failure  Acute on chronic hypoxemic respiratory failure  H/o laryngeal cancer with h/o upper airway stenosis  Former smoker  H/o aspiration pneumonia  Admits to snoring but no known h/o RODRÍGUEZ  S/p respiratory failure requiring intubation and mechanical ventilation; now evens NCO2    Rec:    Drug nebs  Decrease steroids  Completed abx  Change CPAP to Bilevel therapy given chronic compensated hypercarbia  Consider home AVAPS for hypercarbia  Continue supplemental O2  Respiratory acidosis with compensatory metabolic alkalosis  Follow CT for improvement with abx  OOB/Ambulate  PT  Pulm f/u after d/c  PFTs as outpt  Prognosis remains guarded    d/w family at bedside Stage IV COPD  Multilobar pneumonia  Chronic compensated hypercarbic respiratory failure  Acute on chronic hypoxemic respiratory failure  H/o laryngeal cancer with h/o upper airway stenosis  Former smoker  H/o aspiration pneumonia  Admits to snoring but no known h/o RODRÍGUEZ  S/p respiratory failure requiring intubation and mechanical ventilation; now evens NCO2  Leukocytosis    Rec:    Drug nebs  Decrease steroids as tolerates  Completed abx  Follow wbc  Change CPAP to Bilevel therapy given chronic compensated hypercarbia  Consider home AVAPS for hypercarbia  Continue supplemental O2  Respiratory acidosis with compensatory metabolic alkalosis  Follow CT for improvement with abx  OOB/Ambulate  PT  Pulm f/u after d/c  PFTs as outpt  Prognosis remains guarded    d/w family at bedside

## 2022-04-15 NOTE — DISCHARGE NOTE PROVIDER - NSDCMRMEDTOKEN_GEN_ALL_CORE_FT
albuterol 2.5 mg/3 mL (0.083%) inhalation solution: 3 milliliter(s) inhaled every 6 hours, As Needed  aspirin 81 mg oral tablet, chewable: 1 tab(s) by gastrostomy tube once a day  atorvastatin 40 mg oral tablet: 1 tab(s) orally once a day   bisacodyl 10 mg rectal suppository: 1 suppository(ies) rectal once a day (at bedtime)  Cod Liver oral oil: 5 milliliter(s) by gastrostomy tube once a day  enalapril 2.5 mg oral tablet: 1 tab(s) orally once a day  enteral feeding bolus  of jevity  1.2 aliza: 237 milliliter(s) by gastrostomy tube 4 times a day  ferrous sulfate 200 mg/5 mL oral elixir: 5 milliliter(s) by gastrostomy tube once a day  Flector Patch 1.3% topical film, extended release: Apply topically to affected area once a day, As Needed  Florastor 250 mg oral capsule: 1 cap(s) by gastrostomy tube 4 times a day  guaiFENesin 200 mg/5 mL oral liquid: 400 milligram(s) by gastrostomy tube 4 times a day  levothyroxine 112 mcg (0.112 mg) oral tablet: 1 tab(s) by gastrostomy tube once a day  Metoprolol Tartrate 25 mg oral tablet: 1 tab(s) by gastrostomy tube 2 times a day  NexIUM 40 mg oral delayed release capsule: 1 cap(s) by gastrostomy tube once a day  [place] Essentials Basic 1400 mg/5 mL oral liquid: 5 milliliter(s) by gastrostomy tube once a day  oxybutynin 5 mg/5 mL oral syrup: 5 milliliter(s) by gastrostomy tube 2 times a day  oxygen  2  to 3  liters/min via nasal cannula  as  needed:   predniSONE 5 mg oral tablet: 1 tab(s) by gastrostomy tube once a day  Spiriva HandiHaler 18 mcg inhalation capsule: 1 cap(s) inhaled once a day  Vitamin D3 10 mcg/mL (400 intl units/mL) oral liquid: 1 milliliter(s) by gastrostomy tube once a day   acetaminophen 325 mg oral tablet: 2 tab(s) by gastrostomy tube every 6 hours, As Needed  albuterol 2.5 mg/3 mL (0.083%) inhalation solution: 3 milliliter(s) inhaled every 6 hours, As Needed  aluminum hydroxide-magnesium hydroxide 200 mg-200 mg/5 mL oral suspension: 30 milliliter(s) orally every 4 hours, As needed, Dyspepsia  aspirin 81 mg oral tablet, chewable: 1 tab(s) by gastrostomy tube once a day  atorvastatin 40 mg oral tablet: 1 tab(s) by gastrostomy tube once a day (at bedtime)  budesonide-formoterol 160 mcg-4.5 mcg/inh inhalation aerosol: 2 puff(s) inhaled 2 times a day  enoxaparin: 40 milligram(s) subcutaneous once a day  enteral feeding bolus  of jevity  1.2 aliza: 237 milliliter(s) by gastrostomy tube 4 times a day  ferrous sulfate 300 mg/5 mL (60 mg/5 mL elemental iron) oral liquid: 5 milliliter(s) orally once a day  Florastor 250 mg oral capsule: 1 cap(s) by gastrostomy tube 4 times a day  guaiFENesin 100 mg/5 mL oral liquid: 5 milliliter(s) orally every 6 hours, As needed, Cough  lactobacillus acidophilus oral capsule: 1 tab(s) by gastrostomy tube once a day  levothyroxine 112 mcg (0.112 mg) oral tablet: 1 tab(s) by gastrostomy tube once a day  metoprolol: 12.5 milligram(s) by PEG tube 2 times a day. hold if sbp&lt;120,hr&lt;55  NexIUM 40 mg oral delayed release capsule: 1 cap(s) by gastrostomy tube once a day  oxybutynin 5 mg oral tablet: 1 tab(s) by gastrostomy tube 2 times a day  oxygen  2  to 3  liters/min via nasal cannula  as  needed:   piperacillin-tazobactam: 3.37 milligram(s) injectable every 8 hours till 04/30/22  polyethylene glycol 3350 oral powder for reconstitution: 17 gram(s) orally once a day  predniSONE 10 mg oral tablet: 3 tab(s) orally once a day  senna oral tablet: 2 tab(s) orally once a day (at bedtime)  tiotropium 18 mcg inhalation capsule: 1 cap(s) inhaled once a day  Vitamin D3 10 mcg/mL (400 intl units/mL) oral liquid: 1 milliliter(s) by gastrostomy tube once a day   acetaminophen 325 mg oral tablet: 2 tab(s) by gastrostomy tube every 6 hours, As Needed  albuterol 2.5 mg/3 mL (0.083%) inhalation solution: 3 milliliter(s) inhaled every 6 hours, As Needed  aluminum hydroxide-magnesium hydroxide 200 mg-200 mg/5 mL oral suspension: 30 milliliter(s) orally every 4 hours, As needed, Dyspepsia  aspirin 81 mg oral tablet, chewable: 1 tab(s) by gastrostomy tube once a day  atorvastatin 40 mg oral tablet: 1 tab(s) by gastrostomy tube once a day (at bedtime)  budesonide-formoterol 160 mcg-4.5 mcg/inh inhalation aerosol: 2 puff(s) inhaled 2 times a day  enoxaparin: 40 milligram(s) subcutaneous once a day  enteral feeding bolus  of jevity  1.2 aliza: 237 milliliter(s) by gastrostomy tube 4 times a day  ferrous sulfate 300 mg/5 mL (60 mg/5 mL elemental iron) oral liquid: 5 milliliter(s) orally once a day  Florastor 250 mg oral capsule: 1 cap(s) by gastrostomy tube 4 times a day  guaiFENesin 100 mg/5 mL oral liquid: 5 milliliter(s) orally every 6 hours, As needed, Cough  lactobacillus acidophilus oral capsule: 1 tab(s) by gastrostomy tube once a day FOR 10DAYS  levothyroxine 112 mcg (0.112 mg) oral tablet: 1 tab(s) by gastrostomy tube once a day  metoprolol: 12.5 milligram(s) by PEG tube 2 times a day. hold if sbp&lt;120,hr&lt;55  NexIUM 40 mg oral delayed release capsule: 1 cap(s) by gastrostomy tube once a day  oxybutynin 5 mg oral tablet: 1 tab(s) by gastrostomy tube 2 times a day  oxygen  2  to 3  liters/min via nasal cannula  as  needed:   piperacillin-tazobactam: 3.37 milligram(s) injectable every 8 hours till 04/30/22  polyethylene glycol 3350 oral powder for reconstitution: 17 gram(s) orally once a day  predniSONE 10 mg oral tablet: 2 tab(s) QD FOR 3 DAYS THEN 1TAB QD FOR 3 DAYS  senna oral tablet: 2 tab(s) orally once a day (at bedtime)  tiotropium 18 mcg inhalation capsule: 1 cap(s) inhaled once a day  Vitamin D3 10 mcg/mL (400 intl units/mL) oral liquid: 1 milliliter(s) by gastrostomy tube once a day

## 2022-04-15 NOTE — DISCHARGE NOTE PROVIDER - DETAILS OF MALNUTRITION DIAGNOSIS/DIAGNOSES
This patient has been assessed with a concern for Malnutrition and was treated during this hospitalization for the following Nutrition diagnosis/diagnoses:     -  04/14/2022: Moderate protein-calorie malnutrition

## 2022-04-15 NOTE — DISCHARGE NOTE PROVIDER - NSDCFUADDINST_GEN_ALL_CORE_FT
fall precaution fall precaution  Continue BPAP QHS and PRN.  Non invasive ventilator (Bi level)  FIO2 (%) 30  IP - 14  EP - 8  BACK IP RATE :12

## 2022-04-15 NOTE — CONSULT NOTE ADULT - SUBJECTIVE AND OBJECTIVE BOX
Patient is a 76y old  Male who presents with a chief complaint of Hypoxic respiratory failure (15 Apr 2022 10:23)      HPI:  Pt is a 75 y/o M pmhx of COPD on home O2, B/l carotid artery stenosis, laryngeal CA s/p chemo and radiation, PEG tube, who was BIBA after being found unresponsive at home w/ SpO2 in the 30's, Intubated upon arrival to ED and started on propofol gtt for sedation. ICU consulted for hypoxic respiratory failure requiring intubation.  (08 Apr 2022 11:18)     We are asked to see the patient due to some leakage from around the gastrostomy tube which was replaced by his gastroenterologist in [place] time just 1 week ago.  On admission he was in respiratory failure with a PO2 at home measured at 30% and was found to have a multifocal pneumonia.  He required ventilatory support and a brief period of time on pressors.  He was treated with antibiotics.  He has had prior admissions for similar pneumonia thought to possibly be due to aspiration of either salivary contents or his bolus feeds.  He has done well and is off pressors and has been subsequently extubated.  He does not take anything orally.  At the time of my visit he denied any abdominal pain, nausea or vomiting and is tolerating the gastrostomy tube feeds without difficulty.     REVIEW OF SYSTEMS:    CONSTITUTIONAL: No fever, weight loss, or fatigue  EYES: No eye pain, visual disturbances, or discharge  ENMT:  No difficulty hearing, tinnitus, vertigo; No sinus or throat pain  NECK: No pain or stiffness  RESPIRATORY: No cough, wheezing, chills or hemoptysis; No shortness of breath  CARDIOVASCULAR: No chest pain, palpitations, dizziness, or leg swelling  GASTROINTESTINAL: as above  NEUROLOGICAL: No headaches, memory loss, loss of strength, numbness, or tremors  SKIN: No itching, burning, rashes, or lesions   LYMPH NODES: No enlarged glands  MUSCULOSKELETAL: No joint pain or swelling; No muscle, back, or extremity pain  PSYCHIATRIC: No depression, anxiety, mood swings, or difficulty sleeping  HEME/LYMPH: No easy bruising, or bleeding gums  ALLERY AND IMMUNOLOGIC: No hives or eczema      PAST MEDICAL & SURGICAL HISTORY:  Esophageal stricture    Prostate CA    History of carotid stenosis    Laryngeal carcinoma    COPD (chronic obstructive pulmonary disease)    Hypothyroidism    Aspiration pneumonia    Traumatic pneumothorax    Benign prostatic hyperplasia with urinary obstruction    Syncope and collapse    Microalbuminuria    Anemia    Hyperlipidemia, unspecified hyperlipidemia type    Femoral fracture    S/P percutaneous endoscopic gastrostomy (PEG) tube placement    Liver laceration    History of exploratory laparotomy    History of total bilateral knee replacement  b/l femur        FAMILY HISTORY:  Family history of hypertension (Mother)    Family history of cerebrovascular accident (CVA) (Mother)        SOCIAL HISTORY:  Smoking Status: [ ] Current, [ ] Former, [ ] Never  Pack Years:  Alcohol Use:    Home Medications:  albuterol 2.5 mg/3 mL (0.083%) inhalation solution: 3 milliliter(s) inhaled every 6 hours, As Needed (13 Apr 2022 13:12)  aspirin 81 mg oral tablet, chewable: 1 tab(s) by gastrostomy tube once a day (08 Apr 2022 11:12)  Cod Liver oral oil: 5 milliliter(s) by gastrostomy tube once a day (08 Apr 2022 11:12)  enalapril 2.5 mg oral tablet: 1 tab(s) orally once a day (08 Apr 2022 11:12)  enteral feeding bolus  of jevity  1.2 aliza: 237 milliliter(s) by gastrostomy tube 4 times a day (08 Apr 2022 11:12)  ferrous sulfate 200 mg/5 mL oral elixir: 5 milliliter(s) by gastrostomy tube once a day (08 Apr 2022 11:12)  Flector Patch 1.3% topical film, extended release: Apply topically to affected area once a day, As Needed (13 Apr 2022 13:14)  Florastor 250 mg oral capsule: 1 cap(s) by gastrostomy tube 4 times a day (13 Apr 2022 13:14)  guaiFENesin 200 mg/5 mL oral liquid: 400 milligram(s) by gastrostomy tube 4 times a day (13 Apr 2022 13:10)  levothyroxine 112 mcg (0.112 mg) oral tablet: 1 tab(s) by gastrostomy tube once a day (08 Apr 2022 11:12)  Metoprolol Tartrate 25 mg oral tablet: 1 tab(s) by gastrostomy tube 2 times a day (08 Apr 2022 11:12)  NexIUM 40 mg oral delayed release capsule: 1 cap(s) by gastrostomy tube once a day (13 Apr 2022 13:14)  [place] Essentials Basic 1400 mg/5 mL oral liquid: 5 milliliter(s) by gastrostomy tube once a day (08 Apr 2022 11:12)  oxybutynin 5 mg/5 mL oral syrup: 5 milliliter(s) by gastrostomy tube 2 times a day (08 Apr 2022 11:12)  oxygen  2  to 3  liters/min via nasal cannula  as  needed:  (08 Apr 2022 11:12)  predniSONE 5 mg oral tablet: 1 tab(s) by gastrostomy tube once a day (08 Apr 2022 11:12)  Spiriva HandiHaler 18 mcg inhalation capsule: 1 cap(s) inhaled once a day (13 Apr 2022 13:12)  Vitamin D3 10 mcg/mL (400 intl units/mL) oral liquid: 1 milliliter(s) by gastrostomy tube once a day (08 Apr 2022 11:12)      MEDICATIONS:  MEDICATIONS  (STANDING):  albuterol/ipratropium for Nebulization 3 milliLiter(s) Nebulizer every 6 hours  aspirin  chewable 81 milliGRAM(s) Oral daily  atorvastatin 40 milliGRAM(s) Oral at bedtime  chlorhexidine 2% Cloths 1 Application(s) Topical daily  chlorhexidine 2% Cloths 1 Application(s) Topical <User Schedule>  cholecalciferol 1000 Unit(s) Oral daily  dextrose 5%. 1000 milliLiter(s) (50 mL/Hr) IV Continuous <Continuous>  dextrose 5%. 1000 milliLiter(s) (100 mL/Hr) IV Continuous <Continuous>  dextrose 50% Injectable 25 Gram(s) IV Push once  dextrose 50% Injectable 12.5 Gram(s) IV Push once  dextrose 50% Injectable 25 Gram(s) IV Push once  ferrous    sulfate Liquid 300 milliGRAM(s) Enteral Tube daily  glucagon  Injectable 1 milliGRAM(s) IntraMuscular once  heparin   Injectable 5000 Unit(s) SubCutaneous every 12 hours  insulin lispro (ADMELOG) corrective regimen sliding scale   SubCutaneous every 6 hours  lactobacillus acidophilus 1 Tablet(s) Oral two times a day  levothyroxine 112 MICROGram(s) Oral daily  methylPREDNISolone sodium succinate Injectable 40 milliGRAM(s) IV Push every 8 hours  oxybutynin 5 milliGRAM(s) Oral two times a day  pantoprazole  Injectable 40 milliGRAM(s) IV Push daily    MEDICATIONS  (PRN):  acetaminophen     Tablet .. 650 milliGRAM(s) Oral every 6 hours PRN Temp greater or equal to 38C (100.4F)  dextrose Oral Gel 15 Gram(s) Oral once PRN Blood Glucose LESS THAN 70 milliGRAM(s)/deciliter  hydrALAZINE Injectable 10 milliGRAM(s) IV Push every 6 hours PRN If SBP > 160  sodium chloride 0.9% lock flush 10 milliLiter(s) IV Push every 1 hour PRN Pre/post blood products, medications, blood draw, and to maintain line patency      Allergies    No Known Allergies    Intolerances        Vital Signs Last 24 Hrs  T(C): 36.8 (15 Apr 2022 08:00), Max: 37.1 (14 Apr 2022 12:00)  T(F): 98.3 (15 Apr 2022 08:00), Max: 98.8 (14 Apr 2022 12:00)  HR: 80 (15 Apr 2022 10:00) (80 - 103)  BP: 98/50 (15 Apr 2022 10:00) (98/50 - 151/48)  BP(mean): 62 (15 Apr 2022 10:00) (57 - 84)  RR: 16 (15 Apr 2022 10:00) (16 - 24)  SpO2: 99% (15 Apr 2022 10:00) (96% - 100%)    04-14 @ 07:01  -  04-15 @ 07:00  --------------------------------------------------------  IN: 1180 mL / OUT: 1130 mL / NET: 50 mL    04-15 @ 07:01  -  04-15 @ 11:43  --------------------------------------------------------  IN: 0 mL / OUT: 100 mL / NET: -100 mL          PHYSICAL EXAM:    General: elderly male in no acute distress  HEENT: MMM, conjunctiva and sclera clear  Lungs: Clear  Heart: Rhythm Regular, No Murmurs  Gastrointestinal: Soft, non-tender non-distended; Normal bowel sounds; No rebound or guarding; No Organomegaly & No Masses. There is A low profile G tube present with which I am unfamiliar. The skin around the tube appears essentially normal with no significant inflammation and there is no leakage at this time a;though the leakage only occurs soon after the bolus feed.   Extremities: Normal range of motion, No clubbing, cyanosis or edema  Neurological: Alert and oriented x3, Non-focal  Skin: Warm and dry. No obvious rash        LABS:                        7.8    16.56 )-----------( 259      ( 15 Apr 2022 07:57 )             26.0     04-15    140  |  97<L>  |  48.8<H>  ----------------------------<  155<H>  4.6   |  37.0<H>  |  0.83    Ca    8.9      15 Apr 2022 07:57  Phos  2.7     04-15  Mg     2.1     04-15

## 2022-04-15 NOTE — PROGRESS NOTE ADULT - SUBJECTIVE AND OBJECTIVE BOX
PULMONARY PROGRESS NOTE      KIMBERLY LAI  MRN-[id number]    Patient is a 76y old  Male who presents with a chief complaint of Hypoxic respiratory failure (15 Apr 2022 09:36)      INTERVAL HPI/OVERNIGHT EVENTS:    Pt is awake and alert  Feels better, sitting in chair  Less SOB    MEDICATIONS  (STANDING):  albuterol/ipratropium for Nebulization 3 milliLiter(s) Nebulizer every 6 hours  aspirin  chewable 81 milliGRAM(s) Oral daily  atorvastatin 40 milliGRAM(s) Oral at bedtime  chlorhexidine 2% Cloths 1 Application(s) Topical daily  chlorhexidine 2% Cloths 1 Application(s) Topical <User Schedule>  cholecalciferol 1000 Unit(s) Oral daily  dextrose 5%. 1000 milliLiter(s) (50 mL/Hr) IV Continuous <Continuous>  dextrose 5%. 1000 milliLiter(s) (100 mL/Hr) IV Continuous <Continuous>  dextrose 50% Injectable 25 Gram(s) IV Push once  dextrose 50% Injectable 12.5 Gram(s) IV Push once  dextrose 50% Injectable 25 Gram(s) IV Push once  ferrous    sulfate Liquid 300 milliGRAM(s) Enteral Tube daily  glucagon  Injectable 1 milliGRAM(s) IntraMuscular once  heparin   Injectable 5000 Unit(s) SubCutaneous every 12 hours  insulin lispro (ADMELOG) corrective regimen sliding scale   SubCutaneous every 6 hours  lactobacillus acidophilus 1 Tablet(s) Oral two times a day  levothyroxine 112 MICROGram(s) Oral daily  methylPREDNISolone sodium succinate Injectable 40 milliGRAM(s) IV Push every 8 hours  metoprolol tartrate 12.5 milliGRAM(s) Oral every 8 hours  oxybutynin 5 milliGRAM(s) Oral two times a day  pantoprazole  Injectable 40 milliGRAM(s) IV Push daily  sodium chloride 0.9%. 1000 milliLiter(s) (100 mL/Hr) IV Continuous <Continuous>      MEDICATIONS  (PRN):  acetaminophen     Tablet .. 650 milliGRAM(s) Oral every 6 hours PRN Temp greater or equal to 38C (100.4F)  dextrose Oral Gel 15 Gram(s) Oral once PRN Blood Glucose LESS THAN 70 milliGRAM(s)/deciliter  hydrALAZINE Injectable 10 milliGRAM(s) IV Push every 6 hours PRN If SBP > 160  sodium chloride 0.9% lock flush 10 milliLiter(s) IV Push every 1 hour PRN Pre/post blood products, medications, blood draw, and to maintain line patency      Allergies    No Known Allergies    Intolerances        PAST MEDICAL & SURGICAL HISTORY:  Esophageal stricture    Prostate CA    History of carotid stenosis    Laryngeal carcinoma    COPD (chronic obstructive pulmonary disease)    Hypothyroidism    Aspiration pneumonia    Traumatic pneumothorax    Benign prostatic hyperplasia with urinary obstruction    Syncope and collapse    Microalbuminuria    Anemia    Hyperlipidemia, unspecified hyperlipidemia type    Femoral fracture    S/P percutaneous endoscopic gastrostomy (PEG) tube placement    Liver laceration    History of exploratory laparotomy    History of total bilateral knee replacement  b/l femur          REVIEW OF SYSTEMS: Offers no new complaints; improved SOB    Vital Signs Last 24 Hrs  T(C): 36.8 (15 Apr 2022 08:00), Max: 37.1 (14 Apr 2022 12:00)  T(F): 98.3 (15 Apr 2022 08:00), Max: 98.8 (14 Apr 2022 12:00)  HR: 80 (15 Apr 2022 08:55) (80 - 103)  BP: 118/37 (15 Apr 2022 08:01) (118/37 - 151/48)  BP(mean): 57 (15 Apr 2022 08:01) (57 - 84)  RR: 16 (15 Apr 2022 08:01) (16 - 24)  SpO2: 97% (15 Apr 2022 08:55) (96% - 100%)    PHYSICAL EXAMINATION:    GENERAL: The patient is awake and alert in no apparent distress.     HEENT: Head is normocephalic and atraumatic. Extraocular muscles are intact. Mucous membranes are moist.    NECK: Supple.    LUNGS: Decreased BS with mild bibasilar crackles but without wheezing or rhonchi; respirations unlabored    HEART: Regular rate and rhythm without murmur.    ABDOMEN: Soft, nontender, and nondistended.      EXTREMITIES: Without any cyanosis, clubbing, rash, lesions or edema.    NEUROLOGIC: Grossly intact.    LABS:                        7.8    16.56 )-----------( 259      ( 15 Apr 2022 07:57 )             26.0     04-15    140  |  97<L>  |  48.8<H>  ----------------------------<  155<H>  4.6   |  37.0<H>  |  0.83    Ca    8.9      15 Apr 2022 07:57  Phos  2.7     04-15  Mg     2.1     04-15          ABG - ( 14 Apr 2022 05:59 )  pH, Arterial: 7.410 pH, Blood: x     /  pCO2: 64    /  pO2: 104   / HCO3: 41    / Base Excess: 16.0  /  SaO2: 99.1        MICROBIOLOGY:    Respiratory Viral Panel with COVID-19 by TROY (04.08.22 @ 09:01)    Rapid RVP Result: [facility]    SARS-CoV-2: NotDete: This Respiratory Panel uses polymerase chain reaction (PCR) to detect for  adenovirus; coronavirus (HKU1, NL63, 229E, OC43); human metapneumovirus  (hMPV); human enterovirus/rhinovirus (Entero/RV); influenza A; influenza  A/H1; influenza A/H3; influenza A/H1-2009; influenza B; parainfluenza  viruses 1, 2, 3, 4; respiratory syncytial virus; Mycoplasma pneumoniae;  Chlamydophila pneumoniae; and SARS-CoV-2.    Culture - Blood (04.12.22 @ 09:19)    Specimen Source: .Blood Blood    Culture Results:   No growth at 48 hours        RADIOLOGY & ADDITIONAL STUDIES:    ACC: [id number] EXAM:  XR CHEST PORTABLE URGENT 1V                        ACC: [id number] EXAM:  XR CHEST PORTABLE URGENT 1V                          PROCEDURE DATE:  04/12/2022          INTERPRETATION:  Portable chest radiograph    CLINICAL INFORMATION: Dyspnea, shortness of breath.    TECHNIQUE:  Portable  AP chest radiograph.    COMPARISON: 4/8/2022 chest .    FINDINGS:  CATHETERS AND TUBES: None    PULMONARY: Residual LEFT basilar airspace consolidation/lower lobe   atelectasis and/or mild bilateral effusions obscuring LEFT diaphragm   contour. Upper zones clear. There are diffuse vascular congestion.  .   No pneumothorax.    HEART/VASCULAR: The heart is mildly enlarged in transverse diameter.  .    BONES: Visualized osseous structures are intact.    IMPRESSION:   LEFT basilar airspace consolidation and/or bilateral mild   effusions obscuring LEFT diaphragm contour.  Cardiomegaly..    FOLLOW-UP AP PORTABLE CHEST RADIOGRAPH 4/13/2022 AT 5:16 AM:  No significant change.            JAZMÍN SAHU MD; Attending Radiologist  This document has been electronically signed. Apr 13 2022  7:10PM      ECHO 7/7/21:    Summary:   1. Left ventricular ejection fraction, by visual estimation, is 60 to 65%.   2. Normal global left ventricular systolic function.   3. Spectral Doppler shows impaired relaxation pattern of left ventricular myocardial filling (Grade I diastolic dysfunction).   4. Mild mitral valve regurgitation.   5. Mild thickening of the anterior and posterior mitral valve leaflets.    MD Zoey Electronically signed on 7/7/2021 at 7:38:07 PM

## 2022-04-16 LAB
GLUCOSE BLDC GLUCOMTR-MCNC: 149 MG/DL — HIGH (ref 70–99)
GLUCOSE BLDC GLUCOMTR-MCNC: 176 MG/DL — HIGH (ref 70–99)
GLUCOSE BLDC GLUCOMTR-MCNC: 239 MG/DL — HIGH (ref 70–99)
HCT VFR BLD CALC: 28.7 % — LOW (ref 39–50)
HGB BLD-MCNC: 8.5 G/DL — LOW (ref 13–17)
MCHC RBC-ENTMCNC: 28.7 PG — SIGNIFICANT CHANGE UP (ref 27–34)
MCHC RBC-ENTMCNC: 29.6 GM/DL — LOW (ref 32–36)
MCV RBC AUTO: 97 FL — SIGNIFICANT CHANGE UP (ref 80–100)
OB PNL STL: NEGATIVE — SIGNIFICANT CHANGE UP
PLATELET # BLD AUTO: 248 K/UL — SIGNIFICANT CHANGE UP (ref 150–400)
RBC # BLD: 2.96 M/UL — LOW (ref 4.2–5.8)
RBC # FLD: 16 % — HIGH (ref 10.3–14.5)
WBC # BLD: 9.12 K/UL — SIGNIFICANT CHANGE UP (ref 3.8–10.5)
WBC # FLD AUTO: 9.12 K/UL — SIGNIFICANT CHANGE UP (ref 3.8–10.5)

## 2022-04-16 PROCEDURE — 99233 SBSQ HOSP IP/OBS HIGH 50: CPT

## 2022-04-16 RX ORDER — LACTULOSE 10 G/15ML
10 SOLUTION ORAL ONCE
Refills: 0 | Status: COMPLETED | OUTPATIENT
Start: 2022-04-16 | End: 2022-04-16

## 2022-04-16 RX ORDER — POLYETHYLENE GLYCOL 3350 17 G/17G
17 POWDER, FOR SOLUTION ORAL ONCE
Refills: 0 | Status: COMPLETED | OUTPATIENT
Start: 2022-04-16 | End: 2022-04-16

## 2022-04-16 RX ADMIN — Medication 3 MILLILITER(S): at 15:54

## 2022-04-16 RX ADMIN — ATORVASTATIN CALCIUM 40 MILLIGRAM(S): 80 TABLET, FILM COATED ORAL at 20:03

## 2022-04-16 RX ADMIN — Medication 1 TABLET(S): at 17:01

## 2022-04-16 RX ADMIN — Medication 40 MILLIGRAM(S): at 01:28

## 2022-04-16 RX ADMIN — POLYETHYLENE GLYCOL 3350 17 GRAM(S): 17 POWDER, FOR SOLUTION ORAL at 10:38

## 2022-04-16 RX ADMIN — Medication 40 MILLIGRAM(S): at 07:55

## 2022-04-16 RX ADMIN — CHLORHEXIDINE GLUCONATE 1 APPLICATION(S): 213 SOLUTION TOPICAL at 11:34

## 2022-04-16 RX ADMIN — PANTOPRAZOLE SODIUM 40 MILLIGRAM(S): 20 TABLET, DELAYED RELEASE ORAL at 11:34

## 2022-04-16 RX ADMIN — Medication 3 MILLILITER(S): at 09:29

## 2022-04-16 RX ADMIN — Medication 4: at 11:34

## 2022-04-16 RX ADMIN — Medication 5 MILLIGRAM(S): at 06:29

## 2022-04-16 RX ADMIN — Medication 1000 UNIT(S): at 10:38

## 2022-04-16 RX ADMIN — Medication 112 MICROGRAM(S): at 06:29

## 2022-04-16 RX ADMIN — SENNA PLUS 2 TABLET(S): 8.6 TABLET ORAL at 20:02

## 2022-04-16 RX ADMIN — CHLORHEXIDINE GLUCONATE 1 APPLICATION(S): 213 SOLUTION TOPICAL at 06:30

## 2022-04-16 RX ADMIN — HEPARIN SODIUM 5000 UNIT(S): 5000 INJECTION INTRAVENOUS; SUBCUTANEOUS at 06:29

## 2022-04-16 RX ADMIN — Medication 40 MILLIGRAM(S): at 17:02

## 2022-04-16 RX ADMIN — Medication 81 MILLIGRAM(S): at 10:37

## 2022-04-16 RX ADMIN — Medication 2: at 18:30

## 2022-04-16 RX ADMIN — Medication 3 MILLILITER(S): at 04:57

## 2022-04-16 RX ADMIN — Medication 300 MILLIGRAM(S): at 10:52

## 2022-04-16 RX ADMIN — Medication 1 TABLET(S): at 06:29

## 2022-04-16 RX ADMIN — LACTULOSE 10 GRAM(S): 10 SOLUTION ORAL at 10:37

## 2022-04-16 RX ADMIN — HEPARIN SODIUM 5000 UNIT(S): 5000 INJECTION INTRAVENOUS; SUBCUTANEOUS at 17:02

## 2022-04-16 RX ADMIN — Medication 5 MILLIGRAM(S): at 17:01

## 2022-04-16 RX ADMIN — Medication 3 MILLILITER(S): at 21:10

## 2022-04-16 NOTE — PROGRESS NOTE ADULT - SUBJECTIVE AND OBJECTIVE BOX
CC: Hypoxic respiratory failure (15 Apr 2022 11:43)    INTERVAL HPI/OVERNIGHT EVENTS:  no acute events overnight  without acute complaints  denies abdominal pain  doing bolus feeds  no leakage from PEG  still constipated    Vital Signs Last 24 Hrs  T(C): 36.6 (2022 08:08), Max: 37.2 (2022 04:00)  T(F): 97.8 (2022 08:08), Max: 98.9 (2022 04:00)  HR: 87 (2022 08:00) (75 - 100)  BP: 147/61 (2022 08:00) (113/46 - 150/50)  BP(mean): 81 (2022 08:00) (66 - 81)  RR: 20 (2022 08:00) (16 - 22)  SpO2: 98% (2022 08:00) (96% - 100%)    PHYSICAL EXAM:  General: in no acute distress  Eyes: PERRLA, EOMI; conjunctiva and sclera clear  Head: Normocephalic; atraumatic  ENMT: No nasal discharge; airway clear  Neck: Supple; non tender; no masses  Respiratory: No wheezes, rales or rhonchi  Cardiovascular: Regular rate and rhythm. S1 and S2 Normal; No murmurs, gallops or rubs  Gastrointestinal: Soft non-tender non-distended; Normal bowel sounds; PEG in place  Genitourinary: No costovertebral angle tenderness  Extremities: Normal range of motion, No clubbing, cyanosis or edema  Vascular: Peripheral pulses palpable 2+ bilaterally  Neurological: Alert and oriented x4  Skin: Warm and dry. No acute rash  Psychiatric: Cooperative and appropriate    I&O's Detail    15 Apr 2022 07:01  -  2022 07:00  --------------------------------------------------------  IN:    Enteral Tube Flush: 550 mL    Free Water: 150 mL  Total IN: 700 mL    OUT:    Voided (mL): 680 mL  Total OUT: 680 mL    Total NET: 20 mL      2022 07:01  -  2022 10:09  --------------------------------------------------------  IN:  Total IN: 0 mL    OUT:    Voided (mL): 350 mL  Total OUT: 350 mL    Total NET: -350 mL               8.5    9.12  )-----------( 248      ( 2022 08:49 )             28.7     15 Apr 2022 07:57    140    |  97     |  48.8   ----------------------------<  155    4.6     |  37.0   |  0.83     Ca    8.9        15 Apr 2022 07:57  Phos  2.7       15 Apr 2022 07:57  Mg     2.1       15 Apr 2022 07:57    CAPILLARY BLOOD GLUCOSE  POCT Blood Glucose.: 149 mg/dL (2022 06:35)  POCT Blood Glucose.: 165 mg/dL (15 Apr 2022 17:55)    Urinalysis Basic - ( 15 Apr 2022 18:23 )    Color: Yellow / Appearance: Clear / S.010 / pH: x  Gluc: x / Ketone: Negative  / Bili: Negative / Urobili: Negative mg/dL   Blood: x / Protein: Negative / Nitrite: Negative   Leuk Esterase: Negative / RBC: x / WBC x   Sq Epi: x / Non Sq Epi: x / Bacteria: x        MEDICATIONS  (STANDING):  albuterol/ipratropium for Nebulization 3 milliLiter(s) Nebulizer every 6 hours  aspirin  chewable 81 milliGRAM(s) Oral daily  atorvastatin 40 milliGRAM(s) Oral at bedtime  chlorhexidine 2% Cloths 1 Application(s) Topical daily  chlorhexidine 2% Cloths 1 Application(s) Topical <User Schedule>  cholecalciferol 1000 Unit(s) Oral daily  dextrose 5%. 1000 milliLiter(s) (100 mL/Hr) IV Continuous <Continuous>  dextrose 5%. 1000 milliLiter(s) (50 mL/Hr) IV Continuous <Continuous>  dextrose 50% Injectable 25 Gram(s) IV Push once  dextrose 50% Injectable 12.5 Gram(s) IV Push once  dextrose 50% Injectable 25 Gram(s) IV Push once  ferrous    sulfate Liquid 300 milliGRAM(s) Enteral Tube daily  glucagon  Injectable 1 milliGRAM(s) IntraMuscular once  heparin   Injectable 5000 Unit(s) SubCutaneous every 12 hours  insulin lispro (ADMELOG) corrective regimen sliding scale   SubCutaneous every 6 hours  lactobacillus acidophilus 1 Tablet(s) Oral two times a day  levothyroxine 112 MICROGram(s) Oral daily  methylPREDNISolone sodium succinate Injectable 40 milliGRAM(s) IV Push every 12 hours  oxybutynin 5 milliGRAM(s) Oral two times a day  pantoprazole  Injectable 40 milliGRAM(s) IV Push daily  polyethylene glycol 3350 17 Gram(s) Oral daily  senna 2 Tablet(s) Oral at bedtime    MEDICATIONS  (PRN):  acetaminophen     Tablet .. 650 milliGRAM(s) Oral every 6 hours PRN Temp greater or equal to 38C (100.4F)  dextrose Oral Gel 15 Gram(s) Oral once PRN Blood Glucose LESS THAN 70 milliGRAM(s)/deciliter  hydrALAZINE Injectable 10 milliGRAM(s) IV Push every 6 hours PRN If SBP > 160  sodium chloride 0.9% lock flush 10 milliLiter(s) IV Push every 1 hour PRN Pre/post blood products, medications, blood draw, and to maintain line patency    RADIOLOGY & ADDITIONAL TESTS:

## 2022-04-16 NOTE — PROGRESS NOTE ADULT - ASSESSMENT
76 year old male with history of Laryngeal Cancer s/p Chemo + RT, PEG Tube, COPD on home oxygen, Carotid Stenosis, Non Obstructive CAD, HTN, HLD, Prediabetes / ? DM 2 and Hypothyroidism presented with unresponsiveness. Oxygen saturation at home in 30s. Intubated in ER and admitted to ICU with Hypoxic Respiratory Failure secondary. Found to be in Septic Shock secondary to Aspiration Pneumonia. Started on Levophed and later weaned off. He was extubated on 4/9/22. EEG preliminary report with potential multifocal epileptogenic foci. He was downgraded to Medicine Service on 4/10/22.     #Acute on chronic Respiratory Failure with Hypoxia   - Likely due to aspiration pneumonia - sputum culture with E. Coli  - s/p extubation on 4/9/22  - Aspiration precautions  - completed 7 days o fZosyn   - Added nebs  - Incentive Spirometry  - Chest PT  - Pulmonary consult appreciated     *COPD - Now with acute exacerbation - may secondary to pneumonia; pneumonia now resolved however  - currently on vidhi level of O2 - 2 L NC  - taper steroids to q12  - continue with DuoNebs q 6 hours PRN  - Pulmonary consult appreciated     #Layrngeal cancer s/p radiation with subsequent PEG tube placement due to dysphagia  - leakage noted   - GI consult   - now resolved  - outpatient follow up    #Septic Shock - resolved  - Likely due to aspiration pneumonia    #MATHIEU - resolved    #Prediabetes / ? DM 2  - HbA1c 6.1  - Accu checks and ISS    #HTN now with hyoptension   - DC metoprolol for now   - restart enalapril as clinically indicated    #Hypothyroidism  - Continue Synthroid     #Constipation  - xray normal  - add miralax and lactulose    #Abnormal EEG  - Repeat EEG with mild to moderate nonspecific diffuse or multifocal cerebral dysfunction. No epileptiform pattern or seizure seen.  - No intervention as per Epilepsy.     #Coag Neg Staph Bacteremia  - Likely contaminant  - Follow repeat cultures 4/12 neg to date    #normocytic anemia - etiology unclear  HH trending down slowly   f/u stool guaiac     DVT Prophylaxis -- Heparin SC    Dispo: patient ultimately wants to go home when ready for DC.    Updated patient's wife at bedside

## 2022-04-16 NOTE — PROGRESS NOTE ADULT - ASSESSMENT
Stage IV COPD  Multilobar pneumonia  Chronic compensated hypercarbic respiratory failure  Acute on chronic hypoxemic respiratory failure  H/o laryngeal cancer with h/o upper airway stenosis  Former smoker  H/o aspiration pneumonia  Admits to snoring but no known h/o [id number]  S/p respiratory failure requiring intubation and mechanical ventilation; now evens NCO2  Respiratory acidosis with compensatory metabolic alkalosis on ABG  Leukocytosis resolved    Rec:    Drug nebs  Decrease steroids as tolerates  Completed abx  Nocturnal and prn Bilevel therapy given chronic compensated hypercarbia  Consider home AVAPS for hypercarbia  Continue supplemental O2 - on baseline 2 lpm NC as he is at home  Follow CT for improvement with abx  OOB/Ambulate  PT  Pulm f/u after d/c  PFTs as outpt    d/w family at bedside

## 2022-04-16 NOTE — PROGRESS NOTE ADULT - SUBJECTIVE AND OBJECTIVE BOX
PULMONARY PROGRESS NOTE      KIMBERLY LAI  MRN-[id number]    Patient is a 76y old  Male who presents with a chief complaint of Hypoxic respiratory failure (2022 10:09)      INTERVAL HPI/OVERNIGHT EVENTS:    Pt is awake and alert  Feels better  Wants to go home  Doing well on 2 lpm NC with O2 sat of 98%  Mild residual cough    MEDICATIONS  (STANDING):  albuterol/ipratropium for Nebulization 3 milliLiter(s) Nebulizer every 6 hours  aspirin  chewable 81 milliGRAM(s) Oral daily  atorvastatin 40 milliGRAM(s) Oral at bedtime  chlorhexidine 2% Cloths 1 Application(s) Topical daily  chlorhexidine 2% Cloths 1 Application(s) Topical <User Schedule>  cholecalciferol 1000 Unit(s) Oral daily  dextrose 5%. 1000 milliLiter(s) (50 mL/Hr) IV Continuous <Continuous>  dextrose 5%. 1000 milliLiter(s) (100 mL/Hr) IV Continuous <Continuous>  dextrose 50% Injectable 25 Gram(s) IV Push once  dextrose 50% Injectable 12.5 Gram(s) IV Push once  dextrose 50% Injectable 25 Gram(s) IV Push once  ferrous    sulfate Liquid 300 milliGRAM(s) Enteral Tube daily  glucagon  Injectable 1 milliGRAM(s) IntraMuscular once  heparin   Injectable 5000 Unit(s) SubCutaneous every 12 hours  insulin lispro (ADMELOG) corrective regimen sliding scale   SubCutaneous every 6 hours  lactobacillus acidophilus 1 Tablet(s) Oral two times a day  levothyroxine 112 MICROGram(s) Oral daily  methylPREDNISolone sodium succinate Injectable 40 milliGRAM(s) IV Push every 12 hours  oxybutynin 5 milliGRAM(s) Oral two times a day  pantoprazole  Injectable 40 milliGRAM(s) IV Push daily  polyethylene glycol 3350 17 Gram(s) Oral daily  senna 2 Tablet(s) Oral at bedtime      MEDICATIONS  (PRN):  acetaminophen     Tablet .. 650 milliGRAM(s) Oral every 6 hours PRN Temp greater or equal to 38C (100.4F)  dextrose Oral Gel 15 Gram(s) Oral once PRN Blood Glucose LESS THAN 70 milliGRAM(s)/deciliter  hydrALAZINE Injectable 10 milliGRAM(s) IV Push every 6 hours PRN If SBP > 160  sodium chloride 0.9% lock flush 10 milliLiter(s) IV Push every 1 hour PRN Pre/post blood products, medications, blood draw, and to maintain line patency      Allergies    No Known Allergies    Intolerances        PAST MEDICAL & SURGICAL HISTORY:  Esophageal stricture    Prostate CA    History of carotid stenosis    Laryngeal carcinoma    COPD (chronic obstructive pulmonary disease)    Hypothyroidism    Aspiration pneumonia    Traumatic pneumothorax    Benign prostatic hyperplasia with urinary obstruction    Syncope and collapse    Microalbuminuria    Anemia    Hyperlipidemia, unspecified hyperlipidemia type    Femoral fracture    S/P percutaneous endoscopic gastrostomy (PEG) tube placement    Liver laceration    History of exploratory laparotomy    History of total bilateral knee replacement  b/l femur          REVIEW OF SYSTEMS:    CONSTITUTIONAL:  No distress    HEENT:  Eyes:  No diplopia or blurred vision. ENT:  No earache, sore throat or runny nose.    CARDIOVASCULAR:  No pressure, squeezing, tightness, heaviness or aching about the chest; no palpitations.    RESPIRATORY:  Improved cough, shortness of breath, no PND or orthopnea. + SOBOE    GASTROINTESTINAL:  No nausea, vomiting or diarrhea.    GENITOURINARY:  No dysuria, frequency or urgency.    NEUROLOGIC:  No paresthesias, fasciculations, seizures or weakness.    PSYCHIATRIC:  No disorder of thought or mood.    Vital Signs Last 24 Hrs  T(C): 36.2 (2022 12:30), Max: 37.2 (2022 04:00)  T(F): 97.2 (2022 12:30), Max: 98.9 (2022 04:00)  HR: 92 (2022 12:00) (75 - 100)  BP: 142/56 (2022 12:00) (113/46 - 150/50)  BP(mean): 77 (2022 12:00) (68 - 81)  RR: 20 (2022 12:00) (16 - 22)  SpO2: 98% (2022 12:00) (97% - 100%)    PHYSICAL EXAMINATION:    GENERAL: The patient is awake and alert in no apparent distress.     HEENT: Head is normocephalic and atraumatic. Extraocular muscles are intact. Mucous membranes are moist.    NECK: Supple.    LUNGS: Clear to auscultation without wheezing, rales but mild rhonchi; respirations unlabored    HEART: Regular rate and rhythm without murmur.    ABDOMEN: Soft, nontender, and nondistended.      EXTREMITIES: Without any cyanosis, clubbing, rash, lesions or edema.    NEUROLOGIC: Grossly intact.    LABS:                        8.5    9.12  )-----------( 248      ( 2022 08:49 )             28.7     04-15    140  |  97<L>  |  48.8<H>  ----------------------------<  155<H>  4.6   |  37.0<H>  |  0.83    Ca    8.9      15 Apr 2022 07:57  Phos  2.7     04-15  Mg     2.1     04-15        Urinalysis Basic - ( 15 Apr 2022 18:23 )    Color: Yellow / Appearance: Clear / S.010 / pH: x  Gluc: x / Ketone: Negative  / Bili: Negative / Urobili: Negative mg/dL   Blood: x / Protein: Negative / Nitrite: Negative   Leuk Esterase: Negative / RBC: x / WBC x   Sq Epi: x / Non Sq Epi: x / Bacteria: x      MICROBIOLOGY:    COVID-19 PCR (04.15.22 @ 10:18)    COVID-19 PCR: NotDetec: You can help in the fight against COVID-19. [employer] [facility] may contact  you to see if you are interested in voluntarily participating in one of  our clinical trials.  Testing is performed using polymerase chain reaction (PCR) or  transcription mediated amplification (TMA). This COVID-19 (SARS-CoV-2)  nucleic acid amplification test was validated by [employer] and is  in use under the FDA Emergency Use Authorization (EUA) for clinical labs  CLIA-certified to perform high complexity testing. Test results should be  correlated with clinical presentation, patient history, and epidemiology.        Culture - Blood (22 @ 12:31)    Specimen Source: .Blood Blood-Peripheral    Culture Results:   No growth at 48 hours      RADIOLOGY & ADDITIONAL STUDIES:    ACC: [id number] EXAM:  XR CHEST PORTABLE URGENT 1V                        ACC: [id number] EXAM:  XR CHEST PORTABLE URGENT 1V                          PROCEDURE DATE:  2022          INTERPRETATION:  Portable chest radiograph    CLINICAL INFORMATION: Dyspnea, shortness of breath.    TECHNIQUE:  Portable  AP chest radiograph.    COMPARISON: 2022 chest .    FINDINGS:  CATHETERS AND TUBES: None    PULMONARY: Residual LEFT basilar airspace consolidation/lower lobe   atelectasis and/or mild bilateral effusions obscuring LEFT diaphragm   contour. Upper zones clear. There are diffuse vascular congestion.  .   No pneumothorax.    HEART/VASCULAR: The heart is mildly enlarged in transverse diameter.  .    BONES: Visualized osseous structures are intact.    IMPRESSION:   LEFT basilar airspace consolidation and/or bilateral mild   effusions obscuring LEFT diaphragm contour.  Cardiomegaly..    FOLLOW-UP AP PORTABLE CHEST RADIOGRAPH 2022 AT 5:16 AM:  No significant change.          JAZMÍN SAHU MD; Attending Radiologist  This document has been electronically signed. 2022  7:10PM      ECHO 21:    Summary:   1. Left ventricular ejection fraction, by visual estimation, is 60 to 65%.   2. Normal global left ventricular systolic function.   3. Spectral Doppler shows impaired relaxation pattern of left ventricular myocardial filling (Grade I diastolic dysfunction).   4. Mild mitral valve regurgitation.   5. Mild thickening of the anterior and posterior mitral valve leaflets.    MD Zoey Electronically signed on 2021 at 7:38:07 PM

## 2022-04-17 LAB
ANION GAP SERPL CALC-SCNC: 11 MMOL/L — SIGNIFICANT CHANGE UP (ref 5–17)
BUN SERPL-MCNC: 53.2 MG/DL — HIGH (ref 8–20)
CALCIUM SERPL-MCNC: 9.6 MG/DL — SIGNIFICANT CHANGE UP (ref 8.6–10.2)
CHLORIDE SERPL-SCNC: 96 MMOL/L — LOW (ref 98–107)
CO2 SERPL-SCNC: 36 MMOL/L — HIGH (ref 22–29)
CREAT SERPL-MCNC: 0.83 MG/DL — SIGNIFICANT CHANGE UP (ref 0.5–1.3)
CULTURE RESULTS: SIGNIFICANT CHANGE UP
CULTURE RESULTS: SIGNIFICANT CHANGE UP
EGFR: 92 ML/MIN/1.73M2 — SIGNIFICANT CHANGE UP
GLUCOSE BLDC GLUCOMTR-MCNC: 108 MG/DL — HIGH (ref 70–99)
GLUCOSE BLDC GLUCOMTR-MCNC: 154 MG/DL — HIGH (ref 70–99)
GLUCOSE BLDC GLUCOMTR-MCNC: 178 MG/DL — HIGH (ref 70–99)
GLUCOSE BLDC GLUCOMTR-MCNC: 181 MG/DL — HIGH (ref 70–99)
GLUCOSE BLDC GLUCOMTR-MCNC: 184 MG/DL — HIGH (ref 70–99)
GLUCOSE SERPL-MCNC: 122 MG/DL — HIGH (ref 70–99)
HCT VFR BLD CALC: 32.6 % — LOW (ref 39–50)
HGB BLD-MCNC: 9.5 G/DL — LOW (ref 13–17)
MCHC RBC-ENTMCNC: 28.4 PG — SIGNIFICANT CHANGE UP (ref 27–34)
MCHC RBC-ENTMCNC: 29.1 GM/DL — LOW (ref 32–36)
MCV RBC AUTO: 97.6 FL — SIGNIFICANT CHANGE UP (ref 80–100)
PLATELET # BLD AUTO: 313 K/UL — SIGNIFICANT CHANGE UP (ref 150–400)
POTASSIUM SERPL-MCNC: 4.6 MMOL/L — SIGNIFICANT CHANGE UP (ref 3.5–5.3)
POTASSIUM SERPL-SCNC: 4.6 MMOL/L — SIGNIFICANT CHANGE UP (ref 3.5–5.3)
RBC # BLD: 3.34 M/UL — LOW (ref 4.2–5.8)
RBC # FLD: 16.1 % — HIGH (ref 10.3–14.5)
SODIUM SERPL-SCNC: 143 MMOL/L — SIGNIFICANT CHANGE UP (ref 135–145)
SPECIMEN SOURCE: SIGNIFICANT CHANGE UP
SPECIMEN SOURCE: SIGNIFICANT CHANGE UP
WBC # BLD: 13.51 K/UL — HIGH (ref 3.8–10.5)
WBC # FLD AUTO: 13.51 K/UL — HIGH (ref 3.8–10.5)

## 2022-04-17 PROCEDURE — 99233 SBSQ HOSP IP/OBS HIGH 50: CPT

## 2022-04-17 RX ORDER — IPRATROPIUM/ALBUTEROL SULFATE 18-103MCG
3 AEROSOL WITH ADAPTER (GRAM) INHALATION ONCE
Refills: 0 | Status: COMPLETED | OUTPATIENT
Start: 2022-04-17 | End: 2022-04-17

## 2022-04-17 RX ORDER — ENOXAPARIN SODIUM 100 MG/ML
40 INJECTION SUBCUTANEOUS EVERY 24 HOURS
Refills: 0 | Status: DISCONTINUED | OUTPATIENT
Start: 2022-04-17 | End: 2022-04-19

## 2022-04-17 RX ADMIN — Medication 3 MILLILITER(S): at 03:21

## 2022-04-17 RX ADMIN — Medication 5 MILLIGRAM(S): at 05:14

## 2022-04-17 RX ADMIN — Medication 112 MICROGRAM(S): at 05:14

## 2022-04-17 RX ADMIN — Medication 1 TABLET(S): at 17:36

## 2022-04-17 RX ADMIN — Medication 3 MILLILITER(S): at 21:10

## 2022-04-17 RX ADMIN — SENNA PLUS 2 TABLET(S): 8.6 TABLET ORAL at 20:28

## 2022-04-17 RX ADMIN — Medication 1 TABLET(S): at 05:14

## 2022-04-17 RX ADMIN — Medication 3 MILLILITER(S): at 14:44

## 2022-04-17 RX ADMIN — Medication 300 MILLIGRAM(S): at 11:11

## 2022-04-17 RX ADMIN — POLYETHYLENE GLYCOL 3350 17 GRAM(S): 17 POWDER, FOR SOLUTION ORAL at 11:18

## 2022-04-17 RX ADMIN — Medication 3 MILLILITER(S): at 17:39

## 2022-04-17 RX ADMIN — CHLORHEXIDINE GLUCONATE 1 APPLICATION(S): 213 SOLUTION TOPICAL at 11:18

## 2022-04-17 RX ADMIN — Medication 2.5 MILLIGRAM(S): at 17:43

## 2022-04-17 RX ADMIN — Medication 1000 UNIT(S): at 11:11

## 2022-04-17 RX ADMIN — Medication 2: at 23:53

## 2022-04-17 RX ADMIN — ATORVASTATIN CALCIUM 40 MILLIGRAM(S): 80 TABLET, FILM COATED ORAL at 20:28

## 2022-04-17 RX ADMIN — Medication 5 MILLIGRAM(S): at 17:36

## 2022-04-17 RX ADMIN — Medication 2: at 00:58

## 2022-04-17 RX ADMIN — ENOXAPARIN SODIUM 40 MILLIGRAM(S): 100 INJECTION SUBCUTANEOUS at 17:36

## 2022-04-17 RX ADMIN — CHLORHEXIDINE GLUCONATE 1 APPLICATION(S): 213 SOLUTION TOPICAL at 06:08

## 2022-04-17 RX ADMIN — Medication 40 MILLIGRAM(S): at 05:18

## 2022-04-17 RX ADMIN — Medication 2: at 17:37

## 2022-04-17 RX ADMIN — Medication 81 MILLIGRAM(S): at 11:11

## 2022-04-17 RX ADMIN — PANTOPRAZOLE SODIUM 40 MILLIGRAM(S): 20 TABLET, DELAYED RELEASE ORAL at 11:11

## 2022-04-17 RX ADMIN — Medication 2: at 11:10

## 2022-04-17 RX ADMIN — Medication 40 MILLIGRAM(S): at 17:36

## 2022-04-17 RX ADMIN — HEPARIN SODIUM 5000 UNIT(S): 5000 INJECTION INTRAVENOUS; SUBCUTANEOUS at 05:15

## 2022-04-17 RX ADMIN — Medication 3 MILLILITER(S): at 09:29

## 2022-04-17 NOTE — PROGRESS NOTE ADULT - SUBJECTIVE AND OBJECTIVE BOX
Chief Complaint:  copd exacerbation     SUBJECTIVE / OVERNIGHT EVENTS: No acute events reported overnight.  pt on baseline O2 and speaking full sentences and reports improving sob since admission.  Patient denies chest pain, abd pain, N/V, fever, chills, dysuria or any other complaints. All remainder ROS negative.       I&O's Summary    2022 07:01  -  2022 07:00  --------------------------------------------------------  IN: 660 mL / OUT: 600 mL / NET: 60 mL          PHYSICAL EXAM:  Vital Signs Last 24 Hrs  T(C): 36.9 (2022 09:47), Max: 36.9 (2022 15:50)  T(F): 98.4 (2022 09:47), Max: 98.5 (2022 15:50)  HR: 102 (2022 09:47) (68 - 102)  BP: 154/73 (2022 09:47) (140/56 - 161/67)  BP(mean): --  RR: 19 (2022 09:47) (18 - 22)  SpO2: 98% (2022 09:32) (92% - 99%)      GENERAL: pt examined bedside, laying comfortably in bed in NAD  HEENT: NC/AT, moist oral mucosa, clear conjunctiva, sclera nonicteric  RESPIRATORY: poor inspiratory effort, bibasilar course rales and scattered expiratory wheezing   CARDIOVASCULAR: RRR, normal S1 and S2  ABDOMEN: soft, NT/ND, normoactive bowel sounds, no rebound/guarding  EXTREMITIES: No cynaosis, no clubbing, no lower extremity edema, pulses are 2+ bilaterally  PSYCH: affect appropriate and cooperative  NEUROLOGY: A+O to person, place, and time, no focal neurologic deficits appreciated   SKIN: No rashes or no palpable lesions        LABS:                        9.5    13.51 )-----------( 313      ( 2022 08:03 )             32.6     04-17    143  |  96<L>  |  53.2<H>  ----------------------------<  122<H>  4.6   |  36.0<H>  |  0.83    Ca    9.6      2022 08:03            Urinalysis Basic - ( 15 Apr 2022 18:23 )    Color: Yellow / Appearance: Clear / S.010 / pH: x  Gluc: x / Ketone: Negative  / Bili: Negative / Urobili: Negative mg/dL   Blood: x / Protein: Negative / Nitrite: Negative   Leuk Esterase: Negative / RBC: x / WBC x   Sq Epi: x / Non Sq Epi: x / Bacteria: x        CAPILLARY BLOOD GLUCOSE      POCT Blood Glucose.: 181 mg/dL (2022 11:05)  POCT Blood Glucose.: 108 mg/dL (2022 07:04)  POCT Blood Glucose.: 178 mg/dL (2022 00:56)  POCT Blood Glucose.: 176 mg/dL (2022 18:27)        RADIOLOGY & ADDITIONAL TESTS:          MEDICATIONS  (STANDING):  albuterol/ipratropium for Nebulization 3 milliLiter(s) Nebulizer every 6 hours  aspirin  chewable 81 milliGRAM(s) Oral daily  atorvastatin 40 milliGRAM(s) Oral at bedtime  chlorhexidine 2% Cloths 1 Application(s) Topical daily  chlorhexidine 2% Cloths 1 Application(s) Topical <User Schedule>  cholecalciferol 1000 Unit(s) Oral daily  dextrose 5%. 1000 milliLiter(s) (100 mL/Hr) IV Continuous <Continuous>  dextrose 5%. 1000 milliLiter(s) (50 mL/Hr) IV Continuous <Continuous>  dextrose 50% Injectable 25 Gram(s) IV Push once  dextrose 50% Injectable 12.5 Gram(s) IV Push once  dextrose 50% Injectable 25 Gram(s) IV Push once  ferrous    sulfate Liquid 300 milliGRAM(s) Enteral Tube daily  glucagon  Injectable 1 milliGRAM(s) IntraMuscular once  heparin   Injectable 5000 Unit(s) SubCutaneous every 12 hours  insulin lispro (ADMELOG) corrective regimen sliding scale   SubCutaneous every 6 hours  lactobacillus acidophilus 1 Tablet(s) Oral two times a day  levothyroxine 112 MICROGram(s) Oral daily  methylPREDNISolone sodium succinate Injectable 40 milliGRAM(s) IV Push every 12 hours  oxybutynin 5 milliGRAM(s) Oral two times a day  pantoprazole  Injectable 40 milliGRAM(s) IV Push daily  polyethylene glycol 3350 17 Gram(s) Oral daily  senna 2 Tablet(s) Oral at bedtime    MEDICATIONS  (PRN):  acetaminophen     Tablet .. 650 milliGRAM(s) Oral every 6 hours PRN Temp greater or equal to 38C (100.4F)  dextrose Oral Gel 15 Gram(s) Oral once PRN Blood Glucose LESS THAN 70 milliGRAM(s)/deciliter  hydrALAZINE Injectable 10 milliGRAM(s) IV Push every 6 hours PRN If SBP > 160  sodium chloride 0.9% lock flush 10 milliLiter(s) IV Push every 1 hour PRN Pre/post blood products, medications, blood draw, and to maintain line patency                                     Chief Complaint:  copd exacerbation     SUBJECTIVE / OVERNIGHT EVENTS: No acute events reported overnight.  pt on baseline O2 and speaking full sentences and reports improving sob since admission.  Patient denies chest pain, abd pain, N/V, fever, chills, dysuria or any other complaints. All remainder ROS negative.       I&O's Summary    2022 07:01  -  2022 07:00  --------------------------------------------------------  IN: 660 mL / OUT: 600 mL / NET: 60 mL          PHYSICAL EXAM:  Vital Signs Last 24 Hrs  T(C): 36.9 (2022 09:47), Max: 36.9 (2022 15:50)  T(F): 98.4 (2022 09:47), Max: 98.5 (2022 15:50)  HR: 102 (2022 09:47) (68 - 102)  BP: 154/73 (2022 09:47) (140/56 - 161/67)  BP(mean): --  RR: 19 (2022 09:47) (18 - 22)  SpO2: 98% (2022 09:32) (92% - 99%)      GENERAL: pt examined bedside, laying comfortably in bed in NAD  HEENT: NC/AT, moist oral mucosa, clear conjunctiva, sclera nonicteric  RESPIRATORY: poor inspiratory effort, scattered rhonchi L>R, faint scattered expiratory wheezing   CARDIOVASCULAR: RRR, normal S1 and S2  ABDOMEN: soft, NT/ND, normoactive bowel sounds, no rebound/guarding  EXTREMITIES: No cynaosis, no clubbing, no lower extremity edema, pulses are 2+ bilaterally  PSYCH: affect appropriate and cooperative  NEUROLOGY: A+O to person, place, and time, no focal neurologic deficits appreciated   SKIN: No rashes or no palpable lesions        LABS:                        9.5    13.51 )-----------( 313      ( 2022 08:03 )             32.6     04-17    143  |  96<L>  |  53.2<H>  ----------------------------<  122<H>  4.6   |  36.0<H>  |  0.83    Ca    9.6      2022 08:03            Urinalysis Basic - ( 15 Apr 2022 18:23 )    Color: Yellow / Appearance: Clear / S.010 / pH: x  Gluc: x / Ketone: Negative  / Bili: Negative / Urobili: Negative mg/dL   Blood: x / Protein: Negative / Nitrite: Negative   Leuk Esterase: Negative / RBC: x / WBC x   Sq Epi: x / Non Sq Epi: x / Bacteria: x        CAPILLARY BLOOD GLUCOSE      POCT Blood Glucose.: 181 mg/dL (2022 11:05)  POCT Blood Glucose.: 108 mg/dL (2022 07:04)  POCT Blood Glucose.: 178 mg/dL (2022 00:56)  POCT Blood Glucose.: 176 mg/dL (2022 18:27)        RADIOLOGY & ADDITIONAL TESTS:          MEDICATIONS  (STANDING):  albuterol/ipratropium for Nebulization 3 milliLiter(s) Nebulizer every 6 hours  aspirin  chewable 81 milliGRAM(s) Oral daily  atorvastatin 40 milliGRAM(s) Oral at bedtime  chlorhexidine 2% Cloths 1 Application(s) Topical daily  chlorhexidine 2% Cloths 1 Application(s) Topical <User Schedule>  cholecalciferol 1000 Unit(s) Oral daily  dextrose 5%. 1000 milliLiter(s) (100 mL/Hr) IV Continuous <Continuous>  dextrose 5%. 1000 milliLiter(s) (50 mL/Hr) IV Continuous <Continuous>  dextrose 50% Injectable 25 Gram(s) IV Push once  dextrose 50% Injectable 12.5 Gram(s) IV Push once  dextrose 50% Injectable 25 Gram(s) IV Push once  ferrous    sulfate Liquid 300 milliGRAM(s) Enteral Tube daily  glucagon  Injectable 1 milliGRAM(s) IntraMuscular once  heparin   Injectable 5000 Unit(s) SubCutaneous every 12 hours  insulin lispro (ADMELOG) corrective regimen sliding scale   SubCutaneous every 6 hours  lactobacillus acidophilus 1 Tablet(s) Oral two times a day  levothyroxine 112 MICROGram(s) Oral daily  methylPREDNISolone sodium succinate Injectable 40 milliGRAM(s) IV Push every 12 hours  oxybutynin 5 milliGRAM(s) Oral two times a day  pantoprazole  Injectable 40 milliGRAM(s) IV Push daily  polyethylene glycol 3350 17 Gram(s) Oral daily  senna 2 Tablet(s) Oral at bedtime    MEDICATIONS  (PRN):  acetaminophen     Tablet .. 650 milliGRAM(s) Oral every 6 hours PRN Temp greater or equal to 38C (100.4F)  dextrose Oral Gel 15 Gram(s) Oral once PRN Blood Glucose LESS THAN 70 milliGRAM(s)/deciliter  hydrALAZINE Injectable 10 milliGRAM(s) IV Push every 6 hours PRN If SBP > 160  sodium chloride 0.9% lock flush 10 milliLiter(s) IV Push every 1 hour PRN Pre/post blood products, medications, blood draw, and to maintain line patency                                     Chief Complaint:  copd exacerbation     SUBJECTIVE / OVERNIGHT EVENTS: No acute events reported overnight.  pt on baseline O2 and speaking full sentences and reports improving sob since admission.  Patient denies chest pain, abd pain, N/V, fever, chills, dysuria or any other complaints. All remainder ROS negative.       I&O's Summary    2022 07:01  -  2022 07:00  --------------------------------------------------------  IN: 660 mL / OUT: 600 mL / NET: 60 mL          PHYSICAL EXAM:  Vital Signs Last 24 Hrs  T(C): 36.9 (2022 09:47), Max: 36.9 (2022 15:50)  T(F): 98.4 (2022 09:47), Max: 98.5 (2022 15:50)  HR: 102 (2022 09:47) (68 - 102)  BP: 154/73 (2022 09:47) (140/56 - 161/67)  BP(mean): --  RR: 19 (2022 09:47) (18 - 22)  SpO2: 98% (2022 09:32) (92% - 99%)      GENERAL: pt examined bedside, laying comfortably in bed in NAD  HEENT: NC/AT, moist oral mucosa, clear conjunctiva, sclera nonicteric  RESPIRATORY: poor inspiratory effort, scattered rhonchi L>R, faint scattered expiratory wheezing   CARDIOVASCULAR: RRR, normal S1 and S2  ABDOMEN: soft, NT/ND, normoactive bowel sounds, no rebound/guarding  EXTREMITIES: No cynaosis, no clubbing, no lower extremity edema, pulses are 2+ bilaterally  PSYCH: affect appropriate and cooperative  NEUROLOGY: A+O to person, place, and time, no focal neurologic deficits appreciated   SKIN: No rashes or no palpable lesions        LABS:                        9.5    13.51 )-----------( 313      ( 2022 08:03 )             32.6     04-17    143  |  96<L>  |  53.2<H>  ----------------------------<  122<H>  4.6   |  36.0<H>  |  0.83    Ca    9.6      2022 08:03            Urinalysis Basic - ( 15 Apr 2022 18:23 )    Color: Yellow / Appearance: Clear / S.010 / pH: x  Gluc: x / Ketone: Negative  / Bili: Negative / Urobili: Negative mg/dL   Blood: x / Protein: Negative / Nitrite: Negative   Leuk Esterase: Negative / RBC: x / WBC x   Sq Epi: x / Non Sq Epi: x / Bacteria: x        CAPILLARY BLOOD GLUCOSE      POCT Blood Glucose.: 181 mg/dL (2022 11:05)  POCT Blood Glucose.: 108 mg/dL (2022 07:04)  POCT Blood Glucose.: 178 mg/dL (2022 00:56)  POCT Blood Glucose.: 176 mg/dL (2022 18:27)        RADIOLOGY & ADDITIONAL TESTS:    < from: CT Abdomen and Pelvis No Cont (22 @ 09:52) >  IMPRESSION:  Multifocalpneumonia, worse in the lower lobes.    Unchanged large left extrapleural nodules.    < end of copied text >      < from: Xray Chest 1 View- PORTABLE-Urgent (Xray Chest 1 View- PORTABLE-Urgent .) (22 @ 06:22) >  IMPRESSION:   LEFT basilar airspace consolidation and/or bilateral mild   effusions obscuring LEFT diaphragm contour.  Cardiomegaly..    < end of copied text >        MEDICATIONS  (STANDING):  albuterol/ipratropium for Nebulization 3 milliLiter(s) Nebulizer every 6 hours  aspirin  chewable 81 milliGRAM(s) Oral daily  atorvastatin 40 milliGRAM(s) Oral at bedtime  chlorhexidine 2% Cloths 1 Application(s) Topical daily  chlorhexidine 2% Cloths 1 Application(s) Topical <User Schedule>  cholecalciferol 1000 Unit(s) Oral daily  dextrose 5%. 1000 milliLiter(s) (100 mL/Hr) IV Continuous <Continuous>  dextrose 5%. 1000 milliLiter(s) (50 mL/Hr) IV Continuous <Continuous>  dextrose 50% Injectable 25 Gram(s) IV Push once  dextrose 50% Injectable 12.5 Gram(s) IV Push once  dextrose 50% Injectable 25 Gram(s) IV Push once  ferrous    sulfate Liquid 300 milliGRAM(s) Enteral Tube daily  glucagon  Injectable 1 milliGRAM(s) IntraMuscular once  heparin   Injectable 5000 Unit(s) SubCutaneous every 12 hours  insulin lispro (ADMELOG) corrective regimen sliding scale   SubCutaneous every 6 hours  lactobacillus acidophilus 1 Tablet(s) Oral two times a day  levothyroxine 112 MICROGram(s) Oral daily  methylPREDNISolone sodium succinate Injectable 40 milliGRAM(s) IV Push every 12 hours  oxybutynin 5 milliGRAM(s) Oral two times a day  pantoprazole  Injectable 40 milliGRAM(s) IV Push daily  polyethylene glycol 3350 17 Gram(s) Oral daily  senna 2 Tablet(s) Oral at bedtime    MEDICATIONS  (PRN):  acetaminophen     Tablet .. 650 milliGRAM(s) Oral every 6 hours PRN Temp greater or equal to 38C (100.4F)  dextrose Oral Gel 15 Gram(s) Oral once PRN Blood Glucose LESS THAN 70 milliGRAM(s)/deciliter  hydrALAZINE Injectable 10 milliGRAM(s) IV Push every 6 hours PRN If SBP > 160  sodium chloride 0.9% lock flush 10 milliLiter(s) IV Push every 1 hour PRN Pre/post blood products, medications, blood draw, and to maintain line patency

## 2022-04-17 NOTE — PROGRESS NOTE ADULT - ASSESSMENT
Stage IV COPD  Multilobar pneumonia  Chronic compensated hypercarbic respiratory failure  Acute on chronic hypoxemic respiratory failure  H/o laryngeal cancer with h/o upper airway stenosis  Former smoker  H/o aspiration pneumonia  Admits to snoring but no known h/o RODRÍGUEZ  S/p respiratory failure requiring intubation and mechanical ventilation; now evens NCO2  Respiratory acidosis with compensatory metabolic alkalosis on ABG  Mild leukocytosis    Rec:    Drug nebs  Decrease steroids as tolerates  Completed abx  Nocturnal and prn Bilevel therapy given chronic compensated hypercarbia  Consider home AVAPS for hypercarbia  Continue supplemental O2 - on baseline 2 lpm NC as he is at home  Follow CT for improvement with abx as outpt  Repeat CXR for now  OOB/Ambulate  PT  Pulm f/u after d/c  PFTs as outpt    d/w family at bedside

## 2022-04-17 NOTE — PROGRESS NOTE ADULT - ASSESSMENT
75 y/o [place]yr[place]z speaking M w/ history of Laryngeal Cancer s/p Chemo + RT, PEG Tube, COPD on home oxygen, Carotid Stenosis, Non Obstructive CAD, HTN, HLD, Prediabetes / ? DM 2 and Hypothyroidism presented with unresponsiveness. Oxygen saturation at home in 30s. Intubated in ER and admitted to ICU with Hypoxic Respiratory Failure secondary. Found to be in Septic Shock secondary to Aspiration Pneumonia. Started on Levophed and later weaned off. He was extubated on 4/9/22. EEG preliminary report with potential multifocal epileptogenic foci. He was downgraded to Medicine Service on 4/10/22.       Acute on chronic Respiratory Failure with Hypoxia multifactorial 2/2 multifocal PNA and COPD exacerbation   - Likely due to aspiration pneumonia, sputum culture with E. Coli  - s/p extubation on 4/9/22 and back to baseline home O2 of 2-3L NC  - Completed 7 days of Zosyn   - Serum HCO3 elevated and most recent ABG reviewed   - Has compensatory hypercarbia and will c/w nocturnal BIPAP   - Per pulm recs may benefit from AVAPS  - PRN duonebs and titrate steroids as tolerated   - Maintain on aspiration precautions and encourage incentive spirometry  - Pulmonary consulted and recs noted       Layrngeal cancer s/p radiation with subsequent PEG tube placement due to dysphagia  - Leakage noted and GI consult   - Now resolved  - Outpatient follow up      Septic Shock, resolved  - Likely due to multifocal PNA  - Leukocytosis elevated 2/2 steroids   - Clinically nontoxic appearing, afebrile and completed full course abx      MATHIEU, likely prerenal   - Resolved      Prediabetes / ?DM 2  - HbA1c 6.1  - Accu checks and ISS      HTN now with hypotension   - Antihypertensives held given hypotension however BP now elevated   - Will resume enalapril for now       Hypothyroidism  - On Synthroid       Constipation  - Xray normal  - Started on miralax and lactulose      Abnormal EEG  - Repeat EEG with mild to moderate nonspecific diffuse or multifocal cerebral dysfunction. No epileptiform pattern or seizure seen.  - No intervention as per Epilepsy      Coag Neg Staph Bacteremia  - Likely contaminant  - Repeat Bcx 4/12 NGTD      Normocytic anemia  - H/H remains low but stable   - Stool guaiac negative, hemodynamically stable and no active bleeding   - Likely mixed anemia given RDW  - Will order iron panel   - Monitor CBC and transfuse for Hb<7-8      VTE ppx: heparin SQ given MATHIEU but will switch to lovenox as MATHIEU resolved     Dispo: patient ultimately wants to go home when ready for DC.

## 2022-04-17 NOTE — PROGRESS NOTE ADULT - SUBJECTIVE AND OBJECTIVE BOX
PULMONARY PROGRESS NOTE      KIMBERLY LAI  MRN-[id number]    Patient is a 76y old  Male who presents with a chief complaint of Hypoxic respiratory failure (2022 13:57)      INTERVAL HPI/OVERNIGHT EVENTS:    Pt is awake and alert  Feels better  Wants to go home    MEDICATIONS  (STANDING):  albuterol/ipratropium for Nebulization 3 milliLiter(s) Nebulizer every 6 hours  aspirin  chewable 81 milliGRAM(s) Oral daily  atorvastatin 40 milliGRAM(s) Oral at bedtime  chlorhexidine 2% Cloths 1 Application(s) Topical daily  chlorhexidine 2% Cloths 1 Application(s) Topical <User Schedule>  cholecalciferol 1000 Unit(s) Oral daily  dextrose 5%. 1000 milliLiter(s) (100 mL/Hr) IV Continuous <Continuous>  dextrose 5%. 1000 milliLiter(s) (50 mL/Hr) IV Continuous <Continuous>  dextrose 50% Injectable 25 Gram(s) IV Push once  dextrose 50% Injectable 12.5 Gram(s) IV Push once  dextrose 50% Injectable 25 Gram(s) IV Push once  ferrous    sulfate Liquid 300 milliGRAM(s) Enteral Tube daily  glucagon  Injectable 1 milliGRAM(s) IntraMuscular once  heparin   Injectable 5000 Unit(s) SubCutaneous every 12 hours  insulin lispro (ADMELOG) corrective regimen sliding scale   SubCutaneous every 6 hours  lactobacillus acidophilus 1 Tablet(s) Oral two times a day  levothyroxine 112 MICROGram(s) Oral daily  methylPREDNISolone sodium succinate Injectable 40 milliGRAM(s) IV Push every 12 hours  oxybutynin 5 milliGRAM(s) Oral two times a day  pantoprazole  Injectable 40 milliGRAM(s) IV Push daily  polyethylene glycol 3350 17 Gram(s) Oral daily  senna 2 Tablet(s) Oral at bedtime      MEDICATIONS  (PRN):  acetaminophen     Tablet .. 650 milliGRAM(s) Oral every 6 hours PRN Temp greater or equal to 38C (100.4F)  dextrose Oral Gel 15 Gram(s) Oral once PRN Blood Glucose LESS THAN 70 milliGRAM(s)/deciliter  hydrALAZINE Injectable 10 milliGRAM(s) IV Push every 6 hours PRN If SBP > 160  sodium chloride 0.9% lock flush 10 milliLiter(s) IV Push every 1 hour PRN Pre/post blood products, medications, blood draw, and to maintain line patency      Allergies    No Known Allergies    Intolerances        PAST MEDICAL & SURGICAL HISTORY:  Esophageal stricture    Prostate CA    History of carotid stenosis    Laryngeal carcinoma    COPD (chronic obstructive pulmonary disease)    Hypothyroidism    Aspiration pneumonia    Traumatic pneumothorax    Benign prostatic hyperplasia with urinary obstruction    Syncope and collapse    Microalbuminuria    Anemia    Hyperlipidemia, unspecified hyperlipidemia type    Femoral fracture    S/P percutaneous endoscopic gastrostomy (PEG) tube placement    Liver laceration    History of exploratory laparotomy    History of total bilateral knee replacement  b/l femur          REVIEW OF SYSTEMS:    CONSTITUTIONAL:  No distress    HEENT:  Eyes:  No diplopia or blurred vision. ENT:  No earache, sore throat or runny nose.    CARDIOVASCULAR:  No pressure, squeezing, tightness, heaviness or aching about the chest; no palpitations.    RESPIRATORY:  Improved cough, shortness of breath, no PND or orthopnea. Mild SOBOE    GASTROINTESTINAL:  No nausea, vomiting or diarrhea.    GENITOURINARY:  No dysuria, frequency or urgency.    NEUROLOGIC:  No paresthesias, fasciculations, seizures or weakness.    PSYCHIATRIC:  No disorder of thought or mood.    Vital Signs Last 24 Hrs  T(C): 36.9 (2022 09:47), Max: 36.9 (2022 15:50)  T(F): 98.4 (2022 09:47), Max: 98.5 (2022 15:50)  HR: 102 (2022 09:47) (68 - 102)  BP: 154/73 (2022 09:47) (140/56 - 161/67)  BP(mean): --  RR: 19 (2022 09:47) (18 - 22)  SpO2: 98% (2022 09:32) (92% - 99%)    PHYSICAL EXAMINATION:    GENERAL: The patient is awake and alert in no apparent distress.     HEENT: Head is normocephalic and atraumatic. Extraocular muscles are intact. Mucous membranes are moist.    NECK: Supple.    LUNGS: Clear to auscultation without wheezing or rhonchi, few basilar crackles; respirations unlabored    HEART: Regular rate and rhythm without murmur.    ABDOMEN: Soft, nontender, and nondistended.      EXTREMITIES: Without any cyanosis, clubbing, rash, lesions or edema.    NEUROLOGIC: Grossly intact.    LABS:                        9.5    13.51 )-----------( 313      ( 2022 08:03 )             32.6     04-    143  |  96<L>  |  53.2<H>  ----------------------------<  122<H>  4.6   |  36.0<H>  |  0.83    Ca    9.6      2022 08:03        Urinalysis Basic - ( 15 Apr 2022 18:23 )    Color: Yellow / Appearance: Clear / S.010 / pH: x  Gluc: x / Ketone: Negative  / Bili: Negative / Urobili: Negative mg/dL   Blood: x / Protein: Negative / Nitrite: Negative   Leuk Esterase: Negative / RBC: x / WBC x   Sq Epi: x / Non Sq Epi: x / Bacteria: x      MICROBIOLOGY:    COVID-19 PCR (04.15.22 @ 10:18)    COVID-19 PCR: NotDetec: You can help in the fight against COVID-19. [employer] [facility] may contact  you to see if you are interested in voluntarily participating in one of  our clinical trials.  Testing is performed using polymerase chain reaction (PCR) or  transcription mediated amplification (TMA). This COVID-19 (SARS-CoV-2)  nucleic acid amplification test was validated by [employer] and is  in use under the FDA Emergency Use Authorization (EUA) for clinical labs  CLIA-certified to perform high complexity testing. Test results should be  correlated with clinical presentation, patient history, and epidemiology.        RADIOLOGY & ADDITIONAL STUDIES:    ACC: [id number] EXAM:  XR CHEST PORTABLE URGENT 1V                        ACC: [id number] EXAM:  XR CHEST PORTABLE URGENT 1V                          PROCEDURE DATE:  2022          INTERPRETATION:  Portable chest radiograph    CLINICAL INFORMATION: Dyspnea, shortness of breath.    TECHNIQUE:  Portable  AP chest radiograph.    COMPARISON: 2022 chest .    FINDINGS:  CATHETERS AND TUBES: None    PULMONARY: Residual LEFT basilar airspace consolidation/lower lobe   atelectasis and/or mild bilateral effusions obscuring LEFT diaphragm   contour. Upper zones clear. There are diffuse vascular congestion.  .   No pneumothorax.    HEART/VASCULAR: The heart is mildly enlarged in transverse diameter.  .    BONES: Visualized osseous structures are intact.    IMPRESSION:   LEFT basilar airspace consolidation and/or bilateral mild   effusions obscuring LEFT diaphragm contour.  Cardiomegaly..    FOLLOW-UP AP PORTABLE CHEST RADIOGRAPH 2022 AT 5:16 AM:  No significant change.          JAZMÍN SAHU MD; Attending Radiologist  This document has been electronically signed. 2022  7:10PM

## 2022-04-18 LAB
ANION GAP SERPL CALC-SCNC: 8 MMOL/L — SIGNIFICANT CHANGE UP (ref 5–17)
BUN SERPL-MCNC: 62 MG/DL — HIGH (ref 8–20)
CALCIUM SERPL-MCNC: 9.2 MG/DL — SIGNIFICANT CHANGE UP (ref 8.6–10.2)
CHLORIDE SERPL-SCNC: 97 MMOL/L — LOW (ref 98–107)
CO2 SERPL-SCNC: 38 MMOL/L — HIGH (ref 22–29)
CREAT SERPL-MCNC: 0.81 MG/DL — SIGNIFICANT CHANGE UP (ref 0.5–1.3)
EGFR: 93 ML/MIN/1.73M2 — SIGNIFICANT CHANGE UP
FERRITIN SERPL-MCNC: 101 NG/ML — SIGNIFICANT CHANGE UP (ref 30–400)
GLUCOSE BLDC GLUCOMTR-MCNC: 125 MG/DL — HIGH (ref 70–99)
GLUCOSE BLDC GLUCOMTR-MCNC: 174 MG/DL — HIGH (ref 70–99)
GLUCOSE BLDC GLUCOMTR-MCNC: 186 MG/DL — HIGH (ref 70–99)
GLUCOSE BLDC GLUCOMTR-MCNC: 218 MG/DL — HIGH (ref 70–99)
GLUCOSE SERPL-MCNC: 132 MG/DL — HIGH (ref 70–99)
HCT VFR BLD CALC: 31.4 % — LOW (ref 39–50)
HGB BLD-MCNC: 9.3 G/DL — LOW (ref 13–17)
IRON SATN MFR SERPL: 18 UG/DL — LOW (ref 59–158)
IRON SATN MFR SERPL: 6 % — LOW (ref 16–55)
MCHC RBC-ENTMCNC: 28.9 PG — SIGNIFICANT CHANGE UP (ref 27–34)
MCHC RBC-ENTMCNC: 29.6 GM/DL — LOW (ref 32–36)
MCV RBC AUTO: 97.5 FL — SIGNIFICANT CHANGE UP (ref 80–100)
PLATELET # BLD AUTO: 312 K/UL — SIGNIFICANT CHANGE UP (ref 150–400)
POTASSIUM SERPL-MCNC: 4.4 MMOL/L — SIGNIFICANT CHANGE UP (ref 3.5–5.3)
POTASSIUM SERPL-SCNC: 4.4 MMOL/L — SIGNIFICANT CHANGE UP (ref 3.5–5.3)
RBC # BLD: 3.22 M/UL — LOW (ref 4.2–5.8)
RBC # FLD: 16.2 % — HIGH (ref 10.3–14.5)
SODIUM SERPL-SCNC: 143 MMOL/L — SIGNIFICANT CHANGE UP (ref 135–145)
TIBC SERPL-MCNC: 287 UG/DL — SIGNIFICANT CHANGE UP (ref 220–430)
TRANSFERRIN SERPL-MCNC: 201 MG/DL — SIGNIFICANT CHANGE UP (ref 180–329)
WBC # BLD: 23.44 K/UL — HIGH (ref 3.8–10.5)
WBC # FLD AUTO: 23.44 K/UL — HIGH (ref 3.8–10.5)

## 2022-04-18 PROCEDURE — 71045 X-RAY EXAM CHEST 1 VIEW: CPT | Mod: 26

## 2022-04-18 PROCEDURE — 99233 SBSQ HOSP IP/OBS HIGH 50: CPT

## 2022-04-18 RX ADMIN — Medication 5 MILLIGRAM(S): at 18:17

## 2022-04-18 RX ADMIN — Medication 5 MILLIGRAM(S): at 07:59

## 2022-04-18 RX ADMIN — Medication 1000 UNIT(S): at 12:23

## 2022-04-18 RX ADMIN — CHLORHEXIDINE GLUCONATE 1 APPLICATION(S): 213 SOLUTION TOPICAL at 12:24

## 2022-04-18 RX ADMIN — Medication 112 MICROGRAM(S): at 08:00

## 2022-04-18 RX ADMIN — Medication 2: at 18:16

## 2022-04-18 RX ADMIN — Medication 1 TABLET(S): at 08:00

## 2022-04-18 RX ADMIN — ATORVASTATIN CALCIUM 40 MILLIGRAM(S): 80 TABLET, FILM COATED ORAL at 20:54

## 2022-04-18 RX ADMIN — Medication 3 MILLILITER(S): at 15:28

## 2022-04-18 RX ADMIN — Medication 3 MILLILITER(S): at 03:12

## 2022-04-18 RX ADMIN — ENOXAPARIN SODIUM 40 MILLIGRAM(S): 100 INJECTION SUBCUTANEOUS at 18:17

## 2022-04-18 RX ADMIN — Medication 300 MILLIGRAM(S): at 12:23

## 2022-04-18 RX ADMIN — Medication 2: at 23:40

## 2022-04-18 RX ADMIN — PANTOPRAZOLE SODIUM 40 MILLIGRAM(S): 20 TABLET, DELAYED RELEASE ORAL at 12:23

## 2022-04-18 RX ADMIN — Medication 3 MILLILITER(S): at 07:54

## 2022-04-18 RX ADMIN — CHLORHEXIDINE GLUCONATE 1 APPLICATION(S): 213 SOLUTION TOPICAL at 06:55

## 2022-04-18 RX ADMIN — Medication 2.5 MILLIGRAM(S): at 08:00

## 2022-04-18 RX ADMIN — Medication 81 MILLIGRAM(S): at 12:23

## 2022-04-18 RX ADMIN — Medication 3 MILLILITER(S): at 20:46

## 2022-04-18 RX ADMIN — Medication 1 TABLET(S): at 18:17

## 2022-04-18 RX ADMIN — Medication 40 MILLIGRAM(S): at 08:00

## 2022-04-18 RX ADMIN — Medication 40 MILLIGRAM(S): at 21:35

## 2022-04-18 RX ADMIN — SENNA PLUS 2 TABLET(S): 8.6 TABLET ORAL at 20:51

## 2022-04-18 RX ADMIN — Medication 4: at 12:23

## 2022-04-18 NOTE — PROGRESS NOTE ADULT - SUBJECTIVE AND OBJECTIVE BOX
Chief Complaint:  copd exacerbation     SUBJECTIVE / OVERNIGHT EVENTS: No acute events reported overnight.  Pt on 3L O2 (baseline O2 2-3L NC) and speaking full sentences w/ no c/o sob at this time.  Patient denies chest pain, abd pain, N/V, fever, chills, dysuria or any other complaints. All remainder ROS negative.       I&O's Summary    2022 07:01  -  2022 07:00  --------------------------------------------------------  IN: 660 mL / OUT: 600 mL / NET: 60 mL          PHYSICAL EXAM:  Vital Signs Last 24 Hrs  T(C): 36.7 (2022 11:52), Max: 36.9 (2022 04:31)  T(F): 98.1 (2022 11:52), Max: 98.4 (2022 04:31)  HR: 99 (2022 11:52) (76 - 110)  BP: 101/61 (2022 11:52) (101/61 - 138/67)  BP(mean): --  RR: 18 (2022 11:52) (16 - 28)  SpO2: 93% (2022 11:52) (91% - 98%)      GENERAL: pt examined bedside, laying comfortably in bed in NAD  HEENT: NC/AT, moist oral mucosa, clear conjunctiva, sclera nonicteric  RESPIRATORY: poor inspiratory effort, scattered rhonchi L>R   CARDIOVASCULAR: RRR, normal S1 and S2  ABDOMEN: soft, NT/ND, normoactive bowel sounds, no rebound/guarding  EXTREMITIES: No cynaosis, no clubbing, no lower extremity edema, pulses are 2+ bilaterally  PSYCH: affect appropriate and cooperative  NEUROLOGY: A+O to person, place, and time, no focal neurologic deficits appreciated   SKIN: No rashes or no palpable lesions        LABS:                                        9.3    23.44 )-----------( 312      ( 2022 06:17 )             31.4       04-18    143  |  97<L>  |  62.0<H>  ----------------------------<  132<H>  4.4   |  38.0<H>  |  0.81    Ca    9.2      2022 06:17      Urinalysis Basic - ( 15 Apr 2022 18:23 )    Color: Yellow / Appearance: Clear / S.010 / pH: x  Gluc: x / Ketone: Negative  / Bili: Negative / Urobili: Negative mg/dL   Blood: x / Protein: Negative / Nitrite: Negative   Leuk Esterase: Negative / RBC: x / WBC x   Sq Epi: x / Non Sq Epi: x / Bacteria: x        CAPILLARY BLOOD GLUCOSE      POCT Blood Glucose.: 181 mg/dL (2022 11:05)  POCT Blood Glucose.: 108 mg/dL (2022 07:04)  POCT Blood Glucose.: 178 mg/dL (2022 00:56)  POCT Blood Glucose.: 176 mg/dL (2022 18:27)        RADIOLOGY & ADDITIONAL TESTS:    < from: CT Abdomen and Pelvis No Cont (22 @ 09:52) >  IMPRESSION:  Multifocalpneumonia, worse in the lower lobes.    Unchanged large left extrapleural nodules.    < end of copied text >      < from: Xray Chest 1 View- PORTABLE-Urgent (Xray Chest 1 View- PORTABLE-Urgent .) (22 @ 06:22) >  IMPRESSION:   LEFT basilar airspace consolidation and/or bilateral mild   effusions obscuring LEFT diaphragm contour.  Cardiomegaly..    < end of copied text >        MEDICATIONS  (STANDING):  albuterol/ipratropium for Nebulization 3 milliLiter(s) Nebulizer every 6 hours  aspirin  chewable 81 milliGRAM(s) Oral daily  atorvastatin 40 milliGRAM(s) Oral at bedtime  chlorhexidine 2% Cloths 1 Application(s) Topical daily  chlorhexidine 2% Cloths 1 Application(s) Topical <User Schedule>  cholecalciferol 1000 Unit(s) Oral daily  dextrose 5%. 1000 milliLiter(s) (100 mL/Hr) IV Continuous <Continuous>  dextrose 5%. 1000 milliLiter(s) (50 mL/Hr) IV Continuous <Continuous>  dextrose 50% Injectable 25 Gram(s) IV Push once  dextrose 50% Injectable 12.5 Gram(s) IV Push once  dextrose 50% Injectable 25 Gram(s) IV Push once  ferrous    sulfate Liquid 300 milliGRAM(s) Enteral Tube daily  glucagon  Injectable 1 milliGRAM(s) IntraMuscular once  heparin   Injectable 5000 Unit(s) SubCutaneous every 12 hours  insulin lispro (ADMELOG) corrective regimen sliding scale   SubCutaneous every 6 hours  lactobacillus acidophilus 1 Tablet(s) Oral two times a day  levothyroxine 112 MICROGram(s) Oral daily  methylPREDNISolone sodium succinate Injectable 40 milliGRAM(s) IV Push every 12 hours  oxybutynin 5 milliGRAM(s) Oral two times a day  pantoprazole  Injectable 40 milliGRAM(s) IV Push daily  polyethylene glycol 3350 17 Gram(s) Oral daily  senna 2 Tablet(s) Oral at bedtime    MEDICATIONS  (PRN):  acetaminophen     Tablet .. 650 milliGRAM(s) Oral every 6 hours PRN Temp greater or equal to 38C (100.4F)  dextrose Oral Gel 15 Gram(s) Oral once PRN Blood Glucose LESS THAN 70 milliGRAM(s)/deciliter  hydrALAZINE Injectable 10 milliGRAM(s) IV Push every 6 hours PRN If SBP > 160  sodium chloride 0.9% lock flush 10 milliLiter(s) IV Push every 1 hour PRN Pre/post blood products, medications, blood draw, and to maintain line patency

## 2022-04-18 NOTE — CHART NOTE - NSCHARTNOTEFT_GEN_A_CORE
Called by RN for O2 satt 85% and daughter at bedside concerned.    Pt was recently downgraded from twice to once a day for solumedrol.   Pt evaluated with family present requesting steroids.    Vital Signs stable  SpO2: 88% on venti mask  HR: 95 (18 Apr 2022 20:52) (76 - 106)  RR: 18 (18 Apr 2022 18:00) (18 - 28)      PHYSICAL EXAM:  GENERAL: pt sitting up in chair comfortably with 40% venti mask , in NAD, A&Ox3  CHEST/LUNG: unlabored respirations, all lung fields with+ rhonchi and at lung bases b/l  HEART: Regular rate and rhythm; No murmurs, rubs, or gallops   NERVOUS SYSTEM:  Answers questions appropriately. No focal neurological deficits     Oxygen increased to 6L from 3L  Recommended BiPAP initially but family adamant about pt receiving solumedrol    Will repeat CXR and put in 1x dose of solumedrol 40 mg IV push  Will restart BiPAP afterwards   Pt was satting 88% when he left    Nurse to call PA for any worsening S/S Called by RN for O2 satt 85% and daughter at bedside concerned.    Pt was recently downgraded from twice to once a day for solumedrol.   Pt evaluated with family present requesting steroids.    Vital Signs stable  SpO2: 88% on venti mask  HR: 95 (18 Apr 2022 20:52) (76 - 106)  RR: 18 (18 Apr 2022 18:00) (18 - 28)      PHYSICAL EXAM:  GENERAL: pt sitting up in chair comfortably with 40% venti mask , in NAD, A&Ox3  CHEST/LUNG: unlabored respirations, all lung fields with+ rhonchi and at lung bases b/l  HEART: Regular rate and rhythm; No murmurs, rubs, or gallops   NERVOUS SYSTEM:  Answers questions appropriately. No focal neurological deficits     Oxygen increased to 6L from 3L  Recommended BiPAP initially but family adamant about pt receiving solumedrol    Will repeat CXR and put in 1x dose of solumedrol 40 mg IV push  Will restart BiPAP afterwards   Pt was satting 88% when he left    Nurse to call PA for any worsening S/S    22:27-   CXR: b/l patchy infiltrates, awaiting official read. Pt afebrile and in NAD Called by RN for O2 satt 85% and daughter at bedside concerned.    Pt was recently downgraded from twice to once a day for solumedrol.   Pt evaluated with family present requesting steroids.    Vital Signs stable  SpO2: 88% on venti mask  HR: 95 (18 Apr 2022 20:52) (76 - 106)  RR: 18 (18 Apr 2022 18:00) (18 - 28)      PHYSICAL EXAM:  GENERAL: pt sitting up in chair comfortably with 40% venti mask , in NAD, A&Ox3  CHEST/LUNG: unlabored respirations, all lung fields with+ rhonchi and at lung bases b/l  HEART: Regular rate and rhythm; No murmurs, rubs, or gallops   NERVOUS SYSTEM:  Answers questions appropriately. No focal neurological deficits     Oxygen increased to 6L from 3L  Recommended BiPAP initially but family adamant about pt receiving solumedrol    Will repeat CXR and put in 1x dose of solumedrol 40 mg IV push  Will restart BiPAP afterwards   Pt was satting 88% when leaving    Nurse to call PA for any worsening S/S    22:27-   CXR: b/l patchy infiltrates, awaiting official read. Pt afebrile and in NAD

## 2022-04-18 NOTE — PROGRESS NOTE ADULT - ASSESSMENT
75 y/o Albanian speaking M w/ history of Laryngeal Cancer s/p Chemo + RT, PEG Tube, COPD on home oxygen, Carotid Stenosis, Non Obstructive CAD, HTN, HLD, Prediabetes / ? DM 2 and Hypothyroidism presented with unresponsiveness. Oxygen saturation at home in 30s. Intubated in ER and admitted to ICU with Hypoxic Respiratory Failure secondary. Found to be in Septic Shock secondary to Aspiration Pneumonia. Started on Levophed and later weaned off. He was extubated on 4/9/22. EEG preliminary report with potential multifocal epileptogenic foci. He was downgraded to Medicine Service on 4/10/22.       Acute on chronic Respiratory Failure with Hypoxia multifactorial 2/2 multifocal PNA and COPD exacerbation   - Likely due to aspiration pneumonia, sputum culture with E. Coli  - s/p extubation on 4/9/22 and back to baseline home O2 of 2-3L NC  - Completed 7 days of Zosyn   - Serum HCO3 trending up   - Has compensatory hypercarbia and will c/w nocturnal BIPAP; may need AVAPS  - PRN duonebs and titrate steroids as tolerated   - Maintain on aspiration precautions and encourage incentive spirometry  - Pulmonary consulted and recs noted       Layrngeal cancer s/p radiation with subsequent PEG tube placement due to dysphagia  - Leakage noted and GI consult   - Now resolved  - Outpatient follow up      Septic Shock, resolved  - Likely due to multifocal PNA  - Leukocytosis elevated 2/2 steroids   - Clinically nontoxic appearing, afebrile and completed full course abx      MATHIEU, likely prerenal   - Resolved      Prediabetes / ?DM 2  - HbA1c 6.1  - Accu checks and ISS      HTN now with hypotension   - Antihypertensives held given hypotension however ACEI resumed given BP trending up       Hypothyroidism  - On Synthroid       Constipation  - Xray normal  - Started on miralax and lactulose      Abnormal EEG  - Repeat EEG with mild to moderate nonspecific diffuse or multifocal cerebral dysfunction. No epileptiform pattern or seizure seen.  - No intervention as per Epilepsy      Coag Neg Staph Bacteremia  - Likely contaminant  - Repeat Bcx 4/12 NGTD      Normocytic anemia  - H/H remains low but stable   - Stool guaiac negative, hemodynamically stable and no active bleeding   - Iron panel reviewed, maintain on supplementation   - Monitor CBC and transfuse for Hb<7-8      VTE ppx: Lovenox     Dispo: patient ultimately wants to go home when ready for DC.

## 2022-04-18 NOTE — CHART NOTE - NSCHARTNOTEFT_GEN_A_CORE
Source: Patient [ ]  Family [ ]   other [x ] EMR, staff and ID rounds     Current Diet:   Diet, NPO with Tube Feed:   Tube Feeding Modality: Gastrostomy  Glucerna 1.5 Heath (GLUCERNA1.5)  Total Volume for 24 Hours (mL): 1200  Bolus  Total Volume of Bolus (mL):  300  Total # of Feeds: 4  Tube Feed Frequency: Every 6 hours   Tube Feed Start Time: 18:00  Bolus Feed Rate (mL per Hour): 300   Bolus Feed Duration (in Hours): 1  Free Water Flush  Bolus   Total Volume per Flush (mL): 150   Frequency: Every 6 Hours   Total Daily Volume of Flush (mL): 600    Start Time: 19:00 (04-15-22 @ 18:05)    Enteral /Parenteral Nutrition: Glucerna 1.5cal bolus 300mL q6hrs provides 1200 ml, 1800 kcal, 99g protein, 911 ml free water, and >100% of RDIs for vitamins/minerals.     Current Weight:   (4/15) 153.2 lbs  (4/9)   143.4 lbs     % Weight Change : Unclear accuracy of weight 2/2 inconsistency, no recent reports of edema documented     Pertinent Medications: MEDICATIONS  (STANDING):  albuterol/ipratropium for Nebulization 3 milliLiter(s) Nebulizer every 6 hours  aspirin  chewable 81 milliGRAM(s) Oral daily  atorvastatin 40 milliGRAM(s) Oral at bedtime  chlorhexidine 2% Cloths 1 Application(s) Topical daily  chlorhexidine 2% Cloths 1 Application(s) Topical <User Schedule>  cholecalciferol 1000 Unit(s) Oral daily  dextrose 5%. 1000 milliLiter(s) (50 mL/Hr) IV Continuous <Continuous>  dextrose 5%. 1000 milliLiter(s) (100 mL/Hr) IV Continuous <Continuous>  dextrose 50% Injectable 25 Gram(s) IV Push once  dextrose 50% Injectable 12.5 Gram(s) IV Push once  dextrose 50% Injectable 25 Gram(s) IV Push once  enalapril 2.5 milliGRAM(s) Oral daily  enoxaparin Injectable 40 milliGRAM(s) SubCutaneous every 24 hours  ferrous    sulfate Liquid 300 milliGRAM(s) Enteral Tube daily  glucagon  Injectable 1 milliGRAM(s) IntraMuscular once  insulin lispro (ADMELOG) corrective regimen sliding scale   SubCutaneous every 6 hours  lactobacillus acidophilus 1 Tablet(s) Oral two times a day  levothyroxine 112 MICROGram(s) Oral daily  methylPREDNISolone sodium succinate Injectable 40 milliGRAM(s) IV Push daily  oxybutynin 5 milliGRAM(s) Oral two times a day  pantoprazole  Injectable 40 milliGRAM(s) IV Push daily  polyethylene glycol 3350 17 Gram(s) Oral daily  senna 2 Tablet(s) Oral at bedtime    MEDICATIONS  (PRN):  acetaminophen     Tablet .. 650 milliGRAM(s) Oral every 6 hours PRN Temp greater or equal to 38C (100.4F)  dextrose Oral Gel 15 Gram(s) Oral once PRN Blood Glucose LESS THAN 70 milliGRAM(s)/deciliter  hydrALAZINE Injectable 10 milliGRAM(s) IV Push every 6 hours PRN If SBP > 160  sodium chloride 0.9% lock flush 10 milliLiter(s) IV Push every 1 hour PRN Pre/post blood products, medications, blood draw, and to maintain line patency    Pertinent Labs: CBC Full  -  ( 18 Apr 2022 06:17 )  WBC Count : 23.44 K/uL  RBC Count : 3.22 M/uL  Hemoglobin : 9.3 g/dL  Hematocrit : 31.4 %  Platelet Count - Automated : 312 K/uL  Mean Cell Volume : 97.5 fl  Mean Cell Hemoglobin : 28.9 pg  Mean Cell Hemoglobin Concentration : 29.6 gm/dL  04-18 Na143 mmol/L Glu 132 mg/dL<H> K+ 4.4 mmol/L Cr  0.81 mg/dL BUN 62.0 mg/dL<H> Phos n/a   Alb n/a   PAB n/a       Skin: No skin breakdown/edema noted     Nutrition focused physical exam conducted - found signs of malnutrition [ ]absent [x ]present    Subcutaneous fat loss: x] Orbital fat pads region, [ ]Buccal fat region, [ ]Triceps region,  [ ]Ribs region    Muscle wasting: [ x]Temples region, [ x]Clavicle region, [ x]Shoulder region, [ ]Scapula region, [ ]Interosseous region,  [ ]thigh region, [ ]Calf region    Estimated Needs:   [ x] no change since previous assessment  [ ] recalculated:     Current Nutrition Diagnosis: Pt remains at high nutrition risk secondary to malnutrition (moderate chronic) related to inadequate protein calorie intake in the setting of acute respiratory failure/ asp pna/ septic shock/ COPD as evidenced by mild/moderate muscle/fat loss, mild edema. Aware consult for GI distress, recommendations provided 4/15, diet order remains for Glucerna. No recent reports of abdominal pain, last documented BM  4/17. Aware seurm Fe decreased, ferrous sulfate liquid ordered.     Recommendations:   1) Consider increase enteral bolus of Glucerna to 340ml q 6 hrs to provide 1360ml, 2040 kcal, 114g protein, 1034ml free water. additional free water per MD discretion  2) Monitor weights daily for trend/accuracy   3) Monitor electrolytes, Fe labs     Monitoring and Evaluation:   [ ] PO intake [x ] Tolerance to diet prescription [X] Weights  [X] Follow up per protocol [X] Labs: Source: Patient [ ]  Family [ ]   other [x ] EMR, staff and ID rounds     Current Diet:   Diet, NPO with Tube Feed:   Tube Feeding Modality: Gastrostomy  Glucerna 1.5 Heath (GLUCERNA1.5)  Total Volume for 24 Hours (mL): 1200  Bolus  Total Volume of Bolus (mL):  300  Total # of Feeds: 4  Tube Feed Frequency: Every 6 hours   Tube Feed Start Time: 18:00  Bolus Feed Rate (mL per Hour): 300   Bolus Feed Duration (in Hours): 1  Free Water Flush  Bolus   Total Volume per Flush (mL): 150   Frequency: Every 6 Hours   Total Daily Volume of Flush (mL): 600    Start Time: 19:00 (04-15-22 @ 18:05)    Enteral /Parenteral Nutrition: Glucerna 1.5cal bolus 300mL q6hrs provides 1200 ml, 1800 kcal, 99g protein, 911 ml free water, and >100% of RDIs for vitamins/minerals.     Current Weight:   (4/15) 153.2 lbs  (4/9)   143.4 lbs     % Weight Change : Unclear accuracy of weight 2/2 inconsistency, no recent reports of edema documented     Pertinent Medications: MEDICATIONS  (STANDING):  albuterol/ipratropium for Nebulization 3 milliLiter(s) Nebulizer every 6 hours  aspirin  chewable 81 milliGRAM(s) Oral daily  atorvastatin 40 milliGRAM(s) Oral at bedtime  chlorhexidine 2% Cloths 1 Application(s) Topical daily  chlorhexidine 2% Cloths 1 Application(s) Topical <User Schedule>  cholecalciferol 1000 Unit(s) Oral daily  dextrose 5%. 1000 milliLiter(s) (50 mL/Hr) IV Continuous <Continuous>  dextrose 5%. 1000 milliLiter(s) (100 mL/Hr) IV Continuous <Continuous>  dextrose 50% Injectable 25 Gram(s) IV Push once  dextrose 50% Injectable 12.5 Gram(s) IV Push once  dextrose 50% Injectable 25 Gram(s) IV Push once  enalapril 2.5 milliGRAM(s) Oral daily  enoxaparin Injectable 40 milliGRAM(s) SubCutaneous every 24 hours  ferrous    sulfate Liquid 300 milliGRAM(s) Enteral Tube daily  glucagon  Injectable 1 milliGRAM(s) IntraMuscular once  insulin lispro (ADMELOG) corrective regimen sliding scale   SubCutaneous every 6 hours  lactobacillus acidophilus 1 Tablet(s) Oral two times a day  levothyroxine 112 MICROGram(s) Oral daily  methylPREDNISolone sodium succinate Injectable 40 milliGRAM(s) IV Push daily  oxybutynin 5 milliGRAM(s) Oral two times a day  pantoprazole  Injectable 40 milliGRAM(s) IV Push daily  polyethylene glycol 3350 17 Gram(s) Oral daily  senna 2 Tablet(s) Oral at bedtime    MEDICATIONS  (PRN):  acetaminophen     Tablet .. 650 milliGRAM(s) Oral every 6 hours PRN Temp greater or equal to 38C (100.4F)  dextrose Oral Gel 15 Gram(s) Oral once PRN Blood Glucose LESS THAN 70 milliGRAM(s)/deciliter  hydrALAZINE Injectable 10 milliGRAM(s) IV Push every 6 hours PRN If SBP > 160  sodium chloride 0.9% lock flush 10 milliLiter(s) IV Push every 1 hour PRN Pre/post blood products, medications, blood draw, and to maintain line patency    Pertinent Labs: CBC Full  -  ( 18 Apr 2022 06:17 )  WBC Count : 23.44 K/uL  RBC Count : 3.22 M/uL  Hemoglobin : 9.3 g/dL  Hematocrit : 31.4 %  Platelet Count - Automated : 312 K/uL  Mean Cell Volume : 97.5 fl  Mean Cell Hemoglobin : 28.9 pg  Mean Cell Hemoglobin Concentration : 29.6 gm/dL  04-18 Na143 mmol/L Glu 132 mg/dL<H> K+ 4.4 mmol/L Cr  0.81 mg/dL BUN 62.0 mg/dL<H> Phos n/a   Alb n/a   PAB n/a       Skin: No skin breakdown/edema noted     Nutrition focused physical exam conducted - found signs of malnutrition [ ]absent [x ]present    Subcutaneous fat loss: x] Orbital fat pads region, [ ]Buccal fat region, [ ]Triceps region,  [ ]Ribs region    Muscle wasting: [ x]Temples region, [ x]Clavicle region, [ x]Shoulder region, [ ]Scapula region, [ ]Interosseous region,  [ ]thigh region, [ ]Calf region    Estimated Needs:   [ x] no change since previous assessment  [ ] recalculated:     Current Nutrition Diagnosis: Pt remains at high nutrition risk secondary to malnutrition (moderate chronic) related to inadequate protein calorie intake in the setting of acute respiratory failure/ asp pna/ septic shock/ COPD as evidenced by mild/moderate muscle/fat loss, mild edema.    Discussed with family at bedside their concern sthat the TF recommendation was changed to Pivot. Pt has remained stable weight 135-140lbs on current regimen. Aware Pt had been having some leakage from PEG site as well as concerns of constipation. Discussed administering half bolus to assess tolerance before providing total amount. If this regimen isn't tolerated can recommend more frequent bolus with reduced volume per feed. Also discussed possible causes/remedies of constipation, will defer to MD for fluid flush management 2/2 labs.     Recommendations:   1) Continue current bolus regimen as tolerated, RD to remain available  2) Monitor weights daily for trend/accuracy   3) Monitor electrolytes, Fe labs     Monitoring and Evaluation:   [ ] PO intake [x ] Tolerance to diet prescription [X] Weights  [X] Follow up per protocol [X] Labs:

## 2022-04-19 LAB
ANION GAP SERPL CALC-SCNC: 8 MMOL/L — SIGNIFICANT CHANGE UP (ref 5–17)
BUN SERPL-MCNC: 68 MG/DL — HIGH (ref 8–20)
CALCIUM SERPL-MCNC: 9.1 MG/DL — SIGNIFICANT CHANGE UP (ref 8.6–10.2)
CHLORIDE SERPL-SCNC: 99 MMOL/L — SIGNIFICANT CHANGE UP (ref 98–107)
CO2 SERPL-SCNC: 37 MMOL/L — HIGH (ref 22–29)
CREAT SERPL-MCNC: 0.84 MG/DL — SIGNIFICANT CHANGE UP (ref 0.5–1.3)
CULTURE RESULTS: SIGNIFICANT CHANGE UP
CULTURE RESULTS: SIGNIFICANT CHANGE UP
EGFR: 92 ML/MIN/1.73M2 — SIGNIFICANT CHANGE UP
GAS PNL BLDA: SIGNIFICANT CHANGE UP
GLUCOSE BLDC GLUCOMTR-MCNC: 162 MG/DL — HIGH (ref 70–99)
GLUCOSE BLDC GLUCOMTR-MCNC: 176 MG/DL — HIGH (ref 70–99)
GLUCOSE BLDC GLUCOMTR-MCNC: 179 MG/DL — HIGH (ref 70–99)
GLUCOSE BLDC GLUCOMTR-MCNC: 206 MG/DL — HIGH (ref 70–99)
GLUCOSE SERPL-MCNC: 174 MG/DL — HIGH (ref 70–99)
HCT VFR BLD CALC: 29.9 % — LOW (ref 39–50)
HGB BLD-MCNC: 8.7 G/DL — LOW (ref 13–17)
MAGNESIUM SERPL-MCNC: 2.2 MG/DL — SIGNIFICANT CHANGE UP (ref 1.6–2.6)
MCHC RBC-ENTMCNC: 28.6 PG — SIGNIFICANT CHANGE UP (ref 27–34)
MCHC RBC-ENTMCNC: 29.1 GM/DL — LOW (ref 32–36)
MCV RBC AUTO: 98.4 FL — SIGNIFICANT CHANGE UP (ref 80–100)
PLATELET # BLD AUTO: 298 K/UL — SIGNIFICANT CHANGE UP (ref 150–400)
POTASSIUM SERPL-MCNC: 4.7 MMOL/L — SIGNIFICANT CHANGE UP (ref 3.5–5.3)
POTASSIUM SERPL-SCNC: 4.7 MMOL/L — SIGNIFICANT CHANGE UP (ref 3.5–5.3)
RBC # BLD: 3.04 M/UL — LOW (ref 4.2–5.8)
RBC # FLD: 16.6 % — HIGH (ref 10.3–14.5)
SODIUM SERPL-SCNC: 144 MMOL/L — SIGNIFICANT CHANGE UP (ref 135–145)
SPECIMEN SOURCE: SIGNIFICANT CHANGE UP
SPECIMEN SOURCE: SIGNIFICANT CHANGE UP
WBC # BLD: 22.99 K/UL — HIGH (ref 3.8–10.5)
WBC # FLD AUTO: 22.99 K/UL — HIGH (ref 3.8–10.5)

## 2022-04-19 PROCEDURE — 99232 SBSQ HOSP IP/OBS MODERATE 35: CPT

## 2022-04-19 PROCEDURE — 99233 SBSQ HOSP IP/OBS HIGH 50: CPT

## 2022-04-19 RX ORDER — ALBUTEROL 90 UG/1
2.5 AEROSOL, METERED ORAL EVERY 6 HOURS
Refills: 0 | Status: DISCONTINUED | OUTPATIENT
Start: 2022-04-19 | End: 2022-04-27

## 2022-04-19 RX ORDER — ENOXAPARIN SODIUM 100 MG/ML
40 INJECTION SUBCUTANEOUS EVERY 24 HOURS
Refills: 0 | Status: DISCONTINUED | OUTPATIENT
Start: 2022-04-19 | End: 2022-04-27

## 2022-04-19 RX ORDER — TIOTROPIUM BROMIDE 18 UG/1
1 CAPSULE ORAL; RESPIRATORY (INHALATION) DAILY
Refills: 0 | Status: DISCONTINUED | OUTPATIENT
Start: 2022-04-19 | End: 2022-04-27

## 2022-04-19 RX ADMIN — CHLORHEXIDINE GLUCONATE 1 APPLICATION(S): 213 SOLUTION TOPICAL at 06:31

## 2022-04-19 RX ADMIN — Medication 40 MILLIGRAM(S): at 06:30

## 2022-04-19 RX ADMIN — Medication 5 MILLIGRAM(S): at 06:30

## 2022-04-19 RX ADMIN — Medication 81 MILLIGRAM(S): at 12:12

## 2022-04-19 RX ADMIN — ATORVASTATIN CALCIUM 40 MILLIGRAM(S): 80 TABLET, FILM COATED ORAL at 21:49

## 2022-04-19 RX ADMIN — Medication 2: at 07:51

## 2022-04-19 RX ADMIN — ENOXAPARIN SODIUM 40 MILLIGRAM(S): 100 INJECTION SUBCUTANEOUS at 18:13

## 2022-04-19 RX ADMIN — Medication 2.5 MILLIGRAM(S): at 07:37

## 2022-04-19 RX ADMIN — Medication 3 MILLILITER(S): at 03:27

## 2022-04-19 RX ADMIN — ALBUTEROL 2.5 MILLIGRAM(S): 90 AEROSOL, METERED ORAL at 14:07

## 2022-04-19 RX ADMIN — Medication 100 MILLIGRAM(S): at 18:12

## 2022-04-19 RX ADMIN — Medication 4: at 23:50

## 2022-04-19 RX ADMIN — Medication 112 MICROGRAM(S): at 06:31

## 2022-04-19 RX ADMIN — SENNA PLUS 2 TABLET(S): 8.6 TABLET ORAL at 21:49

## 2022-04-19 RX ADMIN — Medication 2: at 12:12

## 2022-04-19 RX ADMIN — Medication 300 MILLIGRAM(S): at 12:12

## 2022-04-19 RX ADMIN — Medication 3 MILLILITER(S): at 09:01

## 2022-04-19 RX ADMIN — Medication 1 TABLET(S): at 06:30

## 2022-04-19 RX ADMIN — Medication 5 MILLIGRAM(S): at 18:13

## 2022-04-19 RX ADMIN — Medication 40 MILLIGRAM(S): at 18:13

## 2022-04-19 RX ADMIN — Medication 2: at 18:12

## 2022-04-19 RX ADMIN — Medication 1 TABLET(S): at 18:13

## 2022-04-19 RX ADMIN — PANTOPRAZOLE SODIUM 40 MILLIGRAM(S): 20 TABLET, DELAYED RELEASE ORAL at 12:12

## 2022-04-19 RX ADMIN — ALBUTEROL 2.5 MILLIGRAM(S): 90 AEROSOL, METERED ORAL at 21:06

## 2022-04-19 RX ADMIN — Medication 1000 UNIT(S): at 18:13

## 2022-04-19 RX ADMIN — TIOTROPIUM BROMIDE 1 CAPSULE(S): 18 CAPSULE ORAL; RESPIRATORY (INHALATION) at 14:09

## 2022-04-19 NOTE — PROGRESS NOTE ADULT - ASSESSMENT
75y/o    frail male with     1- acute on chronic hypercapneic and  hypoxic respiratory failure  2- gastric  motility decreased  with  gerd    3- likely chronic aspiration pneumonitis cxr increase LLL changes   4-s/p antibiotics for  pneumonia / intubation   5- laryngeal ca with  dysphagia    - since patient had large  chuncks of ? clots  ct neck/chest  -aspiration precautions   elevate head of bed  - GI  input for gerd / gallstones on ct abdomen  - procalcitonin    sputum culture  - budesonide q 12  - duonebs  q 6  - bipap    can convert to AVAP if ct no obstruction noted  -IV steroids    will follow    discussed with daughters   Kelly La    Please  feel free to reach out with any questions or suggestions    KAILA ATKINSON    PULMONARY and CRITICAL CARE   [facility] Physician [facility]     [phone]

## 2022-04-19 NOTE — PROGRESS NOTE ADULT - ASSESSMENT
75 y/o Setsw[place] speaking M w/ history of Laryngeal Cancer s/p Chemo + RT, PEG Tube, COPD on home oxygen, Carotid Stenosis, Non Obstructive CAD, HTN, HLD, Prediabetes / ? DM 2 and Hypothyroidism presented with unresponsiveness. Oxygen saturation at home in 30s. Intubated in ER and admitted to ICU with Hypoxic Respiratory Failure secondary. Found to be in Septic Shock secondary to Aspiration Pneumonia. Started on Levophed and later weaned off. He was extubated on 4/9/22. EEG preliminary report with potential multifocal epileptogenic foci. He was downgraded to Medicine Service on 4/10/22.       Acute on chronic Respiratory Failure with Hypoxia multifactorial 2/2 multifocal PNA and COPD exacerbation   - Reported to have episode of hypoxia overnight but now sating at goal on 3L NC   - Initial etiology likely due to aspiration pneumonia, sputum culture with E. Coli  - s/p extubation on 4/9/22 and back to baseline home O2 of 2-3L NC  - Completed 7 days of Zosyn   - Serum HCO3 remains elevated    - Has compensatory hypercarbia and will c/w nocturnal BIPAP  - May benefit from AVAPS, will f/u with pulmonology   - Will get ABG to reassess   - PRN duonebs and titrate steroids as tolerated   - Maintain on aspiration precautions and encourage incentive spirometry  - Pulmonary consulted and recs noted       Layrngeal cancer s/p radiation with subsequent PEG tube placement due to dysphagia  - Leakage noted during hospital course and GI consult; now resolved   - Outpatient follow up      Septic Shock, resolved  - Likely due to multifocal PNA   - Current leukocytosis elevated 2/2 steroids   - Clinically nontoxic appearing, afebrile and completed full course abx      MATHIEU, likely prerenal   - Resolved      Prediabetes / ?DM 2  - HbA1c 6.1  - Accu checks and ISS      HTN now with hypotension   - Antihypertensives were held given hypotension however ACEI resumed given BP trending up       Hypothyroidism  - On Synthroid       Constipation  - Xray normal  - Started on miralax and lactulose      Abnormal EEG  - Repeat EEG with mild to moderate nonspecific diffuse or multifocal cerebral dysfunction. No epileptiform pattern or seizure seen.  - No intervention as per Epilepsy      Coag Neg Staph Bacteremia  - Likely contaminant  - Repeat Bcx 4/12 NGTD      Normocytic anemia  - H/H low but stable overall   - Stool guaiac negative, hemodynamically stable and no active bleeding   - Iron panel reviewed, maintain on supplementation   - Monitor CBC and transfuse for Hb<7-8      VTE ppx: Lovenox     Dispo: patient ultimately wants to go home when ready for DC.

## 2022-04-19 NOTE — PROGRESS NOTE ADULT - SUBJECTIVE AND OBJECTIVE BOX
PULMONARY PROGRESS NOTE      KIMBERLY LAI  MRN-[id number]    Patient is a 76y old  Male who presents with a chief complaint of Hypoxic respiratory failure (19 Apr 2022 13:05)      BRIEF HOSPITAL COURSE: ***    Events last 24 hours: *   had last BM  yesterday -   feeds via   peg tube  at times refuses   flushes  episode of coughing up  large   chuncks of dark material  ? clots  with  feed  colored liquid  mixed  with clear phlegm   -abg  reviewed  on 2.5 L/min oxygen  -cxr increased  LLL changes   effusion ?    ambulating some sob     REVIEW OF SYSTEMS     CONSTITUTIONAL   no fevers  no loss of appetite  no weight loss   HEENT  no sore throat   no ringing in ears  NECK   no pain   RESPIRATORY  see HPI   CARDIOVASCULAR  no chest  pain no palpitations   GASTROINTESTINAL no vomiting  no diarrhea    no   gerd   MUSCULOSKELETAL  no joint pains    no  back pain   SKIN   no rash   no itchiness   GENITOURINARY  no dysuria   HEME    no bleeding or bruising   ENDOCRINE     no   warmth   no  sweating   no  cold intolerance   NEUROLOGIC  no tremors  no seizures  no    weakness    PSYCHIATRIC   no mood disorder    no delirium    **    PAST MEDICAL & SURGICAL HISTORY:  Esophageal stricture    Prostate CA    History of carotid stenosis    Laryngeal carcinoma    COPD (chronic obstructive pulmonary disease)    Hypothyroidism    Aspiration pneumonia    Traumatic pneumothorax    Benign prostatic hyperplasia with urinary obstruction    Syncope and collapse    Microalbuminuria    Anemia    Hyperlipidemia, unspecified hyperlipidemia type    Femoral fracture    S/P percutaneous endoscopic gastrostomy (PEG) tube placement    Liver laceration    History of exploratory laparotomy    History of total bilateral knee replacement  b/l femur          Medications:    enalapril 2.5 milliGRAM(s) Oral daily  hydrALAZINE Injectable 10 milliGRAM(s) IV Push every 6 hours PRN    ALBUTerol    0.083% 2.5 milliGRAM(s) Nebulizer every 6 hours  guaiFENesin ER 1200 milliGRAM(s) Oral every 12 hours  guaiFENesin Oral Liquid (Sugar-Free) 100 milliGRAM(s) Oral every 6 hours PRN  tiotropium 18 MICROgram(s) Capsule 1 Capsule(s) Inhalation daily    acetaminophen     Tablet .. 650 milliGRAM(s) Oral every 6 hours PRN      aspirin  chewable 81 milliGRAM(s) Oral daily  enoxaparin Injectable 40 milliGRAM(s) SubCutaneous every 24 hours    pantoprazole  Injectable 40 milliGRAM(s) IV Push daily  polyethylene glycol 3350 17 Gram(s) Oral daily  senna 2 Tablet(s) Oral at bedtime    oxybutynin 5 milliGRAM(s) Oral two times a day    atorvastatin 40 milliGRAM(s) Oral at bedtime  dextrose 50% Injectable 25 Gram(s) IV Push once  dextrose 50% Injectable 12.5 Gram(s) IV Push once  dextrose 50% Injectable 25 Gram(s) IV Push once  dextrose Oral Gel 15 Gram(s) Oral once PRN  glucagon  Injectable 1 milliGRAM(s) IntraMuscular once  insulin lispro (ADMELOG) corrective regimen sliding scale   SubCutaneous every 6 hours  levothyroxine 112 MICROGram(s) Oral daily  methylPREDNISolone sodium succinate Injectable 40 milliGRAM(s) IV Push every 12 hours    cholecalciferol 1000 Unit(s) Oral daily  dextrose 5%. 1000 milliLiter(s) IV Continuous <Continuous>  dextrose 5%. 1000 milliLiter(s) IV Continuous <Continuous>  ferrous    sulfate Liquid 300 milliGRAM(s) Enteral Tube daily  sodium chloride 0.9% lock flush 10 milliLiter(s) IV Push every 1 hour PRN      chlorhexidine 2% Cloths 1 Application(s) Topical daily  chlorhexidine 2% Cloths 1 Application(s) Topical <User Schedule>    lactobacillus acidophilus 1 Tablet(s) Oral two times a day          ICU Vital Signs Last 24 Hrs  T(C): 37 (19 Apr 2022 17:00), Max: 37 (19 Apr 2022 17:00)  T(F): 98.6 (19 Apr 2022 17:00), Max: 98.6 (19 Apr 2022 17:00)  HR: 111 (19 Apr 2022 17:00) (95 - 111)  BP: 101/60 (19 Apr 2022 17:00) (92/51 - 150/65)  BP(mean): --  ABP: --  ABP(mean): --  RR: 18 (19 Apr 2022 17:00) (18 - 20)  SpO2: 94% (19 Apr 2022 17:00) (83% - 98%)      ABG - ( 19 Apr 2022 12:55 )  pH, Arterial: 7.410 pH, Blood: x     /  pCO2: 69    /  pO2: 62    / HCO3: 44    / Base Excess: 19.1  /  SaO2: 93.3                I&O's Detail    19 Apr 2022 07:01  -  19 Apr 2022 18:11  --------------------------------------------------------  IN:    Free Water: 100 mL  Total IN: 100 mL    OUT:    Voided (mL): 150 mL  Total OUT: 150 mL    Total NET: -50 mL            LABS:                        8.7    22.99 )-----------( 298      ( 19 Apr 2022 06:44 )             29.9     04-19    144  |  99  |  68.0<H>  ----------------------------<  174<H>  4.7   |  37.0<H>  |  0.84    Ca    9.1      19 Apr 2022 06:44  Mg     2.2     04-19            CAPILLARY BLOOD GLUCOSE      POCT Blood Glucose.: 179 mg/dL (19 Apr 2022 18:10)        CULTURES:  Culture Results:   No growth at 5 days. (04-14 @ 12:31)  Culture Results:   No growth at 5 days. (04-14 @ 12:31)      Physical Examination:    General: No acute distress.   in chair  no cough     HEENT: Pupils equal, reactive to light.  Symmetric.    PULM:  bilater al  air entry       coarse w   diffuse      NECK: Supple, no lymphadenopathy, trachea midline    CVS: Regular rate and rhythm, no murmurs, rubs, or gallops    ABD:  distended   peg  in place soft   EXT: No edema, nontender    SKIN: Warm and well perfused, no rashes noted.    NEURO: Alert, oriented, interactive, nonfocal    DEVICES:     RADIOLOGY: *CHEST:  LUNGS AND LARGE AIRWAYS: Endotracheal tube with tip above the idania.   Patchy and nodular opacities in both lungs, predominantly in the lower   lobes. Post radiation changes in the medial lung apices.  PLEURA: Unchanged large extrapleural nodules, measuring up to 1.7 x 0.8   cm on the left (series 4, image 76).  VESSELS: Atherosclerotic changes of the aorta.  HEART: Heart size is normal. No pericardial effusion.  MEDIASTINUM AND NIECY: Similar appearance of prominent mediastinal lymph   nodes.  CHEST WALL AND LOWER NECK: Within normal limits.    ABDOMEN AND PELVIS:  LIVER: Within normal limits.  BILE DUCTS: Left-sided pneumobilia.  GALLBLADDER: Unchanged large amount of high density material in the lumen   in the gallbladder, possiblylarge stones.  SPLEEN: Capsular calcification.  PANCREAS: Within normal limits.  ADRENALS: Within normal limits.  KIDNEYS/URETERS: Within normal limits.    BLADDER: Contains air, small amount of fluid, and a Hood catheter   balloon.  REPRODUCTIVE ORGANS: Prostate is enlarged.    BOWEL: Enteric tube with tip terminating in the stomach. Percutaneous   gastrostomy tube. No bowel obstruction. Appendix is normal.  PERITONEUM: No ascites.  VESSELS: Atherosclerotic changes. Left femoral artery approachcentral   venous catheter terminating in the left common iliac vein.  RETROPERITONEUM/LYMPH NODES: No lymphadenopathy.  ABDOMINAL WALL: Within normal limits.  BONES: Degenerative changes. Intramedullary rods in both femurs.    IMPRESSION:  Multifocalpneumonia, worse in the lower lobes.    Unchanged large left extrapleural nodules.    --- End of Report ---            JAYLA SMITH MD; Attending Radiologist  This document has been electronically signed. Apr 8 2022 10:05AM  **    CRITICAL CARE TIME SPENT: ***

## 2022-04-19 NOTE — PROGRESS NOTE ADULT - SUBJECTIVE AND OBJECTIVE BOX
Chief Complaint:  copd exacerbation     SUBJECTIVE / OVERNIGHT EVENTS: Overnight pt reported to have 1 episode of desaturation to the low 80's, was given 1x dose IV steroid and said to improve shortly after.  Pt on 3L O2 (baseline O2 2-3L NC) and able to speak full sentences w/ no c/o sob at this time.  Patient denies chest pain, abd pain, N/V, fever, chills, dysuria or any other complaints. All remainder ROS negative.       I&O's Summary    2022 07:01  -  2022 07:00  --------------------------------------------------------  IN: 660 mL / OUT: 600 mL / NET: 60 mL        PHYSICAL EXAM:  Vital Signs Last 24 Hrs  T(C): 36.7 (2022 10:49), Max: 36.7 (2022 10:49)  T(F): 98.1 (2022 10:49), Max: 98.1 (2022 10:49)  HR: 105 (2022 10:49) (95 - 105)  BP: 108/62 (2022 12:00) (92/51 - 150/65)  BP(mean): --  RR: 20 (2022 10:49) (18 - 20)  SpO2: 98% (2022 10:49) (83% - 98%)      GENERAL: pt examined bedside, laying comfortably in bed in NAD, speaking full sentences  HEENT: NC/AT, moist oral mucosa, clear conjunctiva, sclera nonicteric  RESPIRATORY: poor inspiratory effort but normal WOB, scattered rhonchi L>R   CARDIOVASCULAR: RRR, normal S1 and S2  ABDOMEN: soft, NT/ND, normoactive bowel sounds, no rebound/guarding  EXTREMITIES: No cynaosis, no clubbing, no lower extremity edema, pulses are 2+ bilaterally  PSYCH: affect appropriate and cooperative  NEUROLOGY: A+O to person, place, and time, no focal neurologic deficits appreciated   SKIN: No rashes or no palpable lesions        LABS:                                       8.7    22.99 )-----------( 298      ( 2022 06:44 )             29.9       04-19    144  |  99  |  68.0<H>  ----------------------------<  174<H>  4.7   |  37.0<H>  |  0.84    Ca    9.1      2022 06:44  Mg     2.2     -        Urinalysis Basic - ( 15 Apr 2022 18:23 )    Color: Yellow / Appearance: Clear / S.010 / pH: x  Gluc: x / Ketone: Negative  / Bili: Negative / Urobili: Negative mg/dL   Blood: x / Protein: Negative / Nitrite: Negative   Leuk Esterase: Negative / RBC: x / WBC x   Sq Epi: x / Non Sq Epi: x / Bacteria: x        CAPILLARY BLOOD GLUCOSE      POCT Blood Glucose.: 181 mg/dL (2022 11:05)  POCT Blood Glucose.: 108 mg/dL (2022 07:04)  POCT Blood Glucose.: 178 mg/dL (2022 00:56)  POCT Blood Glucose.: 176 mg/dL (2022 18:27)        RADIOLOGY & ADDITIONAL TESTS:    < from: CT Abdomen and Pelvis No Cont (22 @ 09:52) >  IMPRESSION:  Multifocalpneumonia, worse in the lower lobes.    Unchanged large left extrapleural nodules.    < end of copied text >      < from: Xray Chest 1 View- PORTABLE-Urgent (Xray Chest 1 View- PORTABLE-Urgent .) (22 @ 06:22) >  IMPRESSION:   LEFT basilar airspace consolidation and/or bilateral mild   effusions obscuring LEFT diaphragm contour.  Cardiomegaly..    < end of copied text >        MEDICATIONS  (STANDING):  albuterol/ipratropium for Nebulization 3 milliLiter(s) Nebulizer every 6 hours  aspirin  chewable 81 milliGRAM(s) Oral daily  atorvastatin 40 milliGRAM(s) Oral at bedtime  chlorhexidine 2% Cloths 1 Application(s) Topical daily  chlorhexidine 2% Cloths 1 Application(s) Topical <User Schedule>  cholecalciferol 1000 Unit(s) Oral daily  dextrose 5%. 1000 milliLiter(s) (100 mL/Hr) IV Continuous <Continuous>  dextrose 5%. 1000 milliLiter(s) (50 mL/Hr) IV Continuous <Continuous>  dextrose 50% Injectable 25 Gram(s) IV Push once  dextrose 50% Injectable 12.5 Gram(s) IV Push once  dextrose 50% Injectable 25 Gram(s) IV Push once  ferrous    sulfate Liquid 300 milliGRAM(s) Enteral Tube daily  glucagon  Injectable 1 milliGRAM(s) IntraMuscular once  heparin   Injectable 5000 Unit(s) SubCutaneous every 12 hours  insulin lispro (ADMELOG) corrective regimen sliding scale   SubCutaneous every 6 hours  lactobacillus acidophilus 1 Tablet(s) Oral two times a day  levothyroxine 112 MICROGram(s) Oral daily  methylPREDNISolone sodium succinate Injectable 40 milliGRAM(s) IV Push every 12 hours  oxybutynin 5 milliGRAM(s) Oral two times a day  pantoprazole  Injectable 40 milliGRAM(s) IV Push daily  polyethylene glycol 3350 17 Gram(s) Oral daily  senna 2 Tablet(s) Oral at bedtime    MEDICATIONS  (PRN):  acetaminophen     Tablet .. 650 milliGRAM(s) Oral every 6 hours PRN Temp greater or equal to 38C (100.4F)  dextrose Oral Gel 15 Gram(s) Oral once PRN Blood Glucose LESS THAN 70 milliGRAM(s)/deciliter  hydrALAZINE Injectable 10 milliGRAM(s) IV Push every 6 hours PRN If SBP > 160  sodium chloride 0.9% lock flush 10 milliLiter(s) IV Push every 1 hour PRN Pre/post blood products, medications, blood draw, and to maintain line patency

## 2022-04-20 LAB
ANION GAP SERPL CALC-SCNC: 9 MMOL/L — SIGNIFICANT CHANGE UP (ref 5–17)
BASE EXCESS BLDA CALC-SCNC: 25.3 MMOL/L — HIGH (ref -2–3)
BLOOD GAS COMMENTS ARTERIAL: SIGNIFICANT CHANGE UP
BUN SERPL-MCNC: 61.4 MG/DL — HIGH (ref 8–20)
CALCIUM SERPL-MCNC: 9.6 MG/DL — SIGNIFICANT CHANGE UP (ref 8.6–10.2)
CHLORIDE SERPL-SCNC: 98 MMOL/L — SIGNIFICANT CHANGE UP (ref 98–107)
CO2 SERPL-SCNC: 37 MMOL/L — HIGH (ref 22–29)
CREAT SERPL-MCNC: 0.77 MG/DL — SIGNIFICANT CHANGE UP (ref 0.5–1.3)
EGFR: 94 ML/MIN/1.73M2 — SIGNIFICANT CHANGE UP
GAS PNL BLDA: SIGNIFICANT CHANGE UP
GLUCOSE BLDC GLUCOMTR-MCNC: 124 MG/DL — HIGH (ref 70–99)
GLUCOSE BLDC GLUCOMTR-MCNC: 148 MG/DL — HIGH (ref 70–99)
GLUCOSE BLDC GLUCOMTR-MCNC: 158 MG/DL — HIGH (ref 70–99)
GLUCOSE SERPL-MCNC: 137 MG/DL — HIGH (ref 70–99)
GRAM STN FLD: SIGNIFICANT CHANGE UP
HCO3 BLDA-SCNC: 49 MMOL/L — CRITICAL HIGH (ref 21–28)
HCT VFR BLD CALC: 31.6 % — LOW (ref 39–50)
HGB BLD-MCNC: 9 G/DL — LOW (ref 13–17)
HOROWITZ INDEX BLDA+IHG-RTO: 40 — SIGNIFICANT CHANGE UP
MAGNESIUM SERPL-MCNC: 2.2 MG/DL — SIGNIFICANT CHANGE UP (ref 1.6–2.6)
MCHC RBC-ENTMCNC: 28.2 PG — SIGNIFICANT CHANGE UP (ref 27–34)
MCHC RBC-ENTMCNC: 28.5 GM/DL — LOW (ref 32–36)
MCV RBC AUTO: 99.1 FL — SIGNIFICANT CHANGE UP (ref 80–100)
PCO2 BLDA: 71 MMHG — CRITICAL HIGH (ref 35–48)
PH BLDA: 7.45 — SIGNIFICANT CHANGE UP (ref 7.35–7.45)
PHOSPHATE SERPL-MCNC: 3 MG/DL — SIGNIFICANT CHANGE UP (ref 2.4–4.7)
PLATELET # BLD AUTO: 334 K/UL — SIGNIFICANT CHANGE UP (ref 150–400)
PO2 BLDA: 55 MMHG — LOW (ref 83–108)
POTASSIUM SERPL-MCNC: 4.4 MMOL/L — SIGNIFICANT CHANGE UP (ref 3.5–5.3)
POTASSIUM SERPL-SCNC: 4.4 MMOL/L — SIGNIFICANT CHANGE UP (ref 3.5–5.3)
RBC # BLD: 3.19 M/UL — LOW (ref 4.2–5.8)
RBC # FLD: 16.8 % — HIGH (ref 10.3–14.5)
SAO2 % BLDA: 87.6 % — LOW (ref 94–98)
SODIUM SERPL-SCNC: 144 MMOL/L — SIGNIFICANT CHANGE UP (ref 135–145)
SPECIMEN SOURCE: SIGNIFICANT CHANGE UP
WBC # BLD: 16.72 K/UL — HIGH (ref 3.8–10.5)
WBC # FLD AUTO: 16.72 K/UL — HIGH (ref 3.8–10.5)

## 2022-04-20 PROCEDURE — 99233 SBSQ HOSP IP/OBS HIGH 50: CPT

## 2022-04-20 PROCEDURE — 99291 CRITICAL CARE FIRST HOUR: CPT

## 2022-04-20 PROCEDURE — 71260 CT THORAX DX C+: CPT | Mod: 26

## 2022-04-20 PROCEDURE — 70491 CT SOFT TISSUE NECK W/DYE: CPT | Mod: 26

## 2022-04-20 PROCEDURE — 99222 1ST HOSP IP/OBS MODERATE 55: CPT

## 2022-04-20 RX ORDER — AZITHROMYCIN 500 MG/1
500 TABLET, FILM COATED ORAL EVERY 24 HOURS
Refills: 0 | Status: DISCONTINUED | OUTPATIENT
Start: 2022-04-20 | End: 2022-04-23

## 2022-04-20 RX ORDER — METOPROLOL TARTRATE 50 MG
25 TABLET ORAL
Refills: 0 | Status: DISCONTINUED | OUTPATIENT
Start: 2022-04-20 | End: 2022-04-21

## 2022-04-20 RX ORDER — CEFTRIAXONE 500 MG/1
1000 INJECTION, POWDER, FOR SOLUTION INTRAMUSCULAR; INTRAVENOUS EVERY 24 HOURS
Refills: 0 | Status: DISCONTINUED | OUTPATIENT
Start: 2022-04-20 | End: 2022-04-21

## 2022-04-20 RX ORDER — BUDESONIDE AND FORMOTEROL FUMARATE DIHYDRATE 160; 4.5 UG/1; UG/1
2 AEROSOL RESPIRATORY (INHALATION)
Refills: 0 | Status: DISCONTINUED | OUTPATIENT
Start: 2022-04-20 | End: 2022-04-27

## 2022-04-20 RX ORDER — ASPIRIN/CALCIUM CARB/MAGNESIUM 324 MG
300 TABLET ORAL DAILY
Refills: 0 | Status: DISCONTINUED | OUTPATIENT
Start: 2022-04-20 | End: 2022-04-22

## 2022-04-20 RX ADMIN — CEFTRIAXONE 100 MILLIGRAM(S): 500 INJECTION, POWDER, FOR SOLUTION INTRAMUSCULAR; INTRAVENOUS at 14:33

## 2022-04-20 RX ADMIN — PANTOPRAZOLE SODIUM 40 MILLIGRAM(S): 20 TABLET, DELAYED RELEASE ORAL at 14:33

## 2022-04-20 RX ADMIN — Medication 2: at 12:39

## 2022-04-20 RX ADMIN — CHLORHEXIDINE GLUCONATE 1 APPLICATION(S): 213 SOLUTION TOPICAL at 18:15

## 2022-04-20 RX ADMIN — Medication 112 MICROGRAM(S): at 05:57

## 2022-04-20 RX ADMIN — ALBUTEROL 2.5 MILLIGRAM(S): 90 AEROSOL, METERED ORAL at 14:18

## 2022-04-20 RX ADMIN — ALBUTEROL 2.5 MILLIGRAM(S): 90 AEROSOL, METERED ORAL at 20:49

## 2022-04-20 RX ADMIN — AZITHROMYCIN 255 MILLIGRAM(S): 500 TABLET, FILM COATED ORAL at 14:33

## 2022-04-20 RX ADMIN — Medication 25 MILLIGRAM(S): at 10:22

## 2022-04-20 RX ADMIN — Medication 1 TABLET(S): at 07:01

## 2022-04-20 RX ADMIN — ENOXAPARIN SODIUM 40 MILLIGRAM(S): 100 INJECTION SUBCUTANEOUS at 18:15

## 2022-04-20 RX ADMIN — ALBUTEROL 2.5 MILLIGRAM(S): 90 AEROSOL, METERED ORAL at 04:37

## 2022-04-20 RX ADMIN — Medication 300 MILLIGRAM(S): at 18:15

## 2022-04-20 RX ADMIN — Medication 40 MILLIGRAM(S): at 05:55

## 2022-04-20 RX ADMIN — Medication 40 MILLIGRAM(S): at 18:15

## 2022-04-20 RX ADMIN — Medication 2.5 MILLIGRAM(S): at 05:56

## 2022-04-20 RX ADMIN — Medication 5 MILLIGRAM(S): at 05:56

## 2022-04-20 RX ADMIN — CHLORHEXIDINE GLUCONATE 1 APPLICATION(S): 213 SOLUTION TOPICAL at 06:02

## 2022-04-20 NOTE — CONSULT NOTE ADULT - SUBJECTIVE AND OBJECTIVE BOX
[facility]                                                INFECTIOUS DISEASES  =======================================================                               Demetrius Daly MD#  Alan Sosa MD*                                     Leslie Mccurdy MD*    Carli Clements MD*            Diplomates American Board of Internal Medicine & Infectious Diseases                  # [place] Office - Appt - Tel  [phone] Fax [phone]                * [place] Office - Appt - Tel [phone] Fax [phone]                                  Hospital Consult line:  [phone]  =======================================================      MRN-[id number]  KIMBERLY LAI   HPI: This 77 y/o M former smoker, started as teenager quit 20 years ago, on home oxygen, COPD on home O2, B/l carotid artery stenosis, laryngeal CA s/p chemo and radiation, PEG tube, who was BIBA after being found unresponsive at home w/ SpO2 in the 30's, Intubated upon arrival to ED and started on propofol gtt for sedation. ICU consulted for hypoxic respiratory failure requiring intubation.  (08 Apr 2022 11:18)    on 4/8/22, he was BIBA after being found unresponsive at home w/ SpO2 in the 30's, Intubated upon arrival to ED and started on propofol gtt for sedation. ICU consulted for hypoxic respiratory failure requiring intubation. Started on Levophed to maintain MAP > 65, A line placed, titrated down now off pressors, off sedation. Extubated this morning. Now awake, alert, oriented. CT shows multifocal PNA, Sputum cultures showed PMNs, few gram-negative rods. Pt on zosyn for aspiration PNA. He was downgrade to the medical service on 4/9/22.    on 4/13/22, he had interval desaturation concerning for recurrence of aspiration.    Tube feeding was held on 4/13/22 for this, and steroids were also given.   he completed a course of zosyn for aspiration PNA from 4/8/22 thru 4/15/22.    ID was consulted on 4/20/22 given that patient still have poor respiratory status on BIPAP at FI 50 percent.       I have personally reviewed the labs and data; pertinent labs and data are listed in this note; please see below.   =======================================================  Past Medical & Surgical Hx:  =====================  PAST MEDICAL & SURGICAL HISTORY:  Esophageal stricture    Prostate CA    History of carotid stenosis    Laryngeal carcinoma    COPD (chronic obstructive pulmonary disease)    Hypothyroidism    Aspiration pneumonia    Traumatic pneumothorax    Benign prostatic hyperplasia with urinary obstruction    Syncope and collapse    Microalbuminuria    Anemia    Hyperlipidemia, unspecified hyperlipidemia type    Femoral fracture    S/P percutaneous endoscopic gastrostomy (PEG) tube placement    Liver laceration    History of exploratory laparotomy    History of total bilateral knee replacement  b/l femur      Problem List:  ==========  HEALTH ISSUES - PROBLEM Dx:  COPD (chronic obstructive pulmonary disease)    Aspiration pneumonia    Sepsis with acute hypoxic respiratory failure    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  No earache, sore throat or runny nose.  CARDIOVASCULAR:  No pressure, squeezing, strangling, tightness, heaviness or aching about the chest, neck, axilla or epigastrium.  RESPIRATORY:  as per HPI  GASTROINTESTINAL:  No nausea, vomiting or diarrhea.  GENITOURINARY:  No dysuria, frequency or urgency. No Blood in urine  MUSCULOSKELETAL:  no joint aches, no muscle pain  SKIN:  No change in skin, hair or nails.  NEUROLOGIC:  No Headaches, seizures or weakness.  PSYCHIATRIC:  No disorder of thought or mood.  ENDOCRINE:  No heat or cold intolerance  HEMATOLOGICAL:  No easy bruising or bleeding.    =======================================================  Allergies  No Known Allergies     Antibiotics:  azithromycin  IVPB 500 milliGRAM(s) IV Intermittent every 24 hours  cefTRIAXone   IVPB 1000 milliGRAM(s) IV Intermittent every 24 hours    Other medications:  ALBUTerol    0.083% 2.5 milliGRAM(s) Nebulizer every 6 hours  aspirin Suppository 300 milliGRAM(s) Rectal daily  atorvastatin 40 milliGRAM(s) Oral at bedtime  budesonide 160 MICROgram(s)/formoterol 4.5 MICROgram(s) Inhaler 2 Puff(s) Inhalation two times a day  chlorhexidine 2% Cloths 1 Application(s) Topical <User Schedule>  chlorhexidine 2% Cloths 1 Application(s) Topical daily  cholecalciferol 1000 Unit(s) Oral daily  dextrose 5%. 1000 milliLiter(s) IV Continuous <Continuous>  dextrose 5%. 1000 milliLiter(s) IV Continuous <Continuous>  dextrose 50% Injectable 25 Gram(s) IV Push once  dextrose 50% Injectable 12.5 Gram(s) IV Push once  dextrose 50% Injectable 25 Gram(s) IV Push once  enalapril 2.5 milliGRAM(s) Oral daily  enoxaparin Injectable 40 milliGRAM(s) SubCutaneous every 24 hours  ferrous    sulfate Liquid 300 milliGRAM(s) Enteral Tube daily  glucagon  Injectable 1 milliGRAM(s) IntraMuscular once  guaiFENesin ER 1200 milliGRAM(s) Oral every 12 hours  insulin lispro (ADMELOG) corrective regimen sliding scale   SubCutaneous every 6 hours  lactobacillus acidophilus 1 Tablet(s) Oral two times a day  levothyroxine 112 MICROGram(s) Oral daily  methylPREDNISolone sodium succinate Injectable 40 milliGRAM(s) IV Push every 12 hours  metoprolol tartrate 25 milliGRAM(s) Oral two times a day  oxybutynin 5 milliGRAM(s) Oral two times a day  pantoprazole  Injectable 40 milliGRAM(s) IV Push daily  polyethylene glycol 3350 17 Gram(s) Oral daily  senna 2 Tablet(s) Oral at bedtime  tiotropium 18 MICROgram(s) Capsule 1 Capsule(s) Inhalation daily   azithromycin  IVPB   255 mL/Hr IV Intermittent (04-20-22 @ 14:33)    cefTRIAXone   IVPB   100 mL/Hr IV Intermittent (04-20-22 @ 14:33)    piperacillin/tazobactam IVPB...   200 mL/Hr IV Intermittent (04-08-22 @ 11:28)   25 mL/Hr IV Intermittent (04-08-22 @ 20:10)   25 mL/Hr IV Intermittent (04-09-22 @ 05:18)   25 mL/Hr IV Intermittent (04-09-22 @ 13:50)   25 mL/Hr IV Intermittent (04-09-22 @ 21:32)   25 mL/Hr IV Intermittent (04-10-22 @ 06:28)   25 mL/Hr IV Intermittent (04-10-22 @ 13:24)   25 mL/Hr IV Intermittent (04-10-22 @ 22:31)   25 mL/Hr IV Intermittent (04-11-22 @ 06:29)   25 mL/Hr IV Intermittent (04-11-22 @ 14:30)   25 mL/Hr IV Intermittent (04-11-22 @ 21:17)   25 mL/Hr IV Intermittent (04-12-22 @ 05:16)   25 mL/Hr IV Intermittent (04-12-22 @ 14:26)   25 mL/Hr IV Intermittent (04-12-22 @ 22:08)   25 mL/Hr IV Intermittent (04-13-22 @ 05:37)   25 mL/Hr IV Intermittent (04-13-22 @ 14:23)   25 mL/Hr IV Intermittent (04-13-22 @ 22:38)   25 mL/Hr IV Intermittent (04-14-22 @ 05:51)   25 mL/Hr IV Intermittent (04-14-22 @ 14:25)   25 mL/Hr IV Intermittent (04-14-22 @ 23:14)   25 mL/Hr IV Intermittent (04-15-22 @ 05:57)        vancomycin  IVPB.   250 mL/Hr IV Intermittent (04-08-22 @ 13:36)      ======================================================  Physical Exam:  ============  T(F): 98 (20 Apr 2022 12:00), Max: 99.2 (20 Apr 2022 06:03)  HR: 95 (20 Apr 2022 14:13)  BP: 103/45 (20 Apr 2022 12:00)  RR: 26 (20 Apr 2022 12:00)  SpO2: 96% (20 Apr 2022 14:13) (91% - 99%)  temp max in last 48H T(F): , Max: 99.2 (04-20-22 @ 06:03)    General:  mild distress.  THIN [place]  Eye: Pupils are equal, round and reactive to light, Extraocular movements are intact, Normal conjunctiva.  HENT: Normocephalic, Oral mucosa is dry; BIPAP MASK  Neck: Supple, No lymphadenopathy.  Respiratory: Lungs with Fair air entry  Cardiovascular: Normal rate, Regular rhythm,   Gastrointestinal: Soft, Non-tender, Non-distended, Normal bowel sounds.  + PEG In place  Genitourinary: No costovertebral angle tenderness.  Lymphatics: No lymphadenopathy neck,   Musculoskeletal: Normal range of motion, Normal strength.  Integumentary: No rash.  Neurologic: Alert, Oriented,      =======================================================  Labs:                        9.0    16.72 )-----------( 334      ( 20 Apr 2022 05:59 )             31.6     04-20    144  |  98  |  61.4<H>  ----------------------------<  137<H>  4.4   |  37.0<H>  |  0.77    Ca    9.6      20 Apr 2022 05:59  Phos  3.0     04-20  Mg     2.2     04-20        Culture - Blood (collected 04-14-22 @ 12:31)  Source: .Blood Blood-Peripheral  Final Report (04-19-22 @ 13:01):    No growth at 5 days.    Culture - Blood (collected 04-14-22 @ 12:31)  Source: .Blood Blood-Peripheral  Final Report (04-19-22 @ 13:01):    No growth at 5 days.    Culture - Blood (collected 04-12-22 @ 09:19)  Source: .Blood Blood  Final Report (04-17-22 @ 11:00):    No growth at 5 days.    Culture - Blood (collected 04-12-22 @ 09:19)  Source: .Blood Blood  Final Report (04-17-22 @ 11:00):    No growth at 5 days.    Culture - Sputum (collected 04-08-22 @ 21:50)  Source: .Sputum Sputum  Gram Stain (04-09-22 @ 07:24):    Numerous polymorphonuclear leukocytes per low power field    Rare Squamous epithelial cells per low power field    Few Gram Negative Rods seen per oil power field  Final Report (04-11-22 @ 19:35):    Moderate Escherichia coli    Normal Respiratory Britt absent  Organism: Escherichia coli (04-11-22 @ 19:35)  Organism: Escherichia coli (04-11-22 @ 19:35)    Sensitivities:      -  Amikacin: S <=16      -  Amoxicillin/Clavulanic Acid: S <=8/4      -  Ampicillin: S <=8 These ampicillin results predict results for amoxicillin      -  Ampicillin/Sulbactam: S <=4/2 Enterobacter, Klebsiella aerogenes, Citrobacter, and Serratia may develop resistance during prolonged therapy (3-4 days)      -  Aztreonam: S <=4      -  Cefazolin: S <=2 Enterobacter, Klebsiella aerogenes, Citrobacter, and Serratia may develop resistance during prolonged therapy (3-4 days)      -  Cefepime: S <=2      -  Cefoxitin: S <=8      -  Ceftriaxone: S <=1 Enterobacter, Klebsiella aerogenes, Citrobacter, and Serratia may develop resistance during prolonged therapy      -  Ciprofloxacin: R >2      -  Ertapenem: S <=0.5      -  Gentamicin: S <=2      -  Imipenem: S <=1      -  Levofloxacin: R >4      -  Meropenem: S <=1      -  Piperacillin/Tazobactam: S <=8      -  Tobramycin: S <=2      -  Trimethoprim/Sulfamethoxazole: R >2/38      Method Type: JAZIEL    Culture - Urine (collected 04-08-22 @ 16:21)  Source: Clean Catch Clean Catch (Midstream)  Final Report (04-09-22 @ 12:58):    No growth    Culture - Blood (collected 04-08-22 @ 10:13)  Source: .Blood Blood-Peripheral  Gram Stain (04-11-22 @ 15:11):    Growth in aerobic bottle: Gram Positive Rods    Gram Stain performed by:    [facility]    [address] [address]    ,    TYPE: (C=Critical, N=Notification, A=Abnormal) c    TESTS:  _ gs    DATE/TIME CALLED: _ 04/11/2022 14:50:11    CALLED TO: Iraida alexander rn    READ BACK (2 Patient Identifiers)(Y/N): _ y    READ BACK VALUES (Y/N): _ y    CALLED BY: Iraida crandall  Final Report (04-12-22 @ 22:34):    Growth in aerobic bottle: Corynebacterium jeikeium "Susceptibilities not    performed"    Culture - Blood (collected 04-08-22 @ 10:12)  Source: .Blood Blood-Peripheral  Gram Stain (04-10-22 @ 09:14):    Growth in aerobic bottle: Gram Positive Cocci in Clusters    ***Blood Panel PCR results on this specimen are available    approximately 3 hours after the Gram stain result.***    Gram stain, PCR, and/or culture results may not always    correspond due to difference in methodologies.    ************************************************************    This PCR assay was performed using [employer].    The following targets are tested for: Enterococcus,    vancomycin resistant enterococci, Listeria monocytogenes,    coagulase negative staphylococci, S. aureus,    methicillin resistant S. aureus, Streptococcus agalactiae    (Group B), S. pneumoniae, S. pyogenes (Group A),    Acinetobacter baumannii, Enterobacter cloacae, E. coli,    Klebsiella oxytoca, K. pneumoniae, Proteus sp.,    Serratia marcescens, Haemophilus influenzae,    Neisseria meningitidis, Pseudomonas aeruginosa, Candida    albicans, C. glabrata, C krusei, C parapsilosis,    C. tropicalis and the KPC resistance gene.    Gram Stain and BCID performed by:    [facility]    [address] [place] [address]    .    TYPE: (C=Critical, N=Notification, A=Abnormal) C    TESTS:  _ GS    DATE/TIME CALLED: _ 04/10/2022 09:12:58    CALLED TO: Iraida Alexander RN    READ BACK (2 Patient Identifiers)(Y/N): _ Y    READ BACK VALUES (Y/N): _ Y    CALLED BY: Iraida Negron  Final Report (04-12-22 @ 17:52):    Growth in aerobic bottle: Coag Negative Staphylococcus Unable to Identify    further    Coag Negative Staphylococcus    Single set isolate, possible contaminant. Contact    Microbiology if susceptibility testing clinically    indicated.  Organism: Blood Culture PCR (04-12-22 @ 17:52)    Sensitivities:      -  Coagulase negative Staphylococcus: Detec      Method Type: PCR  Organism: Blood Culture PCR (04-10-22 @ 09:10)      Creatinine, Serum: 0.77 mg/dL (04-20-22 @ 05:59)  Creatinine, Serum: 0.84 mg/dL (04-19-22 @ 06:44)  Creatinine, Serum: 0.81 mg/dL (04-18-22 @ 06:17)  Creatinine, Serum: 0.83 mg/dL (04-17-22 @ 08:03)          COVID-19 PCR: NotDetec (04-15-22 @ 10:18)  SARS-CoV-2: NotDetec (04-08-22 @ 09:01)         Steroids Course:       hydrocortisone sodium succinate Injectable   50 milliGRAM(s) (04-10-22 @ 18:30)   50 milliGRAM(s) (04-10-22 @ 13:24)   50 milliGRAM(s) (04-10-22 @ 06:29)   50 milliGRAM(s) (04-10-22 @ 00:01)   50 milliGRAM(s) (04-09-22 @ 17:28)   50 milliGRAM(s) (04-09-22 @ 11:19)   50 milliGRAM(s) (04-09-22 @ 05:15)   50 milliGRAM(s) (04-09-22 @ 00:27)   50 milliGRAM(s) (04-08-22 @ 18:10)   50 milliGRAM(s) (04-08-22 @ 13:15)       methylPREDNISolone sodium succinate Injectable   40 milliGRAM(s) (04-17-22 @ 05:18)   40 milliGRAM(s) (04-16-22 @ 17:02)    methylPREDNISolone sodium succinate Injectable   40 milliGRAM(s) (04-20-22 @ 05:55)   40 milliGRAM(s) (04-19-22 @ 18:13)    methylPREDNISolone sodium succinate Injectable   40 milliGRAM(s) (04-18-22 @ 21:35)    methylPREDNISolone sodium succinate Injectable   40 milliGRAM(s) (04-19-22 @ 06:30)   40 milliGRAM(s) (04-18-22 @ 08:00)   40 milliGRAM(s) (04-17-22 @ 17:36)    methylPREDNISolone sodium succinate Injectable   40 milliGRAM(s) (04-16-22 @ 07:55)   40 milliGRAM(s) (04-16-22 @ 01:28)   40 milliGRAM(s) (04-15-22 @ 16:07)   40 milliGRAM(s) (04-15-22 @ 11:16)   40 milliGRAM(s) (04-15-22 @ 02:41)   40 milliGRAM(s) (04-14-22 @ 18:30)   40 milliGRAM(s) (04-14-22 @ 10:04)   40 milliGRAM(s) (04-14-22 @ 03:03)   40 milliGRAM(s) (04-13-22 @ 18:34)    methylPREDNISolone sodium succinate Injectable   125 milliGRAM(s) (04-13-22 @ 09:16)

## 2022-04-20 NOTE — CHART NOTE - NSCHARTNOTEFT_GEN_A_CORE
Called by RN that pt was desatting to 83-86%.   Spoke with respiratory who took pt off BiPAP around 04:20 to receive nebulizer treatment.   Pt was initially put on 3L NC, still satting in the mid 80's, then increased to 5L NC.  Respiratory then put pt on 40% venti mask, equivalent to 5L, with pt satting ~88-91%    Pt was evaluated at bedside with wife present.   Pt had no complaints.     Vital Signs     GEN: comfortable, in NAD    T(F): 98.5   HR: 119   BP: 101/60   RR: 18   SpO2: 88% on 40% venti    ABG ordered.    RN to follow up if any worsening S/S Called by RN that pt was desatting to 83-86%.   Spoke with respiratory who took pt off BiPAP around 04:20 to receive nebulizer treatment.   Pt was initially put on 3L NC, still satting in the mid 80's, then increased to 5L NC.  Respiratory then put pt on 40% venti mask, equivalent to 5L, with pt satting ~88-91%    Pt was evaluated at bedside with wife present.   Pt had no complaints.     Vital Signs     GEN: comfortable, in NAD    T(F): 98.5   HR: 119   BP: 101/60   RR: 18   SpO2: 88% on 40% venti    ABG ordered.  Asked nurse to get rectal temp and manual pulse.   RN to follow up if any worsening S/S.

## 2022-04-20 NOTE — PROGRESS NOTE ADULT - SUBJECTIVE AND OBJECTIVE BOX
Pt seen and examined To see patient again as either the patient or his family have been refusing the confusion of free water post bolus G-tube feeds    Since last being seen by me 5 days ago he has continued to have problems with his underlying pneumonia and oxygen desaturation and is currently on a Ventimask.  He apparently has been experiencing some bloating but no abdominal pain.  There has been no significant leakage around the tube.  He tolerates the gastrostomy tube feeds which are given as a low volume bolus but due to some bloating either he or his family have intermittently refused a post feeding free water bolus.  There is no nausea or vomiting.  We are asked to see the patient and discussed with both him and his family the necessity of free water infusions.    REVIEW OF SYSTEMS:    CONSTITUTIONAL: No fever, weight loss, or fatigue  EYES: No eye pain, visual disturbances, or discharge  ENMT:  No difficulty hearing, tinnitus, vertigo; No sinus or throat pain  RESPIRATORY: No cough, wheezing, chills or hemoptysis; No shortness of breath  CARDIOVASCULAR: No chest pain, palpitations, dizziness, or leg swelling  GASTROINTESTINAL: as above    MEDICATIONS:  MEDICATIONS  (STANDING):  ALBUTerol    0.083% 2.5 milliGRAM(s) Nebulizer every 6 hours  aspirin  chewable 81 milliGRAM(s) Oral daily  atorvastatin 40 milliGRAM(s) Oral at bedtime  budesonide 160 MICROgram(s)/formoterol 4.5 MICROgram(s) Inhaler 2 Puff(s) Inhalation two times a day  chlorhexidine 2% Cloths 1 Application(s) Topical <User Schedule>  chlorhexidine 2% Cloths 1 Application(s) Topical daily  cholecalciferol 1000 Unit(s) Oral daily  dextrose 5%. 1000 milliLiter(s) (50 mL/Hr) IV Continuous <Continuous>  dextrose 5%. 1000 milliLiter(s) (100 mL/Hr) IV Continuous <Continuous>  dextrose 50% Injectable 25 Gram(s) IV Push once  dextrose 50% Injectable 12.5 Gram(s) IV Push once  dextrose 50% Injectable 25 Gram(s) IV Push once  enalapril 2.5 milliGRAM(s) Oral daily  enoxaparin Injectable 40 milliGRAM(s) SubCutaneous every 24 hours  ferrous    sulfate Liquid 300 milliGRAM(s) Enteral Tube daily  glucagon  Injectable 1 milliGRAM(s) IntraMuscular once  guaiFENesin ER 1200 milliGRAM(s) Oral every 12 hours  insulin lispro (ADMELOG) corrective regimen sliding scale   SubCutaneous every 6 hours  lactobacillus acidophilus 1 Tablet(s) Oral two times a day  levothyroxine 112 MICROGram(s) Oral daily  methylPREDNISolone sodium succinate Injectable 40 milliGRAM(s) IV Push every 12 hours  oxybutynin 5 milliGRAM(s) Oral two times a day  pantoprazole  Injectable 40 milliGRAM(s) IV Push daily  polyethylene glycol 3350 17 Gram(s) Oral daily  senna 2 Tablet(s) Oral at bedtime  tiotropium 18 MICROgram(s) Capsule 1 Capsule(s) Inhalation daily    MEDICATIONS  (PRN):  acetaminophen     Tablet .. 650 milliGRAM(s) Oral every 6 hours PRN Temp greater or equal to 38C (100.4F)  dextrose Oral Gel 15 Gram(s) Oral once PRN Blood Glucose LESS THAN 70 milliGRAM(s)/deciliter  guaiFENesin Oral Liquid (Sugar-Free) 100 milliGRAM(s) Oral every 6 hours PRN Cough  hydrALAZINE Injectable 10 milliGRAM(s) IV Push every 6 hours PRN If SBP > 160  sodium chloride 0.9% lock flush 10 milliLiter(s) IV Push every 1 hour PRN Pre/post blood products, medications, blood draw, and to maintain line patency      Allergies    No Known Allergies    Intolerances        Vital Signs Last 24 Hrs  T(C): 37.3 (20 Apr 2022 06:03), Max: 37.3 (20 Apr 2022 06:03)  T(F): 99.2 (20 Apr 2022 06:03), Max: 99.2 (20 Apr 2022 06:03)  HR: 125 (20 Apr 2022 06:03) (78 - 125)  BP: 146/70 (20 Apr 2022 06:03) (92/51 - 146/70)  BP(mean): --  RR: 18 (20 Apr 2022 06:03) (18 - 20)  SpO2: 92% (20 Apr 2022 06:03) (91% - 98%)    04-19 @ 07:01  -  04-20 @ 07:00  --------------------------------------------------------  IN: 100 mL / OUT: 450 mL / NET: -350 mL        PHYSICAL EXAM:    General: ill appearing male, slightly tachypneic with facemask present in no acute distress  HEENT: MMM, conjunctiva and sclera clear  Gastrointestinal:Abdomen: Soft non-tender non-distended but slightly protuberant.  Normal bowel sounds; No hepatosplenomegaly. G tube site clean and with out discharge  Extremities: no cyanosis, clubbing or edema.  Skin: Warm and dry. No obvious rash    LABS:  ABG - ( 20 Apr 2022 06:27 )  pH, Arterial: 7.450 pH, Blood: x     /  pCO2: 71    /  pO2: 55    / HCO3: 49    / Base Excess: 25.3  /  SaO2: 87.6                CBC Full  -  ( 20 Apr 2022 05:59 )  WBC Count : 16.72 K/uL  RBC Count : 3.19 M/uL  Hemoglobin : 9.0 g/dL  Hematocrit : 31.6 %  Platelet Count - Automated : 334 K/uL  Mean Cell Volume : 99.1 fl  Mean Cell Hemoglobin : 28.2 pg  Mean Cell Hemoglobin Concentration : 28.5 gm/dL  Auto Neutrophil # : x  Auto Lymphocyte # : x  Auto Monocyte # : x  Auto Eosinophil # : x  Auto Basophil # : x  Auto Neutrophil % : x  Auto Lymphocyte % : x  Auto Monocyte % : x  Auto Eosinophil % : x  Auto Basophil % : x    04-20    144  |  98  |  61.4<H>  ----------------------------<  137<H>  4.4   |  37.0<H>  |  0.77    Ca    9.6      20 Apr 2022 05:59  Phos  3.0     04-20  Mg     2.2     04-20                        RADIOLOGY & ADDITIONAL STUDIES (The following images were personally reviewed):  < from: Xray Abdomen 2 Views (04.15.22 @ 12:12) >  ACC: [id number] EXAM:  XR ABDOMEN 2V                          PROCEDURE DATE:  04/15/2022          INTERPRETATION:  KUB: AP and upright    COMPARISON: 11/12/2022 abdominal radiograph.    CLINICAL INFORMATION: Abdominal pain. Ileus    FINDINGS:  PEG feeding tube overlies LEFT upper quadrant  There is a nonspecific, nonobstructive bowel gas pattern.  No free intra-abdominal air.  No abnormal calcifications.  The osseous structures are intact.    IMPRESSION:    No acute radiographic intra-abdominal findings.    --- End of Report ---            JAZMÍN SAHU MD; Attending Radiologist  This document has been electro    < end of copied text >

## 2022-04-20 NOTE — PROGRESS NOTE ADULT - SUBJECTIVE AND OBJECTIVE BOX
Chief Complaint:  copd exacerbation     SUBJECTIVE / OVERNIGHT EVENTS: Overnight patient again desaturated and placed on 40% VM.  Pt c/o of feeling weak and sob this morning.  Unable to speak full sentences.  Abd less distneded this morning, last BM was yesterday and passing flatus.  Patient denies chest pain, abd pain, N/V, fever, chills, dysuria or any other complaints. All remainder ROS negative.       I&O's Summary    2022 07:01  -  2022 07:00  --------------------------------------------------------  IN: 660 mL / OUT: 600 mL / NET: 60 mL        PHYSICAL EXAM:  Vital Signs Last 24 Hrs  T(C): 37.3 (2022 06:03), Max: 37.3 (2022 06:03)  T(F): 99.2 (2022 06:03), Max: 99.2 (2022 06:03)  HR: 125 (2022 06:03) (78 - 125)  BP: 146/70 (2022 06:03) (92/51 - 146/70)  BP(mean): --  RR: 18 (2022 06:03) (18 - 20)  SpO2: 92% (2022 06:03) (91% - 98%)      GENERAL: pt examined bedside, appears uncomfortable in mild to mod distress, speaking 2-3 word sentences   HEENT: NC/AT, moist oral mucosa, clear conjunctiva, sclera nonicteric  RESPIRATORY: poor inspiratory effort, increase wob but w/out use of accessories, diffuse course rales and faint scattered expiratory wheezing   CARDIOVASCULAR: RRR, normal S1 and S2  ABDOMEN: soft, mild distension (improved from yesterday), nontender, normoactive bowel sounds, no rebound/guarding, +PEG  EXTREMITIES: No cynaosis, no clubbing, no lower extremity edema, pulses are 2+ bilaterally  PSYCH: affect appropriate and cooperative  NEUROLOGY: A+O to person, place, and time, no focal neurologic deficits appreciated   SKIN: No rashes or no palpable lesions        LABS:                                       9.0    16.72 )-----------( 334      ( 2022 05:59 )             31.6     04-20    144  |  98  |  61.4<H>  ----------------------------<  137<H>  4.4   |  37.0<H>  |  0.77    Ca    9.6      2022 05:59  Phos  3.0       Mg     2.2     -        Urinalysis Basic - ( 15 Apr 2022 18:23 )    Color: Yellow / Appearance: Clear / S.010 / pH: x  Gluc: x / Ketone: Negative  / Bili: Negative / Urobili: Negative mg/dL   Blood: x / Protein: Negative / Nitrite: Negative   Leuk Esterase: Negative / RBC: x / WBC x   Sq Epi: x / Non Sq Epi: x / Bacteria: x        CAPILLARY BLOOD GLUCOSE      POCT Blood Glucose.: 181 mg/dL (2022 11:05)  POCT Blood Glucose.: 108 mg/dL (2022 07:04)  POCT Blood Glucose.: 178 mg/dL (2022 00:56)  POCT Blood Glucose.: 176 mg/dL (2022 18:27)        RADIOLOGY & ADDITIONAL TESTS:    < from: CT Abdomen and Pelvis No Cont (22 @ 09:52) >  IMPRESSION:  Multifocalpneumonia, worse in the lower lobes.    Unchanged large left extrapleural nodules.    < end of copied text >      < from: Xray Chest 1 View- PORTABLE-Urgent (Xray Chest 1 View- PORTABLE-Urgent .) (22 @ 06:22) >  IMPRESSION:   LEFT basilar airspace consolidation and/or bilateral mild   effusions obscuring LEFT diaphragm contour.  Cardiomegaly..    < end of copied text >        MEDICATIONS  (STANDING):  albuterol/ipratropium for Nebulization 3 milliLiter(s) Nebulizer every 6 hours  aspirin  chewable 81 milliGRAM(s) Oral daily  atorvastatin 40 milliGRAM(s) Oral at bedtime  chlorhexidine 2% Cloths 1 Application(s) Topical daily  chlorhexidine 2% Cloths 1 Application(s) Topical <User Schedule>  cholecalciferol 1000 Unit(s) Oral daily  dextrose 5%. 1000 milliLiter(s) (100 mL/Hr) IV Continuous <Continuous>  dextrose 5%. 1000 milliLiter(s) (50 mL/Hr) IV Continuous <Continuous>  dextrose 50% Injectable 25 Gram(s) IV Push once  dextrose 50% Injectable 12.5 Gram(s) IV Push once  dextrose 50% Injectable 25 Gram(s) IV Push once  ferrous    sulfate Liquid 300 milliGRAM(s) Enteral Tube daily  glucagon  Injectable 1 milliGRAM(s) IntraMuscular once  heparin   Injectable 5000 Unit(s) SubCutaneous every 12 hours  insulin lispro (ADMELOG) corrective regimen sliding scale   SubCutaneous every 6 hours  lactobacillus acidophilus 1 Tablet(s) Oral two times a day  levothyroxine 112 MICROGram(s) Oral daily  methylPREDNISolone sodium succinate Injectable 40 milliGRAM(s) IV Push every 12 hours  oxybutynin 5 milliGRAM(s) Oral two times a day  pantoprazole  Injectable 40 milliGRAM(s) IV Push daily  polyethylene glycol 3350 17 Gram(s) Oral daily  senna 2 Tablet(s) Oral at bedtime    MEDICATIONS  (PRN):  acetaminophen     Tablet .. 650 milliGRAM(s) Oral every 6 hours PRN Temp greater or equal to 38C (100.4F)  dextrose Oral Gel 15 Gram(s) Oral once PRN Blood Glucose LESS THAN 70 milliGRAM(s)/deciliter  hydrALAZINE Injectable 10 milliGRAM(s) IV Push every 6 hours PRN If SBP > 160  sodium chloride 0.9% lock flush 10 milliLiter(s) IV Push every 1 hour PRN Pre/post blood products, medications, blood draw, and to maintain line patency

## 2022-04-20 NOTE — PROGRESS NOTE ADULT - ASSESSMENT
The patient's bloating is probably due to aerophagia as a result of his severe COPD complicated by the use of a facemask.  The importance of intermittent infusions of free water were discussed with both the patient, his wife and his daughter and that they should need to allow the infusion either immediately after feedings or in between feeding so as to prevent the possibility of dehydration with hypernatremia.  They understand that hopefully will allow free water infusion going forward.  He should be maintained on pantoprazole for any type of gastroesophageal reflux and for cytoprotection.  At this time he does not have any active gastrointestinal issues for which any type of intervention is needed and we will see once again on the as needed you request.

## 2022-04-20 NOTE — PROGRESS NOTE ADULT - ASSESSMENT
-acute on chronic hypercapneic respiratory failure  -acute on chronic hypoxic resp failure  -gastric  motility decreased  with  gerd    -likely chronic aspiration pneumonitis; worsening opacities on CT  -S/P intubation  -hx laryngeal ca with  dysphagia; has PEG  -anemia  -metabolic alk    RECC:  BPAP qhs and prn. Aspiration precautions. F/U ct neck result. GI f/u. F/U final sputum culture. Nebs. O2; titrate. H/H per PMT. F/U lytes. DVT proph.

## 2022-04-20 NOTE — PROGRESS NOTE ADULT - SUBJECTIVE AND OBJECTIVE BOX
PULMONARY PROGRESS NOTE      KIMBERLY LAIROD-[id number]    Patient is a 76y old  Male who presents with a chief complaint of Hypoxic respiratory failure (20 Apr 2022 08:40)      INTERVAL HPI/OVERNIGHT EVENTS: Chart reviewed. Events noted. Currently on BPAP but feeling better. Less sob. No further episodes of hemoptysis.     MEDICATIONS  (STANDING):  ALBUTerol    0.083% 2.5 milliGRAM(s) Nebulizer every 6 hours  aspirin Suppository 300 milliGRAM(s) Rectal daily  atorvastatin 40 milliGRAM(s) Oral at bedtime  azithromycin  IVPB 500 milliGRAM(s) IV Intermittent every 24 hours  budesonide 160 MICROgram(s)/formoterol 4.5 MICROgram(s) Inhaler 2 Puff(s) Inhalation two times a day  cefTRIAXone   IVPB 1000 milliGRAM(s) IV Intermittent every 24 hours  chlorhexidine 2% Cloths 1 Application(s) Topical <User Schedule>  chlorhexidine 2% Cloths 1 Application(s) Topical daily  cholecalciferol 1000 Unit(s) Oral daily  dextrose 5%. 1000 milliLiter(s) (50 mL/Hr) IV Continuous <Continuous>  dextrose 5%. 1000 milliLiter(s) (100 mL/Hr) IV Continuous <Continuous>  dextrose 50% Injectable 25 Gram(s) IV Push once  dextrose 50% Injectable 12.5 Gram(s) IV Push once  dextrose 50% Injectable 25 Gram(s) IV Push once  enalapril 2.5 milliGRAM(s) Oral daily  enoxaparin Injectable 40 milliGRAM(s) SubCutaneous every 24 hours  ferrous    sulfate Liquid 300 milliGRAM(s) Enteral Tube daily  glucagon  Injectable 1 milliGRAM(s) IntraMuscular once  guaiFENesin ER 1200 milliGRAM(s) Oral every 12 hours  insulin lispro (ADMELOG) corrective regimen sliding scale   SubCutaneous every 6 hours  lactobacillus acidophilus 1 Tablet(s) Oral two times a day  levothyroxine 112 MICROGram(s) Oral daily  methylPREDNISolone sodium succinate Injectable 40 milliGRAM(s) IV Push every 12 hours  metoprolol tartrate 25 milliGRAM(s) Oral two times a day  oxybutynin 5 milliGRAM(s) Oral two times a day  pantoprazole  Injectable 40 milliGRAM(s) IV Push daily  polyethylene glycol 3350 17 Gram(s) Oral daily  senna 2 Tablet(s) Oral at bedtime  tiotropium 18 MICROgram(s) Capsule 1 Capsule(s) Inhalation daily      MEDICATIONS  (PRN):  acetaminophen     Tablet .. 650 milliGRAM(s) Oral every 6 hours PRN Temp greater or equal to 38C (100.4F)  dextrose Oral Gel 15 Gram(s) Oral once PRN Blood Glucose LESS THAN 70 milliGRAM(s)/deciliter  guaiFENesin Oral Liquid (Sugar-Free) 100 milliGRAM(s) Oral every 6 hours PRN Cough  hydrALAZINE Injectable 10 milliGRAM(s) IV Push every 6 hours PRN If SBP > 160  sodium chloride 0.9% lock flush 10 milliLiter(s) IV Push every 1 hour PRN Pre/post blood products, medications, blood draw, and to maintain line patency      Allergies    No Known Allergies    Intolerances        PAST MEDICAL & SURGICAL HISTORY:  Esophageal stricture    Prostate CA    History of carotid stenosis    Laryngeal carcinoma    COPD (chronic obstructive pulmonary disease)    Hypothyroidism    Aspiration pneumonia    Traumatic pneumothorax    Benign prostatic hyperplasia with urinary obstruction    Syncope and collapse    Microalbuminuria    Anemia    Hyperlipidemia, unspecified hyperlipidemia type    Femoral fracture    S/P percutaneous endoscopic gastrostomy (PEG) tube placement    Liver laceration    History of exploratory laparotomy    History of total bilateral knee replacement  b/l femur           REVIEW OF SYSTEMS:    CONSTITUTIONAL:  No distress    HEENT:  Eyes:  No diplopia or blurred vision. ENT:  No earache, sore throat or runny nose.    CARDIOVASCULAR:  No pressure, squeezing, tightness, heaviness or aching about the chest; no palpitations.    RESPIRATORY:  per HPI     GASTROINTESTINAL:  No nausea, vomiting or diarrhea.    GENITOURINARY:  No dysuria, frequency or urgency.    NEUROLOGIC:  No paresthesias, fasciculations, seizures or weakness.    PSYCHIATRIC:  No disorder of thought or mood.    Vital Signs Last 24 Hrs  T(C): 36.7 (20 Apr 2022 12:00), Max: 37.3 (20 Apr 2022 06:03)  T(F): 98 (20 Apr 2022 12:00), Max: 99.2 (20 Apr 2022 06:03)  HR: 95 (20 Apr 2022 14:13) (78 - 125)  BP: 103/45 (20 Apr 2022 12:00) (101/60 - 146/70)  BP(mean): --  RR: 26 (20 Apr 2022 14:06) (18 - 26)  SpO2: 96% (20 Apr 2022 14:13) (91% - 99%)    PHYSICAL EXAMINATION:    GENERAL: The patient is awake and alert in no apparent distress.     HEENT: Head is normocephalic and atraumatic.   Mucous membranes are moist.    NECK: Supple.    LUNGS: Clear to auscultation without wheezing, rales or rhonchi; respirations unlabored    HEART: Regular rate and rhythm      ABDOMEN: Soft, nontender, and nondistended.      EXTREMITIES: Without any cyanosis, clubbing, rash, lesions or edema.         LABS:                        9.0    16.72 )-----------( 334      ( 20 Apr 2022 05:59 )             31.6     04-20    144  |  98  |  61.4<H>  ----------------------------<  137<H>  4.4   |  37.0<H>  |  0.77    Ca    9.6      20 Apr 2022 05:59  Phos  3.0     04-20  Mg     2.2     04-20       ABG - ( 20 Apr 2022 06:27 )  pH, Arterial: 7.450 pH, Blood: x     /  pCO2: 71    /  pO2: 55    / HCO3: 49    / Base Excess: 25.3  /  SaO2: 87.6               MICROBIOLOGY:  Culture - Sputum . (04.20.22 @ 12:30)    Gram Stain:   Rare polymorphonuclear leukocytes per low power field  Moderate Squamous epithelial cells per low power field  Few Gram Negative Rods per oil power field    Specimen Source: .Sputum Sputum        RADIOLOGY & ADDITIONAL STUDIES:  < from: CT Chest w/ IV Cont (04.20.22 @ 13:44) >    ACC: [id number] EXAM:  CT CHEST IC                          PROCEDURE DATE:  04/20/2022          INTERPRETATION:  HISTORY: Admitting Dxs: J96.01 RESPIRATORY DIS. Hypoxia.   Recent pneumonia. Worsening chest x-ray findings.    EXAMINATION: CT CHEST was performed with IV contrast.  CONTRAST/COMPLICATIONS:  IV Contrast: Omnipaque 300  97 cc administered   3 cc discarded  Oral Contrast: NONE  Complications: None reported at time of study completion    COMPARISON: 4/8/2022 CT chest.    FINDINGS:    AIRWAYS, LUNGS, PLEURA: Tracheal secretions. Biapical scarring/fibrosis   and peripheral left upper lobe subpleural thickening unchanged.    Multifocal left greater than right lower lobe consolidation, patchy   ground-glass opacities primarily in the upper lobes, and tree-in-bud   nodular opacities throughout the right middle lobe and bilateral lower   lobes.    With respect to 4/8/2022 left lower lobe multifocal consolidation and   superior segment ground-glass nodular opacities and lingular   consolidation appear worse.    Trace left pleural effusion.    MEDIASTINUM: Normal heart size. No pericardial effusion. Thoracic aorta   normal caliber.  Unchanged mediastinal and right hilar lymphadenopathy;   reference subcarinal 3.8 x 1.5 cm node in 1.3 x 1 cm node adjacent to the   descending thoracic aorta (image 1 1, series 3).    IMAGED ABDOMEN: Unchanged hyperdense material within the gallbladder,   likely stones. Subcentimeter left renal hypodense lesions too small to   characterize. Gastrostomy tube in place.    SOFT TISSUES: Unremarkable.    BONES: Unremarkable.      IMPRESSION:.    Findings likely reflect aspiration pneumonitis/infection and there has   been interval worsening of airspace disease in the lingula and left lower   lobe compared to 4/8/2022.    Unchanged mediastinal and right hilar lymphadenopathy.    --- End of Report ---            MILENA BLOCK MD; Attending Radiologist  This document has been electronically signed. Apr 20 2022  2:04PM    < end of copied text >

## 2022-04-20 NOTE — PROGRESS NOTE ADULT - ASSESSMENT
75 y/o [place] speaking M w/ history of Laryngeal Cancer s/p Chemo + RT, PEG Tube, COPD on home oxygen, Carotid Stenosis, Non Obstructive CAD, HTN, HLD, Prediabetes / ? DM 2 and Hypothyroidism presented with unresponsiveness. Oxygen saturation at home in 30s. Intubated in ER and admitted to ICU with Hypoxic Respiratory Failure secondary. Found to be in Septic Shock secondary to Aspiration Pneumonia. Started on Levophed and later weaned off. He was extubated on 4/9/22. EEG preliminary report with potential multifocal epileptogenic foci. He was downgraded to Medicine Service on 4/10/22.  Since downgrade hospital course has been complicated by intermittent hypoxia likely due to continued worsening aspiration.  Pt will upgraded to SDU.       Acute on chronic Respiratory Failure with Hypoxia multifactorial 2/2 multifocal PNA, COPD exacerbation, aspiration pneumonitis/pna   - Worsening episodes of hypoxia and clinically diffuse rales likely due to ongoing aspiration  - Aspiration is multifactorial 2/2 dysphagia and reflux from lack of free water flushing after tube feeds   - Counseled pt and family on importance of free water flushes after feeding to decrease risk of reflux; GI consulted for further recs    - Sputum culture 04/08 gorwing E. Coli and completed 7 day course of IV Zosyn   - s/p extubation on 4/9/22 and back to baseline home O2 of 2-3L NC  - Serum HCO3 remains elevated and PaCO2 trending up; has compensatory hypercarbia and will c/w nocturnal NIV  - May benefit from AVAPS however pending CT chest prior to starting as per pulm recs    - Duonebs q6h, Symbicort q12h and IV steroids w/ titration to po as tolerated   - Maintain on aspiration precautions including HOB elevation   - Pulmonary consulted and recs noted       Layrngeal cancer s/p radiation with subsequent PEG tube placement due to dysphagia  - Leakage noted during hospital course and GI consult; now resolved   - CT neck pending to assess for recurrence   - Outpatient follow up      Septic Shock, resolved  - Likely due to multifocal PNA   - Current leukocytosis elevated 2/2 steroids   - Clinically nontoxic appearing, afebrile and completed full course abx  - Although decompensation likely related to aspiration pneumonitis vs pna will will consult ID for recs      MATHIEU, likely prerenal   - Resolved      Prediabetes / ?DM 2  - HbA1c 6.1  - Accu checks and ISS      HTN now with hypotension   - Antihypertensives were held given hypotension however ACEI resumed given BP trending up       Hypothyroidism  - On Synthroid       Constipation  - Xray normal  - Maintain on miralax and lactulose      Abnormal EEG  - Repeat EEG with mild to moderate nonspecific diffuse or multifocal cerebral dysfunction. No epileptiform pattern or seizure seen.  - No intervention as per Epilepsy      Coag Neg Staph Bacteremia  - Likely contaminant  - Repeat Bcx 4/12 NGTD      Normocytic anemia  - H/H low but stable overall and at baseline of 8-9  - Stool guaiac negative, hemodynamically stable and no active bleeding   - Iron panel reviewed, maintain on supplementation   - Monitor CBC and transfuse for Hb<7-8      VTE ppx: Lovenox     Dispo: Pt decompensated overnight.  Remains acute and will upgrade to SDU.  Wife at bedside and daughter on phone this morning while evaluating pt.  Discussed current plan and in agreement.  Patient ultimately wants to go home when ready for DC.         77 y/o [place] speaking M w/ history of Laryngeal Cancer s/p Chemo + RT, PEG Tube, COPD on home oxygen, Carotid Stenosis, Non Obstructive CAD, HTN, HLD, Prediabetes / ? DM 2 and Hypothyroidism presented with unresponsiveness. Oxygen saturation at home in 30s. Intubated in ER and admitted to ICU with Hypoxic Respiratory Failure secondary. Found to be in Septic Shock secondary to Aspiration Pneumonia. Started on Levophed and later weaned off. He was extubated on 4/9/22. EEG preliminary report with potential multifocal epileptogenic foci. He was downgraded to Medicine Service on 4/10/22.  Since downgrade hospital course has been complicated by intermittent hypoxia likely due to continued worsening aspiration.  Pt will upgraded to SDU.       Acute on chronic Respiratory Failure with Hypoxia multifactorial 2/2 multifocal PNA, COPD exacerbation, aspiration pneumonitis/pna   - Worsening episodes of hypoxia and clinically diffuse rales likely due to ongoing aspiration  - Aspiration is multifactorial 2/2 dysphagia and reflux from lack of free water flushing after tube feeds   - Counseled pt and family on importance of free water flushes after feeding to decrease risk of reflux; GI consulted for further recs    - Sputum culture 04/08 gorwing E. Coli and completed 7 day course of IV Zosyn   - s/p extubation on 4/9/22 and back to baseline home O2 of 2-3L NC  - Serum HCO3 remains elevated and PaCO2 trending up; has compensatory hypercarbia and will c/w nocturnal NIV  - May benefit from AVAPS however pending CT chest prior to starting as per pulm recs    - Duonebs q6h, Symbicort q12h and IV steroids w/ titration to po as tolerated   - Maintain on aspiration precautions including HOB elevation   - Pulmonary consulted and recs noted       Layrngeal cancer s/p radiation with subsequent PEG tube placement due to dysphagia  - Leakage noted during hospital course and GI consult; now resolved   - CT neck pending to assess for recurrence   - Outpatient follow up      Septic Shock, resolved  - Likely due to multifocal PNA   - Current leukocytosis elevated 2/2 steroids   - Clinically nontoxic appearing, afebrile and completed full course abx  - Although decompensation likely related to aspiration pneumonitis vs pna will will consult ID for recs      MATHIEU, likely prerenal   - Resolved      Prediabetes / ?DM 2  - HbA1c 6.1  - Accu checks and ISS      HTN now with hypotension   - Antihypertensives were held given hypotension however ACEI resumed given BP trending up       Hypothyroidism  - On Synthroid       Constipation  - Xray normal  - Maintain on miralax and lactulose      Abnormal EEG  - Repeat EEG with mild to moderate nonspecific diffuse or multifocal cerebral dysfunction. No epileptiform pattern or seizure seen.  - No intervention as per Epilepsy      Coag Neg Staph Bacteremia  - Likely contaminant  - Repeat Bcx 4/12 NGTD      Normocytic anemia  - H/H low but stable overall and at baseline of 8-9  - Stool guaiac negative, hemodynamically stable and no active bleeding   - Iron panel reviewed, maintain on supplementation   - Monitor CBC and transfuse for Hb<7-8      VTE ppx: Lovenox     Dispo: Pt decompensated overnight.  Remains acute and will upgrade to SDU.  Wife at bedside and daughter on phone this morning while evaluating pt.  Discussed current plan and in agreement.  Patient ultimately wants to go home when ready for DC.      Spent 60 minutes of critical care time.

## 2022-04-20 NOTE — PROGRESS NOTE ADULT - CONVERSATION DETAILS
Pt is Full code.  Discussed current condition and hospital course to date.  Went over most recent lab and radiology results.  Family is agreeable w/ trial of intubation if needed.

## 2022-04-20 NOTE — CONSULT NOTE ADULT - ASSESSMENT
77 y/o M pmhx of COPD on 2L NC home O2, B/l carotid artery stenosis, laryngeal CA s/p chemo and radiation, chronic PEG tube, who was BIBA on 4/8 after being found unresponsive at home w/ SpO2 in the 30's, Intubated upon arrival, on levophed. Extubated and off pressors the next day 4/9. CT showed multifocal PNA, Sputum cultures showed E. coli. Downgraded to floor, continued on zosyn for PNA, steroids and duonebs for COPD. O2 weaned to bipap at night, home O2 during the day. Last night, developed increasing O2 requirements, upgraded to venti mask and then continuous bipap. Crackles auscultated on exam. Likely a recurrent aspiration.  Infectious disease consulted, recs ceftriaxone and azithromycin, repeat sputum cultures. Full code.    Not a candidate for ICU at this time.    #Acute hypoxic respiratory failure 2/2 likely recurrent aspiration PNA  --wean to high flow NC, use intermittent bipap as necessary  --continue ceftriaxone and azithromycin  --Sputum shows gram negative rods, cultures pending  --continue duonebs and steroids   --Maintain on aspiration precautions including HOB elevation     Case discussed with Dr. Shar Elena.  Not a candidate for ICU at this time.   Thank you for your consult.   Please call us back with any worsening clinical status or with concerns & questions.   We will Sign Off for now.  77 y/o M pmhx of COPD on 2L NC home O2, B/l carotid artery stenosis, laryngeal CA s/p chemo and radiation, chronic PEG tube, who was BIBA on 4/8 after being found unresponsive at home w/ SpO2 in the 30's, Intubated upon arrival, on levophed. Extubated and off pressors the next day 4/9. CT showed multifocal PNA, Sputum cultures showed E. coli. Downgraded to floor, continued on zosyn for PNA, steroids and duonebs for COPD. O2 weaned to bipap at night, home O2 during the day. Last night, developed increasing O2 requirements, upgraded to venti mask and then continuous bipap. Crackles auscultated on exam. Likely a recurrent aspiration.  Infectious disease consulted, recs ceftriaxone and azithromycin, repeat sputum cultures. Full code.        #Acute hypoxic respiratory failure 2/2 likely recurrent aspiration PNA  --wean to high flow NC, use intermittent bipap as necessary  --continue ceftriaxone and azithromycin  --Sputum shows gram negative rods, cultures pending  --continue duonebs and steroids   --Maintain on aspiration precautions including HOB elevation     Case discussed with Dr. Shar Elena.  Not a candidate for ICU at this time.   Thank you for your consult.   Please call us back with any worsening clinical status or with concerns & questions.   We will Sign Off for now.

## 2022-04-20 NOTE — CONSULT NOTE ADULT - ASSESSMENT
This 75 y/o M former smoker, started as teenager quit 20 years ago, on home oxygen, COPD on home O2, B/l carotid artery stenosis, laryngeal CA s/p chemo and radiation, PEG tube, who was BIBA after being found unresponsive at home w/ SpO2 in the 30's, Intubated upon arrival to ED and started on propofol gtt for sedation. ICU consulted for hypoxic respiratory failure requiring intubation.  (08 Apr 2022 11:18)    on 4/8/22, he was BIBA after being found unresponsive at home w/ SpO2 in the 30's, Intubated upon arrival to ED and started on propofol gtt for sedation. ICU consulted for hypoxic respiratory failure requiring intubation. Started on Levophed to maintain MAP > 65, A line placed, titrated down now off pressors, off sedation. Extubated this morning. Now awake, alert, oriented. CT shows multifocal PNA, Sputum cultures showed PMNs, few gram-negative rods. Pt on zosyn for aspiration PNA. He was downgrade to the medical service on 4/9/22.    on 4/13/22, he had interval desaturation concerning for recurrence of aspiration.    Tube feeding was held on 4/13/22 for this, and steroids were also given.   he completed a course of zosyn for aspiration PNA from 4/8/22 thru 4/15/22.    ID was consulted on 4/20/22 given that patient still have poor respiratory status on BIPAP at FI 50 percent.       Impression:  SOB  WBC elevation  recent PNA  COPD on bipap      Plan:  this could be re-currence of aspiration    agree with further imaging    - start back on Ceftriaxone  add azithromycin for atypical coverage    recent sputum cx with E coli Susceptible to Ceftriaxone    repeat cultures from sputum.       WBC elevation is reactive  - and also could be from all the steroids received so far.     - will follow and trend

## 2022-04-20 NOTE — CONSULT NOTE ADULT - SUBJECTIVE AND OBJECTIVE BOX
Patient is a 76y old  Male who presents with a chief complaint of Hypoxic respiratory failure (20 Apr 2022 14:30)      BRIEF HOSPITAL COURSE: 77 y/o M pmhx of COPD on 2L NC home O2, B/l carotid artery stenosis, laryngeal CA s/p chemo and radiation, chronic PEG tube, who was BIBA on 4/8 after being found unresponsive at home w/ SpO2 in the 30's, Intubated upon arrival to ED and started on propofol gtt for sedation. Admitted to the ICU. Started on Levophed to maintain MAP > 65, A line placed, titrated down off pressors, off sedation. Extubated the next day 4/9. CT showed multifocal PNA, Sputum cultures showed E. coli. Downgraded to floor, continued on zosyn for PNA, steroids and duonebs for COPD. O2 weaned to bipap at night, home O2 during the day. Last night, patient developed worsening SOB and hypoxia, increasing O2 requirements, upgraded to venti mask and then continuous bipap. Crackles auscultated on exam. Likely a recurrent aspiration. Infectious disease consulted, recs ceftriaxone and azithromycin, repeat sputum cultures. Patient is alert and oriented, patient's daughters are at bedside and very involved in care. Full code. Discussed possibility of re-intubation, long-term possibility of trach. Answered all questions.        PAST MEDICAL & SURGICAL HISTORY:  Esophageal stricture    Prostate CA    History of carotid stenosis    Laryngeal carcinoma    COPD (chronic obstructive pulmonary disease)    Hypothyroidism    Aspiration pneumonia    Traumatic pneumothorax    Benign prostatic hyperplasia with urinary obstruction    Syncope and collapse    Microalbuminuria    Anemia    Hyperlipidemia, unspecified hyperlipidemia type    Femoral fracture    S/P percutaneous endoscopic gastrostomy (PEG) tube placement    Liver laceration    History of exploratory laparotomy    History of total bilateral knee replacement  b/l femur      Allergies    No Known Allergies    Intolerances      FAMILY HISTORY:  Family history of hypertension (Mother)    Family history of cerebrovascular accident (CVA) (Mother)        Review of Systems:  CONSTITUTIONAL: No fever, chills, or fatigue  EYES: No eye pain, visual disturbances, or discharge  ENMT:  No difficulty hearing, tinnitus, vertigo; No sinus or throat pain  NECK: No pain or stiffness  RESPIRATORY: No cough, wheezing, chills or hemoptysis; +shortness of breath  CARDIOVASCULAR: No chest pain, palpitations, dizziness, or leg swelling  GASTROINTESTINAL: No abdominal or epigastric pain. No nausea, vomiting, or hematemesis; No diarrhea or constipation. No melena or hematochezia.  GENITOURINARY: No dysuria, frequency, hematuria, or incontinence  NEUROLOGICAL: No headaches, memory loss, loss of strength, numbness, or tremors  SKIN: No itching, burning, rashes, or lesions   MUSCULOSKELETAL: No joint pain or swelling; No muscle, back, or extremity pain  PSYCHIATRIC: No depression, anxiety, mood swings, or difficulty sleeping      Social History: h/o smoking, quit 20 years ago      Medications:  azithromycin  IVPB 500 milliGRAM(s) IV Intermittent every 24 hours  cefTRIAXone   IVPB 1000 milliGRAM(s) IV Intermittent every 24 hours    enalapril 2.5 milliGRAM(s) Oral daily  hydrALAZINE Injectable 10 milliGRAM(s) IV Push every 6 hours PRN  metoprolol tartrate 25 milliGRAM(s) Oral two times a day    ALBUTerol    0.083% 2.5 milliGRAM(s) Nebulizer every 6 hours  budesonide 160 MICROgram(s)/formoterol 4.5 MICROgram(s) Inhaler 2 Puff(s) Inhalation two times a day  guaiFENesin ER 1200 milliGRAM(s) Oral every 12 hours  guaiFENesin Oral Liquid (Sugar-Free) 100 milliGRAM(s) Oral every 6 hours PRN  tiotropium 18 MICROgram(s) Capsule 1 Capsule(s) Inhalation daily    acetaminophen     Tablet .. 650 milliGRAM(s) Oral every 6 hours PRN  aspirin Suppository 300 milliGRAM(s) Rectal daily      enoxaparin Injectable 40 milliGRAM(s) SubCutaneous every 24 hours    pantoprazole  Injectable 40 milliGRAM(s) IV Push daily  polyethylene glycol 3350 17 Gram(s) Oral daily  senna 2 Tablet(s) Oral at bedtime    oxybutynin 5 milliGRAM(s) Oral two times a day    atorvastatin 40 milliGRAM(s) Oral at bedtime  dextrose 50% Injectable 25 Gram(s) IV Push once  dextrose 50% Injectable 12.5 Gram(s) IV Push once  dextrose 50% Injectable 25 Gram(s) IV Push once  dextrose Oral Gel 15 Gram(s) Oral once PRN  glucagon  Injectable 1 milliGRAM(s) IntraMuscular once  insulin lispro (ADMELOG) corrective regimen sliding scale   SubCutaneous every 6 hours  levothyroxine 112 MICROGram(s) Oral daily  methylPREDNISolone sodium succinate Injectable 40 milliGRAM(s) IV Push every 12 hours    cholecalciferol 1000 Unit(s) Oral daily  dextrose 5%. 1000 milliLiter(s) IV Continuous <Continuous>  dextrose 5%. 1000 milliLiter(s) IV Continuous <Continuous>  ferrous    sulfate Liquid 300 milliGRAM(s) Enteral Tube daily  sodium chloride 0.9% lock flush 10 milliLiter(s) IV Push every 1 hour PRN      chlorhexidine 2% Cloths 1 Application(s) Topical <User Schedule>  chlorhexidine 2% Cloths 1 Application(s) Topical daily    lactobacillus acidophilus 1 Tablet(s) Oral two times a day          ICU Vital Signs Last 24 Hrs  T(C): 36.5 (20 Apr 2022 15:23), Max: 37.3 (20 Apr 2022 06:03)  T(F): 97.7 (20 Apr 2022 15:23), Max: 99.2 (20 Apr 2022 06:03)  HR: 95 (20 Apr 2022 14:13) (78 - 125)  BP: 103/45 (20 Apr 2022 12:00) (103/45 - 146/70)  BP(mean): --  ABP: --  ABP(mean): --  RR: 26 (20 Apr 2022 14:06) (18 - 26)  SpO2: 96% (20 Apr 2022 14:13) (91% - 99%)    Vital Signs Last 24 Hrs  T(C): 36.5 (20 Apr 2022 15:23), Max: 37.3 (20 Apr 2022 06:03)  T(F): 97.7 (20 Apr 2022 15:23), Max: 99.2 (20 Apr 2022 06:03)  HR: 95 (20 Apr 2022 14:13) (78 - 125)  BP: 103/45 (20 Apr 2022 12:00) (103/45 - 146/70)  BP(mean): --  RR: 26 (20 Apr 2022 14:06) (18 - 26)  SpO2: 96% (20 Apr 2022 14:13) (91% - 99%)    ABG - ( 20 Apr 2022 06:27 )  pH, Arterial: 7.450 pH, Blood: x     /  pCO2: 71    /  pO2: 55    / HCO3: 49    / Base Excess: 25.3  /  SaO2: 87.6                I&O's Detail    19 Apr 2022 07:01  -  20 Apr 2022 07:00  --------------------------------------------------------  IN:    Free Water: 100 mL  Total IN: 100 mL    OUT:    Voided (mL): 450 mL  Total OUT: 450 mL    Total NET: -350 mL            LABS:                        9.0    16.72 )-----------( 334      ( 20 Apr 2022 05:59 )             31.6     04-20    144  |  98  |  61.4<H>  ----------------------------<  137<H>  4.4   |  37.0<H>  |  0.77    Ca    9.6      20 Apr 2022 05:59  Phos  3.0     04-20  Mg     2.2     04-20            CAPILLARY BLOOD GLUCOSE      POCT Blood Glucose.: 158 mg/dL (20 Apr 2022 12:38)        CULTURES:  Culture Results:   No growth at 5 days. (04-14 @ 12:31)  Culture Results:   No growth at 5 days. (04-14 @ 12:31)        Physical Examination:    General: No acute distress.  Alert, oriented, interactive, nonfocal    HEENT: Pupils equal, reactive to light.  Symmetric.    PULM: Diffuse crackles bilaterally, mildly tachypneic, bipap mask in place, no significant sputum production    CVS: Regular rate and rhythm, no murmurs, rubs, or gallops    ABD: Soft, nondistended, nontender, normoactive bowel sounds, no masses    EXT: No edema, nontender    SKIN: Warm and well perfused, no rashes noted.    NEURO: A&Ox3, strength 5/5 all extremities, cranial nerves grossly intact, no focal deficits      RADIOLOGY: ***

## 2022-04-20 NOTE — CONSULT NOTE ADULT - ATTENDING COMMENTS
75 y/o M pmhx of COPD on 2L NC home O2, B/l carotid artery stenosis, laryngeal CA s/p chemo and radiation, chronic PEG tube, who was BIBA on 4/8 after being found unresponsive at home w/ SpO2 in the 30's, Intubated upon arrival, on levophed. Extubated and off pressors the next day 4/9. CT showed multifocal PNA, Sputum cultures showed E. coli.     76-year-old  male with  COPD with chronic hypoxic respiratory failure dependent on 2 L nasal cannula, bilateral carotid artery stenosis, laryngeal cancer s/p chemo and radiation currently in remission, chronic PEG tube who was originally admitted on April 8 for acute on chronic hypoxic respiratory failure with aspiration pneumonitis/pneumonia requiring intubation and mechanical ventilation as well as vasoactive agents for couple of days and subsequently was extubated and weaned off of vasoactive agents and transferred out of medical ICU.  Hospital course further complicated by recurrent worsening of respiratory distress, hypoxia overnight and hence medical ICU consulted again.  Patient evaluated on noninvasive ventilation with bilevel 14/8 50% with improved respiratory distress since morning and saturation 98 100%.   I would recommend continuing with the antibiotic started today with Rocephin and azithromycin while obtaining sputum culture, Legionella urinary antigen, strep pneumonia urinary antigen with procalcitonin with Gram stain showing gram-negative rods with history of E. coli which was pansensitive it should be a good regimen for now.  Would recommend continuing with steroids and around-the-clock breathing treatment including but not limited to DuoNeb's.  We will  consider anxiolytic like Xanax especially while trying BiPAP which I recommend to wean off for now to high flow and BiPAP nocturnally or as needed.  Patient at high risk of recurrent aspiration injury with presence of PEG tube and with history of laryngeal cancer and COPD at high risk of requiring tracheostomy in future.  Plan of care explained to patient's 3 daughters at bedside with all questions answered and for now patient is full code and they understand that patient would be okay with temporary tracheostomy but would not want that to be part of his life for the rest of his life.      Continue managing the patient in stepdown unit for now but please call us back with any concern or deterioration.  Explained that to patient's family who are in agreement for now.    Critical Care Time :  50 minutes were spent assessing the patient's presenting problems of acute illness that pose a high probability of life threatening deterioration or end organ damage/dysfunction.  Medical decision making includes initiation/continuation of plan of care, reviewing data/labwork, reviewing radiographic studies, direct patient bedside evaluation and interpretation of vital signs, any necessary ventilator management, discussions with multidisciplinary team, and discussing goals of care with patient and/or family, all non-inclusive of procedures.

## 2022-04-21 LAB
ALBUMIN SERPL ELPH-MCNC: 2.9 G/DL — LOW (ref 3.3–5.2)
ALP SERPL-CCNC: 78 U/L — SIGNIFICANT CHANGE UP (ref 40–120)
ALT FLD-CCNC: 17 U/L — SIGNIFICANT CHANGE UP
ANION GAP SERPL CALC-SCNC: 14 MMOL/L — SIGNIFICANT CHANGE UP (ref 5–17)
ANISOCYTOSIS BLD QL: SLIGHT — SIGNIFICANT CHANGE UP
AST SERPL-CCNC: 22 U/L — SIGNIFICANT CHANGE UP
BASE EXCESS BLDA CALC-SCNC: 23.3 MMOL/L — HIGH (ref -2–3)
BASOPHILS # BLD AUTO: 0 K/UL — SIGNIFICANT CHANGE UP (ref 0–0.2)
BASOPHILS NFR BLD AUTO: 0 % — SIGNIFICANT CHANGE UP (ref 0–2)
BILIRUB SERPL-MCNC: 0.4 MG/DL — SIGNIFICANT CHANGE UP (ref 0.4–2)
BLOOD GAS COMMENTS ARTERIAL: SIGNIFICANT CHANGE UP
BUN SERPL-MCNC: 54.2 MG/DL — HIGH (ref 8–20)
BURR CELLS BLD QL SMEAR: PRESENT — SIGNIFICANT CHANGE UP
BURR CELLS BLD QL SMEAR: SLIGHT — SIGNIFICANT CHANGE UP
CALCIUM SERPL-MCNC: 10 MG/DL — SIGNIFICANT CHANGE UP (ref 8.6–10.2)
CHLORIDE SERPL-SCNC: 97 MMOL/L — LOW (ref 98–107)
CO2 SERPL-SCNC: 35 MMOL/L — HIGH (ref 22–29)
CREAT SERPL-MCNC: 0.78 MG/DL — SIGNIFICANT CHANGE UP (ref 0.5–1.3)
DACRYOCYTES BLD QL SMEAR: SLIGHT — SIGNIFICANT CHANGE UP
EGFR: 94 ML/MIN/1.73M2 — SIGNIFICANT CHANGE UP
ELLIPTOCYTES BLD QL SMEAR: SLIGHT — SIGNIFICANT CHANGE UP
EOSINOPHIL # BLD AUTO: 0 K/UL — SIGNIFICANT CHANGE UP (ref 0–0.5)
EOSINOPHIL NFR BLD AUTO: 0 % — SIGNIFICANT CHANGE UP (ref 0–6)
GAS PNL BLDA: SIGNIFICANT CHANGE UP
GIANT PLATELETS BLD QL SMEAR: PRESENT — SIGNIFICANT CHANGE UP
GLUCOSE BLDC GLUCOMTR-MCNC: 130 MG/DL — HIGH (ref 70–99)
GLUCOSE BLDC GLUCOMTR-MCNC: 138 MG/DL — HIGH (ref 70–99)
GLUCOSE BLDC GLUCOMTR-MCNC: 160 MG/DL — HIGH (ref 70–99)
GLUCOSE BLDC GLUCOMTR-MCNC: 215 MG/DL — HIGH (ref 70–99)
GLUCOSE BLDC GLUCOMTR-MCNC: 218 MG/DL — HIGH (ref 70–99)
GLUCOSE SERPL-MCNC: 120 MG/DL — HIGH (ref 70–99)
HCO3 BLDA-SCNC: 46 MMOL/L — CRITICAL HIGH (ref 21–28)
HCT VFR BLD CALC: 32.3 % — LOW (ref 39–50)
HGB BLD-MCNC: 9.3 G/DL — LOW (ref 13–17)
HOROWITZ INDEX BLDA+IHG-RTO: SIGNIFICANT CHANGE UP
HYPOCHROMIA BLD QL: SLIGHT — SIGNIFICANT CHANGE UP
LYMPHOCYTES # BLD AUTO: 0.26 K/UL — LOW (ref 1–3.3)
LYMPHOCYTES # BLD AUTO: 2.6 % — LOW (ref 13–44)
MACROCYTES BLD QL: SLIGHT — SIGNIFICANT CHANGE UP
MAGNESIUM SERPL-MCNC: 2.1 MG/DL — SIGNIFICANT CHANGE UP (ref 1.6–2.6)
MANUAL SMEAR VERIFICATION: SIGNIFICANT CHANGE UP
MCHC RBC-ENTMCNC: 28.6 PG — SIGNIFICANT CHANGE UP (ref 27–34)
MCHC RBC-ENTMCNC: 28.8 GM/DL — LOW (ref 32–36)
MCV RBC AUTO: 99.4 FL — SIGNIFICANT CHANGE UP (ref 80–100)
METAMYELOCYTES # FLD: 0.9 % — HIGH (ref 0–0)
MICROCYTES BLD QL: SLIGHT — SIGNIFICANT CHANGE UP
MONOCYTES # BLD AUTO: 0.26 K/UL — SIGNIFICANT CHANGE UP (ref 0–0.9)
MONOCYTES NFR BLD AUTO: 2.6 % — SIGNIFICANT CHANGE UP (ref 2–14)
NEUTROPHILS # BLD AUTO: 9.33 K/UL — HIGH (ref 1.8–7.4)
NEUTROPHILS NFR BLD AUTO: 86.1 % — HIGH (ref 43–77)
NEUTS BAND # BLD: 6.9 % — SIGNIFICANT CHANGE UP (ref 0–8)
OVALOCYTES BLD QL SMEAR: SLIGHT — SIGNIFICANT CHANGE UP
PCO2 BLDA: 58 MMHG — HIGH (ref 35–48)
PH BLDA: 7.51 — HIGH (ref 7.35–7.45)
PHOSPHATE SERPL-MCNC: 3.7 MG/DL — SIGNIFICANT CHANGE UP (ref 2.4–4.7)
PLAT MORPH BLD: NORMAL — SIGNIFICANT CHANGE UP
PLATELET # BLD AUTO: 309 K/UL — SIGNIFICANT CHANGE UP (ref 150–400)
PLATELET COUNT - ESTIMATE: NORMAL — SIGNIFICANT CHANGE UP
PO2 BLDA: 67 MMHG — LOW (ref 83–108)
POLYCHROMASIA BLD QL SMEAR: SLIGHT — SIGNIFICANT CHANGE UP
POTASSIUM SERPL-MCNC: 4.6 MMOL/L — SIGNIFICANT CHANGE UP (ref 3.5–5.3)
POTASSIUM SERPL-SCNC: 4.6 MMOL/L — SIGNIFICANT CHANGE UP (ref 3.5–5.3)
PROT SERPL-MCNC: 8.2 G/DL — SIGNIFICANT CHANGE UP (ref 6.6–8.7)
RBC # BLD: 3.25 M/UL — LOW (ref 4.2–5.8)
RBC # FLD: 16.9 % — HIGH (ref 10.3–14.5)
RBC BLD AUTO: NORMAL — SIGNIFICANT CHANGE UP
SAO2 % BLDA: 96.1 % — SIGNIFICANT CHANGE UP (ref 94–98)
SARS-COV-2 RNA SPEC QL NAA+PROBE: SIGNIFICANT CHANGE UP
SODIUM SERPL-SCNC: 146 MMOL/L — HIGH (ref 135–145)
VARIANT LYMPHS # BLD: 0.9 % — SIGNIFICANT CHANGE UP (ref 0–6)
WBC # BLD: 10.03 K/UL — SIGNIFICANT CHANGE UP (ref 3.8–10.5)
WBC # FLD AUTO: 10.03 K/UL — SIGNIFICANT CHANGE UP (ref 3.8–10.5)

## 2022-04-21 PROCEDURE — 99233 SBSQ HOSP IP/OBS HIGH 50: CPT

## 2022-04-21 PROCEDURE — 99232 SBSQ HOSP IP/OBS MODERATE 35: CPT

## 2022-04-21 PROCEDURE — 99222 1ST HOSP IP/OBS MODERATE 55: CPT

## 2022-04-21 RX ORDER — MORPHINE SULFATE 50 MG/1
2 CAPSULE, EXTENDED RELEASE ORAL AT BEDTIME
Refills: 0 | Status: DISCONTINUED | OUTPATIENT
Start: 2022-04-21 | End: 2022-04-27

## 2022-04-21 RX ORDER — METOPROLOL TARTRATE 50 MG
5 TABLET ORAL EVERY 6 HOURS
Refills: 0 | Status: DISCONTINUED | OUTPATIENT
Start: 2022-04-21 | End: 2022-04-22

## 2022-04-21 RX ORDER — MORPHINE SULFATE 50 MG/1
2 CAPSULE, EXTENDED RELEASE ORAL EVERY 8 HOURS
Refills: 0 | Status: DISCONTINUED | OUTPATIENT
Start: 2022-04-21 | End: 2022-04-27

## 2022-04-21 RX ORDER — SODIUM CHLORIDE 9 MG/ML
1000 INJECTION, SOLUTION INTRAVENOUS
Refills: 0 | Status: DISCONTINUED | OUTPATIENT
Start: 2022-04-21 | End: 2022-04-22

## 2022-04-21 RX ORDER — ACETAMINOPHEN 500 MG
1000 TABLET ORAL ONCE
Refills: 0 | Status: COMPLETED | OUTPATIENT
Start: 2022-04-21 | End: 2022-04-21

## 2022-04-21 RX ORDER — MORPHINE SULFATE 50 MG/1
2 CAPSULE, EXTENDED RELEASE ORAL ONCE
Refills: 0 | Status: DISCONTINUED | OUTPATIENT
Start: 2022-04-21 | End: 2022-04-21

## 2022-04-21 RX ORDER — SODIUM CHLORIDE 9 MG/ML
500 INJECTION, SOLUTION INTRAVENOUS ONCE
Refills: 0 | Status: COMPLETED | OUTPATIENT
Start: 2022-04-21 | End: 2022-04-21

## 2022-04-21 RX ORDER — METOPROLOL TARTRATE 50 MG
5 TABLET ORAL EVERY 8 HOURS
Refills: 0 | Status: DISCONTINUED | OUTPATIENT
Start: 2022-04-21 | End: 2022-04-21

## 2022-04-21 RX ORDER — MEROPENEM 1 G/30ML
1000 INJECTION INTRAVENOUS EVERY 8 HOURS
Refills: 0 | Status: DISCONTINUED | OUTPATIENT
Start: 2022-04-21 | End: 2022-04-22

## 2022-04-21 RX ORDER — LEVOTHYROXINE SODIUM 125 MCG
56 TABLET ORAL AT BEDTIME
Refills: 0 | Status: DISCONTINUED | OUTPATIENT
Start: 2022-04-21 | End: 2022-04-22

## 2022-04-21 RX ADMIN — MORPHINE SULFATE 2 MILLIGRAM(S): 50 CAPSULE, EXTENDED RELEASE ORAL at 21:48

## 2022-04-21 RX ADMIN — Medication 40 MILLIGRAM(S): at 17:48

## 2022-04-21 RX ADMIN — CHLORHEXIDINE GLUCONATE 1 APPLICATION(S): 213 SOLUTION TOPICAL at 11:57

## 2022-04-21 RX ADMIN — ALBUTEROL 2.5 MILLIGRAM(S): 90 AEROSOL, METERED ORAL at 02:54

## 2022-04-21 RX ADMIN — Medication 1000 MILLIGRAM(S): at 09:00

## 2022-04-21 RX ADMIN — Medication 5 MILLIGRAM(S): at 17:48

## 2022-04-21 RX ADMIN — MORPHINE SULFATE 2 MILLIGRAM(S): 50 CAPSULE, EXTENDED RELEASE ORAL at 06:15

## 2022-04-21 RX ADMIN — Medication 5 MILLIGRAM(S): at 07:56

## 2022-04-21 RX ADMIN — Medication 56 MICROGRAM(S): at 21:33

## 2022-04-21 RX ADMIN — PANTOPRAZOLE SODIUM 40 MILLIGRAM(S): 20 TABLET, DELAYED RELEASE ORAL at 11:52

## 2022-04-21 RX ADMIN — BUDESONIDE AND FORMOTEROL FUMARATE DIHYDRATE 2 PUFF(S): 160; 4.5 AEROSOL RESPIRATORY (INHALATION) at 09:04

## 2022-04-21 RX ADMIN — Medication 4: at 17:49

## 2022-04-21 RX ADMIN — MORPHINE SULFATE 2 MILLIGRAM(S): 50 CAPSULE, EXTENDED RELEASE ORAL at 14:41

## 2022-04-21 RX ADMIN — CHLORHEXIDINE GLUCONATE 1 APPLICATION(S): 213 SOLUTION TOPICAL at 05:17

## 2022-04-21 RX ADMIN — BUDESONIDE AND FORMOTEROL FUMARATE DIHYDRATE 2 PUFF(S): 160; 4.5 AEROSOL RESPIRATORY (INHALATION) at 21:20

## 2022-04-21 RX ADMIN — MORPHINE SULFATE 2 MILLIGRAM(S): 50 CAPSULE, EXTENDED RELEASE ORAL at 05:15

## 2022-04-21 RX ADMIN — SODIUM CHLORIDE 60 MILLILITER(S): 9 INJECTION, SOLUTION INTRAVENOUS at 12:24

## 2022-04-21 RX ADMIN — Medication 40 MILLIGRAM(S): at 05:15

## 2022-04-21 RX ADMIN — ALBUTEROL 2.5 MILLIGRAM(S): 90 AEROSOL, METERED ORAL at 21:21

## 2022-04-21 RX ADMIN — SODIUM CHLORIDE 500 MILLILITER(S): 9 INJECTION, SOLUTION INTRAVENOUS at 10:36

## 2022-04-21 RX ADMIN — MEROPENEM 100 MILLIGRAM(S): 1 INJECTION INTRAVENOUS at 10:50

## 2022-04-21 RX ADMIN — ENOXAPARIN SODIUM 40 MILLIGRAM(S): 100 INJECTION SUBCUTANEOUS at 17:48

## 2022-04-21 RX ADMIN — ATORVASTATIN CALCIUM 40 MILLIGRAM(S): 80 TABLET, FILM COATED ORAL at 21:34

## 2022-04-21 RX ADMIN — Medication 4: at 23:37

## 2022-04-21 RX ADMIN — SENNA PLUS 2 TABLET(S): 8.6 TABLET ORAL at 21:34

## 2022-04-21 RX ADMIN — MEROPENEM 100 MILLIGRAM(S): 1 INJECTION INTRAVENOUS at 18:13

## 2022-04-21 RX ADMIN — AZITHROMYCIN 255 MILLIGRAM(S): 500 TABLET, FILM COATED ORAL at 14:42

## 2022-04-21 RX ADMIN — Medication 2: at 00:12

## 2022-04-21 RX ADMIN — MORPHINE SULFATE 2 MILLIGRAM(S): 50 CAPSULE, EXTENDED RELEASE ORAL at 15:30

## 2022-04-21 RX ADMIN — Medication 400 MILLIGRAM(S): at 07:56

## 2022-04-21 RX ADMIN — Medication 300 MILLIGRAM(S): at 11:52

## 2022-04-21 RX ADMIN — ALBUTEROL 2.5 MILLIGRAM(S): 90 AEROSOL, METERED ORAL at 09:01

## 2022-04-21 RX ADMIN — Medication 1 TABLET(S): at 17:48

## 2022-04-21 RX ADMIN — MORPHINE SULFATE 2 MILLIGRAM(S): 50 CAPSULE, EXTENDED RELEASE ORAL at 21:36

## 2022-04-21 RX ADMIN — ALBUTEROL 2.5 MILLIGRAM(S): 90 AEROSOL, METERED ORAL at 16:07

## 2022-04-21 NOTE — CHART NOTE - NSCHARTNOTEFT_GEN_A_CORE
Palliative care social work note.    SW and palliative care physician Dr Ross met with family at bedside, Devorah and Joshua and spoke with daughter form [place] on phone. Role of palliative care service reviewed and symptoms addressed with patient. education regarding morphine to assist with breathing as well as to assist in taking edge off since patient anxious reviewed and all in agreement. patient responded to palliative care team with thumbs up in asking how current management is for symptoms to ensure no distress. palliative care following.

## 2022-04-21 NOTE — PROGRESS NOTE ADULT - ASSESSMENT
This 77 y/o M former smoker, started as teenager quit 20 years ago, on home oxygen, COPD on home O2, B/l carotid artery stenosis, laryngeal CA s/p chemo and radiation, PEG tube, who was BIBA after being found unresponsive at home w/ SpO2 in the 30's, Intubated upon arrival to ED and started on propofol gtt for sedation. ICU consulted for hypoxic respiratory failure requiring intubation.  (08 Apr 2022 11:18)    on 4/8/22, he was BIBA after being found unresponsive at home w/ SpO2 in the 30's, Intubated upon arrival to ED and started on propofol gtt for sedation. ICU consulted for hypoxic respiratory failure requiring intubation. Started on Levophed to maintain MAP > 65, A line placed, titrated down now off pressors, off sedation. Extubated this morning. Now awake, alert, oriented. CT shows multifocal PNA, Sputum cultures showed PMNs, few gram-negative rods. Pt on zosyn for aspiration PNA. He was downgrade to the medical service on 4/9/22.    on 4/13/22, he had interval desaturation concerning for recurrence of aspiration.    Tube feeding was held on 4/13/22 for this, and steroids were also given.   he completed a course of zosyn for aspiration PNA from 4/8/22 thru 4/15/22.    ID was consulted on 4/20/22 given that patient still have poor respiratory status on BIPAP at FI 50 percent.       Impression:  SOB  WBC elevation  recent PNA  COPD on bipap      Plan:  this could be re-currence of aspiration    Repeat imaging / CT scan on 4/20/22 showed:  Findings likely reflect aspiration pneumonitis/infection and there has been interval worsening of airspace disease in the lingula and left lower lobe compared to 4/8/2022.    GNR in sputum cx sent from 4/20/22  - will change Ceftriaxone to Merrem 1 gram Q8H  continue azithromycin for atypical coverage    prior sputum cx with E coli Susceptible to Ceftriaxone    - follow up all outstanding cultures  - trend temperature and WBC curve  - repeat cultures from blood and all sources if febrile.        WBC elevation is reactive  - and also could be from all the steroids received so far.     - will follow and trend

## 2022-04-21 NOTE — PROGRESS NOTE ADULT - ASSESSMENT
-acute on chronic hypercapneic respiratory failure  -acute on chronic hypoxic resp failure  -gastric  motility decreased  with  gerd    -likely chronic aspiration pneumonitis; worsening opacities on CT  -S/P intubation and extubation  -hx laryngeal ca with  dysphagia; has PEG  -anemia  -metabolic alk    Pt at high risk for needing re-intubation.    RECC:  BPAP qhs and prn. Aspiration precautions.  GI f/u. F/U final sputum culture. Nebs. O2; titrate. H/H per PMT. F/U lytes. DVT proph.     Long talk with dtr on phone (phone handed to me by family in room). Reviewed above. Reviewed high likelihood of intubation. Poor prognosis. She said he would not want a trach "unless it is life saving." I explained to her that intubation and eventual trach would be done, in any event, only if it is life-saving and necessary if he needs intubation and does not get it, he likely not survive. She understands and needs to talk to rest of the family.

## 2022-04-21 NOTE — CONSULT NOTE ADULT - CONSULT REASON
" goals of care"
abnormal EEG
possible aspiration PNA
leakage around G tube
Hypoxic respiratory failure
Acute on chronic hypoxic respiratory failure

## 2022-04-21 NOTE — PROGRESS NOTE ADULT - PROBLEM SELECTOR PROBLEM 3
Chronic respiratory failure with hypoxia and hypercapnia

## 2022-04-21 NOTE — CHART NOTE - NSCHARTNOTEFT_GEN_A_CORE
Source: Patient [ ]  Family [ ]   other [x ]  77 y/o Cuban speaking M w/ history of Laryngeal Cancer s/p Chemo + RT, PEG Tube, COPD on home oxygen, Carotid Stenosis, Non Obstructive CAD, HTN, HLD, Prediabetes / ? DM 2 and Hypothyroidism presented with unresponsiveness. Oxygen saturation at home in 30s. Intubated in ER and admitted to ICU with Hypoxic Respiratory Failure secondary. Found to be in Septic Shock secondary to Aspiration Pneumonia. Started on Levophed and later weaned off. He was extubated on 4/9/22. EEG preliminary report with potential multifocal epileptogenic foci. He was downgraded to Medicine Service on 4/10/22.  Since downgrade hospital course has been complicated by intermittent hypoxia likely due to continued worsening aspiration.  Pt will upgraded to SDU.   Acute on chronic Respiratory Failure with Hypoxia    Current Diet: Diet, NPO with Tube Feed:   Tube Feeding Modality: Gastrostomy  Glucerna 1.5 Heath (GLUCERNA1.5)  Total Volume for 24 Hours (mL): 1200  Bolus  Total Volume of Bolus (mL):  300  Total # of Feeds: 4  Tube Feed Frequency: Every 6 hours   Tube Feed Start Time: 18:00  Bolus Feed Rate (mL per Hour): 300   Bolus Feed Duration (in Hours): 1  Free Water Flush  Bolus   Total Volume per Flush (mL): 150   Frequency: Every 6 Hours   Total Daily Volume of Flush (mL): 600    Start Time: 19:00 (04-15-22 @ 18:05)    Enteral /Parenteral Nutrition:  1200ml, 1800kcal, 100g protein and micronutrients, 912 ml free water    Current Weight:   4/15 69.5 kg  4/9 65 kg  % Weight Change     Pertinent Medications: MEDICATIONS  (STANDING):  ALBUTerol    0.083% 2.5 milliGRAM(s) Nebulizer every 6 hours  aspirin Suppository 300 milliGRAM(s) Rectal daily  atorvastatin 40 milliGRAM(s) Oral at bedtime  azithromycin  IVPB 500 milliGRAM(s) IV Intermittent every 24 hours  budesonide 160 MICROgram(s)/formoterol 4.5 MICROgram(s) Inhaler 2 Puff(s) Inhalation two times a day  chlorhexidine 2% Cloths 1 Application(s) Topical <User Schedule>  chlorhexidine 2% Cloths 1 Application(s) Topical daily  cholecalciferol 1000 Unit(s) Oral daily  dextrose 5%. 1000 milliLiter(s) (50 mL/Hr) IV Continuous <Continuous>  dextrose 5%. 1000 milliLiter(s) (100 mL/Hr) IV Continuous <Continuous>  dextrose 50% Injectable 12.5 Gram(s) IV Push once  dextrose 50% Injectable 25 Gram(s) IV Push once  dextrose 50% Injectable 25 Gram(s) IV Push once  enalapril 2.5 milliGRAM(s) Oral daily  enoxaparin Injectable 40 milliGRAM(s) SubCutaneous every 24 hours  ferrous    sulfate Liquid 300 milliGRAM(s) Enteral Tube daily  glucagon  Injectable 1 milliGRAM(s) IntraMuscular once  guaiFENesin ER 1200 milliGRAM(s) Oral every 12 hours  insulin lispro (ADMELOG) corrective regimen sliding scale   SubCutaneous every 6 hours  lactated ringers. 1000 milliLiter(s) (60 mL/Hr) IV Continuous <Continuous>  lactobacillus acidophilus 1 Tablet(s) Oral two times a day  levothyroxine Injectable 56 MICROGram(s) IV Push at bedtime  meropenem  IVPB 1000 milliGRAM(s) IV Intermittent every 8 hours  methylPREDNISolone sodium succinate Injectable 40 milliGRAM(s) IV Push every 12 hours  metoprolol tartrate Injectable 5 milliGRAM(s) IV Push every 6 hours  morphine  - Injectable 2 milliGRAM(s) IV Push at bedtime  oxybutynin 5 milliGRAM(s) Oral two times a day  pantoprazole  Injectable 40 milliGRAM(s) IV Push daily  polyethylene glycol 3350 17 Gram(s) Oral daily  senna 2 Tablet(s) Oral at bedtime  tiotropium 18 MICROgram(s) Capsule 1 Capsule(s) Inhalation daily    MEDICATIONS  (PRN):  acetaminophen     Tablet .. 650 milliGRAM(s) Oral every 6 hours PRN Temp greater or equal to 38C (100.4F)  dextrose Oral Gel 15 Gram(s) Oral once PRN Blood Glucose LESS THAN 70 milliGRAM(s)/deciliter  guaiFENesin Oral Liquid (Sugar-Free) 100 milliGRAM(s) Oral every 6 hours PRN Cough  hydrALAZINE Injectable 10 milliGRAM(s) IV Push every 6 hours PRN If SBP > 160  morphine  - Injectable 2 milliGRAM(s) IV Push every 8 hours PRN dyspnea or increased work of breathing  sodium chloride 0.9% lock flush 10 milliLiter(s) IV Push every 1 hour PRN Pre/post blood products, medications, blood draw, and to maintain line patency    Pertinent Labs: CBC Full  -  ( 21 Apr 2022 08:17 )  WBC Count : 10.03 K/uL  RBC Count : 3.25 M/uL  Hemoglobin : 9.3 g/dL  Hematocrit : 32.3 %  Platelet Count - Automated : 309 K/uL  Mean Cell Volume : 99.4 fl  Mean Cell Hemoglobin : 28.6 pg  Mean Cell Hemoglobin Concentration : 28.8 gm/dL  Auto Neutrophil # : 9.33 K/uL  Auto Lymphocyte # : 0.26 K/uL  Auto Monocyte # : 0.26 K/uL  Auto Eosinophil # : 0.00 K/uL  Auto Basophil # : 0.00 K/uL  Auto Neutrophil % : 86.1 %  Auto Lymphocyte % : 2.6 %  Auto Monocyte % : 2.6 %  Auto Eosinophil % : 0.0 %  Auto Basophil % : 0.0 %  04-21 Na146 mmol/L<H> Glu 120 mg/dL<H> K+ 4.6 mmol/L Cr  0.78 mg/dL BUN 54.2 mg/dL<H> Phos 3.7 mg/dL Alb 2.9 g/dL<L> PAB n/a               Skin: intact    Nutrition focused physical exam conducted - found signs of malnutrition [ ]absent [ x]present    Subcutaneous fat loss: [x ] Orbital fat pads region, [x ]Buccal fat region, [ ]Triceps region,  [ ]Ribs region    Muscle wasting: [x ]Temples region, [ x]Clavicle region, [ x]Shoulder region, [ ]Scapula region, [ ]Interosseous region,  [ ]thigh region, [ ]Calf region    Estimated Needs:   [x ] no change since previous assessment  [ ] recalculated:     Current Nutrition Diagnosis: Pt presents at risk secondary to Malnutrition, chronic moderate, related to inadequate protein calorie intake in the setting of acute respiratory failure/ asp pna/ septic shock/ COPD  as evidenced by muscle mass depletion, subcutaneous fat loss, mild edema. Pt now on BiPap, TF held. palliative care following for [facility]      Recommendations:   1) Resume TF as medically appropriate   2) Honor Pt/ family wishes regarding plan of care   3) Daily weight  4) RX MVI  Monitoring and Evaluation:   [ ] PO intake [ ] Tolerance to diet prescription [X] Weights  [X] Follow up per protocol [X] Labs:

## 2022-04-21 NOTE — PROGRESS NOTE ADULT - SUBJECTIVE AND OBJECTIVE BOX
[facility] Division of Hospital Medicine    Chief Complaint:  COPD exacerbation    SUBJECTIVE / OVERNIGHT EVENTS:    Overnight patient tachycardiac, spiked a fever and blood cultures drawn.     patient awake this morning reports he is not able to conversate      MEDICATIONS  (STANDING):  ALBUTerol    0.083% 2.5 milliGRAM(s) Nebulizer every 6 hours  aspirin Suppository 300 milliGRAM(s) Rectal daily  atorvastatin 40 milliGRAM(s) Oral at bedtime  azithromycin  IVPB 500 milliGRAM(s) IV Intermittent every 24 hours  budesonide 160 MICROgram(s)/formoterol 4.5 MICROgram(s) Inhaler 2 Puff(s) Inhalation two times a day  chlorhexidine 2% Cloths 1 Application(s) Topical <User Schedule>  chlorhexidine 2% Cloths 1 Application(s) Topical daily  cholecalciferol 1000 Unit(s) Oral daily  dextrose 5%. 1000 milliLiter(s) (50 mL/Hr) IV Continuous <Continuous>  dextrose 5%. 1000 milliLiter(s) (100 mL/Hr) IV Continuous <Continuous>  dextrose 50% Injectable 12.5 Gram(s) IV Push once  dextrose 50% Injectable 25 Gram(s) IV Push once  dextrose 50% Injectable 25 Gram(s) IV Push once  enalapril 2.5 milliGRAM(s) Oral daily  enoxaparin Injectable 40 milliGRAM(s) SubCutaneous every 24 hours  ferrous    sulfate Liquid 300 milliGRAM(s) Enteral Tube daily  glucagon  Injectable 1 milliGRAM(s) IntraMuscular once  guaiFENesin ER 1200 milliGRAM(s) Oral every 12 hours  insulin lispro (ADMELOG) corrective regimen sliding scale   SubCutaneous every 6 hours  lactobacillus acidophilus 1 Tablet(s) Oral two times a day  levothyroxine Injectable 56 MICROGram(s) IV Push at bedtime  meropenem  IVPB 1000 milliGRAM(s) IV Intermittent every 8 hours  methylPREDNISolone sodium succinate Injectable 40 milliGRAM(s) IV Push every 12 hours  metoprolol tartrate Injectable 5 milliGRAM(s) IV Push every 6 hours  oxybutynin 5 milliGRAM(s) Oral two times a day  pantoprazole  Injectable 40 milliGRAM(s) IV Push daily  polyethylene glycol 3350 17 Gram(s) Oral daily  senna 2 Tablet(s) Oral at bedtime  tiotropium 18 MICROgram(s) Capsule 1 Capsule(s) Inhalation daily    MEDICATIONS  (PRN):  acetaminophen     Tablet .. 650 milliGRAM(s) Oral every 6 hours PRN Temp greater or equal to 38C (100.4F)  dextrose Oral Gel 15 Gram(s) Oral once PRN Blood Glucose LESS THAN 70 milliGRAM(s)/deciliter  guaiFENesin Oral Liquid (Sugar-Free) 100 milliGRAM(s) Oral every 6 hours PRN Cough  hydrALAZINE Injectable 10 milliGRAM(s) IV Push every 6 hours PRN If SBP > 160  sodium chloride 0.9% lock flush 10 milliLiter(s) IV Push every 1 hour PRN Pre/post blood products, medications, blood draw, and to maintain line patency        I&O's Summary    20 Apr 2022 07:01  -  21 Apr 2022 07:00  --------------------------------------------------------  IN: 400 mL / OUT: 1250 mL / NET: -850 mL    21 Apr 2022 07:01  -  21 Apr 2022 11:15  --------------------------------------------------------  IN: 500 mL / OUT: 300 mL / NET: 200 mL        PHYSICAL EXAM:  Vital Signs Last 24 Hrs  T(C): 38.3 (21 Apr 2022 09:01), Max: 38.8 (21 Apr 2022 07:56)  T(F): 100.9 (21 Apr 2022 09:01), Max: 101.9 (21 Apr 2022 07:56)  HR: 108 (21 Apr 2022 10:00) (94 - 136)  BP: 88/78 (21 Apr 2022 10:44) (80/48 - 156/55)  BP(mean): 77 (21 Apr 2022 06:25) (71 - 85)  RR: 26 (21 Apr 2022 10:00) (18 - 30)  SpO2: 92% (21 Apr 2022 10:00) (91% - 99%)        CONSTITUTIONAL: ill appearing, comfortable on bipap  ENMT: Dry MMM  RESPIRATORY: Rales b/l lung fields   CARDIOVASCULAR: Tachycardiac and rhythm, normal S1 and S2, no murmur   ABDOMEN: Nontender to palpation, normoactive bowel sounds, no rebound.  PEG tube in place   PSYCH: A+O to person, place, and time; affect appropriate  SKIN: No rashes; no palpable lesions    LABS:                        9.3    10.03 )-----------( 309      ( 21 Apr 2022 08:17 )             32.3     04-21    146<H>  |  97<L>  |  54.2<H>  ----------------------------<  120<H>  4.6   |  35.0<H>  |  0.78    Ca    10.0      21 Apr 2022 08:17  Phos  3.7     04-21  Mg     2.1     04-21    TPro  8.2  /  Alb  2.9<L>  /  TBili  0.4  /  DBili  x   /  AST  22  /  ALT  17  /  AlkPhos  78  04-21              Culture - Sputum (collected 20 Apr 2022 12:30)  Source: .Sputum Sputum  Gram Stain (20 Apr 2022 15:09):    Rare polymorphonuclear leukocytes per low power field    Moderate Squamous epithelial cells per low power field    Few Gram Negative Rods per oil power field  Preliminary Report (21 Apr 2022 10:44):    Numerous Escherichia coli      CAPILLARY BLOOD GLUCOSE      POCT Blood Glucose.: 130 mg/dL (21 Apr 2022 06:29)  POCT Blood Glucose.: 160 mg/dL (21 Apr 2022 00:04)  POCT Blood Glucose.: 124 mg/dL (20 Apr 2022 18:08)  POCT Blood Glucose.: 158 mg/dL (20 Apr 2022 12:38)        RADIOLOGY & ADDITIONAL TESTS:  Results Reviewed:   Imaging Personally Reviewed:  Electrocardiogram Personally Reviewed:

## 2022-04-21 NOTE — PROGRESS NOTE ADULT - SUBJECTIVE AND OBJECTIVE BOX
PULMONARY PROGRESS NOTE      KIMBERLY LAIROD-[id number]    Patient is a 76y old  Male who presents with a chief complaint of Hypoxic respiratory failure (21 Apr 2022 13:53)      INTERVAL HPI/OVERNIGHT EVENTS: Events noted. Last pm with increased hypoxic failure. On BPAP. Assessed by MICU team. On BPAP 40% FIO2; TV amarjit 525 cc.     MEDICATIONS  (STANDING):  ALBUTerol    0.083% 2.5 milliGRAM(s) Nebulizer every 6 hours  aspirin Suppository 300 milliGRAM(s) Rectal daily  atorvastatin 40 milliGRAM(s) Oral at bedtime  azithromycin  IVPB 500 milliGRAM(s) IV Intermittent every 24 hours  budesonide 160 MICROgram(s)/formoterol 4.5 MICROgram(s) Inhaler 2 Puff(s) Inhalation two times a day  chlorhexidine 2% Cloths 1 Application(s) Topical <User Schedule>  chlorhexidine 2% Cloths 1 Application(s) Topical daily  cholecalciferol 1000 Unit(s) Oral daily  dextrose 5%. 1000 milliLiter(s) (50 mL/Hr) IV Continuous <Continuous>  dextrose 5%. 1000 milliLiter(s) (100 mL/Hr) IV Continuous <Continuous>  dextrose 50% Injectable 12.5 Gram(s) IV Push once  dextrose 50% Injectable 25 Gram(s) IV Push once  dextrose 50% Injectable 25 Gram(s) IV Push once  enalapril 2.5 milliGRAM(s) Oral daily  enoxaparin Injectable 40 milliGRAM(s) SubCutaneous every 24 hours  ferrous    sulfate Liquid 300 milliGRAM(s) Enteral Tube daily  glucagon  Injectable 1 milliGRAM(s) IntraMuscular once  guaiFENesin ER 1200 milliGRAM(s) Oral every 12 hours  insulin lispro (ADMELOG) corrective regimen sliding scale   SubCutaneous every 6 hours  lactated ringers. 1000 milliLiter(s) (60 mL/Hr) IV Continuous <Continuous>  lactobacillus acidophilus 1 Tablet(s) Oral two times a day  levothyroxine Injectable 56 MICROGram(s) IV Push at bedtime  meropenem  IVPB 1000 milliGRAM(s) IV Intermittent every 8 hours  methylPREDNISolone sodium succinate Injectable 40 milliGRAM(s) IV Push every 12 hours  metoprolol tartrate Injectable 5 milliGRAM(s) IV Push every 6 hours  morphine  - Injectable 2 milliGRAM(s) IV Push at bedtime  oxybutynin 5 milliGRAM(s) Oral two times a day  pantoprazole  Injectable 40 milliGRAM(s) IV Push daily  polyethylene glycol 3350 17 Gram(s) Oral daily  senna 2 Tablet(s) Oral at bedtime  tiotropium 18 MICROgram(s) Capsule 1 Capsule(s) Inhalation daily      MEDICATIONS  (PRN):  acetaminophen     Tablet .. 650 milliGRAM(s) Oral every 6 hours PRN Temp greater or equal to 38C (100.4F)  dextrose Oral Gel 15 Gram(s) Oral once PRN Blood Glucose LESS THAN 70 milliGRAM(s)/deciliter  guaiFENesin Oral Liquid (Sugar-Free) 100 milliGRAM(s) Oral every 6 hours PRN Cough  hydrALAZINE Injectable 10 milliGRAM(s) IV Push every 6 hours PRN If SBP > 160  morphine  - Injectable 2 milliGRAM(s) IV Push every 8 hours PRN dyspnea or increased work of breathing  sodium chloride 0.9% lock flush 10 milliLiter(s) IV Push every 1 hour PRN Pre/post blood products, medications, blood draw, and to maintain line patency      Allergies    No Known Allergies    Intolerances        PAST MEDICAL & SURGICAL HISTORY:  Esophageal stricture    Prostate CA    History of carotid stenosis    Laryngeal carcinoma    COPD (chronic obstructive pulmonary disease)    Hypothyroidism    Aspiration pneumonia    Traumatic pneumothorax    Benign prostatic hyperplasia with urinary obstruction    Syncope and collapse    Microalbuminuria    Anemia    Hyperlipidemia, unspecified hyperlipidemia type    Femoral fracture    S/P percutaneous endoscopic gastrostomy (PEG) tube placement    Liver laceration    History of exploratory laparotomy    History of total bilateral knee replacement  b/l femur           REVIEW OF SYSTEMS:    not available    Vital Signs Last 24 Hrs  T(C): 37.6 (21 Apr 2022 11:25), Max: 38.8 (21 Apr 2022 07:56)  T(F): 99.7 (21 Apr 2022 11:25), Max: 101.9 (21 Apr 2022 07:56)  HR: 107 (21 Apr 2022 11:25) (96 - 136)  BP: 110/52 (21 Apr 2022 11:25) (80/48 - 156/55)  BP(mean): 77 (21 Apr 2022 06:25) (71 - 85)  RR: 25 (21 Apr 2022 11:25) (18 - 30)  SpO2: 96% (21 Apr 2022 11:25) (91% - 98%)    PHYSICAL EXAMINATION:    GENERAL: The patient is awake and alert; on BPAP      HEENT: Head is normocephalic and atraumatic.   Mucous membranes are moist.    NECK: Supple.    LUNGS: rhonchi b/l, respirations unlabored    HEART: Regular rate and rhythm      ABDOMEN: Soft, nontender, and nondistended.      EXTREMITIES: Without any cyanosis, clubbing, rash, lesions or edema.         LABS:                        9.3    10.03 )-----------( 309      ( 21 Apr 2022 08:17 )             32.3     04-21    146<H>  |  97<L>  |  54.2<H>  ----------------------------<  120<H>  4.6   |  35.0<H>  |  0.78    Ca    10.0      21 Apr 2022 08:17  Phos  3.7     04-21  Mg     2.1     04-21    TPro  8.2  /  Alb  2.9<L>  /  TBili  0.4  /  DBili  x   /  AST  22  /  ALT  17  /  AlkPhos  78  04-21        ABG - ( 21 Apr 2022 04:44 )  pH, Arterial: 7.510 pH, Blood: x     /  pCO2: 58    /  pO2: 67    / HCO3: 46    / Base Excess: 23.3  /  SaO2: 96.1                 MICROBIOLOGY:    RADIOLOGY & ADDITIONAL STUDIES:  < from: CT Neck Soft Tissue w/ IV Cont (04.20.22 @ 13:44) >    ACC: [id number] EXAM:  CT NECK SOFT TISSUE IC                          PROCEDURE DATE:  04/20/2022          INTERPRETATION:  TECHNIQUE: Contrast-enhanced CT neck soft tissues. 97 cc   Omnipaque under contrast was administered. 3 cc of contrast was discarded    COMPARISON: CTA neck dated 4/8/2022    CLINICAL INDICATIONS: hx of laryngeal CA    FINDINGS:    Visualized intracranial structures are unremarkable. Nasopharyngeal soft   tissues are unremarkable. The parapharyngeal spaces,  spaces,   parotid spaces, and submandibular spaces are symmetric.  Fatty replacement of the bilateral parotid glands and bilateral   submandibular glands. The oral cavity is unremarkable. The hypopharynx   and larynx are unremarkable without evidence of a definitive mass.   Consider outpatient PET/CT as clinically warranted.    Moderate right carotid bulb calcifications.    Bilateral apical scarring with bronchiectasis are noted. Please refer to   dedicated CT of the chest for further details. No acuteosseous   abnormality.    IMPRESSION:  No laryngeal mass visualized. Consider outpatient PET/CT as   clinically warranted.    --- End of Report ---            VITALY AMADOR MD; Attending Radiologist  This document has been electronically signed. Apr 20 2022  3:23PM    < end of copied text >

## 2022-04-21 NOTE — PROGRESS NOTE ADULT - ASSESSMENT
77 y/o Frisian speaking M w/ history of Laryngeal Cancer s/p Chemo + RT, PEG Tube, COPD on home oxygen, Carotid Stenosis, Non Obstructive CAD, HTN, HLD, Prediabetes / ? DM 2 and Hypothyroidism presented with unresponsiveness. Oxygen saturation at home in 30s. Intubated in ER and admitted to ICU with Hypoxic Respiratory Failure secondary. Found to be in Septic Shock secondary to Aspiration Pneumonia. Started on Levophed and later weaned off. He was extubated on 4/9/22. EEG preliminary report with potential multifocal epileptogenic foci. He was downgraded to Medicine Service on 4/10/22.  Since downgrade hospital course has been complicated by intermittent hypoxia likely due to continued worsening aspiration.  Pt will upgraded to SDU.       Acute on chronic Respiratory Failure with Hypoxia  - Worsening episodes of hypoxia and clinically diffuse rales likely due to ongoing aspiration.  CT chest reveals worsening infiltrate.    - Meropenem started by ID   - Currently on bipap titrate down as tolerated   - Aspiration is multifactorial 2/2 dysphagia and reflux from lack of free water flushing after tube feeds   - Duonebs q6h, Symbicort q12h and IV steroids w/ titration to po as tolerated   - Maintain on aspiration precautions including HOB elevation   - Pulmonary consulted and recs noted     -Recurrent aspiration Pneumonia  -Continue meropenem  -f/u culture data    Recurrent Sepsis  - Patient fits criteria once again.  Now febrile and tachycardiac  - Source likely due to aspiration once again  - Care management as above     Hypernatremia  Due to NPO status and insensible losses from sepsis  Start LR @ 60 cc/hr       Layrngeal cancer s/p radiation with subsequent PEG tube placement due to dysphagia  - Leakage noted during hospital course and GI consult; now resolved   - CT neck does not reveal any laryngeal mass  - Outpatient follow up    MATHIEU, likely prerenal   - Resolved      Prediabetes / ?DM 2  - HbA1c 6.1  - Accu checks and ISS      HTN now with hypotension   All all antihypertensives  Metoprolol 5 with holding parameters       Hypothyroidism  - On Synthroid IV      Constipation  - Maintain on miralax and lactulose      Abnormal EEG  - Repeat EEG with mild to moderate nonspecific diffuse or multifocal cerebral dysfunction. No epileptiform pattern or seizure seen.  - No intervention as per Epilepsy    Normocytic anemia  - H/H low but stable overall and at baseline of 8-9  - Stool guaiac negative, hemodynamically stable and no active bleeding   - Iron panel reviewed, maintain on supplementation   - Monitor CBC and transfuse for Hb<7-8      VTE ppx: Lovenox     Dispo: Pt decompensated overnight.  Remains acute  Daughter at [facility].  Discussed current plan and in agreement.  Patient ultimately wants to go home when ready for DC.

## 2022-04-21 NOTE — PROGRESS NOTE ADULT - SUBJECTIVE AND OBJECTIVE BOX
[facility]                                                INFECTIOUS DISEASES  =======================================================                               Demetrius Daly MD#  Alan Sosa MD*                                     Leslie Mccurdy MD*    Carli Clements MD*            Diplomates American Board of Internal Medicine & Infectious Diseases                  # [place] Office - Appt - Tel  [phone] Fax [phone]                * [place] Office - Appt - Tel [phone] Fax [phone]                                  Hospital Consult line:  [phone]  =======================================================    [id number]  KIMBERLY LAI  follow up: COPD, SOB    interval events reviewed. ICU re-evaluated on 4/20/22.    remains on BIPAP    Sputum cx prelim with GNR    I have personally reviewed the labs and data; pertinent labs and data are listed in this note; please see below.   =======================================================  Past Medical & Surgical Hx:  =====================  PAST MEDICAL & SURGICAL HISTORY:  Esophageal stricture    Prostate CA    History of carotid stenosis    Laryngeal carcinoma    COPD (chronic obstructive pulmonary disease)    Hypothyroidism    Aspiration pneumonia    Traumatic pneumothorax    Benign prostatic hyperplasia with urinary obstruction    Syncope and collapse    Microalbuminuria    Anemia    Hyperlipidemia, unspecified hyperlipidemia type    Femoral fracture    S/P percutaneous endoscopic gastrostomy (PEG) tube placement    Liver laceration    History of exploratory laparotomy    History of total bilateral knee replacement  b/l femur      Problem List:  ==========  HEALTH ISSUES - PROBLEM Dx:  COPD (chronic obstructive pulmonary disease)    Aspiration pneumonia    Sepsis with acute hypoxic respiratory failure    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  No earache, sore throat or runny nose.  CARDIOVASCULAR:  No pressure, squeezing, strangling, tightness, heaviness or aching about the chest, neck, axilla or epigastrium.  RESPIRATORY:  as per HPI  GASTROINTESTINAL:  No nausea, vomiting or diarrhea.  GENITOURINARY:  No dysuria, frequency or urgency. No Blood in urine  MUSCULOSKELETAL:  no joint aches, no muscle pain  SKIN:  No change in skin, hair or nails.  NEUROLOGIC:  No Headaches, seizures or weakness.  PSYCHIATRIC:  No disorder of thought or mood.  ENDOCRINE:  No heat or cold intolerance  HEMATOLOGICAL:  No easy bruising or bleeding.    =======================================================  Allergies  No Known Allergies       ======================================================  Physical Exam:  ============      General:  mild distress.  THIN Frail  Eye: Pupils are equal, round and reactive to light, Extraocular movements are intact, Normal conjunctiva.  HENT: Normocephalic, Oral mucosa is dry; BIPAP MASK  Neck: Supple, No lymphadenopathy.  Respiratory: Lungs with Fair air entry  Cardiovascular: Normal rate, Regular rhythm,   Gastrointestinal: Soft, Non-tender, Non-distended, Normal bowel sounds.  + PEG In place  Genitourinary: No costovertebral angle tenderness.  Lymphatics: No lymphadenopathy neck,   Musculoskeletal: Normal range of motion, Normal strength.  Integumentary: No rash.  Neurologic: Alert, Oriented,      =======================================================  Vitals:  ============  T(F): 100.9 (21 Apr 2022 09:01), Max: 101.9 (21 Apr 2022 07:56)  HR: 108 (21 Apr 2022 10:00)  BP: 80/48 (21 Apr 2022 10:00)  RR: 26 (21 Apr 2022 10:00)  SpO2: 92% (21 Apr 2022 10:00) (91% - 99%)  temp max in last 48H T(F): , Max: 101.9 (04-21-22 @ 07:56)    =======================================================  Current Antibiotics:  azithromycin  IVPB 500 milliGRAM(s) IV Intermittent every 24 hours  meropenem  IVPB 1000 milliGRAM(s) IV Intermittent every 8 hours    Other medications:  ALBUTerol    0.083% 2.5 milliGRAM(s) Nebulizer every 6 hours  aspirin Suppository 300 milliGRAM(s) Rectal daily  atorvastatin 40 milliGRAM(s) Oral at bedtime  budesonide 160 MICROgram(s)/formoterol 4.5 MICROgram(s) Inhaler 2 Puff(s) Inhalation two times a day  chlorhexidine 2% Cloths 1 Application(s) Topical <User Schedule>  chlorhexidine 2% Cloths 1 Application(s) Topical daily  cholecalciferol 1000 Unit(s) Oral daily  dextrose 5%. 1000 milliLiter(s) IV Continuous <Continuous>  dextrose 5%. 1000 milliLiter(s) IV Continuous <Continuous>  dextrose 50% Injectable 25 Gram(s) IV Push once  dextrose 50% Injectable 12.5 Gram(s) IV Push once  dextrose 50% Injectable 25 Gram(s) IV Push once  enalapril 2.5 milliGRAM(s) Oral daily  enoxaparin Injectable 40 milliGRAM(s) SubCutaneous every 24 hours  ferrous    sulfate Liquid 300 milliGRAM(s) Enteral Tube daily  glucagon  Injectable 1 milliGRAM(s) IntraMuscular once  guaiFENesin ER 1200 milliGRAM(s) Oral every 12 hours  insulin lispro (ADMELOG) corrective regimen sliding scale   SubCutaneous every 6 hours  lactobacillus acidophilus 1 Tablet(s) Oral two times a day  levothyroxine Injectable 56 MICROGram(s) IV Push at bedtime  methylPREDNISolone sodium succinate Injectable 40 milliGRAM(s) IV Push every 12 hours  metoprolol tartrate Injectable 5 milliGRAM(s) IV Push every 6 hours  oxybutynin 5 milliGRAM(s) Oral two times a day  pantoprazole  Injectable 40 milliGRAM(s) IV Push daily  polyethylene glycol 3350 17 Gram(s) Oral daily  senna 2 Tablet(s) Oral at bedtime  tiotropium 18 MICROgram(s) Capsule 1 Capsule(s) Inhalation daily      =======================================================  Labs:                        9.3    10.03 )-----------( 309      ( 21 Apr 2022 08:17 )             32.3     04-21    146<H>  |  97<L>  |  54.2<H>  ----------------------------<  120<H>  4.6   |  35.0<H>  |  0.78    Ca    10.0      21 Apr 2022 08:17  Phos  3.7     04-21  Mg     2.1     04-21    TPro  8.2  /  Alb  2.9<L>  /  TBili  0.4  /  DBili  x   /  AST  22  /  ALT  17  /  AlkPhos  78  04-21      Culture - Sputum (collected 04-20-22 @ 12:30)  Source: .Sputum Sputum  Gram Stain (04-20-22 @ 15:09):    Rare polymorphonuclear leukocytes per low power field    Moderate Squamous epithelial cells per low power field    Few Gram Negative Rods per oil power field    Culture - Blood (collected 04-14-22 @ 12:31)  Source: .Blood Blood-Peripheral  Final Report (04-19-22 @ 13:01):    No growth at 5 days.    Culture - Blood (collected 04-14-22 @ 12:31)  Source: .Blood Blood-Peripheral  Final Report (04-19-22 @ 13:01):    No growth at 5 days.    Culture - Blood (collected 04-12-22 @ 09:19)  Source: .Blood Blood  Final Report (04-17-22 @ 11:00):    No growth at 5 days.    Culture - Blood (collected 04-12-22 @ 09:19)  Source: .Blood Blood  Final Report (04-17-22 @ 11:00):    No growth at 5 days.    Culture - Sputum (collected 04-08-22 @ 21:50)  Source: .Sputum Sputum  Gram Stain (04-09-22 @ 07:24):    Numerous polymorphonuclear leukocytes per low power field    Rare Squamous epithelial cells per low power field    Few Gram Negative Rods seen per oil power field  Final Report (04-11-22 @ 19:35):    Moderate Escherichia coli    Normal Respiratory Britt absent  Organism: Escherichia coli (04-11-22 @ 19:35)  Organism: Escherichia coli (04-11-22 @ 19:35)    Sensitivities:      -  Amikacin: S <=16      -  Amoxicillin/Clavulanic Acid: S <=8/4      -  Ampicillin: S <=8 These ampicillin results predict results for amoxicillin      -  Ampicillin/Sulbactam: S <=4/2 Enterobacter, Klebsiella aerogenes, Citrobacter, and Serratia may develop resistance during prolonged therapy (3-4 days)      -  Aztreonam: S <=4      -  Cefazolin: S <=2 Enterobacter, Klebsiella aerogenes, Citrobacter, and Serratia may develop resistance during prolonged therapy (3-4 days)      -  Cefepime: S <=2      -  Cefoxitin: S <=8      -  Ceftriaxone: S <=1 Enterobacter, Klebsiella aerogenes, Citrobacter, and Serratia may develop resistance during prolonged therapy      -  Ciprofloxacin: R >2      -  Ertapenem: S <=0.5      -  Gentamicin: S <=2      -  Imipenem: S <=1      -  Levofloxacin: R >4      -  Meropenem: S <=1      -  Piperacillin/Tazobactam: S <=8      -  Tobramycin: S <=2      -  Trimethoprim/Sulfamethoxazole: R >2/38      Method Type: JAZIEL    Culture - Urine (collected 04-08-22 @ 16:21)  Source: Clean Catch Clean Catch (Midstream)  Final Report (04-09-22 @ 12:58):    No growth    Culture - Blood (collected 04-08-22 @ 10:13)  Source: .Blood Blood-Peripheral  Gram Stain (04-11-22 @ 15:11):    Growth in aerobic bottle: Gram Positive Rods    Gram Stain performed by:    [facility]    [address] [place] [id number]    ,    TYPE: (C=Critical, N=Notification, A=Abnormal) c    TESTS:  _ gs    DATE/TIME CALLED: _ 04/11/2022 14:50:11    CALLED TO: Iraida alexander rn    READ BACK (2 Patient Identifiers)(Y/N): _ y    READ BACK VALUES (Y/N): _ y    CALLED BY: Iraida crandall  Final Report (04-12-22 @ 22:34):    Growth in aerobic bottle: Corynebacterium jeikeium "Susceptibilities not    performed"    Culture - Blood (collected 04-08-22 @ 10:12)  Source: .Blood Blood-Peripheral  Gram Stain (04-10-22 @ 09:14):    Growth in aerobic bottle: Gram Positive Cocci in Clusters    ***Blood Panel PCR results on this specimen are available    approximately 3 hours after the Gram stain result.***    Gram stain, PCR, and/or culture results may not always    correspond due to difference in methodologies.    ************************************************************    This PCR assay was performed using [employer].    The following targets are tested for: Enterococcus,    vancomycin resistant enterococci, Listeria monocytogenes,    coagulase negative staphylococci, S. aureus,    methicillin resistant S. aureus, Streptococcus agalactiae    (Group B), S. pneumoniae, S. pyogenes (Group A),    Acinetobacter baumannii, Enterobacter cloacae, E. coli,    Klebsiella oxytoca, K. pneumoniae, Proteus sp.,    Serratia marcescens, Haemophilus influenzae,    Neisseria meningitidis, Pseudomonas aeruginosa, Candida    albicans, C. glabrata, C krusei, C parapsilosis,    C. tropicalis and the KPC resistance gene.    Gram Stain and BCID performed by:    [facility]    [address] [place] [id number]    .    TYPE: (C=Critical, N=Notification, A=Abnormal) C    TESTS:  _ GS    DATE/TIME CALLED: _ 04/10/2022 09:12:58    CALLED TO: Iraida Alexander RN    READ BACK (2 Patient Identifiers)(Y/N): _ Y    READ BACK VALUES (Y/N): _ Y    CALLED BY: Iraida Negron  Final Report (04-12-22 @ 17:52):    Growth in aerobic bottle: Coag Negative Staphylococcus Unable to Identify    further    Coag Negative Staphylococcus    Single set isolate, possible contaminant. Contact    Microbiology if susceptibility testing clinically    indicated.  Organism: Blood Culture PCR (04-12-22 @ 17:52)    Sensitivities:      -  Coagulase negative Staphylococcus: Detec      Method Type: PCR  Organism: Blood Culture PCR (04-10-22 @ 09:10)         COVID-19 PCR: NotDetec (04-15-22 @ 10:18)  SARS-CoV-2: NotDetec (04-08-22 @ 09:01)      =======================================================  Steroids Course:      methylPREDNISolone sodium succinate Injectable   40 milliGRAM(s) (04-20-22 @ 05:55)   40 milliGRAM(s) (04-19-22 @ 18:13)   40 milliGRAM(s) (04-19-22 @ 06:30)   40 milliGRAM(s) (04-18-22 @ 21:35)   40 milliGRAM(s) (04-18-22 @ 08:00)   40 milliGRAM(s) (04-17-22 @ 17:36)   40 milliGRAM(s) (04-17-22 @ 05:18)   40 milliGRAM(s) (04-16-22 @ 17:02)   40 milliGRAM(s) (04-16-22 @ 07:55)   40 milliGRAM(s) (04-16-22 @ 01:28)   40 milliGRAM(s) (04-15-22 @ 16:07)   40 milliGRAM(s) (04-15-22 @ 11:16)   40 milliGRAM(s) (04-15-22 @ 02:41)   40 milliGRAM(s) (04-14-22 @ 18:30)   40 milliGRAM(s) (04-14-22 @ 10:04)   40 milliGRAM(s) (04-14-22 @ 03:03)   40 milliGRAM(s) (04-13-22 @ 18:34)   125 milliGRAM(s) (04-13-22 @ 09:16)    hydrocortisone sodium succinate Injectable   50 milliGRAM(s) (04-10-22 @ 18:30)   50 milliGRAM(s) (04-10-22 @ 13:24)   50 milliGRAM(s) (04-10-22 @ 06:29)   50 milliGRAM(s) (04-10-22 @ 00:01)   50 milliGRAM(s) (04-09-22 @ 17:28)   50 milliGRAM(s) (04-09-22 @ 11:19)   50 milliGRAM(s) (04-09-22 @ 05:15)   50 milliGRAM(s) (04-09-22 @ 00:27)   50 milliGRAM(s) (04-08-22 @ 18:10)   50 milliGRAM(s) (04-08-22 @ 13:15)      =========  < from: CT Chest w/ IV Cont (04.20.22 @ 13:44) >    ACC: [id number] EXAM:  CT CHEST IC                          PROCEDURE DATE:  04/20/2022          INTERPRETATION:  HISTORY: Admitting Dxs: J96.01 RESPIRATORY DIS. Hypoxia.   Recent pneumonia. Worsening chest x-ray findings.    EXAMINATION: CT CHEST was performed with IV contrast.  CONTRAST/COMPLICATIONS:  IV Contrast: Omnipaque 300  97 cc administered   3 cc discarded  Oral Contrast: NONE  Complications: None reported at time of study completion    COMPARISON: 4/8/2022 CT chest.    FINDINGS:    AIRWAYS, LUNGS, PLEURA: Tracheal secretions. Biapical scarring/fibrosis   and peripheral left upper lobe subpleural thickening unchanged.    Multifocal left greater than right lower lobe consolidation, patchy   ground-glass opacities primarily in the upper lobes, and tree-in-bud   nodular opacities throughout the right middle lobe and bilateral lower   lobes.    With respect to 4/8/2022 left lower lobe multifocal consolidation and   superior segment ground-glass nodular opacities and lingular   consolidation appear worse.    Trace left pleural effusion.    MEDIASTINUM: Normal heart size. No pericardial effusion. Thoracic aorta   normal caliber.  Unchanged mediastinal and right hilar lymphadenopathy;   reference subcarinal 3.8 x 1.5 cm node in 1.3 x 1 cm node adjacent to the   descending thoracic aorta (image 1 1, series 3).    IMAGED ABDOMEN: Unchanged hyperdense material within the gallbladder,   likely stones. Subcentimeter left renal hypodense lesions too small to   characterize. Gastrostomy tube in place.    SOFT TISSUES: Unremarkable.    BONES: Unremarkable.      IMPRESSION:.    Findings likely reflect aspiration pneumonitis/infection and there has been interval worsening of airspace disease in the lingula and left lower lobe compared to 4/8/2022.    Unchanged mediastinal and right hilar lymphadenopathy.    --- End of Report ---         MILENA BLOCK MD; Attending Radiologist  This document has been electronically signed. Apr 20 2022  2:04PM    < end of copied text >

## 2022-04-21 NOTE — CONSULT NOTE ADULT - ASSESSMENT
76M with oxygen dependent COPD, bilateral carotid stenosis, laryngeal cancer s/p chemo/RT, s/p PEG, recent admission in January for aspiration pneumonia, here with acute on chronic respiratory failure, s/p intubation/extubation ( 4/9), Ecoli pneumonia, COPD exacerbation, now on continuous bipap with decompensation on 4/20, fevers, possible recurrent aspiration.     #1 Acute on chronic hypoxic respiratory failure  - s/p intubation/extubation 4/9  - s/p treatment for ecoli pneumonia  - on steroids / nebs for component of COPD  - on continuous bipap  - CT chest from 4/20 with worsening airspace disease possible aspiration pnuemonitis/infection  - possible recurrent aspiration event, on merrem and azithromycin per ID     #2 Dyspnea  - family states he tolerated morphine well to aid in compliance with bipap  - order morphine at bedtime and PRN    #3 Laryngeal cancer s/p RT, PEG tube  - no inpatient needs, outpatient follow up     #4 COPD, possible exacerbation on admission  - steroid taper and nebulizers  - oxygen supplementation    #5 Encounter for palliative care  - spoke with patient, wife, brother, and daughter Karolyn (on phone), introduced myself and role of palliative care in patients with chronic illnesses and help in support and symptom management.   - Family states Kelly ( daughter) is primary caretaker   - Discussed use of symptom management to aid in recovery  - emotional support provided

## 2022-04-21 NOTE — CONSULT NOTE ADULT - SUBJECTIVE AND OBJECTIVE BOX
HPI:  Pt is a 75 y/o M pmhx of COPD on home O2, B/l carotid artery stenosis, laryngeal CA s/p chemo and radiation, PEG tube, who was BIBA after being found unresponsive at home w/ SpO2 in the 30's, Intubated upon arrival to ED and started on propofol gtt for sedation. ICU consulted for hypoxic respiratory failure requiring intubation.  (08 Apr 2022 11:18)      PERTINENT PMH REVIEWED: Yes No    PAST MEDICAL & SURGICAL HISTORY:  Esophageal stricture    Prostate CA    History of carotid stenosis    Laryngeal carcinoma    COPD (chronic obstructive pulmonary disease)    Hypothyroidism    Aspiration pneumonia    Traumatic pneumothorax    Benign prostatic hyperplasia with urinary obstruction    Syncope and collapse    Microalbuminuria    Anemia    Hyperlipidemia, unspecified hyperlipidemia type    Femoral fracture    S/P percutaneous endoscopic gastrostomy (PEG) tube placement    Liver laceration    History of exploratory laparotomy    History of total bilateral knee replacement  b/l femur        SOCIAL HISTORY:                      Substance history:                    Admitted from:  home  SNF  [facility]                     Mosque/spirituality:                    Cultural concerns:                      Surrogate/HCP/Guardian: Phone#:    FAMILY HISTORY:  Family history of hypertension (Mother)    Family history of cerebrovascular accident (CVA) (Mother)        Allergies    No Known Allergies    Intolerances        ADVANCE DIRECTIVES/TREATMENT PREFERENCES:  Full code, all aggressive measures desired   DNR/DNI - MOLST, continue all other medical treatments  DNR/DNI - MOLST, comfort measures only     Baseline ADLs (prior to admission):  Independent/ Dependent      Karnofsky/Palliative Performance Status Version 2:  %  http://npcrc.org/files/news/palliative_performance_scale_ppsv2.pdf    Present Symptoms:     Dyspnea: Mild Moderate Severe  Nausea/Vomiting: Yes No  Anxiety:  Yes No  Depression: Yes No  Fatigue: Yes No  Loss of appetite: Yes No  Constipation:     Pain:             Character-            Duration-            Effect-            Factors-            Frequency-            Location-            Severity-    Pain AD Score:  [email or web address] (press ctrl + left click to view)    Review of Systems: Reviewed                     Negative:                     Positive:  Unable to obtain due to poor mentation   All others negative    MEDICATIONS  (STANDING):  ALBUTerol    0.083% 2.5 milliGRAM(s) Nebulizer every 6 hours  aspirin Suppository 300 milliGRAM(s) Rectal daily  atorvastatin 40 milliGRAM(s) Oral at bedtime  azithromycin  IVPB 500 milliGRAM(s) IV Intermittent every 24 hours  budesonide 160 MICROgram(s)/formoterol 4.5 MICROgram(s) Inhaler 2 Puff(s) Inhalation two times a day  chlorhexidine 2% Cloths 1 Application(s) Topical <User Schedule>  chlorhexidine 2% Cloths 1 Application(s) Topical daily  cholecalciferol 1000 Unit(s) Oral daily  dextrose 5%. 1000 milliLiter(s) (50 mL/Hr) IV Continuous <Continuous>  dextrose 5%. 1000 milliLiter(s) (100 mL/Hr) IV Continuous <Continuous>  dextrose 50% Injectable 12.5 Gram(s) IV Push once  dextrose 50% Injectable 25 Gram(s) IV Push once  dextrose 50% Injectable 25 Gram(s) IV Push once  enalapril 2.5 milliGRAM(s) Oral daily  enoxaparin Injectable 40 milliGRAM(s) SubCutaneous every 24 hours  ferrous    sulfate Liquid 300 milliGRAM(s) Enteral Tube daily  glucagon  Injectable 1 milliGRAM(s) IntraMuscular once  guaiFENesin ER 1200 milliGRAM(s) Oral every 12 hours  insulin lispro (ADMELOG) corrective regimen sliding scale   SubCutaneous every 6 hours  lactated ringers. 1000 milliLiter(s) (60 mL/Hr) IV Continuous <Continuous>  lactobacillus acidophilus 1 Tablet(s) Oral two times a day  levothyroxine Injectable 56 MICROGram(s) IV Push at bedtime  meropenem  IVPB 1000 milliGRAM(s) IV Intermittent every 8 hours  methylPREDNISolone sodium succinate Injectable 40 milliGRAM(s) IV Push every 12 hours  metoprolol tartrate Injectable 5 milliGRAM(s) IV Push every 6 hours  oxybutynin 5 milliGRAM(s) Oral two times a day  pantoprazole  Injectable 40 milliGRAM(s) IV Push daily  polyethylene glycol 3350 17 Gram(s) Oral daily  senna 2 Tablet(s) Oral at bedtime  tiotropium 18 MICROgram(s) Capsule 1 Capsule(s) Inhalation daily    MEDICATIONS  (PRN):  acetaminophen     Tablet .. 650 milliGRAM(s) Oral every 6 hours PRN Temp greater or equal to 38C (100.4F)  dextrose Oral Gel 15 Gram(s) Oral once PRN Blood Glucose LESS THAN 70 milliGRAM(s)/deciliter  guaiFENesin Oral Liquid (Sugar-Free) 100 milliGRAM(s) Oral every 6 hours PRN Cough  hydrALAZINE Injectable 10 milliGRAM(s) IV Push every 6 hours PRN If SBP > 160  sodium chloride 0.9% lock flush 10 milliLiter(s) IV Push every 1 hour PRN Pre/post blood products, medications, blood draw, and to maintain line patency      PHYSICAL EXAM:    Vital Signs Last 24 Hrs  T(C): 37.6 (21 Apr 2022 11:25), Max: 38.8 (21 Apr 2022 07:56)  T(F): 99.7 (21 Apr 2022 11:25), Max: 101.9 (21 Apr 2022 07:56)  HR: 107 (21 Apr 2022 11:25) (94 - 136)  BP: 110/52 (21 Apr 2022 11:25) (80/48 - 156/55)  BP(mean): 77 (21 Apr 2022 06:25) (71 - 85)  RR: 25 (21 Apr 2022 11:25) (18 - 30)  SpO2: 96% (21 Apr 2022 11:25) (91% - 98%)    General: alert  oriented x ____ lethargic agitated delirious                  cachexia  nonverbal  coma    HEENT: normal  dry mouth  ET tube/trach    Lungs: comfortable tachypnea/labored breathing  excessive secretions    CV: normal  tachycardia    GI: normal  distended  tender  no BS               PEG/NG/OG tube  constipation  last BM:     : normal  incontinent  oliguria/anuria  albright    MSK: normal  weakness  edema             ambulatory  bedbound/wheelchair bound    Neuro: no focal deficits cognitive impairment dysphagia dysarthria hemiplegia     Skin: normal  pressure ulcers- Stage_____  no rash    LABS:                        9.3    10.03 )-----------( 309      ( 21 Apr 2022 08:17 )             32.3     04-21    146<H>  |  97<L>  |  54.2<H>  ----------------------------<  120<H>  4.6   |  35.0<H>  |  0.78    Ca    10.0      21 Apr 2022 08:17  Phos  3.7     04-21  Mg     2.1     04-21    TPro  8.2  /  Alb  2.9<L>  /  TBili  0.4  /  DBili  x   /  AST  22  /  ALT  17  /  AlkPhos  78  04-21        I&O's Summary    20 Apr 2022 07:01  -  21 Apr 2022 07:00  --------------------------------------------------------  IN: 400 mL / OUT: 1250 mL / NET: -850 mL    21 Apr 2022 07:01  -  21 Apr 2022 13:54  --------------------------------------------------------  IN: 610 mL / OUT: 300 mL / NET: 310 mL    RADIOLOGY & ADDITIONAL STUDIES:    < from: CT Chest w/ IV Cont (04.20.22 @ 13:44) >    IMPRESSION:.    Findings likely reflect aspiration pneumonitis/infection and there has   been interval worsening of airspace disease in the lingula and left lower   lobe compared to 4/8/2022.    Unchanged mediastinal and right hilar lymphadenopathy.    --- End of Report ---    < end of copied text >         HPI: 76M with PMH as listed admitted 4/8 with unresponsiveness, hypoxic respiratory failure, intubated admitted to ICU. Initially in ICU required pressors, extubated 4/9, Ecoli + sputum treated for pneumonia, and transitioned to medical unit. Was also treated with steroids for superimposed COPD exacerbation.  Patient had acute decompensation 4/20 with worsening hypoxia, now on continuous bipap, possible aspiration related event. MICU reconsulted but declined patient. palliative called for assist in support and symptom management.     PERTINENT PMH REVIEWED: Yes     PAST MEDICAL & SURGICAL HISTORY:  Esophageal stricture  Prostate CA  History of carotid stenosis  Laryngeal carcinoma  COPD (chronic obstructive pulmonary disease)  Hypothyroidism  Aspiration pneumonia  Traumatic pneumothorax  Benign prostatic hyperplasia with urinary obstruction  Syncope and collapse  Microalbuminuria  Anemia  Hyperlipidemia, unspecified hyperlipidemia type  Femoral fracture  S/P percutaneous endoscopic gastrostomy (PEG) tube placement  Liver laceration  History of exploratory laparotomy  History of total bilateral knee replacement  b/l femur    SOCIAL HISTORY:                      Substance history:                     Admitted from:  home                      Restorationism/spirituality:                    Cultural concerns:                      Surrogate - wife Devorah . daughter Kelly , daughter Karolyn ( radiologist)    FAMILY HISTORY:  Family history of hypertension (Mother)    Family history of cerebrovascular accident (CVA) (Mother)    Allergies    No Known Allergies    ADVANCE DIRECTIVES/TREATMENT PREFERENCES:  Full code, all aggressive measures desired     Baseline ADLs (prior to admission):  Dependent      Karnofsky/Palliative Performance Status Version 2:  30 %  http://npcrc.org/files/news/palliative_performance_scale_ppsv2.pdf    Present Symptoms:     Dyspnea: Mild    Nausea/Vomiting: No  Anxiety:  No  Depression: unable   Fatigue: Yes   Loss of appetite: No  Constipation: unable     Pain: none             Character-            Duration-            Effect-            Factors-            Frequency-            Location-            Severity-    Pain AD Score:  [email or web address] (press ctrl + left click to view)    Review of Systems: Reviewed                     Negative: no chest pain                      All others negative    MEDICATIONS  (STANDING):  ALBUTerol    0.083% 2.5 milliGRAM(s) Nebulizer every 6 hours  aspirin Suppository 300 milliGRAM(s) Rectal daily  atorvastatin 40 milliGRAM(s) Oral at bedtime  azithromycin  IVPB 500 milliGRAM(s) IV Intermittent every 24 hours  budesonide 160 MICROgram(s)/formoterol 4.5 MICROgram(s) Inhaler 2 Puff(s) Inhalation two times a day  chlorhexidine 2% Cloths 1 Application(s) Topical <User Schedule>  chlorhexidine 2% Cloths 1 Application(s) Topical daily  cholecalciferol 1000 Unit(s) Oral daily  dextrose 5%. 1000 milliLiter(s) (50 mL/Hr) IV Continuous <Continuous>  dextrose 5%. 1000 milliLiter(s) (100 mL/Hr) IV Continuous <Continuous>  dextrose 50% Injectable 12.5 Gram(s) IV Push once  dextrose 50% Injectable 25 Gram(s) IV Push once  dextrose 50% Injectable 25 Gram(s) IV Push once  enalapril 2.5 milliGRAM(s) Oral daily  enoxaparin Injectable 40 milliGRAM(s) SubCutaneous every 24 hours  ferrous    sulfate Liquid 300 milliGRAM(s) Enteral Tube daily  glucagon  Injectable 1 milliGRAM(s) IntraMuscular once  guaiFENesin ER 1200 milliGRAM(s) Oral every 12 hours  insulin lispro (ADMELOG) corrective regimen sliding scale   SubCutaneous every 6 hours  lactated ringers. 1000 milliLiter(s) (60 mL/Hr) IV Continuous <Continuous>  lactobacillus acidophilus 1 Tablet(s) Oral two times a day  levothyroxine Injectable 56 MICROGram(s) IV Push at bedtime  meropenem  IVPB 1000 milliGRAM(s) IV Intermittent every 8 hours  methylPREDNISolone sodium succinate Injectable 40 milliGRAM(s) IV Push every 12 hours  metoprolol tartrate Injectable 5 milliGRAM(s) IV Push every 6 hours  oxybutynin 5 milliGRAM(s) Oral two times a day  pantoprazole  Injectable 40 milliGRAM(s) IV Push daily  polyethylene glycol 3350 17 Gram(s) Oral daily  senna 2 Tablet(s) Oral at bedtime  tiotropium 18 MICROgram(s) Capsule 1 Capsule(s) Inhalation daily    MEDICATIONS  (PRN):  acetaminophen     Tablet .. 650 milliGRAM(s) Oral every 6 hours PRN Temp greater or equal to 38C (100.4F)  dextrose Oral Gel 15 Gram(s) Oral once PRN Blood Glucose LESS THAN 70 milliGRAM(s)/deciliter  guaiFENesin Oral Liquid (Sugar-Free) 100 milliGRAM(s) Oral every 6 hours PRN Cough  hydrALAZINE Injectable 10 milliGRAM(s) IV Push every 6 hours PRN If SBP > 160  sodium chloride 0.9% lock flush 10 milliLiter(s) IV Push every 1 hour PRN Pre/post blood products, medications, blood draw, and to maintain line patency      PHYSICAL EXAM:    Vital Signs Last 24 Hrs  T(C): 37.6 (21 Apr 2022 11:25), Max: 38.8 (21 Apr 2022 07:56)  T(F): 99.7 (21 Apr 2022 11:25), Max: 101.9 (21 Apr 2022 07:56)  HR: 107 (21 Apr 2022 11:25) (94 - 136)  BP: 110/52 (21 Apr 2022 11:25) (80/48 - 156/55)  BP(mean): 77 (21 Apr 2022 06:25) (71 - 85)  RR: 25 (21 Apr 2022 11:25) (18 - 30)  SpO2: 96% (21 Apr 2022 11:25) (91% - 98%)    General: alert, cachexia, on bipap     HEENT: normal      Lungs: comfortable on bipap     CV: normal      GI: normal      : normal      MSK: weakness     Neuro: no focal deficits     Skin: no rash    LABS:                      9.3    10.03 )-----------( 309      ( 21 Apr 2022 08:17 )             32.3     04-21    146<H>  |  97<L>  |  54.2<H>  ----------------------------<  120<H>  4.6   |  35.0<H>  |  0.78    Ca    10.0      21 Apr 2022 08:17  Phos  3.7     04-21  Mg     2.1     04-21    TPro  8.2  /  Alb  2.9<L>  /  TBili  0.4  /  DBili  x   /  AST  22  /  ALT  17  /  AlkPhos  78  04-21    I&O's Summary    20 Apr 2022 07:01  -  21 Apr 2022 07:00  --------------------------------------------------------  IN: 400 mL / OUT: 1250 mL / NET: -850 mL    21 Apr 2022 07:01  -  21 Apr 2022 13:54  --------------------------------------------------------  IN: 610 mL / OUT: 300 mL / NET: 310 mL    RADIOLOGY & ADDITIONAL STUDIES:    < from: CT Chest w/ IV Cont (04.20.22 @ 13:44) >    IMPRESSION:.    Findings likely reflect aspiration pneumonitis/infection and there has   been interval worsening of airspace disease in the lingula and left lower   lobe compared to 4/8/2022.    Unchanged mediastinal and right hilar lymphadenopathy.    --- End of Report ---    < end of copied text >         HPI: 76M with PMH as listed admitted 4/8 with unresponsiveness, hypoxic respiratory failure, intubated admitted to ICU. Initially in ICU required pressors, extubated 4/9, Ecoli + sputum treated for pneumonia, and transitioned to medical unit. Was also treated with steroids for superimposed COPD exacerbation.  Patient had acute decompensation 4/20 with worsening hypoxia, now on continuous bipap, possible aspiration related event. MICU reconsulted but declined patient. palliative called for assist in support and symptom management.    Of note, I had seen patient on prior admit here in January for aspiration pneumonia, and at that time he was unsure of his wishes regarding advanced care planning.      PERTINENT PMH REVIEWED: Yes     PAST MEDICAL & SURGICAL HISTORY:  Esophageal stricture  Prostate CA  History of carotid stenosis  Laryngeal carcinoma  COPD (chronic obstructive pulmonary disease)  Hypothyroidism  Aspiration pneumonia  Traumatic pneumothorax  Benign prostatic hyperplasia with urinary obstruction  Syncope and collapse  Microalbuminuria  Anemia  Hyperlipidemia, unspecified hyperlipidemia type  Femoral fracture  S/P percutaneous endoscopic gastrostomy (PEG) tube placement  Liver laceration  History of exploratory laparotomy  History of total bilateral knee replacement  b/l femur    SOCIAL HISTORY:                      Substance history:                     Admitted from:  home                      Sabianism/spirituality:                    Cultural concerns:                      Surrogate - wife Devorah . daughter Kelly , daughter Karolyn ( radiologist)    FAMILY HISTORY:  Family history of hypertension (Mother)    Family history of cerebrovascular accident (CVA) (Mother)    Allergies    No Known Allergies    ADVANCE DIRECTIVES/TREATMENT PREFERENCES:  Full code, all aggressive measures desired     Baseline ADLs (prior to admission):  Dependent      Karnofsky/Palliative Performance Status Version 2:  30 %  http://npcrc.org/files/news/palliative_performance_scale_ppsv2.pdf    Present Symptoms:     Dyspnea: Mild    Nausea/Vomiting: No  Anxiety:  No  Depression: unable   Fatigue: Yes   Loss of appetite: No  Constipation: unable     Pain: none             Character-            Duration-            Effect-            Factors-            Frequency-            Location-            Severity-    Pain AD Score:  [email or web address] (press ctrl + left click to view)    Review of Systems: Reviewed                     Negative: no chest pain                      All others negative    MEDICATIONS  (STANDING):  ALBUTerol    0.083% 2.5 milliGRAM(s) Nebulizer every 6 hours  aspirin Suppository 300 milliGRAM(s) Rectal daily  atorvastatin 40 milliGRAM(s) Oral at bedtime  azithromycin  IVPB 500 milliGRAM(s) IV Intermittent every 24 hours  budesonide 160 MICROgram(s)/formoterol 4.5 MICROgram(s) Inhaler 2 Puff(s) Inhalation two times a day  chlorhexidine 2% Cloths 1 Application(s) Topical <User Schedule>  chlorhexidine 2% Cloths 1 Application(s) Topical daily  cholecalciferol 1000 Unit(s) Oral daily  dextrose 5%. 1000 milliLiter(s) (50 mL/Hr) IV Continuous <Continuous>  dextrose 5%. 1000 milliLiter(s) (100 mL/Hr) IV Continuous <Continuous>  dextrose 50% Injectable 12.5 Gram(s) IV Push once  dextrose 50% Injectable 25 Gram(s) IV Push once  dextrose 50% Injectable 25 Gram(s) IV Push once  enalapril 2.5 milliGRAM(s) Oral daily  enoxaparin Injectable 40 milliGRAM(s) SubCutaneous every 24 hours  ferrous    sulfate Liquid 300 milliGRAM(s) Enteral Tube daily  glucagon  Injectable 1 milliGRAM(s) IntraMuscular once  guaiFENesin ER 1200 milliGRAM(s) Oral every 12 hours  insulin lispro (ADMELOG) corrective regimen sliding scale   SubCutaneous every 6 hours  lactated ringers. 1000 milliLiter(s) (60 mL/Hr) IV Continuous <Continuous>  lactobacillus acidophilus 1 Tablet(s) Oral two times a day  levothyroxine Injectable 56 MICROGram(s) IV Push at bedtime  meropenem  IVPB 1000 milliGRAM(s) IV Intermittent every 8 hours  methylPREDNISolone sodium succinate Injectable 40 milliGRAM(s) IV Push every 12 hours  metoprolol tartrate Injectable 5 milliGRAM(s) IV Push every 6 hours  oxybutynin 5 milliGRAM(s) Oral two times a day  pantoprazole  Injectable 40 milliGRAM(s) IV Push daily  polyethylene glycol 3350 17 Gram(s) Oral daily  senna 2 Tablet(s) Oral at bedtime  tiotropium 18 MICROgram(s) Capsule 1 Capsule(s) Inhalation daily    MEDICATIONS  (PRN):  acetaminophen     Tablet .. 650 milliGRAM(s) Oral every 6 hours PRN Temp greater or equal to 38C (100.4F)  dextrose Oral Gel 15 Gram(s) Oral once PRN Blood Glucose LESS THAN 70 milliGRAM(s)/deciliter  guaiFENesin Oral Liquid (Sugar-Free) 100 milliGRAM(s) Oral every 6 hours PRN Cough  hydrALAZINE Injectable 10 milliGRAM(s) IV Push every 6 hours PRN If SBP > 160  sodium chloride 0.9% lock flush 10 milliLiter(s) IV Push every 1 hour PRN Pre/post blood products, medications, blood draw, and to maintain line patency      PHYSICAL EXAM:    Vital Signs Last 24 Hrs  T(C): 37.6 (21 Apr 2022 11:25), Max: 38.8 (21 Apr 2022 07:56)  T(F): 99.7 (21 Apr 2022 11:25), Max: 101.9 (21 Apr 2022 07:56)  HR: 107 (21 Apr 2022 11:25) (94 - 136)  BP: 110/52 (21 Apr 2022 11:25) (80/48 - 156/55)  BP(mean): 77 (21 Apr 2022 06:25) (71 - 85)  RR: 25 (21 Apr 2022 11:25) (18 - 30)  SpO2: 96% (21 Apr 2022 11:25) (91% - 98%)    General: alert, cachexia, on bipap     HEENT: normal      Lungs: comfortable on bipap     CV: normal      GI: normal      : normal      MSK: weakness     Neuro: no focal deficits     Skin: no rash    LABS:                      9.3    10.03 )-----------( 309      ( 21 Apr 2022 08:17 )             32.3     04-21    146<H>  |  97<L>  |  54.2<H>  ----------------------------<  120<H>  4.6   |  35.0<H>  |  0.78    Ca    10.0      21 Apr 2022 08:17  Phos  3.7     04-21  Mg     2.1     04-21    TPro  8.2  /  Alb  2.9<L>  /  TBili  0.4  /  DBili  x   /  AST  22  /  ALT  17  /  AlkPhos  78  04-21    I&O's Summary    20 Apr 2022 07:01  -  21 Apr 2022 07:00  --------------------------------------------------------  IN: 400 mL / OUT: 1250 mL / NET: -850 mL    21 Apr 2022 07:01  -  21 Apr 2022 13:54  --------------------------------------------------------  IN: 610 mL / OUT: 300 mL / NET: 310 mL    RADIOLOGY & ADDITIONAL STUDIES:    < from: CT Chest w/ IV Cont (04.20.22 @ 13:44) >    IMPRESSION:.    Findings likely reflect aspiration pneumonitis/infection and there has   been interval worsening of airspace disease in the lingula and left lower   lobe compared to 4/8/2022.    Unchanged mediastinal and right hilar lymphadenopathy.    --- End of Report ---    < end of copied text >

## 2022-04-21 NOTE — CHART NOTE - NSCHARTNOTEFT_GEN_A_CORE
Called by RN after pt desat on 40%FiO2 BiPAP.  As per RN, Pt's daughter by bedside, very anxious and upset, demanding to have pt seen by ICU. Called by RN after pt desat to 85% on 40%FiO2 BiPAP.  As per RN, Pt's daughter by bedside, very anxious and upset, demanding to have pt seen by ICU.  RT by pt's bedside, increased FiO2 to 60%, sating 90%.  Stat ABG ordered.  As per daughter, pt's HR increased and desatted post duoneb treatment.  RN to obtain rectal temp.  Pt is asymptomatic.    ICU Vital Signs Last 24 Hrs  T(C): 37.3 (21 Apr 2022 05:00), Max: 37.3 (20 Apr 2022 06:03)  T(F): 99.1 (21 Apr 2022 05:00), Max: 99.2 (20 Apr 2022 06:03)  HR: 124 (21 Apr 2022 05:00) (94 - 125)  BP: 155/64 (21 Apr 2022 05:00) (103/45 - 156/55)  BP(mean): 85 (21 Apr 2022 05:00) (71 - 85)  ABP: --  ABP(mean): --  RR: 27 (21 Apr 2022 05:00) (18 - 28)  SpO2: 93% (21 Apr 2022 05:00) (91% - 99%)  General   Pt is arousable to verbal stimuli, tachypneic, with resting tremors  Lungs      fair air entry b/l  w/ decreased BS /crackles in lower fields >R  Heart      s1/s2 tachycardic regular  Dyspnea  ABG - ( 21 Apr 2022 04:44 )  pH, Arterial: 7.510 pH, Blood: x     /  pCO2: 58    /  pO2: 67    / HCO3: 46    / Base Excess: 23.3  /  SaO2: 96.1    Tachycardia/tremors likely from albuterol given afebrile rectally  Morphine 2mg IVPx1 stat  Will repeat ABG @09:00 Called by RN after pt desat to 85% on 40%FiO2 BiPAP.  As per RN, Pt's daughter by bedside, very anxious and upset, demanding to have pt seen by ICU.  RT by pt's bedside, increased FiO2 to 60%, sating 90%.  Stat ABG ordered.  As per daughter, pt's HR increased and desatted post duoneb treatment.  RN to obtain rectal temp.  Pt is asymptomatic.    ICU Vital Signs Last 24 Hrs  T(C): 37.3 (21 Apr 2022 05:00), Max: 37.3 (20 Apr 2022 06:03)  T(F): 99.1 (21 Apr 2022 05:00), Max: 99.2 (20 Apr 2022 06:03)  HR: 124 (21 Apr 2022 05:00) (94 - 125)  BP: 155/64 (21 Apr 2022 05:00) (103/45 - 156/55)  BP(mean): 85 (21 Apr 2022 05:00) (71 - 85)  ABP: --  ABP(mean): --  RR: 27 (21 Apr 2022 05:00) (18 - 28)  SpO2: 93% (21 Apr 2022 05:00) (91% - 99%)  General   Pt is arousable to verbal stimuli, tachypneic, with resting tremors  Lungs      fair air entry b/l  w/ decreased BS /crackles in lower fields >R  Heart      s1/s2 tachycardic regular  Dyspnea  ABG - ( 21 Apr 2022 04:44 )  pH, Arterial: 7.510 pH, Blood: x     /  pCO2: 58    /  pO2: 67    / HCO3: 46    / Base Excess: 23.3  /  SaO2: 96.1    Tachycardia/tremors likely from albuterol given afebrile rectally  Morphine 2mg IVPx1 stat  Will repeat ABG @09:00  For increasing O2 demand/continued tachycardia, consider CTA of chest to r/o PE given pt's hx of CA/ immobility.  Continue to monitor and escalate prn.

## 2022-04-22 LAB
-  AMIKACIN: SIGNIFICANT CHANGE UP
-  AMOXICILLIN/CLAVULANIC ACID: SIGNIFICANT CHANGE UP
-  AMPICILLIN/SULBACTAM: SIGNIFICANT CHANGE UP
-  AMPICILLIN: SIGNIFICANT CHANGE UP
-  AZTREONAM: SIGNIFICANT CHANGE UP
-  CEFAZOLIN: SIGNIFICANT CHANGE UP
-  CEFEPIME: SIGNIFICANT CHANGE UP
-  CEFOXITIN: SIGNIFICANT CHANGE UP
-  CEFTRIAXONE: SIGNIFICANT CHANGE UP
-  CIPROFLOXACIN: SIGNIFICANT CHANGE UP
-  ERTAPENEM: SIGNIFICANT CHANGE UP
-  GENTAMICIN: SIGNIFICANT CHANGE UP
-  IMIPENEM: SIGNIFICANT CHANGE UP
-  LEVOFLOXACIN: SIGNIFICANT CHANGE UP
-  MEROPENEM: SIGNIFICANT CHANGE UP
-  PIPERACILLIN/TAZOBACTAM: SIGNIFICANT CHANGE UP
-  TOBRAMYCIN: SIGNIFICANT CHANGE UP
-  TRIMETHOPRIM/SULFAMETHOXAZOLE: SIGNIFICANT CHANGE UP
ALBUMIN SERPL ELPH-MCNC: 2.8 G/DL — LOW (ref 3.3–5.2)
ALP SERPL-CCNC: 81 U/L — SIGNIFICANT CHANGE UP (ref 40–120)
ALT FLD-CCNC: 17 U/L — SIGNIFICANT CHANGE UP
ANION GAP SERPL CALC-SCNC: 12 MMOL/L — SIGNIFICANT CHANGE UP (ref 5–17)
AST SERPL-CCNC: 23 U/L — SIGNIFICANT CHANGE UP
BILIRUB SERPL-MCNC: <0.2 MG/DL — LOW (ref 0.4–2)
BUN SERPL-MCNC: 64.5 MG/DL — HIGH (ref 8–20)
CALCIUM SERPL-MCNC: 9.7 MG/DL — SIGNIFICANT CHANGE UP (ref 8.6–10.2)
CHLORIDE SERPL-SCNC: 99 MMOL/L — SIGNIFICANT CHANGE UP (ref 98–107)
CO2 SERPL-SCNC: 37 MMOL/L — HIGH (ref 22–29)
CREAT SERPL-MCNC: 0.86 MG/DL — SIGNIFICANT CHANGE UP (ref 0.5–1.3)
EGFR: 91 ML/MIN/1.73M2 — SIGNIFICANT CHANGE UP
GLUCOSE BLDC GLUCOMTR-MCNC: 170 MG/DL — HIGH (ref 70–99)
GLUCOSE BLDC GLUCOMTR-MCNC: 218 MG/DL — HIGH (ref 70–99)
GLUCOSE BLDC GLUCOMTR-MCNC: 235 MG/DL — HIGH (ref 70–99)
GLUCOSE SERPL-MCNC: 201 MG/DL — HIGH (ref 70–99)
HCT VFR BLD CALC: 28.4 % — LOW (ref 39–50)
HGB BLD-MCNC: 8.3 G/DL — LOW (ref 13–17)
MCHC RBC-ENTMCNC: 28.5 PG — SIGNIFICANT CHANGE UP (ref 27–34)
MCHC RBC-ENTMCNC: 29.2 GM/DL — LOW (ref 32–36)
MCV RBC AUTO: 97.6 FL — SIGNIFICANT CHANGE UP (ref 80–100)
METHOD TYPE: SIGNIFICANT CHANGE UP
PLATELET # BLD AUTO: 315 K/UL — SIGNIFICANT CHANGE UP (ref 150–400)
POTASSIUM SERPL-MCNC: 4.3 MMOL/L — SIGNIFICANT CHANGE UP (ref 3.5–5.3)
POTASSIUM SERPL-SCNC: 4.3 MMOL/L — SIGNIFICANT CHANGE UP (ref 3.5–5.3)
PROT SERPL-MCNC: 7.8 G/DL — SIGNIFICANT CHANGE UP (ref 6.6–8.7)
RBC # BLD: 2.91 M/UL — LOW (ref 4.2–5.8)
RBC # FLD: 16.7 % — HIGH (ref 10.3–14.5)
SODIUM SERPL-SCNC: 148 MMOL/L — HIGH (ref 135–145)
WBC # BLD: 12.33 K/UL — HIGH (ref 3.8–10.5)
WBC # FLD AUTO: 12.33 K/UL — HIGH (ref 3.8–10.5)

## 2022-04-22 PROCEDURE — 99233 SBSQ HOSP IP/OBS HIGH 50: CPT

## 2022-04-22 PROCEDURE — 99232 SBSQ HOSP IP/OBS MODERATE 35: CPT

## 2022-04-22 PROCEDURE — 99497 ADVNCD CARE PLAN 30 MIN: CPT | Mod: 25

## 2022-04-22 RX ORDER — ASPIRIN/CALCIUM CARB/MAGNESIUM 324 MG
325 TABLET ORAL DAILY
Refills: 0 | Status: DISCONTINUED | OUTPATIENT
Start: 2022-04-22 | End: 2022-04-23

## 2022-04-22 RX ORDER — METOPROLOL TARTRATE 50 MG
25 TABLET ORAL
Refills: 0 | Status: DISCONTINUED | OUTPATIENT
Start: 2022-04-22 | End: 2022-04-24

## 2022-04-22 RX ORDER — CHLORHEXIDINE GLUCONATE 213 G/1000ML
15 SOLUTION TOPICAL
Refills: 0 | Status: DISCONTINUED | OUTPATIENT
Start: 2022-04-22 | End: 2022-04-27

## 2022-04-22 RX ORDER — CEFTRIAXONE 500 MG/1
1000 INJECTION, POWDER, FOR SOLUTION INTRAMUSCULAR; INTRAVENOUS EVERY 24 HOURS
Refills: 0 | Status: DISCONTINUED | OUTPATIENT
Start: 2022-04-22 | End: 2022-04-23

## 2022-04-22 RX ORDER — LEVOTHYROXINE SODIUM 125 MCG
112 TABLET ORAL DAILY
Refills: 0 | Status: DISCONTINUED | OUTPATIENT
Start: 2022-04-22 | End: 2022-04-27

## 2022-04-22 RX ORDER — SODIUM CHLORIDE 9 MG/ML
1000 INJECTION, SOLUTION INTRAVENOUS
Refills: 0 | Status: DISCONTINUED | OUTPATIENT
Start: 2022-04-22 | End: 2022-04-25

## 2022-04-22 RX ADMIN — Medication 5 MILLIGRAM(S): at 05:47

## 2022-04-22 RX ADMIN — POLYETHYLENE GLYCOL 3350 17 GRAM(S): 17 POWDER, FOR SOLUTION ORAL at 13:11

## 2022-04-22 RX ADMIN — ALBUTEROL 2.5 MILLIGRAM(S): 90 AEROSOL, METERED ORAL at 05:08

## 2022-04-22 RX ADMIN — Medication 300 MILLIGRAM(S): at 13:11

## 2022-04-22 RX ADMIN — Medication 2: at 05:54

## 2022-04-22 RX ADMIN — ALBUTEROL 2.5 MILLIGRAM(S): 90 AEROSOL, METERED ORAL at 19:51

## 2022-04-22 RX ADMIN — BUDESONIDE AND FORMOTEROL FUMARATE DIHYDRATE 2 PUFF(S): 160; 4.5 AEROSOL RESPIRATORY (INHALATION) at 19:52

## 2022-04-22 RX ADMIN — Medication 1 TABLET(S): at 18:10

## 2022-04-22 RX ADMIN — Medication 40 MILLIGRAM(S): at 05:46

## 2022-04-22 RX ADMIN — ATORVASTATIN CALCIUM 40 MILLIGRAM(S): 80 TABLET, FILM COATED ORAL at 20:24

## 2022-04-22 RX ADMIN — MORPHINE SULFATE 2 MILLIGRAM(S): 50 CAPSULE, EXTENDED RELEASE ORAL at 21:38

## 2022-04-22 RX ADMIN — CEFTRIAXONE 100 MILLIGRAM(S): 500 INJECTION, POWDER, FOR SOLUTION INTRAMUSCULAR; INTRAVENOUS at 13:10

## 2022-04-22 RX ADMIN — SODIUM CHLORIDE 75 MILLILITER(S): 9 INJECTION, SOLUTION INTRAVENOUS at 13:11

## 2022-04-22 RX ADMIN — ENOXAPARIN SODIUM 40 MILLIGRAM(S): 100 INJECTION SUBCUTANEOUS at 18:11

## 2022-04-22 RX ADMIN — MEROPENEM 100 MILLIGRAM(S): 1 INJECTION INTRAVENOUS at 02:30

## 2022-04-22 RX ADMIN — CHLORHEXIDINE GLUCONATE 15 MILLILITER(S): 213 SOLUTION TOPICAL at 05:47

## 2022-04-22 RX ADMIN — Medication 4: at 23:46

## 2022-04-22 RX ADMIN — Medication 1 TABLET(S): at 05:46

## 2022-04-22 RX ADMIN — PANTOPRAZOLE SODIUM 40 MILLIGRAM(S): 20 TABLET, DELAYED RELEASE ORAL at 13:09

## 2022-04-22 RX ADMIN — ALBUTEROL 2.5 MILLIGRAM(S): 90 AEROSOL, METERED ORAL at 10:06

## 2022-04-22 RX ADMIN — Medication 1000 UNIT(S): at 13:09

## 2022-04-22 RX ADMIN — SENNA PLUS 2 TABLET(S): 8.6 TABLET ORAL at 20:25

## 2022-04-22 RX ADMIN — Medication 5 MILLIGRAM(S): at 18:10

## 2022-04-22 RX ADMIN — MORPHINE SULFATE 2 MILLIGRAM(S): 50 CAPSULE, EXTENDED RELEASE ORAL at 22:40

## 2022-04-22 RX ADMIN — Medication 2.5 MILLIGRAM(S): at 05:47

## 2022-04-22 RX ADMIN — TIOTROPIUM BROMIDE 1 CAPSULE(S): 18 CAPSULE ORAL; RESPIRATORY (INHALATION) at 10:06

## 2022-04-22 RX ADMIN — CHLORHEXIDINE GLUCONATE 1 APPLICATION(S): 213 SOLUTION TOPICAL at 06:02

## 2022-04-22 RX ADMIN — BUDESONIDE AND FORMOTEROL FUMARATE DIHYDRATE 2 PUFF(S): 160; 4.5 AEROSOL RESPIRATORY (INHALATION) at 10:07

## 2022-04-22 RX ADMIN — Medication 325 MILLIGRAM(S): at 13:09

## 2022-04-22 RX ADMIN — Medication 4: at 13:11

## 2022-04-22 RX ADMIN — CHLORHEXIDINE GLUCONATE 15 MILLILITER(S): 213 SOLUTION TOPICAL at 18:11

## 2022-04-22 RX ADMIN — Medication 40 MILLIGRAM(S): at 18:10

## 2022-04-22 RX ADMIN — AZITHROMYCIN 255 MILLIGRAM(S): 500 TABLET, FILM COATED ORAL at 15:07

## 2022-04-22 RX ADMIN — Medication 4: at 18:16

## 2022-04-22 NOTE — PROGRESS NOTE ADULT - SUBJECTIVE AND OBJECTIVE BOX
[facility] Division of Hospital Medicine    Chief Complaint:  Shortness of breath    SUBJECTIVE / OVERNIGHT EVENTS:  Patient successfully taken off of bipap    Patient denies chest pain, SOB, abd pain, N/V, fever, chills, dysuria or any other complaints. All remainder ROS negative.     MEDICATIONS  (STANDING):  ALBUTerol    0.083% 2.5 milliGRAM(s) Nebulizer every 6 hours  aspirin Suppository 300 milliGRAM(s) Rectal daily  atorvastatin 40 milliGRAM(s) Oral at bedtime  azithromycin  IVPB 500 milliGRAM(s) IV Intermittent every 24 hours  budesonide 160 MICROgram(s)/formoterol 4.5 MICROgram(s) Inhaler 2 Puff(s) Inhalation two times a day  chlorhexidine 0.12% Liquid 15 milliLiter(s) Swish and Spit two times a day  chlorhexidine 2% Cloths 1 Application(s) Topical <User Schedule>  chlorhexidine 2% Cloths 1 Application(s) Topical daily  cholecalciferol 1000 Unit(s) Oral daily  dextrose 5%. 1000 milliLiter(s) (100 mL/Hr) IV Continuous <Continuous>  dextrose 5%. 1000 milliLiter(s) (50 mL/Hr) IV Continuous <Continuous>  dextrose 50% Injectable 25 Gram(s) IV Push once  dextrose 50% Injectable 12.5 Gram(s) IV Push once  dextrose 50% Injectable 25 Gram(s) IV Push once  enalapril 2.5 milliGRAM(s) Oral daily  enoxaparin Injectable 40 milliGRAM(s) SubCutaneous every 24 hours  ferrous    sulfate Liquid 300 milliGRAM(s) Enteral Tube daily  glucagon  Injectable 1 milliGRAM(s) IntraMuscular once  insulin lispro (ADMELOG) corrective regimen sliding scale   SubCutaneous every 6 hours  lactated ringers. 1000 milliLiter(s) (60 mL/Hr) IV Continuous <Continuous>  lactobacillus acidophilus 1 Tablet(s) Oral two times a day  levothyroxine Injectable 56 MICROGram(s) IV Push at bedtime  meropenem  IVPB 1000 milliGRAM(s) IV Intermittent every 8 hours  methylPREDNISolone sodium succinate Injectable 40 milliGRAM(s) IV Push every 12 hours  metoprolol tartrate Injectable 5 milliGRAM(s) IV Push every 6 hours  morphine  - Injectable 2 milliGRAM(s) IV Push at bedtime  oxybutynin 5 milliGRAM(s) Oral two times a day  pantoprazole  Injectable 40 milliGRAM(s) IV Push daily  polyethylene glycol 3350 17 Gram(s) Oral daily  senna 2 Tablet(s) Oral at bedtime  tiotropium 18 MICROgram(s) Capsule 1 Capsule(s) Inhalation daily    MEDICATIONS  (PRN):  acetaminophen     Tablet .. 650 milliGRAM(s) Oral every 6 hours PRN Temp greater or equal to 38C (100.4F)  aluminum hydroxide/magnesium hydroxide/simethicone Suspension 30 milliLiter(s) Oral every 4 hours PRN Dyspepsia  dextrose Oral Gel 15 Gram(s) Oral once PRN Blood Glucose LESS THAN 70 milliGRAM(s)/deciliter  guaiFENesin Oral Liquid (Sugar-Free) 100 milliGRAM(s) Oral every 6 hours PRN Cough  hydrALAZINE Injectable 10 milliGRAM(s) IV Push every 6 hours PRN If SBP > 160  morphine  - Injectable 2 milliGRAM(s) IV Push every 8 hours PRN dyspnea or increased work of breathing  sodium chloride 0.9% lock flush 10 milliLiter(s) IV Push every 1 hour PRN Pre/post blood products, medications, blood draw, and to maintain line patency        I&O's Summary    21 Apr 2022 07:01  -  22 Apr 2022 07:00  --------------------------------------------------------  IN: 1700 mL / OUT: 1150 mL / NET: 550 mL        PHYSICAL EXAM:  Vital Signs Last 24 Hrs  T(C): 35.9 (22 Apr 2022 07:44), Max: 37.6 (21 Apr 2022 11:25)  T(F): 96.7 (22 Apr 2022 07:44), Max: 99.7 (21 Apr 2022 11:25)  HR: 87 (22 Apr 2022 08:00) (87 - 107)  BP: 130/52 (22 Apr 2022 08:00) (94/45 - 161/66)  BP(mean): 72 (22 Apr 2022 08:00) (58 - 82)  RR: 17 (22 Apr 2022 08:00) (17 - 25)  SpO2: 98% (22 Apr 2022 08:00) (96% - 100%)        CONSTITUTIONAL: ill appearing  ENMT: Dry oral mucosa, no pharyngeal injection    RESPIRATORY: Normal respiratory effort; lungs w/ bilateral rales   CARDIOVASCULAR: Regular rate and rhythm, normal S1 and S2, no murmur/rub/gallop; No lower extremity edema   ABDOMEN: Nontender to palpation, normoactive bowel sounds, PEG in place  PSYCH: A+O to person, place, and time; affect appropriate  NEUROLOGY: CN 2-12 are intact and symmetric; no gross sensory deficits;   SKIN: No rashes; no palpable lesions    LABS:                        8.3    12.33 )-----------( 315      ( 22 Apr 2022 08:53 )             28.4     04-22    148<H>  |  99  |  64.5<H>  ----------------------------<  201<H>  4.3   |  37.0<H>  |  0.86    Ca    9.7      22 Apr 2022 08:53  Phos  3.7     04-21  Mg     2.1     04-21    TPro  7.8  /  Alb  2.8<L>  /  TBili  <0.2<L>  /  DBili  x   /  AST  23  /  ALT  17  /  AlkPhos  81  04-22              Culture - Sputum (collected 20 Apr 2022 12:30)  Source: .Sputum Sputum  Gram Stain (20 Apr 2022 15:09):    Rare polymorphonuclear leukocytes per low power field    Moderate Squamous epithelial cells per low power field    Few Gram Negative Rods per oil power field  Preliminary Report (22 Apr 2022 09:39):    Numerous Escherichia coli    Moderate Pseudomonas aeruginosa  Organism: Escherichia coli (22 Apr 2022 09:38)  Organism: Escherichia coli (22 Apr 2022 09:38)      CAPILLARY BLOOD GLUCOSE      POCT Blood Glucose.: 218 mg/dL (21 Apr 2022 23:33)  POCT Blood Glucose.: 215 mg/dL (21 Apr 2022 17:36)  POCT Blood Glucose.: 138 mg/dL (21 Apr 2022 11:51)        RADIOLOGY & ADDITIONAL TESTS:  Results Reviewed:   Imaging Personally Reviewed:  Electrocardiogram Personally Reviewed:

## 2022-04-22 NOTE — PROGRESS NOTE ADULT - SUBJECTIVE AND OBJECTIVE BOX
[facility]                                                INFECTIOUS DISEASES  =======================================================                               Demetrius Daly MD#  Alan Sosa MD*                                     Leslie Mccurdy MD*    Carli Clements MD*            Diplomates American Board of Internal Medicine & Infectious Diseases                  # [place] Office - Appt - Tel  [phone] Fax [phone]                * [place] Office - Appt - Tel [phone] Fax [phone]                                  Hospital Consult line:  [phone]  =======================================================    [facility]-[id number]  KIMBERLY LAI  follow up: COPD, SOB    off BIpap  doing better  E coli in sputum Cx susceptible to Ceftriaxone.         I have personally reviewed the labs and data; pertinent labs and data are listed in this note; please see below.   =======================================================  Past Medical & Surgical Hx:  =====================  PAST MEDICAL & SURGICAL HISTORY:  Esophageal stricture    Prostate CA    History of carotid stenosis    Laryngeal carcinoma    COPD (chronic obstructive pulmonary disease)    Hypothyroidism    Aspiration pneumonia    Traumatic pneumothorax    Benign prostatic hyperplasia with urinary obstruction    Syncope and collapse    Microalbuminuria    Anemia    Hyperlipidemia, unspecified hyperlipidemia type    Femoral fracture    S/P percutaneous endoscopic gastrostomy (PEG) tube placement    Liver laceration    History of exploratory laparotomy    History of total bilateral knee replacement  b/l femur      Problem List:  ==========  HEALTH ISSUES - PROBLEM Dx:  COPD (chronic obstructive pulmonary disease)    Aspiration pneumonia    Sepsis with acute hypoxic respiratory failure    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  No earache, sore throat or runny nose.  CARDIOVASCULAR:  No pressure, squeezing, strangling, tightness, heaviness or aching about the chest, neck, axilla or epigastrium.  RESPIRATORY:  as per HPI  GASTROINTESTINAL:  No nausea, vomiting or diarrhea.  GENITOURINARY:  No dysuria, frequency or urgency. No Blood in urine  MUSCULOSKELETAL:  no joint aches, no muscle pain  SKIN:  No change in skin, hair or nails.  NEUROLOGIC:  No Headaches, seizures or weakness.  PSYCHIATRIC:  No disorder of thought or mood.  ENDOCRINE:  No heat or cold intolerance  HEMATOLOGICAL:  No easy bruising or bleeding.    =======================================================  Allergies  No Known Allergies       ======================================================  Physical Exam:  ============      General:  NO distress.  THIN Frail,    Eye: Pupils are equal, round and reactive to light, Extraocular movements are intact, Normal conjunctiva.  Vitals:  ============  T(F): 96.7 (22 Apr 2022 07:44), Max: 97.9 (21 Apr 2022 18:00)  HR: 87 (22 Apr 2022 08:00)  BP: 130/52 (22 Apr 2022 08:00)  RR: 17 (22 Apr 2022 08:00)  SpO2: 98% (22 Apr 2022 08:00) (96% - 100%)  temp max in last 48H T(F): , Max: 101.9 (04-21-22 @ 07:56)    =======================================================  Current Antibiotics:  azithromycin  IVPB 500 milliGRAM(s) IV Intermittent every 24 hours  cefTRIAXone   IVPB 1000 milliGRAM(s) IV Intermittent every 24 hours    Other medications:  ALBUTerol    0.083% 2.5 milliGRAM(s) Nebulizer every 6 hours  aspirin 325 milliGRAM(s) Oral daily  atorvastatin 40 milliGRAM(s) Oral at bedtime  budesonide 160 MICROgram(s)/formoterol 4.5 MICROgram(s) Inhaler 2 Puff(s) Inhalation two times a day  chlorhexidine 0.12% Liquid 15 milliLiter(s) Swish and Spit two times a day  chlorhexidine 2% Cloths 1 Application(s) Topical <User Schedule>  cholecalciferol 1000 Unit(s) Oral daily  dextrose 5%. 1000 milliLiter(s) IV Continuous <Continuous>  dextrose 5%. 1000 milliLiter(s) IV Continuous <Continuous>  dextrose 50% Injectable 25 Gram(s) IV Push once  dextrose 50% Injectable 12.5 Gram(s) IV Push once  dextrose 50% Injectable 25 Gram(s) IV Push once  enalapril 2.5 milliGRAM(s) Oral daily  enoxaparin Injectable 40 milliGRAM(s) SubCutaneous every 24 hours  ferrous    sulfate Liquid 300 milliGRAM(s) Enteral Tube daily  glucagon  Injectable 1 milliGRAM(s) IntraMuscular once  insulin lispro (ADMELOG) corrective regimen sliding scale   SubCutaneous every 6 hours  lactated ringers. 1000 milliLiter(s) IV Continuous <Continuous>  lactobacillus acidophilus 1 Tablet(s) Oral two times a day  levothyroxine 112 MICROGram(s) Oral daily  methylPREDNISolone sodium succinate Injectable 40 milliGRAM(s) IV Push every 12 hours  metoprolol tartrate 25 milliGRAM(s) Oral two times a day  morphine  - Injectable 2 milliGRAM(s) IV Push at bedtime  oxybutynin 5 milliGRAM(s) Oral two times a day  pantoprazole  Injectable 40 milliGRAM(s) IV Push daily  polyethylene glycol 3350 17 Gram(s) Oral daily  senna 2 Tablet(s) Oral at bedtime  tiotropium 18 MICROgram(s) Capsule 1 Capsule(s) Inhalation daily      =======================================================  Labs:                        8.3    12.33 )-----------( 315      ( 22 Apr 2022 08:53 )             28.4     04-22    148<H>  |  99  |  64.5<H>  ----------------------------<  201<H>  4.3   |  37.0<H>  |  0.86    Ca    9.7      22 Apr 2022 08:53  Phos  3.7     04-21  Mg     2.1     04-21    TPro  7.8  /  Alb  2.8<L>  /  TBili  <0.2<L>  /  DBili  x   /  AST  23  /  ALT  17  /  AlkPhos  81  04-22      Culture - Sputum (collected 04-20-22 @ 12:30)  Source: .Sputum Sputum  Gram Stain (04-20-22 @ 15:09):    Rare polymorphonuclear leukocytes per low power field    Moderate Squamous epithelial cells per low power field    Few Gram Negative Rods per oil power field  Organism: Escherichia coli (04-22-22 @ 09:38)  Organism: Escherichia coli (04-22-22 @ 09:38)    Sensitivities:      -  Amikacin: S <=16      -  Amoxicillin/Clavulanic Acid: S <=8/4      -  Ampicillin: S <=8 These ampicillin results predict results for amoxicillin      -  Ampicillin/Sulbactam: S <=4/2 Enterobacter, Klebsiella aerogenes, Citrobacter, and Serratia may develop resistance during prolonged therapy (3-4 days)      -  Aztreonam: S <=4      -  Cefazolin: S <=2 Enterobacter, Klebsiella aerogenes, Citrobacter, and Serratia may develop resistance during prolonged therapy (3-4 days)      -  Cefepime: S <=2      -  Cefoxitin: S <=8      -  Ceftriaxone: S <=1 Enterobacter, Klebsiella aerogenes, Citrobacter, and Serratia may develop resistance during prolonged therapy      -  Ciprofloxacin: R >2      -  Ertapenem: S <=0.5      -  Gentamicin: S <=2      -  Imipenem: S <=1      -  Levofloxacin: R >4      -  Meropenem: S <=1      -  Piperacillin/Tazobactam: S <=8      -  Tobramycin: S <=2      -  Trimethoprim/Sulfamethoxazole: R >2/38      Method Type: JAZIEL    Culture - Blood (collected 04-14-22 @ 12:31)  Source: .Blood Blood-Peripheral  Final Report (04-19-22 @ 13:01):    No growth at 5 days.    Culture - Blood (collected 04-14-22 @ 12:31)  Source: .Blood Blood-Peripheral  Final Report (04-19-22 @ 13:01):    No growth at 5 days.    Culture - Blood (collected 04-12-22 @ 09:19)  Source: .Blood Blood  Final Report (04-17-22 @ 11:00):    No growth at 5 days.    Culture - Blood (collected 04-12-22 @ 09:19)  Source: .Blood Blood  Final Report (04-17-22 @ 11:00):    No growth at 5 days.    Culture - Sputum (collected 04-08-22 @ 21:50)  Source: .Sputum Sputum  Gram Stain (04-09-22 @ 07:24):    Numerous polymorphonuclear leukocytes per low power field    Rare Squamous epithelial cells per low power field    Few Gram Negative Rods seen per oil power field  Final Report (04-11-22 @ 19:35):    Moderate Escherichia coli    Normal Respiratory Britt absent  Organism: Escherichia coli (04-11-22 @ 19:35)  Organism: Escherichia coli (04-11-22 @ 19:35)    Sensitivities:      -  Amikacin: S <=16      -  Amoxicillin/Clavulanic Acid: S <=8/4      -  Ampicillin: S <=8 These ampicillin results predict results for amoxicillin      -  Ampicillin/Sulbactam: S <=4/2 Enterobacter, Klebsiella aerogenes, Citrobacter, and Serratia may develop resistance during prolonged therapy (3-4 days)      -  Aztreonam: S <=4      -  Cefazolin: S <=2 Enterobacter, Klebsiella aerogenes, Citrobacter, and Serratia may develop resistance during prolonged therapy (3-4 days)      -  Cefepime: S <=2      -  Cefoxitin: S <=8      -  Ceftriaxone: S <=1 Enterobacter, Klebsiella aerogenes, Citrobacter, and Serratia may develop resistance during prolonged therapy      -  Ciprofloxacin: R >2      -  Ertapenem: S <=0.5      -  Gentamicin: S <=2      -  Imipenem: S <=1      -  Levofloxacin: R >4      -  Meropenem: S <=1      -  Piperacillin/Tazobactam: S <=8      -  Tobramycin: S <=2      -  Trimethoprim/Sulfamethoxazole: R >2/38      Method Type: JAZIEL    Culture - Urine (collected 04-08-22 @ 16:21)  Source: Clean Catch Clean Catch (Midstream)  Final Report (04-09-22 @ 12:58):    No growth              COVID-19 PCR: NotDetec (04-21-22 @ 04:39)  COVID-19 PCR: NotDetec (04-15-22 @ 10:18)  SARS-CoV-2: NotDetec (04-08-22 @ 09:01)      =======================================================  HENT: Normocephalic, Oral mucosa is dry;   nasal cannula in place  Neck: Supple, No lymphadenopathy.  Respiratory: Lungs with Fair air entry  Cardiovascular: Normal rate, Regular rhythm,   Gastrointestinal: Soft, Non-tender, Non-distended, Normal bowel sounds.  + PEG In place  Genitourinary: No costovertebral angle tenderness.  Lymphatics: No lymphadenopathy neck,   Musculoskeletal: Normal range of motion, Normal strength.  Integumentary: No rash.  Neurologic: Alert, Oriented,      =======================================================    < from: CT Chest w/ IV Cont (04.20.22 @ 13:44) >    ACC: [id number] EXAM:  CT CHEST IC                          PROCEDURE DATE:  04/20/2022          INTERPRETATION:  HISTORY: Admitting Dxs: J96.01 RESPIRATORY DIS. Hypoxia.   Recent pneumonia. Worsening chest x-ray findings.    EXAMINATION: CT CHEST was performed with IV contrast.  CONTRAST/COMPLICATIONS:  IV Contrast: Omnipaque 300  97 cc administered   3 cc discarded  Oral Contrast: NONE  Complications: None reported at time of study completion    COMPARISON: 4/8/2022 CT chest.    FINDINGS:    AIRWAYS, LUNGS, PLEURA: Tracheal secretions. Biapical scarring/fibrosis   and peripheral left upper lobe subpleural thickening unchanged.    Multifocal left greater than right lower lobe consolidation, patchy   ground-glass opacities primarily in the upper lobes, and tree-in-bud   nodular opacities throughout the right middle lobe and bilateral lower   lobes.    With respect to 4/8/2022 left lower lobe multifocal consolidation and   superior segment ground-glass nodular opacities and lingular   consolidation appear worse.    Trace left pleural effusion.    MEDIASTINUM: Normal heart size. No pericardial effusion. Thoracic aorta   normal caliber.  Unchanged mediastinal and right hilar lymphadenopathy;   reference subcarinal 3.8 x 1.5 cm node in 1.3 x 1 cm node adjacent to the   descending thoracic aorta (image 1 1, series 3).    IMAGED ABDOMEN: Unchanged hyperdense material within the gallbladder,   likely stones. Subcentimeter left renal hypodense lesions too small to   characterize. Gastrostomy tube in place.    SOFT TISSUES: Unremarkable.    BONES: Unremarkable.      IMPRESSION:.    Findings likely reflect aspiration pneumonitis/infection and there has been interval worsening of airspace disease in the lingula and left lower lobe compared to 4/8/2022.    Unchanged mediastinal and right hilar lymphadenopathy.    --- End of Report ---         MILENA BLOCK MD; Attending Radiologist  This document has been electronically signed. Apr 20 2022  2:04PM    < end of copied text >

## 2022-04-22 NOTE — PROGRESS NOTE ADULT - SUBJECTIVE AND OBJECTIVE BOX
OVERNIGHT EVENTS: doing better, sitting up on commode on nasal cannula. tolerated bipap better last night with use of morphine     Present Symptoms:     Dyspnea: none   Nausea/Vomiting: No  Anxiety:  No  Depression: No  Fatigue: Yes   Loss of appetite: No  Constipation: none     Pain: none             Character-            Duration-            Effect-            Factors-            Frequency-            Location-            Severity-    Pain AD Score:  [email or web address] (press ctrl + left click to view)    Review of Systems: Reviewed                     Negative: no chest pain                     All others negative    MEDICATIONS  (STANDING):  ALBUTerol    0.083% 2.5 milliGRAM(s) Nebulizer every 6 hours  aspirin Suppository 300 milliGRAM(s) Rectal daily  atorvastatin 40 milliGRAM(s) Oral at bedtime  azithromycin  IVPB 500 milliGRAM(s) IV Intermittent every 24 hours  budesonide 160 MICROgram(s)/formoterol 4.5 MICROgram(s) Inhaler 2 Puff(s) Inhalation two times a day  chlorhexidine 0.12% Liquid 15 milliLiter(s) Swish and Spit two times a day  chlorhexidine 2% Cloths 1 Application(s) Topical <User Schedule>  chlorhexidine 2% Cloths 1 Application(s) Topical daily  cholecalciferol 1000 Unit(s) Oral daily  dextrose 5%. 1000 milliLiter(s) (100 mL/Hr) IV Continuous <Continuous>  dextrose 5%. 1000 milliLiter(s) (50 mL/Hr) IV Continuous <Continuous>  dextrose 50% Injectable 25 Gram(s) IV Push once  dextrose 50% Injectable 12.5 Gram(s) IV Push once  dextrose 50% Injectable 25 Gram(s) IV Push once  enalapril 2.5 milliGRAM(s) Oral daily  enoxaparin Injectable 40 milliGRAM(s) SubCutaneous every 24 hours  ferrous    sulfate Liquid 300 milliGRAM(s) Enteral Tube daily  glucagon  Injectable 1 milliGRAM(s) IntraMuscular once  insulin lispro (ADMELOG) corrective regimen sliding scale   SubCutaneous every 6 hours  lactated ringers. 1000 milliLiter(s) (60 mL/Hr) IV Continuous <Continuous>  lactobacillus acidophilus 1 Tablet(s) Oral two times a day  levothyroxine Injectable 56 MICROGram(s) IV Push at bedtime  meropenem  IVPB 1000 milliGRAM(s) IV Intermittent every 8 hours  methylPREDNISolone sodium succinate Injectable 40 milliGRAM(s) IV Push every 12 hours  metoprolol tartrate Injectable 5 milliGRAM(s) IV Push every 6 hours  morphine  - Injectable 2 milliGRAM(s) IV Push at bedtime  oxybutynin 5 milliGRAM(s) Oral two times a day  pantoprazole  Injectable 40 milliGRAM(s) IV Push daily  polyethylene glycol 3350 17 Gram(s) Oral daily  senna 2 Tablet(s) Oral at bedtime  tiotropium 18 MICROgram(s) Capsule 1 Capsule(s) Inhalation daily    MEDICATIONS  (PRN):  acetaminophen     Tablet .. 650 milliGRAM(s) Oral every 6 hours PRN Temp greater or equal to 38C (100.4F)  aluminum hydroxide/magnesium hydroxide/simethicone Suspension 30 milliLiter(s) Oral every 4 hours PRN Dyspepsia  dextrose Oral Gel 15 Gram(s) Oral once PRN Blood Glucose LESS THAN 70 milliGRAM(s)/deciliter  guaiFENesin Oral Liquid (Sugar-Free) 100 milliGRAM(s) Oral every 6 hours PRN Cough  hydrALAZINE Injectable 10 milliGRAM(s) IV Push every 6 hours PRN If SBP > 160  morphine  - Injectable 2 milliGRAM(s) IV Push every 8 hours PRN dyspnea or increased work of breathing  sodium chloride 0.9% lock flush 10 milliLiter(s) IV Push every 1 hour PRN Pre/post blood products, medications, blood draw, and to maintain line patency    PHYSICAL EXAM:    Vital Signs Last 24 Hrs  T(C): 35.9 (22 Apr 2022 07:44), Max: 37.6 (21 Apr 2022 11:25)  T(F): 96.7 (22 Apr 2022 07:44), Max: 99.7 (21 Apr 2022 11:25)  HR: 90 (22 Apr 2022 06:00) (90 - 108)  BP: 94/45 (22 Apr 2022 07:00) (80/48 - 161/66)  BP(mean): 82 (22 Apr 2022 06:00) (58 - 82)  RR: 18 (22 Apr 2022 06:00) (18 - 26)  SpO2: 100% (22 Apr 2022 06:00) (92% - 100%)    General: alert  oriented x ____ lethargic agitated                  cachexia  nonverbal  coma    Karnofsky:  %    HEENT: normal  dry mouth  ET tube/trach    Lungs: comfortable tachypnea/labored breathing  excessive secretions    CV: normal  tachycardia    GI: normal  distended  tender  no BS               PEG/NG/OG tube  constipation  last BM:     : normal  incontinent  oliguria/anuria  albright    MSK: normal  weakness  edema             ambulatory  bedbound/wheelchair bound    Skin: normal  pressure ulcers- Stage_____  no rash    LABS:                      8.3    12.33 )-----------( 315      ( 22 Apr 2022 08:53 )             28.4     04-22    148<H>  |  99  |  64.5<H>  ----------------------------<  201<H>  4.3   |  37.0<H>  |  0.86    Ca    9.7      22 Apr 2022 08:53  Phos  3.7     04-21  Mg     2.1     04-21    TPro  7.8  /  Alb  2.8<L>  /  TBili  <0.2<L>  /  DBili  x   /  AST  23  /  ALT  17  /  AlkPhos  81  04-22        I&O's Summary    21 Apr 2022 07:01  -  22 Apr 2022 07:00  --------------------------------------------------------  IN: 1700 mL / OUT: 1150 mL / NET: 550 mL        RADIOLOGY & ADDITIONAL STUDIES:    ADVANCE DIRECTIVES/TREATMENT PREFERENCES:  DNR YES NO  Completed on:                     MOLST  YES NO   Completed on:  Living Will  YES NO   Completed on: OVERNIGHT EVENTS: doing better, sitting up on commode on nasal cannula. tolerated bipap better last night with use of morphine     Present Symptoms:     Dyspnea: none   Nausea/Vomiting: No  Anxiety:  No  Depression: No  Fatigue: Yes   Loss of appetite: No  Constipation: none     Pain: none             Character-            Duration-            Effect-            Factors-            Frequency-            Location-            Severity-    Pain AD Score:  [email or web address] (press ctrl + left click to view)    Review of Systems: Reviewed                     Negative: no chest pain                     All others negative    MEDICATIONS  (STANDING):  ALBUTerol    0.083% 2.5 milliGRAM(s) Nebulizer every 6 hours  aspirin Suppository 300 milliGRAM(s) Rectal daily  atorvastatin 40 milliGRAM(s) Oral at bedtime  azithromycin  IVPB 500 milliGRAM(s) IV Intermittent every 24 hours  budesonide 160 MICROgram(s)/formoterol 4.5 MICROgram(s) Inhaler 2 Puff(s) Inhalation two times a day  chlorhexidine 0.12% Liquid 15 milliLiter(s) Swish and Spit two times a day  chlorhexidine 2% Cloths 1 Application(s) Topical <User Schedule>  chlorhexidine 2% Cloths 1 Application(s) Topical daily  cholecalciferol 1000 Unit(s) Oral daily  dextrose 5%. 1000 milliLiter(s) (100 mL/Hr) IV Continuous <Continuous>  dextrose 5%. 1000 milliLiter(s) (50 mL/Hr) IV Continuous <Continuous>  dextrose 50% Injectable 25 Gram(s) IV Push once  dextrose 50% Injectable 12.5 Gram(s) IV Push once  dextrose 50% Injectable 25 Gram(s) IV Push once  enalapril 2.5 milliGRAM(s) Oral daily  enoxaparin Injectable 40 milliGRAM(s) SubCutaneous every 24 hours  ferrous    sulfate Liquid 300 milliGRAM(s) Enteral Tube daily  glucagon  Injectable 1 milliGRAM(s) IntraMuscular once  insulin lispro (ADMELOG) corrective regimen sliding scale   SubCutaneous every 6 hours  lactated ringers. 1000 milliLiter(s) (60 mL/Hr) IV Continuous <Continuous>  lactobacillus acidophilus 1 Tablet(s) Oral two times a day  levothyroxine Injectable 56 MICROGram(s) IV Push at bedtime  meropenem  IVPB 1000 milliGRAM(s) IV Intermittent every 8 hours  methylPREDNISolone sodium succinate Injectable 40 milliGRAM(s) IV Push every 12 hours  metoprolol tartrate Injectable 5 milliGRAM(s) IV Push every 6 hours  morphine  - Injectable 2 milliGRAM(s) IV Push at bedtime  oxybutynin 5 milliGRAM(s) Oral two times a day  pantoprazole  Injectable 40 milliGRAM(s) IV Push daily  polyethylene glycol 3350 17 Gram(s) Oral daily  senna 2 Tablet(s) Oral at bedtime  tiotropium 18 MICROgram(s) Capsule 1 Capsule(s) Inhalation daily    MEDICATIONS  (PRN):  acetaminophen     Tablet .. 650 milliGRAM(s) Oral every 6 hours PRN Temp greater or equal to 38C (100.4F)  aluminum hydroxide/magnesium hydroxide/simethicone Suspension 30 milliLiter(s) Oral every 4 hours PRN Dyspepsia  dextrose Oral Gel 15 Gram(s) Oral once PRN Blood Glucose LESS THAN 70 milliGRAM(s)/deciliter  guaiFENesin Oral Liquid (Sugar-Free) 100 milliGRAM(s) Oral every 6 hours PRN Cough  hydrALAZINE Injectable 10 milliGRAM(s) IV Push every 6 hours PRN If SBP > 160  morphine  - Injectable 2 milliGRAM(s) IV Push every 8 hours PRN dyspnea or increased work of breathing  sodium chloride 0.9% lock flush 10 milliLiter(s) IV Push every 1 hour PRN Pre/post blood products, medications, blood draw, and to maintain line patency    PHYSICAL EXAM:    Vital Signs Last 24 Hrs  T(C): 35.9 (22 Apr 2022 07:44), Max: 37.6 (21 Apr 2022 11:25)  T(F): 96.7 (22 Apr 2022 07:44), Max: 99.7 (21 Apr 2022 11:25)  HR: 90 (22 Apr 2022 06:00) (90 - 108)  BP: 94/45 (22 Apr 2022 07:00) (80/48 - 161/66)  BP(mean): 82 (22 Apr 2022 06:00) (58 - 82)  RR: 18 (22 Apr 2022 06:00) (18 - 26)  SpO2: 100% (22 Apr 2022 06:00) (92% - 100%)    General: alert , sitting on bedside commode in no acute distress     Karnofsky:  40 %    HEENT: normal     Lungs: comfortable     CV: normal      GI: normal      : normal      MSK: weakness     Skin: no rash    LABS:                      8.3    12.33 )-----------( 315      ( 22 Apr 2022 08:53 )             28.4     04-22    148<H>  |  99  |  64.5<H>  ----------------------------<  201<H>  4.3   |  37.0<H>  |  0.86    Ca    9.7      22 Apr 2022 08:53  Phos  3.7     04-21  Mg     2.1     04-21    TPro  7.8  /  Alb  2.8<L>  /  TBili  <0.2<L>  /  DBili  x   /  AST  23  /  ALT  17  /  AlkPhos  81  04-22    I&O's Summary    21 Apr 2022 07:01  -  22 Apr 2022 07:00  --------------------------------------------------------  IN: 1700 mL / OUT: 1150 mL / NET: 550 mL    RADIOLOGY & ADDITIONAL STUDIES:    ADVANCE DIRECTIVES/TREATMENT PREFERENCES:  Full code

## 2022-04-22 NOTE — PROGRESS NOTE ADULT - SUBJECTIVE AND OBJECTIVE BOX
PULMONARY PROGRESS NOTE      KIMBERLY LAI  MRN-[id number]    Patient is a 76y old  Male who presents with a chief complaint of Hypoxic respiratory failure (22 Apr 2022 11:10)      INTERVAL HPI/OVERNIGHT EVENTS:    Pt is awake and alert  Feels better  Rodrigue noct NIV  Weak  Dizzy with walking    MEDICATIONS  (STANDING):  ALBUTerol    0.083% 2.5 milliGRAM(s) Nebulizer every 6 hours  aspirin 325 milliGRAM(s) Oral daily  atorvastatin 40 milliGRAM(s) Oral at bedtime  azithromycin  IVPB 500 milliGRAM(s) IV Intermittent every 24 hours  budesonide 160 MICROgram(s)/formoterol 4.5 MICROgram(s) Inhaler 2 Puff(s) Inhalation two times a day  chlorhexidine 0.12% Liquid 15 milliLiter(s) Swish and Spit two times a day  chlorhexidine 2% Cloths 1 Application(s) Topical <User Schedule>  cholecalciferol 1000 Unit(s) Oral daily  dextrose 5%. 1000 milliLiter(s) (100 mL/Hr) IV Continuous <Continuous>  dextrose 5%. 1000 milliLiter(s) (50 mL/Hr) IV Continuous <Continuous>  dextrose 50% Injectable 25 Gram(s) IV Push once  dextrose 50% Injectable 12.5 Gram(s) IV Push once  dextrose 50% Injectable 25 Gram(s) IV Push once  enalapril 2.5 milliGRAM(s) Oral daily  enoxaparin Injectable 40 milliGRAM(s) SubCutaneous every 24 hours  ferrous    sulfate Liquid 300 milliGRAM(s) Enteral Tube daily  glucagon  Injectable 1 milliGRAM(s) IntraMuscular once  insulin lispro (ADMELOG) corrective regimen sliding scale   SubCutaneous every 6 hours  lactated ringers. 1000 milliLiter(s) (75 mL/Hr) IV Continuous <Continuous>  lactobacillus acidophilus 1 Tablet(s) Oral two times a day  levothyroxine 112 MICROGram(s) Oral daily  meropenem  IVPB 1000 milliGRAM(s) IV Intermittent every 8 hours  methylPREDNISolone sodium succinate Injectable 40 milliGRAM(s) IV Push every 12 hours  metoprolol tartrate 25 milliGRAM(s) Oral two times a day  morphine  - Injectable 2 milliGRAM(s) IV Push at bedtime  oxybutynin 5 milliGRAM(s) Oral two times a day  pantoprazole  Injectable 40 milliGRAM(s) IV Push daily  polyethylene glycol 3350 17 Gram(s) Oral daily  senna 2 Tablet(s) Oral at bedtime  tiotropium 18 MICROgram(s) Capsule 1 Capsule(s) Inhalation daily      MEDICATIONS  (PRN):  acetaminophen     Tablet .. 650 milliGRAM(s) Oral every 6 hours PRN Temp greater or equal to 38C (100.4F)  aluminum hydroxide/magnesium hydroxide/simethicone Suspension 30 milliLiter(s) Oral every 4 hours PRN Dyspepsia  dextrose Oral Gel 15 Gram(s) Oral once PRN Blood Glucose LESS THAN 70 milliGRAM(s)/deciliter  guaiFENesin Oral Liquid (Sugar-Free) 100 milliGRAM(s) Oral every 6 hours PRN Cough  morphine  - Injectable 2 milliGRAM(s) IV Push every 8 hours PRN dyspnea or increased work of breathing      Allergies    No Known Allergies    Intolerances        PAST MEDICAL & SURGICAL HISTORY:  Esophageal stricture    Prostate CA    History of carotid stenosis    Laryngeal carcinoma    COPD (chronic obstructive pulmonary disease)    Hypothyroidism    Aspiration pneumonia    Traumatic pneumothorax    Benign prostatic hyperplasia with urinary obstruction    Syncope and collapse    Microalbuminuria    Anemia    Hyperlipidemia, unspecified hyperlipidemia type    Femoral fracture    S/P percutaneous endoscopic gastrostomy (PEG) tube placement    Liver laceration    History of exploratory laparotomy    History of total bilateral knee replacement  b/l femur          REVIEW OF SYSTEMS: Offers no new complaints    Vital Signs Last 24 Hrs  T(C): 35.9 (22 Apr 2022 07:44), Max: 36.6 (21 Apr 2022 18:00)  T(F): 96.7 (22 Apr 2022 07:44), Max: 97.9 (21 Apr 2022 18:00)  HR: 87 (22 Apr 2022 08:00) (87 - 106)  BP: 130/52 (22 Apr 2022 08:00) (94/45 - 161/66)  BP(mean): 72 (22 Apr 2022 08:00) (58 - 82)  RR: 17 (22 Apr 2022 08:00) (17 - 22)  SpO2: 98% (22 Apr 2022 08:00) (96% - 100%)    PHYSICAL EXAMINATION:    GENERAL: The patient is awake and alert in no apparent distress.     HEENT: Head is normocephalic and atraumatic. Extraocular muscles are intact. Mucous membranes are moist.    NECK: Supple.    LUNGS: Clear to auscultation without wheezing, rales or rhonchi; respirations unlabored    HEART: Regular rate and rhythm without murmur.    ABDOMEN: Soft, nontender, and nondistended.      EXTREMITIES: Without any cyanosis, clubbing, rash, lesions or edema.    NEUROLOGIC: Grossly intact.    LABS:                        8.3    12.33 )-----------( 315      ( 22 Apr 2022 08:53 )             28.4     04-22    148<H>  |  99  |  64.5<H>  ----------------------------<  201<H>  4.3   |  37.0<H>  |  0.86    Ca    9.7      22 Apr 2022 08:53  Phos  3.7     04-21  Mg     2.1     04-21    TPro  7.8  /  Alb  2.8<L>  /  TBili  <0.2<L>  /  DBili  x   /  AST  23  /  ALT  17  /  AlkPhos  81  04-22        ABG - ( 21 Apr 2022 04:44 )  pH, Arterial: 7.510 pH, Blood: x     /  pCO2: 58    /  pO2: 67    / HCO3: 46    / Base Excess: 23.3  /  SaO2: 96.1        MICROBIOLOGY:    COVID-19 PCR (04.21.22 @ 04:39)    COVID-19 PCR: NotDetec: You can help in the fight against COVID-19. [facility] may contact  you to see if you are interested in voluntarily participating in one of  our clinical trials.  Testing is performed using polymerase chain reaction (PCR) or  transcription mediated amplification (TMA). This COVID-19 (SARS-CoV-2)  nucleic acid amplification test was validated by [facility] and is  in use under the FDA Emergency Use Authorization (EUA) for clinical labs  CLIA-certified to perform high complexity testing. Test results should be  correlated with clinical presentation, patient history, and epidemiology.        RADIOLOGY & ADDITIONAL STUDIES:    ACC: [id number] EXAM:  CT CHEST IC                          PROCEDURE DATE:  04/20/2022          INTERPRETATION:  HISTORY: Admitting Dxs: J96.01 RESPIRATORY DIS. Hypoxia.   Recent pneumonia. Worsening chest x-ray findings.    EXAMINATION: CT CHEST was performed with IV contrast.  CONTRAST/COMPLICATIONS:  IV Contrast: Omnipaque 300  97 cc administered   3 cc discarded  Oral Contrast: NONE  Complications: None reported at time of study completion    COMPARISON: 4/8/2022 CT chest.    FINDINGS:    AIRWAYS, LUNGS, PLEURA: Tracheal secretions. Biapical scarring/fibrosis   and peripheral left upper lobe subpleural thickening unchanged.    Multifocal left greater than right lower lobe consolidation, patchy   ground-glass opacities primarily in the upper lobes, and tree-in-bud   nodular opacities throughout the right middle lobe and bilateral lower   lobes.    With respect to 4/8/2022 left lower lobe multifocal consolidation and   superior segment ground-glass nodular opacities and lingular   consolidation appear worse.    Trace left pleural effusion.    MEDIASTINUM: Normal heart size. No pericardial effusion. Thoracic aorta   normal caliber.  Unchanged mediastinal and right hilar lymphadenopathy;   reference subcarinal 3.8 x 1.5 cm node in 1.3 x 1 cm node adjacent to the   descending thoracic aorta (image 1 1, series 3).    IMAGED ABDOMEN: Unchanged hyperdense material within the gallbladder,   likely stones. Subcentimeter left renal hypodense lesions too small to   characterize. Gastrostomy tube in place.    SOFT TISSUES: Unremarkable.    BONES: Unremarkable.      IMPRESSION:.        Findings likely reflect aspiration pneumonitis/infection and there has   been interval worsening of airspace disease in the lingula and left lower   lobe compared to 4/8/2022.    Unchanged mediastinal and right hilar lymphadenopathy.        MILENA BLOCK MD; Attending Radiologist  This document has been electronically signed. Apr 20 2022  2:04PM

## 2022-04-22 NOTE — PROGRESS NOTE ADULT - ASSESSMENT
This 77 y/o M former smoker, started as teenager quit 20 years ago, on home oxygen, COPD on home O2, B/l carotid artery stenosis, laryngeal CA s/p chemo and radiation, PEG tube, who was BIBA after being found unresponsive at home w/ SpO2 in the 30's, Intubated upon arrival to ED and started on propofol gtt for sedation. ICU consulted for hypoxic respiratory failure requiring intubation.  (08 Apr 2022 11:18)    on 4/8/22, he was BIBA after being found unresponsive at home w/ SpO2 in the 30's, Intubated upon arrival to ED and started on propofol gtt for sedation. ICU consulted for hypoxic respiratory failure requiring intubation. Started on Levophed to maintain MAP > 65, A line placed, titrated down now off pressors, off sedation. Extubated this morning. Now awake, alert, oriented. CT shows multifocal PNA, Sputum cultures showed PMNs, few gram-negative rods. Pt on zosyn for aspiration PNA. He was downgrade to the medical service on 4/9/22.    on 4/13/22, he had interval desaturation concerning for recurrence of aspiration.    Tube feeding was held on 4/13/22 for this, and steroids were also given.   he completed a course of zosyn for aspiration PNA from 4/8/22 thru 4/15/22.    ID was consulted on 4/20/22 given that patient still have poor respiratory status on BIPAP at FI 50 percent.       Impression:  SOB  WBC elevation  recent PNA  COPD on bipap  E coli lung infection      Plan:  this could be re-currence of aspiration    Repeat imaging / CT scan on 4/20/22 showed:  Findings likely reflect aspiration pneumonitis/infection and there has been interval worsening of airspace disease in the lingula and left lower lobe compared to 4/8/2022.    E coli in sputum cx sent from 4/20/22  / Pneumonia  - rever back to Ceftriaxone 1 gram Q24H  continue azithromycin x 5 days for atypical coverage      WBC elevation is reactive  - and also could be from all the steroids received so far.     - will follow and trend

## 2022-04-22 NOTE — PROGRESS NOTE ADULT - ASSESSMENT
acute on chronic hypercapneic respiratory failure  acute on chronic hypoxic resp failure  gastric  motility decreased  with  gerd    likely chronic aspiration pneumonitis; worsening opacities on CT  S/P intubation/extubation  hx laryngeal ca with  dysphagia; has PEG  anemia  metabolic alk  weak  Pt at high risk for needing re-intubation so would benefit from outpt AVAPS to decrease readmissions and to treat chronic hypercarbia.    REC:    Continue BPAP qhs and prn  Aspiration precautions  GI f/u  F/U cultures  Follow CT for improvement  Abx per ID  Drug nebs/inhalers for BD therapy  Wean steroids as evens  Titrate O2 as able  Follow H/H  F/U lytes and current as needed  DVT proph.  OOB/mobilize  PT  ? LATA   Palliative care following    d/w family at bedside

## 2022-04-22 NOTE — PROGRESS NOTE ADULT - CONVERSATION DETAILS
Met with daughter Kelly at bedside. Reviewed patients history, recurrent admissions, and current admission with numerous complications. Daughter acknowledges being primary caregiver for patient alongside her mother. She also reports increasing difficulty managing him at home due to his recurrent aspiration and weakness but states that her father is a very strong man and has been through a tough life. She discusses the desires to continue all heroics measures to give him the best possible quality of life. She feels he needs more aggressive home based physical therapy and speech and swallow therapy to help with clearing his secretions and helping prevent aspiration. She realizes how sick he has become but has seen him overcome so many hurdles in the past despite what was prognosticated for him. She had questions regarding his need for home non invasive therapy which I told her to ask the pulmonary and primary team about as there are certain criteria that have to be met to qualify for this therapy and we need to make sure its something they feel will benefit him. We also discussed the usage of symptomatic medications like morphine to help with vasodilation, work of breathing, and tolerance of that kind of therapy and she is in agreement. She prefers this over xanax at this time, and I told her I agreed because the underlying cause of his anxiety behavior is shortness of breath so better at this time to treat underlying cause with morphine based substances. I also discussed with her the home care program for advanced illness patients that may afford some more symptomatic management and support for them upon transitioning home. A lot of emotional support provided.

## 2022-04-22 NOTE — PROGRESS NOTE ADULT - ASSESSMENT
77 y/o Croatian speaking M w/ history of Laryngeal Cancer s/p Chemo + RT, PEG Tube, COPD on home oxygen 2LNC, Carotid Stenosis, Non Obstructive CAD, HTN, HLD, Prediabetes / ? DM 2 and Hypothyroidism presented with unresponsiveness. Oxygen saturation at home in 30s. Intubated in ER and admitted to ICU with Hypoxic Respiratory Failure secondary. Found to be in Septic Shock secondary to Aspiration Pneumonia. Started on Levophed and later weaned off. He was extubated on 4/9/22. EEG preliminary report with potential multifocal epileptogenic foci. He was downgraded to Medicine Service on 4/10/22.  Since downgrade hospital course has been complicated by intermittent hypoxia likely due to continued worsening aspiration.        Acute on chronic Respiratory Failure with Hypoxia  - CT chest reveals worsening infiltrate.    - Meropenem started by ID 4/21  - Currently tolerating 4LNC, off bipap now.  Should use bipap at night  - Aspiration is multifactorial 2/2 dysphagia and reflux from lack of free water flushing after tube feeds   - Duonebs q6h, Symbicort q12h and IV steroids w/ titration to po starting 4/23  - Maintain on aspiration precautions including HOB elevation   - Pulmonary consulted and recs noted     -Recurrent aspiration Pneumonia  -Continue meropenem  - sputum culture from 4/20 growing ecoli    Recurrent Sepsis  - Resolving  - Source likely due to aspiration once again  - Care management as above     Hypernatremia  Increased to 148.    Added free water down the PEG.   Monitor daily       Layrngeal cancer s/p radiation with subsequent PEG tube placement due to dysphagia  - Leakage noted during hospital course and GI consult; now resolved   - CT neck does not reveal any laryngeal mass  - Outpatient follow up    MATHIEU, likely prerenal   - Resolved      Prediabetes / ?DM 2  - HbA1c 6.1  - Accu checks and ISS      HTN now with hypotension   Restart home medications with holding parameters       Hypothyroidism  - On Synthroid        Constipation  - Maintain on miralax and lactulose      Abnormal EEG  - Repeat EEG with mild to moderate nonspecific diffuse or multifocal cerebral dysfunction. No epileptiform pattern or seizure seen.  - No intervention as per Epilepsy    VTE ppx: Lovenox     Dispo:  Slow medical improvement.  Discussed need for LATA, likely discharge early next week

## 2022-04-22 NOTE — PROGRESS NOTE ADULT - ASSESSMENT
76M with oxygen dependent COPD, bilateral carotid stenosis, laryngeal cancer s/p chemo/RT, s/p PEG, recent admission in January for aspiration pneumonia, here with acute on chronic respiratory failure, s/p intubation/extubation ( 4/9), Ecoli pneumonia, COPD exacerbation, now on continuous bipap with decompensation on 4/20, fevers, possible recurrent aspiration.     #1 Acute on chronic hypoxic respiratory failure  - s/p intubation/extubation 4/9  - s/p treatment for ecoli pneumonia  - on steroids / nebs for component of COPD  - CT chest from 4/20 with worsening airspace disease possible aspiration pnuemonitis/infection  - possible recurrent aspiration event, on merrem and azithromycin per ID   - improved off continuous bipap this AM ? need for transition home with bipap, defer to pulmonary     #2 Dyspnea  - tolerated bipap with morphine usage, would continue as needed  - if transitioning home with bipap can change to Roxanol sublingual     #3 Laryngeal cancer s/p RT, PEG tube ( PEG in place for almost 7-10 years)  - no inpatient needs, outpatient follow up     #4 COPD, possible exacerbation on admission  - steroid taper and nebulizers  - oxygen supplementation  - ? need for home non invasive therapy, patients primary pulmonologist just retired so they need new pulmonologist   - defer to pulmonary regarding need for long term non invasive     #5 Encounter for palliative care  - patient clinically improved   - met with daughter Kelly at bedside, see above goals of care    76M with oxygen dependent COPD, bilateral carotid stenosis, laryngeal cancer s/p chemo/RT, s/p PEG, recent admission in January for aspiration pneumonia, here with acute on chronic respiratory failure, s/p intubation/extubation ( 4/9), Ecoli pneumonia, COPD exacerbation, now on continuous bipap with decompensation on 4/20, fevers, possible recurrent aspiration.     #1 Acute on chronic hypoxic respiratory failure  - s/p intubation/extubation 4/9  - s/p treatment for ecoli pneumonia  - on steroids / nebs for component of COPD  - CT chest from 4/20 with worsening airspace disease possible aspiration pnuemonitis/infection  - possible recurrent aspiration event, on merrem and azithromycin per ID   - improved off continuous bipap this AM ? need for transition home with bipap, defer to pulmonary     #2 Dyspnea  - tolerated bipap with morphine usage, would continue as needed  - if transitioning home with bipap can change to Roxanol sublingual     #3 Laryngeal cancer s/p RT, PEG tube ( PEG in place for almost 7-10 years)  - no inpatient needs, outpatient follow up     #4 COPD, possible exacerbation on admission  - steroid taper and nebulizers  - oxygen supplementation  - ? need for home non invasive therapy, patients primary pulmonologist just retired so they need new pulmonologist   - defer to pulmonary regarding need for long term non invasive     #5 Encounter for palliative care  - patient clinically improved   - met with daughter Kelly at bedside, see above goals of care   - recommend advanced illness program evaluation through home care services upon discharge

## 2022-04-23 LAB
-  AMIKACIN: SIGNIFICANT CHANGE UP
-  AZTREONAM: SIGNIFICANT CHANGE UP
-  CEFEPIME: SIGNIFICANT CHANGE UP
-  CEFTAZIDIME: SIGNIFICANT CHANGE UP
-  CIPROFLOXACIN: SIGNIFICANT CHANGE UP
-  GENTAMICIN: SIGNIFICANT CHANGE UP
-  IMIPENEM: SIGNIFICANT CHANGE UP
-  LEVOFLOXACIN: SIGNIFICANT CHANGE UP
-  MEROPENEM: SIGNIFICANT CHANGE UP
-  PIPERACILLIN/TAZOBACTAM: SIGNIFICANT CHANGE UP
-  TOBRAMYCIN: SIGNIFICANT CHANGE UP
ALBUMIN SERPL ELPH-MCNC: 2.3 G/DL — LOW (ref 3.3–5.2)
ALP SERPL-CCNC: 78 U/L — SIGNIFICANT CHANGE UP (ref 40–120)
ALT FLD-CCNC: 20 U/L — SIGNIFICANT CHANGE UP
ANION GAP SERPL CALC-SCNC: 8 MMOL/L — SIGNIFICANT CHANGE UP (ref 5–17)
AST SERPL-CCNC: 22 U/L — SIGNIFICANT CHANGE UP
BILIRUB SERPL-MCNC: 0.2 MG/DL — LOW (ref 0.4–2)
BUN SERPL-MCNC: 56.2 MG/DL — HIGH (ref 8–20)
CALCIUM SERPL-MCNC: 8.8 MG/DL — SIGNIFICANT CHANGE UP (ref 8.6–10.2)
CHLORIDE SERPL-SCNC: 103 MMOL/L — SIGNIFICANT CHANGE UP (ref 98–107)
CO2 SERPL-SCNC: 36 MMOL/L — HIGH (ref 22–29)
CREAT SERPL-MCNC: 0.67 MG/DL — SIGNIFICANT CHANGE UP (ref 0.5–1.3)
CULTURE RESULTS: SIGNIFICANT CHANGE UP
EGFR: 98 ML/MIN/1.73M2 — SIGNIFICANT CHANGE UP
GLUCOSE BLDC GLUCOMTR-MCNC: 139 MG/DL — HIGH (ref 70–99)
GLUCOSE BLDC GLUCOMTR-MCNC: 174 MG/DL — HIGH (ref 70–99)
GLUCOSE BLDC GLUCOMTR-MCNC: 191 MG/DL — HIGH (ref 70–99)
GLUCOSE BLDC GLUCOMTR-MCNC: 201 MG/DL — HIGH (ref 70–99)
GLUCOSE BLDC GLUCOMTR-MCNC: 203 MG/DL — HIGH (ref 70–99)
GLUCOSE SERPL-MCNC: 200 MG/DL — HIGH (ref 70–99)
HCT VFR BLD CALC: 25.2 % — LOW (ref 39–50)
HCT VFR BLD CALC: 25.3 % — LOW (ref 39–50)
HCT VFR BLD CALC: 26.9 % — LOW (ref 39–50)
HGB BLD-MCNC: 7.2 G/DL — LOW (ref 13–17)
HGB BLD-MCNC: 7.4 G/DL — LOW (ref 13–17)
HGB BLD-MCNC: 7.8 G/DL — LOW (ref 13–17)
IRON SATN MFR SERPL: 29 % — SIGNIFICANT CHANGE UP (ref 16–55)
IRON SATN MFR SERPL: 59 UG/DL — SIGNIFICANT CHANGE UP (ref 59–158)
MCHC RBC-ENTMCNC: 28.2 PG — SIGNIFICANT CHANGE UP (ref 27–34)
MCHC RBC-ENTMCNC: 28.2 PG — SIGNIFICANT CHANGE UP (ref 27–34)
MCHC RBC-ENTMCNC: 28.5 GM/DL — LOW (ref 32–36)
MCHC RBC-ENTMCNC: 28.6 PG — SIGNIFICANT CHANGE UP (ref 27–34)
MCHC RBC-ENTMCNC: 29 GM/DL — LOW (ref 32–36)
MCHC RBC-ENTMCNC: 29.4 GM/DL — LOW (ref 32–36)
MCV RBC AUTO: 97.1 FL — SIGNIFICANT CHANGE UP (ref 80–100)
MCV RBC AUTO: 97.3 FL — SIGNIFICANT CHANGE UP (ref 80–100)
MCV RBC AUTO: 99.2 FL — SIGNIFICANT CHANGE UP (ref 80–100)
METHOD TYPE: SIGNIFICANT CHANGE UP
OB PNL STL: POSITIVE
ORGANISM # SPEC MICROSCOPIC CNT: SIGNIFICANT CHANGE UP
PLATELET # BLD AUTO: 239 K/UL — SIGNIFICANT CHANGE UP (ref 150–400)
PLATELET # BLD AUTO: 262 K/UL — SIGNIFICANT CHANGE UP (ref 150–400)
PLATELET # BLD AUTO: 279 K/UL — SIGNIFICANT CHANGE UP (ref 150–400)
POTASSIUM SERPL-MCNC: 4.3 MMOL/L — SIGNIFICANT CHANGE UP (ref 3.5–5.3)
POTASSIUM SERPL-SCNC: 4.3 MMOL/L — SIGNIFICANT CHANGE UP (ref 3.5–5.3)
PROT SERPL-MCNC: 6.8 G/DL — SIGNIFICANT CHANGE UP (ref 6.6–8.7)
RBC # BLD: 2.55 M/UL — LOW (ref 4.2–5.8)
RBC # BLD: 2.59 M/UL — LOW (ref 4.2–5.8)
RBC # BLD: 2.77 M/UL — LOW (ref 4.2–5.8)
RBC # FLD: 16.1 % — HIGH (ref 10.3–14.5)
RBC # FLD: 16.1 % — HIGH (ref 10.3–14.5)
RBC # FLD: 16.2 % — HIGH (ref 10.3–14.5)
SODIUM SERPL-SCNC: 146 MMOL/L — HIGH (ref 135–145)
SPECIMEN SOURCE: SIGNIFICANT CHANGE UP
TIBC SERPL-MCNC: 206 UG/DL — LOW (ref 220–430)
TRANSFERRIN SERPL-MCNC: 144 MG/DL — LOW (ref 180–329)
WBC # BLD: 8.06 K/UL — SIGNIFICANT CHANGE UP (ref 3.8–10.5)
WBC # BLD: 8.82 K/UL — SIGNIFICANT CHANGE UP (ref 3.8–10.5)
WBC # BLD: 9.48 K/UL — SIGNIFICANT CHANGE UP (ref 3.8–10.5)
WBC # FLD AUTO: 8.06 K/UL — SIGNIFICANT CHANGE UP (ref 3.8–10.5)
WBC # FLD AUTO: 8.82 K/UL — SIGNIFICANT CHANGE UP (ref 3.8–10.5)
WBC # FLD AUTO: 9.48 K/UL — SIGNIFICANT CHANGE UP (ref 3.8–10.5)

## 2022-04-23 PROCEDURE — 99233 SBSQ HOSP IP/OBS HIGH 50: CPT

## 2022-04-23 RX ORDER — IRON SUCROSE 20 MG/ML
200 INJECTION, SOLUTION INTRAVENOUS EVERY 24 HOURS
Refills: 0 | Status: DISCONTINUED | OUTPATIENT
Start: 2022-04-23 | End: 2022-04-23

## 2022-04-23 RX ORDER — PIPERACILLIN AND TAZOBACTAM 4; .5 G/20ML; G/20ML
3.38 INJECTION, POWDER, LYOPHILIZED, FOR SOLUTION INTRAVENOUS EVERY 8 HOURS
Refills: 0 | Status: DISCONTINUED | OUTPATIENT
Start: 2022-04-23 | End: 2022-04-27

## 2022-04-23 RX ORDER — PANTOPRAZOLE SODIUM 20 MG/1
40 TABLET, DELAYED RELEASE ORAL EVERY 12 HOURS
Refills: 0 | Status: DISCONTINUED | OUTPATIENT
Start: 2022-04-23 | End: 2022-04-27

## 2022-04-23 RX ORDER — PIPERACILLIN AND TAZOBACTAM 4; .5 G/20ML; G/20ML
3.38 INJECTION, POWDER, LYOPHILIZED, FOR SOLUTION INTRAVENOUS ONCE
Refills: 0 | Status: COMPLETED | OUTPATIENT
Start: 2022-04-23 | End: 2022-04-23

## 2022-04-23 RX ADMIN — Medication 25 MILLIGRAM(S): at 06:06

## 2022-04-23 RX ADMIN — Medication 2: at 23:40

## 2022-04-23 RX ADMIN — Medication 4: at 12:20

## 2022-04-23 RX ADMIN — CHLORHEXIDINE GLUCONATE 15 MILLILITER(S): 213 SOLUTION TOPICAL at 06:04

## 2022-04-23 RX ADMIN — Medication 1 TABLET(S): at 19:18

## 2022-04-23 RX ADMIN — Medication 112 MICROGRAM(S): at 06:06

## 2022-04-23 RX ADMIN — CHLORHEXIDINE GLUCONATE 1 APPLICATION(S): 213 SOLUTION TOPICAL at 06:09

## 2022-04-23 RX ADMIN — BUDESONIDE AND FORMOTEROL FUMARATE DIHYDRATE 2 PUFF(S): 160; 4.5 AEROSOL RESPIRATORY (INHALATION) at 20:02

## 2022-04-23 RX ADMIN — CHLORHEXIDINE GLUCONATE 15 MILLILITER(S): 213 SOLUTION TOPICAL at 19:17

## 2022-04-23 RX ADMIN — ENOXAPARIN SODIUM 40 MILLIGRAM(S): 100 INJECTION SUBCUTANEOUS at 19:17

## 2022-04-23 RX ADMIN — Medication 5 MILLIGRAM(S): at 19:18

## 2022-04-23 RX ADMIN — Medication 30 MILLILITER(S): at 21:43

## 2022-04-23 RX ADMIN — PANTOPRAZOLE SODIUM 40 MILLIGRAM(S): 20 TABLET, DELAYED RELEASE ORAL at 19:18

## 2022-04-23 RX ADMIN — BUDESONIDE AND FORMOTEROL FUMARATE DIHYDRATE 2 PUFF(S): 160; 4.5 AEROSOL RESPIRATORY (INHALATION) at 08:57

## 2022-04-23 RX ADMIN — Medication 2: at 06:06

## 2022-04-23 RX ADMIN — AZITHROMYCIN 255 MILLIGRAM(S): 500 TABLET, FILM COATED ORAL at 12:34

## 2022-04-23 RX ADMIN — ALBUTEROL 2.5 MILLIGRAM(S): 90 AEROSOL, METERED ORAL at 08:56

## 2022-04-23 RX ADMIN — PIPERACILLIN AND TAZOBACTAM 200 GRAM(S): 4; .5 INJECTION, POWDER, LYOPHILIZED, FOR SOLUTION INTRAVENOUS at 16:26

## 2022-04-23 RX ADMIN — Medication 300 MILLIGRAM(S): at 11:44

## 2022-04-23 RX ADMIN — Medication 2.5 MILLIGRAM(S): at 11:44

## 2022-04-23 RX ADMIN — ALBUTEROL 2.5 MILLIGRAM(S): 90 AEROSOL, METERED ORAL at 15:58

## 2022-04-23 RX ADMIN — CEFTRIAXONE 100 MILLIGRAM(S): 500 INJECTION, POWDER, FOR SOLUTION INTRAMUSCULAR; INTRAVENOUS at 11:45

## 2022-04-23 RX ADMIN — ATORVASTATIN CALCIUM 40 MILLIGRAM(S): 80 TABLET, FILM COATED ORAL at 21:43

## 2022-04-23 RX ADMIN — Medication 325 MILLIGRAM(S): at 11:45

## 2022-04-23 RX ADMIN — PANTOPRAZOLE SODIUM 40 MILLIGRAM(S): 20 TABLET, DELAYED RELEASE ORAL at 11:43

## 2022-04-23 RX ADMIN — ALBUTEROL 2.5 MILLIGRAM(S): 90 AEROSOL, METERED ORAL at 03:39

## 2022-04-23 RX ADMIN — Medication 5 MILLIGRAM(S): at 06:06

## 2022-04-23 RX ADMIN — PIPERACILLIN AND TAZOBACTAM 25 GRAM(S): 4; .5 INJECTION, POWDER, LYOPHILIZED, FOR SOLUTION INTRAVENOUS at 21:43

## 2022-04-23 RX ADMIN — MORPHINE SULFATE 2 MILLIGRAM(S): 50 CAPSULE, EXTENDED RELEASE ORAL at 23:41

## 2022-04-23 RX ADMIN — ALBUTEROL 2.5 MILLIGRAM(S): 90 AEROSOL, METERED ORAL at 20:01

## 2022-04-23 RX ADMIN — Medication 1 TABLET(S): at 06:05

## 2022-04-23 RX ADMIN — Medication 1000 UNIT(S): at 11:44

## 2022-04-23 RX ADMIN — Medication 40 MILLIGRAM(S): at 06:05

## 2022-04-23 NOTE — PROGRESS NOTE ADULT - SUBJECTIVE AND OBJECTIVE BOX
[facility]                                                INFECTIOUS DISEASES  =======================================================                               Demetrius Daly MD#  Alan Sosa MD*                                     Leslie Mccurdy MD*    Carli Clements MD*            Diplomates American Board of Internal Medicine & Infectious Diseases                  # [place] Office - Appt - Tel  [phone] Fax [phone]                * [place] Office - Appt - Tel [phone] Fax [phone]                                  Hospital Consult line:  [phone]  =======================================================    [facility]-[id number]  KIMBERLY LAI  follow up: COPD, SOB    remains off BIpap  doing better  no new issues    I have personally reviewed the labs and data; pertinent labs and data are listed in this note; please see below.   =======================================================  Past Medical & Surgical Hx:  =====================  PAST MEDICAL & SURGICAL HISTORY:  Esophageal stricture  Prostate CA  History of carotid stenosis  Laryngeal carcinoma  COPD (chronic obstructive pulmonary disease)  Hypothyroidism  Aspiration pneumonia  Traumatic pneumothorax  Benign prostatic hyperplasia with urinary obstruction  Syncope and collapse  Microalbuminuria  Anemia  Hyperlipidemia, unspecified hyperlipidemia type  Femoral fracture  S/P percutaneous endoscopic gastrostomy (PEG) tube placement  Liver laceration  History of exploratory laparotomy  History of total bilateral knee replacement b/l femur    Problem List:  ==========  HEALTH ISSUES - PROBLEM Dx:  COPD (chronic obstructive pulmonary disease)  Aspiration pneumonia  Sepsis with acute hypoxic respiratory failure  Nocturnal hypoxemia due to obstructive chronic bronchitis  Chronic respiratory failure with hypoxia and hypercapnia  Leukocytosis  Abnormal CT scan of lung  Lung infiltrate on CT  SOB (shortness of breath)        Social Hx:  =======  no toxic habits currently    FAMILY HISTORY:  Family history of hypertension (Mother)  Family history of cerebrovascular accident (CVA) (Mother)   =======================================================    REVIEW OF SYSTEMS:  CONSTITUTIONAL:  No Fever or chills  HEENT:  No diplopia or blurred vision.  No earache, sore throat or runny nose.  CARDIOVASCULAR:  No pressure, squeezing, strangling, tightness, heaviness or aching about the chest, neck, axilla or epigastrium.  RESPIRATORY:  as per HPI  GASTROINTESTINAL:  No nausea, vomiting or diarrhea.  GENITOURINARY:  No dysuria, frequency or urgency. No Blood in urine  MUSCULOSKELETAL:  no joint aches, no muscle pain  SKIN:  No change in skin, hair or nails.  NEUROLOGIC:  No Headaches, seizures or weakness.  PSYCHIATRIC:  No disorder of thought or mood.  ENDOCRINE:  No heat or cold intolerance  HEMATOLOGICAL:  No easy bruising or bleeding.    =======================================================  Allergies  No Known Allergies       ======================================================  Physical Exam:  ============      General:  NO distress.  THIN Frail,    Eye: Pupils are equal, round and reactive to light, Extraocular movements are intact, Normal conjunctiva.  HENT: Normocephalic, Oral mucosa is dry;   nasal cannula in place  Neck: Supple, No lymphadenopathy.  Respiratory: Lungs with Fair air entry  Cardiovascular: Normal rate, Regular rhythm,   Gastrointestinal: Soft, Non-tender, Non-distended, Normal bowel sounds.  + PEG In place  Genitourinary: No costovertebral angle tenderness.  Lymphatics: No lymphadenopathy neck,   Musculoskeletal: Normal range of motion, Normal strength.  Integumentary: No rash.  Neurologic: Alert, Oriented,      =======================================================  Vitals:  ============  T(F): 98.3 (23 Apr 2022 12:00), Max: 98.3 (23 Apr 2022 12:00)  HR: 101 (23 Apr 2022 14:42)  BP: 136/60 (23 Apr 2022 14:42)  RR: 17 (23 Apr 2022 14:42)  SpO2: 99% (23 Apr 2022 14:42) (98% - 100%)  temp max in last 48H T(F): , Max: 98.3 (04-23-22 @ 12:00)    =======================================================  Current Antibiotics:  azithromycin  IVPB 500 milliGRAM(s) IV Intermittent every 24 hours  cefTRIAXone   IVPB 1000 milliGRAM(s) IV Intermittent every 24 hours    Other medications:  ALBUTerol    0.083% 2.5 milliGRAM(s) Nebulizer every 6 hours  aspirin 325 milliGRAM(s) Oral daily  atorvastatin 40 milliGRAM(s) Oral at bedtime  budesonide 160 MICROgram(s)/formoterol 4.5 MICROgram(s) Inhaler 2 Puff(s) Inhalation two times a day  chlorhexidine 0.12% Liquid 15 milliLiter(s) Swish and Spit two times a day  chlorhexidine 2% Cloths 1 Application(s) Topical <User Schedule>  cholecalciferol 1000 Unit(s) Oral daily  dextrose 5%. 1000 milliLiter(s) IV Continuous <Continuous>  dextrose 5%. 1000 milliLiter(s) IV Continuous <Continuous>  dextrose 50% Injectable 25 Gram(s) IV Push once  dextrose 50% Injectable 12.5 Gram(s) IV Push once  dextrose 50% Injectable 25 Gram(s) IV Push once  enalapril 2.5 milliGRAM(s) Oral daily  enoxaparin Injectable 40 milliGRAM(s) SubCutaneous every 24 hours  ferrous    sulfate Liquid 300 milliGRAM(s) Enteral Tube daily  glucagon  Injectable 1 milliGRAM(s) IntraMuscular once  insulin lispro (ADMELOG) corrective regimen sliding scale   SubCutaneous every 6 hours  lactated ringers. 1000 milliLiter(s) IV Continuous <Continuous>  lactobacillus acidophilus 1 Tablet(s) Oral two times a day  levothyroxine 112 MICROGram(s) Oral daily  metoprolol tartrate 25 milliGRAM(s) Oral two times a day  morphine  - Injectable 2 milliGRAM(s) IV Push at bedtime  oxybutynin 5 milliGRAM(s) Oral two times a day  pantoprazole  Injectable 40 milliGRAM(s) IV Push every 12 hours  polyethylene glycol 3350 17 Gram(s) Oral daily  predniSONE   Tablet 40 milliGRAM(s) Oral daily  senna 2 Tablet(s) Oral at bedtime  tiotropium 18 MICROgram(s) Capsule 1 Capsule(s) Inhalation daily      =======================================================  Labs:                        7.2    8.06  )-----------( 239      ( 23 Apr 2022 13:56 )             25.3     04-23    146<H>  |  103  |  56.2<H>  ----------------------------<  200<H>  4.3   |  36.0<H>  |  0.67    Ca    8.8      23 Apr 2022 08:34    TPro  6.8  /  Alb  2.3<L>  /  TBili  0.2<L>  /  DBili  x   /  AST  22  /  ALT  20  /  AlkPhos  78  04-23      Culture - Sputum (collected 04-20-22 @ 12:30)  Source: .Sputum Sputum  Gram Stain (04-20-22 @ 15:09):    Rare polymorphonuclear leukocytes per low power field    Moderate Squamous epithelial cells per low power field    Few Gram Negative Rods per oil power field  Final Report (04-23-22 @ 12:11):    Numerous Escherichia coli    Moderate Pseudomonas aeruginosa    Normal Respiratory Britt absent  Organism: Escherichia coli  Pseudomonas aeruginosa (04-23-22 @ 12:11)  Organism: Pseudomonas aeruginosa (04-23-22 @ 12:11)    Sensitivities:      -  Amikacin: R >32      -  Aztreonam: S 8      -  Cefepime: I 16      -  Ceftazidime: S 4      -  Ciprofloxacin: R >2      -  Gentamicin: R >8      -  Imipenem: I 4      -  Levofloxacin: R >4      -  Meropenem: S <=1      -  Piperacillin/Tazobactam: S <=8      -  Tobramycin: S 4      Method Type: JAZIEL  Organism: Escherichia coli (04-23-22 @ 12:11)    Sensitivities:      -  Amikacin: S <=16      -  Amoxicillin/Clavulanic Acid: S <=8/4      -  Ampicillin: S <=8 These ampicillin results predict results for amoxicillin      -  Ampicillin/Sulbactam: S <=4/2 Enterobacter, Klebsiella aerogenes, Citrobacter, and Serratia may develop resistance during prolonged therapy (3-4 days)      -  Aztreonam: S <=4      -  Cefazolin: S <=2 Enterobacter, Klebsiella aerogenes, Citrobacter, and Serratia may develop resistance during prolonged therapy (3-4 days)      -  Cefepime: S <=2      -  Cefoxitin: S <=8      -  Ceftriaxone: S <=1 Enterobacter, Klebsiella aerogenes, Citrobacter, and Serratia may develop resistance during prolonged therapy      -  Ciprofloxacin: R >2      -  Ertapenem: S <=0.5      -  Gentamicin: S <=2      -  Imipenem: S <=1      -  Levofloxacin: R >4      -  Meropenem: S <=1      -  Piperacillin/Tazobactam: S <=8      -  Tobramycin: S <=2      -  Trimethoprim/Sulfamethoxazole: R >2/38      Method Type: JAZIEL    Culture - Blood (collected 04-14-22 @ 12:31)  Source: .Blood Blood-Peripheral  Final Report (04-19-22 @ 13:01):    No growth at 5 days.    Culture - Blood (collected 04-14-22 @ 12:31)  Source: .Blood Blood-Peripheral  Final Report (04-19-22 @ 13:01):    No growth at 5 days.    Culture - Blood (collected 04-12-22 @ 09:19)  Source: .Blood Blood  Final Report (04-17-22 @ 11:00):    No growth at 5 days.    Culture - Blood (collected 04-12-22 @ 09:19)  Source: .Blood Blood  Final Report (04-17-22 @ 11:00):    No growth at 5 days.           COVID-19 PCR: NotDetec (04-21-22 @ 04:39)  COVID-19 PCR: NotDetec (04-15-22 @ 10:18)  SARS-CoV-2: NotDetec (04-08-22 @ 09:01)      =======================================================      < from: CT Chest w/ IV Cont (04.20.22 @ 13:44) >    ACC: [id number] EXAM:  CT CHEST IC                          PROCEDURE DATE:  04/20/2022          INTERPRETATION:  HISTORY: Admitting Dxs: J96.01 RESPIRATORY DIS. Hypoxia.   Recent pneumonia. Worsening chest x-ray findings.    EXAMINATION: CT CHEST was performed with IV contrast.  CONTRAST/COMPLICATIONS:  IV Contrast: Omnipaque 300  97 cc administered   3 cc discarded  Oral Contrast: NONE  Complications: None reported at time of study completion    COMPARISON: 4/8/2022 CT chest.    FINDINGS:    AIRWAYS, LUNGS, PLEURA: Tracheal secretions. Biapical scarring/fibrosis   and peripheral left upper lobe subpleural thickening unchanged.    Multifocal left greater than right lower lobe consolidation, patchy   ground-glass opacities primarily in the upper lobes, and tree-in-bud   nodular opacities throughout the right middle lobe and bilateral lower   lobes.    With respect to 4/8/2022 left lower lobe multifocal consolidation and   superior segment ground-glass nodular opacities and lingular   consolidation appear worse.    Trace left pleural effusion.    MEDIASTINUM: Normal heart size. No pericardial effusion. Thoracic aorta   normal caliber.  Unchanged mediastinal and right hilar lymphadenopathy;   reference subcarinal 3.8 x 1.5 cm node in 1.3 x 1 cm node adjacent to the   descending thoracic aorta (image 1 1, series 3).    IMAGED ABDOMEN: Unchanged hyperdense material within the gallbladder,   likely stones. Subcentimeter left renal hypodense lesions too small to   characterize. Gastrostomy tube in place.    SOFT TISSUES: Unremarkable.    BONES: Unremarkable.      IMPRESSION:.    Findings likely reflect aspiration pneumonitis/infection and there has been interval worsening of airspace disease in the lingula and left lower lobe compared to 4/8/2022.    Unchanged mediastinal and right hilar lymphadenopathy.    --- End of Report ---         MILENA BLOCK MD; Attending Radiologist  This document has been electronically signed. Apr 20 2022  2:04PM    < end of copied text >        Antibiotics Course:  azithromycin  IVPB   255 mL/Hr (04-23-22 @ 12:34)   255 mL/Hr (04-22-22 @ 15:07)   255 mL/Hr (04-21-22 @ 14:42)   255 mL/Hr (04-20-22 @ 14:33)    cefTRIAXone   IVPB   100 mL/Hr (04-20-22 @ 14:33)    cefTRIAXone   IVPB   100 mL/Hr (04-23-22 @ 11:45)   100 mL/Hr (04-22-22 @ 13:10)    meropenem  IVPB   100 mL/Hr (04-22-22 @ 02:30)   100 mL/Hr (04-21-22 @ 18:13)   100 mL/Hr (04-21-22 @ 10:50)    piperacillin/tazobactam IVPB..   25 mL/Hr (04-15-22 @ 05:57)   25 mL/Hr (04-14-22 @ 23:14)   25 mL/Hr (04-14-22 @ 14:25)   25 mL/Hr (04-14-22 @ 05:51)   25 mL/Hr (04-13-22 @ 22:38)   25 mL/Hr (04-13-22 @ 14:23)   25 mL/Hr (04-13-22 @ 05:37)   25 mL/Hr (04-12-22 @ 22:08)   25 mL/Hr (04-12-22 @ 14:26)   25 mL/Hr (04-12-22 @ 05:16)   25 mL/Hr (04-11-22 @ 21:17)   25 mL/Hr (04-11-22 @ 14:30)   25 mL/Hr (04-11-22 @ 06:29)   25 mL/Hr (04-10-22 @ 22:31)   25 mL/Hr (04-10-22 @ 13:24)   25 mL/Hr (04-10-22 @ 06:28)   25 mL/Hr (04-09-22 @ 21:32)   25 mL/Hr (04-09-22 @ 13:50)   25 mL/Hr (04-09-22 @ 05:18)   25 mL/Hr (04-08-22 @ 20:10)    piperacillin/tazobactam IVPB...   200 mL/Hr (04-08-22 @ 11:28)    vancomycin  IVPB.   250 mL/Hr (04-08-22 @ 13:36)

## 2022-04-23 NOTE — PROGRESS NOTE ADULT - SUBJECTIVE AND OBJECTIVE BOX
PULMONARY PROGRESS NOTE      KIMBERLY LAI  MRN-[id number]    Patient is a 76y old  Male who presents with a chief complaint of Hypoxic respiratory failure (23 Apr 2022 11:37)        INTERVAL HPI/OVERNIGHT EVENTS:  Pt seen and examined at the bedside. Denies worsening SOB. Denies chest pain.     MEDICATIONS  (STANDING):  ALBUTerol    0.083% 2.5 milliGRAM(s) Nebulizer every 6 hours  aspirin 325 milliGRAM(s) Oral daily  atorvastatin 40 milliGRAM(s) Oral at bedtime  budesonide 160 MICROgram(s)/formoterol 4.5 MICROgram(s) Inhaler 2 Puff(s) Inhalation two times a day  chlorhexidine 0.12% Liquid 15 milliLiter(s) Swish and Spit two times a day  chlorhexidine 2% Cloths 1 Application(s) Topical <User Schedule>  cholecalciferol 1000 Unit(s) Oral daily  dextrose 5%. 1000 milliLiter(s) (100 mL/Hr) IV Continuous <Continuous>  dextrose 5%. 1000 milliLiter(s) (50 mL/Hr) IV Continuous <Continuous>  dextrose 50% Injectable 25 Gram(s) IV Push once  dextrose 50% Injectable 12.5 Gram(s) IV Push once  dextrose 50% Injectable 25 Gram(s) IV Push once  enalapril 2.5 milliGRAM(s) Oral daily  enoxaparin Injectable 40 milliGRAM(s) SubCutaneous every 24 hours  ferrous    sulfate Liquid 300 milliGRAM(s) Enteral Tube daily  glucagon  Injectable 1 milliGRAM(s) IntraMuscular once  insulin lispro (ADMELOG) corrective regimen sliding scale   SubCutaneous every 6 hours  lactated ringers. 1000 milliLiter(s) (75 mL/Hr) IV Continuous <Continuous>  lactobacillus acidophilus 1 Tablet(s) Oral two times a day  levothyroxine 112 MICROGram(s) Oral daily  metoprolol tartrate 25 milliGRAM(s) Oral two times a day  morphine  - Injectable 2 milliGRAM(s) IV Push at bedtime  oxybutynin 5 milliGRAM(s) Oral two times a day  pantoprazole  Injectable 40 milliGRAM(s) IV Push every 12 hours  piperacillin/tazobactam IVPB. 3.375 Gram(s) IV Intermittent once  piperacillin/tazobactam IVPB.. 3.375 Gram(s) IV Intermittent every 8 hours  polyethylene glycol 3350 17 Gram(s) Oral daily  predniSONE   Tablet 40 milliGRAM(s) Oral daily  senna 2 Tablet(s) Oral at bedtime  tiotropium 18 MICROgram(s) Capsule 1 Capsule(s) Inhalation daily    MEDICATIONS  (PRN):  acetaminophen     Tablet .. 650 milliGRAM(s) Oral every 6 hours PRN Temp greater or equal to 38C (100.4F)  aluminum hydroxide/magnesium hydroxide/simethicone Suspension 30 milliLiter(s) Oral every 4 hours PRN Dyspepsia  dextrose Oral Gel 15 Gram(s) Oral once PRN Blood Glucose LESS THAN 70 milliGRAM(s)/deciliter  guaiFENesin Oral Liquid (Sugar-Free) 100 milliGRAM(s) Oral every 6 hours PRN Cough  morphine  - Injectable 2 milliGRAM(s) IV Push every 8 hours PRN dyspnea or increased work of breathing    Allergies    No Known Allergies    Intolerances      PAST MEDICAL & SURGICAL HISTORY:  Esophageal stricture    Prostate CA    History of carotid stenosis    Laryngeal carcinoma    COPD (chronic obstructive pulmonary disease)    Hypothyroidism    Aspiration pneumonia    Traumatic pneumothorax    Benign prostatic hyperplasia with urinary obstruction    Syncope and collapse    Microalbuminuria    Anemia    Hyperlipidemia, unspecified hyperlipidemia type    Femoral fracture    S/P percutaneous endoscopic gastrostomy (PEG) tube placement    Liver laceration    History of exploratory laparotomy    History of total bilateral knee replacement  b/l femur          REVIEW OF SYSTEMS:  Negative except as noted in HPI      Vital Signs Last 24 Hrs  T(C): 36.8 (23 Apr 2022 12:00), Max: 36.8 (23 Apr 2022 12:00)  T(F): 98.3 (23 Apr 2022 12:00), Max: 98.3 (23 Apr 2022 12:00)  HR: 101 (23 Apr 2022 14:42) (72 - 113)  BP: 136/60 (23 Apr 2022 14:42) (81/31 - 168/71)  BP(mean): 79 (23 Apr 2022 14:42) (43 - 79)  RR: 17 (23 Apr 2022 14:42) (17 - 26)  SpO2: 99% (23 Apr 2022 14:42) (98% - 100%)      PHYSICAL EXAMINATION:  GEN: In no apparent distress  HEENT: NC/AT  NECK: Supple, non-tender  CV: +S1, S2  RESPIRATORY: CTA B/L, good inspiratory effort, non-labored breathing  ABDOMEN: Soft, non-tender  EXTREMITIES: No pedal edema B/L  SKIN: No open wounds  PSYCH: Normal affect      LABS:                        7.8    9.48  )-----------( 279      ( 23 Apr 2022 15:39 )             26.9     04-23    146<H>  |  103  |  56.2<H>  ----------------------------<  200<H>  4.3   |  36.0<H>  |  0.67    Ca    8.8      23 Apr 2022 08:34    TPro  6.8  /  Alb  2.3<L>  /  TBili  0.2<L>  /  DBili  x   /  AST  22  /  ALT  20  /  AlkPhos  78  04-23                        MICROBIOLOGY:  COVID-19 PCR (04.21.22 @ 04:39)    COVID-19 PCR: NotDetec: You can help in the fight against COVID-19. [facility] may contact  you to see if you are interested in voluntarily participating in one of  our clinical trials.  Testing is performed using polymerase chain reaction (PCR) or  transcription mediated amplification (TMA). This COVID-19 (SARS-CoV-2)  nucleic acid amplification test was validated by [facility] and is  in use under the FDA Emergency Use Authorization (EUA) for clinical labs  CLIA-certified to perform high complexity testing. Test results should be  correlated with clinical presentation, patient history, and epidemiology.        Culture - Sputum . (04.20.22 @ 12:30)    -  Trimethoprim/Sulfamethoxazole: R >2/38    Gram Stain:   Rare polymorphonuclear leukocytes per low power field  Moderate Squamous epithelial cells per low power field  Few Gram Negative Rods per oil power field    -  Amikacin: S <=16    -  Amikacin: R >32    -  Amoxicillin/Clavulanic Acid: S <=8/4    -  Ampicillin: S <=8 These ampicillin results predict results for amoxicillin    -  Ampicillin/Sulbactam: S <=4/2 Enterobacter, Klebsiella aerogenes, Citrobacter, and Serratia may develop resistance during prolonged therapy (3-4 days)    -  Aztreonam: S <=4    -  Aztreonam: S 8    -  Cefazolin: S <=2 Enterobacter, Klebsiella aerogenes, Citrobacter, and Serratia may develop resistance during prolonged therapy (3-4 days)    -  Cefepime: S <=2    -  Cefepime: I 16    -  Cefoxitin: S <=8    -  Ceftazidime: S 4    -  Ceftriaxone: S <=1 Enterobacter, Klebsiella aerogenes, Citrobacter, and Serratia may develop resistance during prolonged therapy    -  Ciprofloxacin: R >2    -  Ciprofloxacin: R >2    -  Ertapenem: S <=0.5    -  Gentamicin: S <=2    -  Gentamicin: R >8    -  Imipenem: I 4    -  Imipenem: S <=1    -  Levofloxacin: R >4    -  Levofloxacin: R >4    -  Meropenem: S <=1    -  Meropenem: S <=1    -  Piperacillin/Tazobactam: S <=8    -  Piperacillin/Tazobactam: S <=8    -  Tobramycin: S 4    -  Tobramycin: S <=2    Specimen Source: .Sputum Sputum    Culture Results:   Numerous Escherichia coli  Moderate Pseudomonas aeruginosa  Normal Respiratory Britt absent    Organism Identification: Escherichia coli  Pseudomonas aeruginosa    Organism: Escherichia coli    Organism: Pseudomonas aeruginosa    Method Type: JAZIEL    Method Type: JAZIEL        RADIOLOGY & ADDITIONAL STUDIES:  < from: CT Chest w/ IV Cont (04.20.22 @ 13:44) >    ACC: [id number] EXAM:  CT CHEST IC                          PROCEDURE DATE:  04/20/2022          INTERPRETATION:  HISTORY: Admitting Dxs: J96.01 RESPIRATORY DIS. Hypoxia.   Recent pneumonia. Worsening chest x-ray findings.    EXAMINATION: CT CHEST was performed with IV contrast.  CONTRAST/COMPLICATIONS:  IV Contrast: Omnipaque 300  97 cc administered   3 cc discarded  Oral Contrast: NONE  Complications: None reported at time of study completion    COMPARISON: 4/8/2022 CT chest.    FINDINGS:    AIRWAYS, LUNGS, PLEURA: Tracheal secretions. Biapical scarring/fibrosis   and peripheral left upper lobe subpleural thickening unchanged.    Multifocal left greater than right lower lobe consolidation, patchy   ground-glass opacities primarily in the upper lobes, and tree-in-bud   nodular opacities throughout the right middle lobe and bilateral lower   lobes.    With respect to 4/8/2022 left lower lobe multifocal consolidation and   superior segment ground-glass nodular opacities and lingular   consolidation appear worse.    Trace left pleural effusion.    MEDIASTINUM: Normal heart size. No pericardial effusion. Thoracic aorta   normal caliber.  Unchanged mediastinal and right hilar lymphadenopathy;   reference subcarinal 3.8 x 1.5 cm node in 1.3 x 1 cm node adjacent to the   descending thoracic aorta (image 1 1, series 3).    IMAGED ABDOMEN: Unchanged hyperdense material within the gallbladder,   likely stones. Subcentimeter left renal hypodense lesions too small to   characterize. Gastrostomy tube in place.    SOFT TISSUES: Unremarkable.    BONES: Unremarkable.      IMPRESSION:.    Findings likely reflect aspiration pneumonitis/infection and there has   been interval worsening of airspace disease in the lingula and left lower   lobe compared to 4/8/2022.    Unchanged mediastinal and right hilar lymphadenopathy.    --- End of Report ---            MILENA BLOCK MD; Attending Radiologist  This document has been electronically signed. Apr 20 2022  2:04PM    < end of copied text >      ECHO:

## 2022-04-23 NOTE — PROGRESS NOTE ADULT - ASSESSMENT
- Acute on chronic hypercapneic respiratory failure  - Acute on chronic hypoxic resp failure  - Acute on chronic aspiration pneumonitis - s/p intubation and extubation  - COPD  - Pseudomonas PNA  - Gastric  motility decreased with GERD  - H/o laryngeal CA with dysphagia s/p PEG  - Anemia      Recommendations:  C/w Prednisone 40mg daily, taper by 10mg every 3 days. Continue BPAP qhs and prn. Strict aspiration precautions, GI f/u. Pt with pseudomonas in sputum - CTX/Azithro switched to Zosyn, ID on board. C/w PRN nebs. OOBTC, ambulate, PT/OT. Follow H/H. Palliative care on board.    D/w family at bedside    Pt at high risk for needing re-intubation so would benefit from outpt AVAPS to decrease readmissions and to treat chronic hypercarbia.      Alan Castorena M.D.  Pulmonary & Critical Care Medicine  [facility]  Pulmonary and Sleep Medicine at [place]  [address]  [place], [place] [address]  T: [phone]  F: [phone]

## 2022-04-23 NOTE — PROGRESS NOTE ADULT - SUBJECTIVE AND OBJECTIVE BOX
Patient is a 76y old  Male who presents with a chief complaint of Hypoxic respiratory failure (22 Apr 2022 11:36)    pt denies any cp,fever,chill,cough,dark stool,n/v/d. Pt is sob with exersion. so2 100% 3L NC.  WIFE AT BEDSIDE  REVIEW OF SYSTEMS: All systems are reviewed and found to be negative except above    MEDICATIONS  (STANDING):  ALBUTerol    0.083% 2.5 milliGRAM(s) Nebulizer every 6 hours  aspirin 325 milliGRAM(s) Oral daily  atorvastatin 40 milliGRAM(s) Oral at bedtime  azithromycin  IVPB 500 milliGRAM(s) IV Intermittent every 24 hours  budesonide 160 MICROgram(s)/formoterol 4.5 MICROgram(s) Inhaler 2 Puff(s) Inhalation two times a day  cefTRIAXone   IVPB 1000 milliGRAM(s) IV Intermittent every 24 hours  chlorhexidine 0.12% Liquid 15 milliLiter(s) Swish and Spit two times a day  chlorhexidine 2% Cloths 1 Application(s) Topical <User Schedule>  cholecalciferol 1000 Unit(s) Oral daily  dextrose 5%. 1000 milliLiter(s) (50 mL/Hr) IV Continuous <Continuous>  dextrose 5%. 1000 milliLiter(s) (100 mL/Hr) IV Continuous <Continuous>  dextrose 50% Injectable 25 Gram(s) IV Push once  dextrose 50% Injectable 12.5 Gram(s) IV Push once  dextrose 50% Injectable 25 Gram(s) IV Push once  enalapril 2.5 milliGRAM(s) Oral daily  enoxaparin Injectable 40 milliGRAM(s) SubCutaneous every 24 hours  ferrous    sulfate Liquid 300 milliGRAM(s) Enteral Tube daily  glucagon  Injectable 1 milliGRAM(s) IntraMuscular once  insulin lispro (ADMELOG) corrective regimen sliding scale   SubCutaneous every 6 hours  lactated ringers. 1000 milliLiter(s) (75 mL/Hr) IV Continuous <Continuous>  lactobacillus acidophilus 1 Tablet(s) Oral two times a day  levothyroxine 112 MICROGram(s) Oral daily  methylPREDNISolone sodium succinate Injectable 40 milliGRAM(s) IV Push every 12 hours  metoprolol tartrate 25 milliGRAM(s) Oral two times a day  morphine  - Injectable 2 milliGRAM(s) IV Push at bedtime  oxybutynin 5 milliGRAM(s) Oral two times a day  pantoprazole  Injectable 40 milliGRAM(s) IV Push daily  polyethylene glycol 3350 17 Gram(s) Oral daily  senna 2 Tablet(s) Oral at bedtime  tiotropium 18 MICROgram(s) Capsule 1 Capsule(s) Inhalation daily    MEDICATIONS  (PRN):  acetaminophen     Tablet .. 650 milliGRAM(s) Oral every 6 hours PRN Temp greater or equal to 38C (100.4F)  aluminum hydroxide/magnesium hydroxide/simethicone Suspension 30 milliLiter(s) Oral every 4 hours PRN Dyspepsia  dextrose Oral Gel 15 Gram(s) Oral once PRN Blood Glucose LESS THAN 70 milliGRAM(s)/deciliter  guaiFENesin Oral Liquid (Sugar-Free) 100 milliGRAM(s) Oral every 6 hours PRN Cough  morphine  - Injectable 2 milliGRAM(s) IV Push every 8 hours PRN dyspnea or increased work of breathing      CAPILLARY BLOOD GLUCOSE      POCT Blood Glucose.: 191 mg/dL (23 Apr 2022 06:04)  POCT Blood Glucose.: 201 mg/dL (22 Apr 2022 23:45)  POCT Blood Glucose.: 170 mg/dL (22 Apr 2022 21:37)  POCT Blood Glucose.: 235 mg/dL (22 Apr 2022 18:13)  POCT Blood Glucose.: 218 mg/dL (22 Apr 2022 13:08)    I&O's Summary    22 Apr 2022 07:01  -  23 Apr 2022 07:00  --------------------------------------------------------  IN: 1815 mL / OUT: 1225 mL / NET: 590 mL        PHYSICAL EXAM:  Vital Signs Last 24 Hrs  T(C): 36.6 (23 Apr 2022 10:00), Max: 36.6 (22 Apr 2022 16:00)  T(F): 97.8 (23 Apr 2022 10:00), Max: 97.9 (22 Apr 2022 16:00)  HR: 72 (23 Apr 2022 08:59) (72 - 113)  BP: 117/41 (23 Apr 2022 08:00) (96/42 - 168/71)  BP(mean): 60 (23 Apr 2022 08:00) (52 - 77)  RR: 22 (23 Apr 2022 08:00) (18 - 26)  SpO2: 100% (23 Apr 2022 08:59) (98% - 100%)    CONSTITUTIONAL: NAD,  EYES: PERRLA; conjunctiva and sclera clear  ENMT: Moist oral mucosa,   RESPIRATORY: Normal respiratory effort;.Mild ocasional rhonchi  to auscultation bilaterally  CARDIOVASCULAR: Regular rate and rhythm, normal S1 and S2, no murmur   EXTS: No lower extremity edema; Peripheral pulses are 2+ bilaterally  ABDOMEN: Nontender to palpation, normoactive bowel sounds, no rebound/guarding;   MUSCLOSKELETAL:   no cyanosis of digits; no joint swelling or tenderness to palpation  PSYCH: affect appropriate  NEUROLOGY: A+O to person, place, and not to date; CN 2-12 are intact and symmetric; no gross sensory/motor deficits;       LABS:                        7.4    8.82  )-----------( 262      ( 23 Apr 2022 08:34 )             25.2     04-23    146<H>  |  103  |  56.2<H>  ----------------------------<  200<H>  4.3   |  36.0<H>  |  0.67    Ca    8.8      23 Apr 2022 08:34    TPro  6.8  /  Alb  2.3<L>  /  TBili  0.2<L>  /  DBili  x   /  AST  22  /  ALT  20  /  AlkPhos  78  04-23              Culture - Sputum (collected 20 Apr 2022 12:30)  Source: .Sputum Sputum  Gram Stain (20 Apr 2022 15:09):    Rare polymorphonuclear leukocytes per low power field    Moderate Squamous epithelial cells per low power field    Few Gram Negative Rods per oil power field  Preliminary Report (22 Apr 2022 09:39):    Numerous Escherichia coli    Moderate Pseudomonas aeruginosa  Organism: Escherichia coli (22 Apr 2022 09:38)  Organism: Escherichia coli (22 Apr 2022 09:38)        RADIOLOGY & ADDITIONAL TESTS:  Results Reviewed:

## 2022-04-23 NOTE — PROGRESS NOTE ADULT - ASSESSMENT
77 y/o Italian speaking M w/ history of Laryngeal Cancer s/p Chemo + RT, PEG Tube, COPD on home oxygen 2LNC, Carotid Stenosis, Non Obstructive CAD, HTN, HLD, Prediabetes / ? DM 2 and Hypothyroidism presented with unresponsiveness. Oxygen saturation at home in 30s. Intubated in ER and admitted to ICU with Hypoxic Respiratory Failure secondary. Found to be in Septic Shock secondary to Aspiration Pneumonia. Started on Levophed and later weaned off. He was extubated on 4/9/22. EEG preliminary report with potential multifocal epileptogenic foci. He was downgraded to Medicine Service on 4/10/22.  Since downgrade hospital course has been complicated by intermittent hypoxia likely due to continued worsening aspiration.        Acute on chronic Respiratory Failure with Hypoxia with sepsis sec to recurrent aspiration PNA  - Currently tolerating 3LNC,titrate down as tolerate.  off bipap now.  bipap at night  - Duonebs q6h, Symbicort q12h.Tapering dose of steroid  - CT chest reveals worsening infiltrate.    - Meropenem started by ID 4/21  - sputum culture from 4/20 growing ecoli  - Aspiration is multifactorial 2/2 dysphagia and reflux from lack of free water flushing after tube feeds   - Maintain on aspiration precautions including HOB elevation   - f/u Pulmonary/id rec       Anemia chronic disease  -monitor h/h  - stool occult  -iron panel     Hypernatremia  Increased to 146   Added free water down the PEG.   Monitor daily       Layrngeal cancer s/p radiation with subsequent PEG tube placement due to dysphagia  - Leakage noted during hospital course and GI consult; now resolved   - CT neck does not reveal any laryngeal mass  - Outpatient follow up    MATHIEU, likely prerenal   - Resolved      Prediabetes / ?DM 2  - HbA1c 6.1  -bg high w/steroid   - Accu checks and ISS      HTN   - bp was low. stable now.  Restart home medications with holding parameters       Hypothyroidism  - On Synthroid        Constipation  - Maintain on miralax and lactulose      Abnormal EEG  - Repeat EEG with mild to moderate nonspecific diffuse or multifocal cerebral dysfunction. No epileptiform pattern or seizure seen.  - No intervention as per Epilepsy    VTE ppx: Lovenox     Dispo:  Slow medical improvement.  Discussed need for LATA, likely discharge early next week  dw pt and wife in detailed at bedside

## 2022-04-23 NOTE — PROGRESS NOTE ADULT - ASSESSMENT
This 75 y/o M former smoker, started as teenager quit 20 years ago, on home oxygen, COPD on home O2, B/l carotid artery stenosis, laryngeal CA s/p chemo and radiation, PEG tube, who was BIBA after being found unresponsive at home w/ SpO2 in the 30's, Intubated upon arrival to ED and started on propofol gtt for sedation. ICU consulted for hypoxic respiratory failure requiring intubation.  (08 Apr 2022 11:18)    on 4/8/22, he was BIBA after being found unresponsive at home w/ SpO2 in the 30's, Intubated upon arrival to ED and started on propofol gtt for sedation. ICU consulted for hypoxic respiratory failure requiring intubation. Started on Levophed to maintain MAP > 65, A line placed, titrated down now off pressors, off sedation. Extubated this morning. Now awake, alert, oriented. CT shows multifocal PNA, Sputum cultures showed PMNs, few gram-negative rods. Pt on zosyn for aspiration PNA. He was downgrade to the medical service on 4/9/22.    on 4/13/22, he had interval desaturation concerning for recurrence of aspiration.    Tube feeding was held on 4/13/22 for this, and steroids were also given.   he completed a course of zosyn for aspiration PNA from 4/8/22 thru 4/15/22.    ID was consulted on 4/20/22 given that patient still have poor respiratory status on BIPAP at FI 50 percent.       Impression:  SOB  WBC elevation  recent PNA  COPD on bipap  E coli lung infection  Pseudomonas Lung infection    Plan:  this could be re-currence of aspiration    Repeat imaging / CT scan on 4/20/22 showed:  Findings likely reflect aspiration pneumonitis/infection and there has been interval worsening of airspace disease in the lingula and left lower lobe compared to 4/8/2022.    E coli in sputum cx sent from 4/20/22  pseudomonas 4/23/22 update from sputum   - had been on CTX and azithromycin   - will Change back to Zosyn x 7 days.   - NO ORAL option for pseudomonas - RESISTANT TO Levaquin and Ciproi    WBC elevation is reactive  - and also could be from all the steroids received so far.     - will follow and trend

## 2022-04-24 DIAGNOSIS — D64.9 ANEMIA, UNSPECIFIED: ICD-10-CM

## 2022-04-24 LAB
ANION GAP SERPL CALC-SCNC: 10 MMOL/L — SIGNIFICANT CHANGE UP (ref 5–17)
BUN SERPL-MCNC: 49.2 MG/DL — HIGH (ref 8–20)
CALCIUM SERPL-MCNC: 9 MG/DL — SIGNIFICANT CHANGE UP (ref 8.6–10.2)
CHLORIDE SERPL-SCNC: 100 MMOL/L — SIGNIFICANT CHANGE UP (ref 98–107)
CO2 SERPL-SCNC: 35 MMOL/L — HIGH (ref 22–29)
CREAT SERPL-MCNC: 0.86 MG/DL — SIGNIFICANT CHANGE UP (ref 0.5–1.3)
EGFR: 91 ML/MIN/1.73M2 — SIGNIFICANT CHANGE UP
GLUCOSE BLDC GLUCOMTR-MCNC: 114 MG/DL — HIGH (ref 70–99)
GLUCOSE BLDC GLUCOMTR-MCNC: 182 MG/DL — HIGH (ref 70–99)
GLUCOSE BLDC GLUCOMTR-MCNC: 216 MG/DL — HIGH (ref 70–99)
GLUCOSE SERPL-MCNC: 176 MG/DL — HIGH (ref 70–99)
HCT VFR BLD CALC: 27 % — LOW (ref 39–50)
HGB BLD-MCNC: 7.8 G/DL — LOW (ref 13–17)
MCHC RBC-ENTMCNC: 28.6 PG — SIGNIFICANT CHANGE UP (ref 27–34)
MCHC RBC-ENTMCNC: 28.9 GM/DL — LOW (ref 32–36)
MCV RBC AUTO: 98.9 FL — SIGNIFICANT CHANGE UP (ref 80–100)
PLATELET # BLD AUTO: 263 K/UL — SIGNIFICANT CHANGE UP (ref 150–400)
POTASSIUM SERPL-MCNC: 4.8 MMOL/L — SIGNIFICANT CHANGE UP (ref 3.5–5.3)
POTASSIUM SERPL-SCNC: 4.8 MMOL/L — SIGNIFICANT CHANGE UP (ref 3.5–5.3)
RBC # BLD: 2.73 M/UL — LOW (ref 4.2–5.8)
RBC # FLD: 16.3 % — HIGH (ref 10.3–14.5)
SODIUM SERPL-SCNC: 145 MMOL/L — SIGNIFICANT CHANGE UP (ref 135–145)
WBC # BLD: 10.14 K/UL — SIGNIFICANT CHANGE UP (ref 3.8–10.5)
WBC # FLD AUTO: 10.14 K/UL — SIGNIFICANT CHANGE UP (ref 3.8–10.5)

## 2022-04-24 PROCEDURE — 99233 SBSQ HOSP IP/OBS HIGH 50: CPT

## 2022-04-24 PROCEDURE — 99232 SBSQ HOSP IP/OBS MODERATE 35: CPT

## 2022-04-24 RX ORDER — METOPROLOL TARTRATE 50 MG
12.5 TABLET ORAL
Refills: 0 | Status: DISCONTINUED | OUTPATIENT
Start: 2022-04-24 | End: 2022-04-25

## 2022-04-24 RX ORDER — ASPIRIN/CALCIUM CARB/MAGNESIUM 324 MG
81 TABLET ORAL DAILY
Refills: 0 | Status: DISCONTINUED | OUTPATIENT
Start: 2022-04-24 | End: 2022-04-27

## 2022-04-24 RX ADMIN — CHLORHEXIDINE GLUCONATE 15 MILLILITER(S): 213 SOLUTION TOPICAL at 06:40

## 2022-04-24 RX ADMIN — MORPHINE SULFATE 2 MILLIGRAM(S): 50 CAPSULE, EXTENDED RELEASE ORAL at 21:54

## 2022-04-24 RX ADMIN — ALBUTEROL 2.5 MILLIGRAM(S): 90 AEROSOL, METERED ORAL at 03:53

## 2022-04-24 RX ADMIN — Medication 2.5 MILLIGRAM(S): at 06:39

## 2022-04-24 RX ADMIN — Medication 5 MILLIGRAM(S): at 17:14

## 2022-04-24 RX ADMIN — ALBUTEROL 2.5 MILLIGRAM(S): 90 AEROSOL, METERED ORAL at 21:17

## 2022-04-24 RX ADMIN — MORPHINE SULFATE 2 MILLIGRAM(S): 50 CAPSULE, EXTENDED RELEASE ORAL at 00:11

## 2022-04-24 RX ADMIN — TIOTROPIUM BROMIDE 1 CAPSULE(S): 18 CAPSULE ORAL; RESPIRATORY (INHALATION) at 08:51

## 2022-04-24 RX ADMIN — Medication 112 MICROGRAM(S): at 06:39

## 2022-04-24 RX ADMIN — MORPHINE SULFATE 2 MILLIGRAM(S): 50 CAPSULE, EXTENDED RELEASE ORAL at 22:54

## 2022-04-24 RX ADMIN — Medication 1000 UNIT(S): at 12:18

## 2022-04-24 RX ADMIN — PIPERACILLIN AND TAZOBACTAM 25 GRAM(S): 4; .5 INJECTION, POWDER, LYOPHILIZED, FOR SOLUTION INTRAVENOUS at 06:38

## 2022-04-24 RX ADMIN — Medication 1 TABLET(S): at 17:14

## 2022-04-24 RX ADMIN — BUDESONIDE AND FORMOTEROL FUMARATE DIHYDRATE 2 PUFF(S): 160; 4.5 AEROSOL RESPIRATORY (INHALATION) at 21:18

## 2022-04-24 RX ADMIN — ALBUTEROL 2.5 MILLIGRAM(S): 90 AEROSOL, METERED ORAL at 08:51

## 2022-04-24 RX ADMIN — ENOXAPARIN SODIUM 40 MILLIGRAM(S): 100 INJECTION SUBCUTANEOUS at 17:18

## 2022-04-24 RX ADMIN — ALBUTEROL 2.5 MILLIGRAM(S): 90 AEROSOL, METERED ORAL at 16:15

## 2022-04-24 RX ADMIN — CHLORHEXIDINE GLUCONATE 1 APPLICATION(S): 213 SOLUTION TOPICAL at 06:41

## 2022-04-24 RX ADMIN — Medication 40 MILLIGRAM(S): at 06:38

## 2022-04-24 RX ADMIN — PIPERACILLIN AND TAZOBACTAM 25 GRAM(S): 4; .5 INJECTION, POWDER, LYOPHILIZED, FOR SOLUTION INTRAVENOUS at 14:05

## 2022-04-24 RX ADMIN — BUDESONIDE AND FORMOTEROL FUMARATE DIHYDRATE 2 PUFF(S): 160; 4.5 AEROSOL RESPIRATORY (INHALATION) at 12:05

## 2022-04-24 RX ADMIN — CHLORHEXIDINE GLUCONATE 15 MILLILITER(S): 213 SOLUTION TOPICAL at 17:14

## 2022-04-24 RX ADMIN — Medication 1 TABLET(S): at 06:38

## 2022-04-24 RX ADMIN — Medication 300 MILLIGRAM(S): at 12:18

## 2022-04-24 RX ADMIN — Medication 5 MILLIGRAM(S): at 06:41

## 2022-04-24 RX ADMIN — Medication 4: at 12:43

## 2022-04-24 RX ADMIN — PANTOPRAZOLE SODIUM 40 MILLIGRAM(S): 20 TABLET, DELAYED RELEASE ORAL at 06:39

## 2022-04-24 RX ADMIN — PIPERACILLIN AND TAZOBACTAM 25 GRAM(S): 4; .5 INJECTION, POWDER, LYOPHILIZED, FOR SOLUTION INTRAVENOUS at 21:54

## 2022-04-24 RX ADMIN — POLYETHYLENE GLYCOL 3350 17 GRAM(S): 17 POWDER, FOR SOLUTION ORAL at 12:18

## 2022-04-24 RX ADMIN — ATORVASTATIN CALCIUM 40 MILLIGRAM(S): 80 TABLET, FILM COATED ORAL at 21:54

## 2022-04-24 RX ADMIN — Medication 2: at 17:13

## 2022-04-24 RX ADMIN — PANTOPRAZOLE SODIUM 40 MILLIGRAM(S): 20 TABLET, DELAYED RELEASE ORAL at 17:18

## 2022-04-24 NOTE — PROGRESS NOTE ADULT - PROBLEM SELECTOR PLAN 1
Normocytic anemia, +FOBT, Chromic anemia. Hgb as above. No evidence of overt GI bleed.   Continue Protonix  Continue to tube feeds.   Last colonoscopy and EGD many years ago. Patient refused rectal exam.  Patient also refusing endoscopy or colonoscopy at this time. Indications for having an endoscopic evaluation is explained in detail.   Please continue to trend CBC, transfuse to Hgb 7 or higher  GI will sign off now. Please call us back if patient agreeing for EGD/colonoscopy

## 2022-04-24 NOTE — PROGRESS NOTE ADULT - SUBJECTIVE AND OBJECTIVE BOX
PULMONARY PROGRESS NOTE      KIMBERLY LAI  MRN-[id number]    Patient is a 76y old  Male who presents with a chief complaint of Hypoxic respiratory failure (24 Apr 2022 12:29)        INTERVAL HPI/OVERNIGHT EVENTS:  Pt seen and examined at the bedside.     MEDICATIONS  (STANDING):  ALBUTerol    0.083% 2.5 milliGRAM(s) Nebulizer every 6 hours  aspirin enteric coated 81 milliGRAM(s) Oral daily  atorvastatin 40 milliGRAM(s) Oral at bedtime  budesonide 160 MICROgram(s)/formoterol 4.5 MICROgram(s) Inhaler 2 Puff(s) Inhalation two times a day  chlorhexidine 0.12% Liquid 15 milliLiter(s) Swish and Spit two times a day  chlorhexidine 2% Cloths 1 Application(s) Topical <User Schedule>  cholecalciferol 1000 Unit(s) Oral daily  dextrose 5%. 1000 milliLiter(s) (50 mL/Hr) IV Continuous <Continuous>  dextrose 5%. 1000 milliLiter(s) (100 mL/Hr) IV Continuous <Continuous>  dextrose 50% Injectable 25 Gram(s) IV Push once  dextrose 50% Injectable 12.5 Gram(s) IV Push once  dextrose 50% Injectable 25 Gram(s) IV Push once  enalapril 2.5 milliGRAM(s) Oral daily  enoxaparin Injectable 40 milliGRAM(s) SubCutaneous every 24 hours  ferrous    sulfate Liquid 300 milliGRAM(s) Enteral Tube daily  glucagon  Injectable 1 milliGRAM(s) IntraMuscular once  insulin lispro (ADMELOG) corrective regimen sliding scale   SubCutaneous every 6 hours  lactated ringers. 1000 milliLiter(s) (75 mL/Hr) IV Continuous <Continuous>  lactobacillus acidophilus 1 Tablet(s) Oral two times a day  levothyroxine 112 MICROGram(s) Oral daily  metoprolol tartrate 12.5 milliGRAM(s) Oral two times a day  morphine  - Injectable 2 milliGRAM(s) IV Push at bedtime  oxybutynin 5 milliGRAM(s) Oral two times a day  pantoprazole  Injectable 40 milliGRAM(s) IV Push every 12 hours  piperacillin/tazobactam IVPB.. 3.375 Gram(s) IV Intermittent every 8 hours  polyethylene glycol 3350 17 Gram(s) Oral daily  predniSONE   Tablet 40 milliGRAM(s) Oral daily  senna 2 Tablet(s) Oral at bedtime  tiotropium 18 MICROgram(s) Capsule 1 Capsule(s) Inhalation daily    MEDICATIONS  (PRN):  acetaminophen     Tablet .. 650 milliGRAM(s) Oral every 6 hours PRN Temp greater or equal to 38C (100.4F)  aluminum hydroxide/magnesium hydroxide/simethicone Suspension 30 milliLiter(s) Oral every 4 hours PRN Dyspepsia  dextrose Oral Gel 15 Gram(s) Oral once PRN Blood Glucose LESS THAN 70 milliGRAM(s)/deciliter  guaiFENesin Oral Liquid (Sugar-Free) 100 milliGRAM(s) Oral every 6 hours PRN Cough  morphine  - Injectable 2 milliGRAM(s) IV Push every 8 hours PRN dyspnea or increased work of breathing    Allergies    No Known Allergies    Intolerances      PAST MEDICAL & SURGICAL HISTORY:  Esophageal stricture    Prostate CA    History of carotid stenosis    Laryngeal carcinoma    COPD (chronic obstructive pulmonary disease)    Hypothyroidism    Aspiration pneumonia    Traumatic pneumothorax    Benign prostatic hyperplasia with urinary obstruction    Syncope and collapse    Microalbuminuria    Anemia    Hyperlipidemia, unspecified hyperlipidemia type    Femoral fracture    S/P percutaneous endoscopic gastrostomy (PEG) tube placement    Liver laceration    History of exploratory laparotomy    History of total bilateral knee replacement  b/l femur          REVIEW OF SYSTEMS:  Negative except as noted in HPI      Vital Signs Last 24 Hrs  T(C): 36.9 (24 Apr 2022 15:46), Max: 36.9 (23 Apr 2022 20:00)  T(F): 98.4 (24 Apr 2022 15:46), Max: 98.5 (24 Apr 2022 00:00)  HR: 76 (24 Apr 2022 16:26) (72 - 96)  BP: 96/55 (24 Apr 2022 15:46) (96/55 - 132/61)  BP(mean): 60 (24 Apr 2022 08:00) (60 - 85)  RR: 19 (24 Apr 2022 15:46) (18 - 21)  SpO2: 97% (24 Apr 2022 16:26) (94% - 100%)      PHYSICAL EXAMINATION:  GEN: In no apparent distress  HEENT: NC/AT  NECK: Supple, non-tender  CV: +S1, S2  RESPIRATORY: Diffuse rhonchi. Good inspiratory effort, non-labored breathing  ABDOMEN: Soft, non-tender  EXTREMITIES: No pedal edema B/L  SKIN: No open wounds  PSYCH: Normal affect      LABS:                        7.8    10.14 )-----------( 263      ( 24 Apr 2022 10:26 )             27.0     04-24    145  |  100  |  49.2<H>  ----------------------------<  176<H>  4.8   |  35.0<H>  |  0.86    Ca    9.0      24 Apr 2022 10:26    TPro  6.8  /  Alb  2.3<L>  /  TBili  0.2<L>  /  DBili  x   /  AST  22  /  ALT  20  /  AlkPhos  78  04-23                        MICROBIOLOGY:  COVID-19 PCR (04.21.22 @ 04:39)    COVID-19 PCR: NotDetec: You can help in the fight against COVID-19. [facility] may contact  you to see if you are interested in voluntarily participating in one of  our clinical trials.  Testing is performed using polymerase chain reaction (PCR) or  transcription mediated amplification (TMA). This COVID-19 (SARS-CoV-2)  nucleic acid amplification test was validated by [facility] and is  in use under the FDA Emergency Use Authorization (EUA) for clinical labs  CLIA-certified to perform high complexity testing. Test results should be  correlated with clinical presentation, patient history, and epidemiology.        RADIOLOGY & ADDITIONAL STUDIES:  < from: CT Chest w/ IV Cont (04.20.22 @ 13:44) >    ACC: [id number] EXAM:  CT CHEST IC                          PROCEDURE DATE:  04/20/2022          INTERPRETATION:  HISTORY: Admitting Dxs: J96.01 RESPIRATORY DIS. Hypoxia.   Recent pneumonia. Worsening chest x-ray findings.    EXAMINATION: CT CHEST was performed with IV contrast.  CONTRAST/COMPLICATIONS:  IV Contrast: Omnipaque 300  97 cc administered   3 cc discarded  Oral Contrast: NONE  Complications: None reported at time of study completion    COMPARISON: 4/8/2022 CT chest.    FINDINGS:    AIRWAYS, LUNGS, PLEURA: Tracheal secretions. Biapical scarring/fibrosis   and peripheral left upper lobe subpleural thickening unchanged.    Multifocal left greater than right lower lobe consolidation, patchy   ground-glass opacities primarily in the upper lobes, and tree-in-bud   nodular opacities throughout the right middle lobe and bilateral lower   lobes.    With respect to 4/8/2022 left lower lobe multifocal consolidation and   superior segment ground-glass nodular opacities and lingular   consolidation appear worse.    Trace left pleural effusion.    MEDIASTINUM: Normal heart size. No pericardial effusion. Thoracic aorta   normal caliber.  Unchanged mediastinal and right hilar lymphadenopathy;   reference subcarinal 3.8 x 1.5 cm node in 1.3 x 1 cm node adjacent to the   descending thoracic aorta (image 1 1, series 3).    IMAGED ABDOMEN: Unchanged hyperdense material within the gallbladder,   likely stones. Subcentimeter left renal hypodense lesions too small to   characterize. Gastrostomy tube in place.    SOFT TISSUES: Unremarkable.    BONES: Unremarkable.      IMPRESSION:.    Findings likely reflect aspiration pneumonitis/infection and there has   been interval worsening of airspace disease in the lingula and left lower   lobe compared to 4/8/2022.    Unchanged mediastinal and right hilar lymphadenopathy.    --- End of Report ---            MILENA BLOCK MD; Attending Radiologist  This document has been electronically signed. Apr 20 2022  2:04PM    < end of copied text >      ECHO:  < from: TTE Echo Complete w/o Contrast w/ Doppler (07.07.21 @ 13:17) >  Summary:   1. Left ventricular ejection fraction, by visual estimation, is 60 to 65%.   2. Normal global left ventricular systolic function.   3. Spectral Doppler shows impaired relaxation pattern of left ventricular myocardial filling (Grade I diastolic dysfunction).   4. Mild mitral valve regurgitation.   5. Mild thickening of the anterior and posterior mitral valve leaflets.    MD Zoey Electronically signed on 7/7/2021 at 7:38:07 PM            *** Final ***              FOZIA SHIPLEY MD; Attending Cardiologist  This document has been electronically signed. Jul 7 2021  1:17PM    < end of copied text >

## 2022-04-24 NOTE — PROGRESS NOTE ADULT - SUBJECTIVE AND OBJECTIVE BOX
Patient is a 76y old  Male who presents with a chief complaint of Hypoxic respiratory failure (23 Apr 2022 15:51)  Pt is feeling better. Breathing stable with 2l nc at rest. so2 98%. Pt denies abd pain,cp,cough,fever,chill  REVIEW OF SYSTEMS: All systems are reviewed and found to be negative except above    MEDICATIONS  (STANDING):  ALBUTerol    0.083% 2.5 milliGRAM(s) Nebulizer every 6 hours  atorvastatin 40 milliGRAM(s) Oral at bedtime  budesonide 160 MICROgram(s)/formoterol 4.5 MICROgram(s) Inhaler 2 Puff(s) Inhalation two times a day  chlorhexidine 0.12% Liquid 15 milliLiter(s) Swish and Spit two times a day  chlorhexidine 2% Cloths 1 Application(s) Topical <User Schedule>  cholecalciferol 1000 Unit(s) Oral daily  dextrose 5%. 1000 milliLiter(s) (50 mL/Hr) IV Continuous <Continuous>  dextrose 5%. 1000 milliLiter(s) (100 mL/Hr) IV Continuous <Continuous>  dextrose 50% Injectable 25 Gram(s) IV Push once  dextrose 50% Injectable 12.5 Gram(s) IV Push once  dextrose 50% Injectable 25 Gram(s) IV Push once  enalapril 2.5 milliGRAM(s) Oral daily  enoxaparin Injectable 40 milliGRAM(s) SubCutaneous every 24 hours  ferrous    sulfate Liquid 300 milliGRAM(s) Enteral Tube daily  glucagon  Injectable 1 milliGRAM(s) IntraMuscular once  insulin lispro (ADMELOG) corrective regimen sliding scale   SubCutaneous every 6 hours  lactated ringers. 1000 milliLiter(s) (75 mL/Hr) IV Continuous <Continuous>  lactobacillus acidophilus 1 Tablet(s) Oral two times a day  levothyroxine 112 MICROGram(s) Oral daily  metoprolol tartrate 25 milliGRAM(s) Oral two times a day  morphine  - Injectable 2 milliGRAM(s) IV Push at bedtime  oxybutynin 5 milliGRAM(s) Oral two times a day  pantoprazole  Injectable 40 milliGRAM(s) IV Push every 12 hours  piperacillin/tazobactam IVPB.. 3.375 Gram(s) IV Intermittent every 8 hours  polyethylene glycol 3350 17 Gram(s) Oral daily  predniSONE   Tablet 40 milliGRAM(s) Oral daily  senna 2 Tablet(s) Oral at bedtime  tiotropium 18 MICROgram(s) Capsule 1 Capsule(s) Inhalation daily    MEDICATIONS  (PRN):  acetaminophen     Tablet .. 650 milliGRAM(s) Oral every 6 hours PRN Temp greater or equal to 38C (100.4F)  aluminum hydroxide/magnesium hydroxide/simethicone Suspension 30 milliLiter(s) Oral every 4 hours PRN Dyspepsia  dextrose Oral Gel 15 Gram(s) Oral once PRN Blood Glucose LESS THAN 70 milliGRAM(s)/deciliter  guaiFENesin Oral Liquid (Sugar-Free) 100 milliGRAM(s) Oral every 6 hours PRN Cough  morphine  - Injectable 2 milliGRAM(s) IV Push every 8 hours PRN dyspnea or increased work of breathing      CAPILLARY BLOOD GLUCOSE      POCT Blood Glucose.: 114 mg/dL (24 Apr 2022 06:37)  POCT Blood Glucose.: 174 mg/dL (23 Apr 2022 23:38)  POCT Blood Glucose.: 139 mg/dL (23 Apr 2022 19:24)  POCT Blood Glucose.: 203 mg/dL (23 Apr 2022 12:19)    I&O's Summary    23 Apr 2022 07:01  -  24 Apr 2022 07:00  --------------------------------------------------------  IN: 1250 mL / OUT: 1100 mL / NET: 150 mL        PHYSICAL EXAM:  Vital Signs Last 24 Hrs  T(C): 36.6 (24 Apr 2022 08:00), Max: 36.9 (23 Apr 2022 20:00)  T(F): 97.8 (24 Apr 2022 08:00), Max: 98.5 (24 Apr 2022 00:00)  HR: 80 (24 Apr 2022 08:00) (74 - 101)  BP: 112/46 (24 Apr 2022 08:00) (81/31 - 140/51)  BP(mean): 60 (24 Apr 2022 08:00) (43 - 85)  RR: 21 (24 Apr 2022 08:00) (17 - 22)  SpO2: 100% (24 Apr 2022 08:00) (97% - 100%)    CONSTITUTIONAL: NAD,  EYES: PERRLA; conjunctiva and sclera clear  ENMT: Moist oral mucosa,   RESPIRATORY: Normal respiratory effort; mild occasional  basal crackle to auscultation bilaterally  CARDIOVASCULAR: Regular rate and rhythm, normal S1 and S2, no murmur   EXTS: No lower extremity edema; Peripheral pulses are 2+ bilaterally  ABDOMEN: Nontender to palpation, normoactive bowel sounds, no rebound/guarding; Peg side  : Clear  MUSCLOSKELETAL:   no cyanosis of digits; no joint swelling or tenderness to palpation  PSYCH: affect appropriate  NEUROLOGY: A+O to person, place, and time; CN 2-12 are intact and symmetric; no gross sensory deficits;       LABS:                        7.8    10.14 )-----------( 263      ( 24 Apr 2022 10:26 )             27.0     04-24    145  |  100  |  49.2<H>  ----------------------------<  176<H>  4.8   |  35.0<H>  |  0.86    Ca    9.0      24 Apr 2022 10:26    TPro  6.8  /  Alb  2.3<L>  /  TBili  0.2<L>  /  DBili  x   /  AST  22  /  ALT  20  /  AlkPhos  78  04-23              Culture - Blood (collected 22 Apr 2022 08:53)  Source: .Blood Blood-Peripheral  Preliminary Report (24 Apr 2022 09:00):    No growth at 48 hours    Culture - Blood (collected 22 Apr 2022 08:53)  Source: .Blood Blood-Peripheral  Preliminary Report (24 Apr 2022 09:00):    No growth at 48 hours        RADIOLOGY & ADDITIONAL TESTS:  Results Reviewed:   Imaging Personally Reviewed:  Electrocardiogram Personally Reviewed:    COORDINATION OF CARE:  Care Discussed with Consultants/Other Providers [Y/N]:  Prior or Outpatient Records Reviewed [Y/N]:

## 2022-04-24 NOTE — PROGRESS NOTE ADULT - ASSESSMENT
- Acute on chronic hypercapneic respiratory failure  - Acute on chronic hypoxic resp failure  - Acute on chronic aspiration pneumonitis - s/p intubation and extubation  - COPD  - Pseudomonas PNA  - Gastric motility decreased with GERD  - H/o laryngeal CA with dysphagia s/p PEG  - Anemia      Recommendations:  C/w Prednisone 40mg daily, taper by 10mg every 3 days. Continue BPAP qhs and prn. Strict aspiration precautions, GI f/u. Pt with pseudomonas in sputum - CTX/Azithro switched to Zosyn (4/23- ), ID on board. C/w PRN nebs. Monitor O2 requirements - he is resting comfortably on 2L NC. OOBTC, ambulate, PT/OT. Follow H/H. Palliative care on board.    D/w family at bedside    Pt at high risk for needing re-intubation so would benefit from outpt AVAPS to decrease readmissions and to treat chronic hypercarbia.      Alan Castorena M.D.  Pulmonary & Critical Care Medicine  [facility]  Pulmonary and [facility] Medicine at [place]  [address]  [place], [place] [address]  T: [phone]  F: [phone]

## 2022-04-24 NOTE — PROGRESS NOTE ADULT - PROBLEM SELECTOR PROBLEM 1
Anemia
COPD (chronic obstructive pulmonary disease)

## 2022-04-24 NOTE — PROGRESS NOTE ADULT - NS ATTEND AMEND GEN_ALL_CORE FT
76M with hx of laryngeal ca s/p radiation/rad/chemo, s/p PEG for FTT, DM, HTN, Dyslipidemia, Hypothyroidism, has prolong hospital course. Initially admitted with unresponsiveness, intubated for hypoxic respiratory failure, treated for septic shock due to aspiration pneumonia, later extubated and sent to [facility]. He was seen initially by GI team for leakage around PEG site which resolved with conservative management. GI is recalled for persistent anemia with stable Hb but had dropped since admission. No overt GI bleeding. Patient denies GI symptoms.  Last EGD/Colon >10yrs ago per patient.  Seems to be frustrated, asking for PEG feeding. Anemia workup showed AOCD. He refuses rectal exam. Further does not want any endoscopic workup. Recommend PPI therapy. Recall GI if he agrees for endoscopic evaluation. Continue to monitor Hb to keep it above 7mg/dl.

## 2022-04-24 NOTE — PROGRESS NOTE ADULT - SUBJECTIVE AND OBJECTIVE BOX
Chief Complaint/ Interval HPI: 75 y/o Albanian speaking M w/ history of Laryngeal Cancer s/p Chemo + RT, PEG Tube, COPD on home oxygen 2LNC, Carotid Stenosis, Non Obstructive CAD, HTN, HLD, Prediabetes / ? DM 2 and Hypothyroidism presented with unresponsiveness. Intubated in ER and admitted to ICU with Hypoxic Respiratory Failure. Found to be in Septic Shock secondary to Aspiration Pneumonia. Started on Levophed and later weaned off. He was extubated on 4/9/22. EEG preliminary report with potential multifocal epileptogenic foci. S/p PEG tube placement for dysphagia. GI was originally consulted for leak around the PEG tube site and also for abdominal "Bloating". Leaking around the PEG tube was resolved and was able to flush the tube with no signs of obstruction. GI is now reconsulted for +FOBT. Today his hemoglobin 7.8 gm (7.8<-- 7.2<-- 7.4,-- 8.3), no evidence of overt GI bleed. Patient denies abdominal pain, nausea vomiting, hematemesis, hematochezia or melena. Denies bloating, chest pian, dizziness, shortness of breath. Reports feeling hungry.     Review of Systems:  · ENMT: negative  · Respiratory and Thorax: negative  · Cardiovascular: negative  · Gastrointestinal: see above.  · Genitourinary:	negative  · Musculoskeletal: negative  · Neurological: negative  · Psychiatric: negative  · Hematology/Lymphatics: negative  · Endocrine: negative      PAST MEDICAL/SURGICAL HISTORY:  Esophageal stricture    Prostate CA    History of carotid stenosis    Laryngeal carcinoma    COPD (chronic obstructive pulmonary disease)    Hypothyroidism    Aspiration pneumonia    Traumatic pneumothorax    Benign prostatic hyperplasia with urinary obstruction    Syncope and collapse    Microalbuminuria    Anemia    Hyperlipidemia, unspecified hyperlipidemia type    Femoral fracture    S/P percutaneous endoscopic gastrostomy (PEG) tube placement    Liver laceration    History of exploratory laparotomy    History of total bilateral knee replacement  b/l femur      MEDICATIONS  (STANDING):  ALBUTerol    0.083% 2.5 milliGRAM(s) Nebulizer every 6 hours  atorvastatin 40 milliGRAM(s) Oral at bedtime  budesonide 160 MICROgram(s)/formoterol 4.5 MICROgram(s) Inhaler 2 Puff(s) Inhalation two times a day  chlorhexidine 0.12% Liquid 15 milliLiter(s) Swish and Spit two times a day  chlorhexidine 2% Cloths 1 Application(s) Topical <User Schedule>  cholecalciferol 1000 Unit(s) Oral daily  dextrose 5%. 1000 milliLiter(s) (50 mL/Hr) IV Continuous <Continuous>  dextrose 5%. 1000 milliLiter(s) (100 mL/Hr) IV Continuous <Continuous>  dextrose 50% Injectable 25 Gram(s) IV Push once  dextrose 50% Injectable 12.5 Gram(s) IV Push once  dextrose 50% Injectable 25 Gram(s) IV Push once  enalapril 2.5 milliGRAM(s) Oral daily  enoxaparin Injectable 40 milliGRAM(s) SubCutaneous every 24 hours  ferrous    sulfate Liquid 300 milliGRAM(s) Enteral Tube daily  glucagon  Injectable 1 milliGRAM(s) IntraMuscular once  insulin lispro (ADMELOG) corrective regimen sliding scale   SubCutaneous every 6 hours  lactated ringers. 1000 milliLiter(s) (75 mL/Hr) IV Continuous <Continuous>  lactobacillus acidophilus 1 Tablet(s) Oral two times a day  levothyroxine 112 MICROGram(s) Oral daily  metoprolol tartrate 12.5 milliGRAM(s) Oral two times a day  morphine  - Injectable 2 milliGRAM(s) IV Push at bedtime  oxybutynin 5 milliGRAM(s) Oral two times a day  pantoprazole  Injectable 40 milliGRAM(s) IV Push every 12 hours  piperacillin/tazobactam IVPB.. 3.375 Gram(s) IV Intermittent every 8 hours  polyethylene glycol 3350 17 Gram(s) Oral daily  predniSONE   Tablet 40 milliGRAM(s) Oral daily  senna 2 Tablet(s) Oral at bedtime  tiotropium 18 MICROgram(s) Capsule 1 Capsule(s) Inhalation daily    MEDICATIONS  (PRN):  acetaminophen     Tablet .. 650 milliGRAM(s) Oral every 6 hours PRN Temp greater or equal to 38C (100.4F)  aluminum hydroxide/magnesium hydroxide/simethicone Suspension 30 milliLiter(s) Oral every 4 hours PRN Dyspepsia  dextrose Oral Gel 15 Gram(s) Oral once PRN Blood Glucose LESS THAN 70 milliGRAM(s)/deciliter  guaiFENesin Oral Liquid (Sugar-Free) 100 milliGRAM(s) Oral every 6 hours PRN Cough  morphine  - Injectable 2 milliGRAM(s) IV Push every 8 hours PRN dyspnea or increased work of breathing    No Known Allergies    T(C): 36.6 (04-24-22 @ 08:00), Max: 36.9 (04-23-22 @ 20:00)  HR: 74 (04-24-22 @ 08:51) (74 - 101)  BP: 112/46 (04-24-22 @ 08:00) (81/31 - 136/60)  RR: 21 (04-24-22 @ 08:00) (17 - 22)  SpO2: 99% (04-24-22 @ 08:51) (97% - 100%)    I&O's Summary    23 Apr 2022 07:01  -  24 Apr 2022 07:00  --------------------------------------------------------  IN: 1250 mL / OUT: 1100 mL / NET: 150 mL      PHYSICAL EXAM:    Constitutional: Thin appearing male, in no acute distress. Node head for most of the questions, very low phonic  Neuro: Awake alert, oriented.   HEENT: PERRL, anicteric sclerae  Neck: supple, no JVD  CV: regular rate, regular rhythm, +S1S2,   Pulm/chest: lung sounds clear bilaterally, no accessory muscle use noted  Abd: soft, round, non tender, +BS. PEG tube clamped, mild erythremia at the PEG site, no obvious drainage noted. No guarding, rigidity, rebound tenderness.   Ext: no Cyanosis, clubbing or edema  Skin: warm, no jaundice   Psych: calm, cooperative      LABS:               7.8    10.14 )-----------( 263      ( 04-24 @ 10:26 )             27.0                7.8    9.48  )-----------( 279      ( 04-23 @ 15:39 )             26.9                7.2    8.06  )-----------( 239      ( 04-23 @ 13:56 )             25.3                7.4    8.82  )-----------( 262      ( 04-23 @ 08:34 )             25.2                8.3    12.33 )-----------( 315      ( 04-22 @ 08:53 )             28.4       04-24    145  |  100  |  49.2<H>  ----------------------------<  176<H>  4.8   |  35.0<H>  |  0.86    Ca    9.0      24 Apr 2022 10:26    TPro  6.8  /  Alb  2.3<L>  /  TBili  0.2<L>  /  DBili  x   /  AST  22  /  ALT  20  /  AlkPhos  78  04-23    LIVER FUNCTIONS - ( 23 Apr 2022 08:34 )  Alb: 2.3 g/dL / Pro: 6.8 g/dL / ALK PHOS: 78 U/L / ALT: 20 U/L / AST: 22 U/L / GGT: x             % Saturation, Iron: 29 % (04-23-22 @ 13:56)  Iron - Total Binding Capacity.: 206 ug/dL *L* (04-23-22 @ 13:56)  Iron Total, Serum: 59 ug/dL (04-23-22 @ 13:56)      Culture - Blood (collected 22 Apr 2022 08:53)  Source: .Blood Blood-Peripheral  Preliminary Report (24 Apr 2022 09:00):    No growth at 48 hours    Culture - Blood (collected 22 Apr 2022 08:53)  Source: .Blood Blood-Peripheral  Preliminary Report (24 Apr 2022 09:00):    No growth at 48 hours

## 2022-04-24 NOTE — PROGRESS NOTE ADULT - ASSESSMENT
77 y/o Yakut speaking M w/ history of Laryngeal Cancer s/p Chemo + RT, PEG Tube, COPD on home oxygen 2LNC, Carotid Stenosis, Non Obstructive CAD, HTN, HLD, Prediabetes / ? DM 2 and Hypothyroidism presented with unresponsiveness. Oxygen saturation at home in 30s. Intubated in ER and admitted to ICU with Hypoxic Respiratory Failure secondary. Found to be in Septic Shock secondary to Aspiration Pneumonia. Started on Levophed and later weaned off. He was extubated on 4/9/22. EEG preliminary report with potential multifocal epileptogenic foci. He was downgraded to Medicine Service on 4/10/22.  Since downgrade hospital course has been complicated by intermittent hypoxia likely due to continued worsening aspiration.        Acute on chronic Respiratory Failure with Hypoxia with sepsis sec to recurrent aspiration PNA  - Currently tolerating 3LNC,titrate down as tolerate.  off bipap now.  bipap at night  - Duonebs q6h, Symbicort q12h.Tapering dose of steroid  - CT chest reveals worsening infiltrate.    - Meropenem started by ID 4/21  - sputum culture from 4/20 growing ecoli  - Aspiration is multifactorial 2/2 dysphagia and reflux from lack of free water flushing after tube feeds   - Maintain on aspiration precautions including HOB elevation   - f/u Pulmonary/id rec       Anemia chronic disease  -monitor h/h  - stool occult  -iron panel     Hypernatremia  Increased to 146   Added free water down the PEG.   Monitor daily       Layrngeal cancer s/p radiation with subsequent PEG tube placement due to dysphagia  - Leakage noted during hospital course and GI consult; now resolved   - CT neck does not reveal any laryngeal mass  - Outpatient follow up    MATHIEU, likely prerenal   - Resolved      Prediabetes / ?DM 2  - HbA1c 6.1  -bg high w/steroid   - Accu checks and ISS      HTN   - bp was low. stable now.  Restart home medications with holding parameters       Hypothyroidism  - On Synthroid        Constipation  - Maintain on miralax and lactulose      Abnormal EEG  - Repeat EEG with mild to moderate nonspecific diffuse or multifocal cerebral dysfunction. No epileptiform pattern or seizure seen.  - No intervention as per Epilepsy    VTE ppx: Lovenox     Dispo:  Slow medical improvement.  Discussed need for LATA, likely discharge early next week  dw pt and wife in detailed at bedside       75 y/o Indonesian speaking M w/ history of Laryngeal Cancer s/p Chemo + RT, PEG Tube, COPD on home oxygen 2LNC, Carotid Stenosis, Non Obstructive CAD, HTN, HLD, Prediabetes / ? DM 2 and Hypothyroidism presented with unresponsiveness. Oxygen saturation at home in 30s. Intubated in ER and admitted to ICU with Hypoxic Respiratory Failure secondary. Found to be in Septic Shock secondary to Aspiration Pneumonia. Started on Levophed and later weaned off. He was extubated on 4/9/22. EEG preliminary report with potential multifocal epileptogenic foci. He was downgraded to Medicine Service on 4/10/22.  Since downgrade hospital course has been complicated by intermittent hypoxia likely due to continued worsening aspiration.        Acute on chronic Respiratory Failure with Hypoxia with sepsis sec to recurrent aspiration PNA  -- sputum culture from 4/20 growing ecoli  - Currently tolerating 2 LNC,titrate down as tolerate.  off bipap now.  bipap at night  - Nebs, Tapering dose of PO steroid   - CT chest reveals worsening infiltrate.    - Meropenem started by ID 4/21. will f/u rec about duration  - Aspiration is multifactorial 2/2 dysphagia and reflux from lack of free water flushing after tube feeds   - Maintain on aspiration precautions including HOB elevation   - f/u Pulmonary/id rec       Acute on chronic Anemia  -hb 7.8  -monitor h/h  - stool occult POSITIVE  -iron level stable  -PPI  -C/S GI eval  -stool AM brown    Hypernatremia  -improving  - 145  Added free water down the PEG.   Monitor daily       Laryngeal cancer s/p radiation with subsequent PEG tube placement due to dysphagia  - Leakage noted during hospital course and GI consult; now resolved   - CT neck does not reveal any laryngeal mass  - Outpatient follow up    MATHIEU, likely prerenal   - Resolved      Prediabetes / ?DM 2  - HbA1c 6.1  -bg high w/steroid   - Accu checks and ISS      HTN   -stable now.  - current medications with holding parameters       Hypothyroidism  - On Synthroid        Constipation  - Maintain on miralax and lactulose      Abnormal EEG  - Repeat EEG with mild to moderate nonspecific diffuse or multifocal cerebral dysfunction. No epileptiform pattern or seizure seen.  - No intervention as per Epilepsy    VTE ppx: Lovenox     Dispo:  Slow medical improvement.  Discussed need for LATA, likely discharge early next week  dw pt and daughter in detailed at bedside, all quesions answers best of my knowledge.  DOWNGRADE TO TELE   Daughter agreed- she will up date to her sister in [place] and mother.  morena chambers       75 y/o Hebrew speaking M w/ history of Laryngeal Cancer s/p Chemo + RT, PEG Tube, COPD on home oxygen 2LNC, Carotid Stenosis, Non Obstructive CAD, HTN, HLD, Prediabetes / ? DM 2 and Hypothyroidism presented with unresponsiveness. Oxygen saturation at home in 30s. Intubated in ER and admitted to ICU with Hypoxic Respiratory Failure secondary. Found to be in Septic Shock secondary to Aspiration Pneumonia. Started on Levophed and later weaned off. He was extubated on 4/9/22. EEG preliminary report with potential multifocal epileptogenic foci. He was downgraded to Medicine Service on 4/10/22.  Since downgrade hospital course has been complicated by intermittent hypoxia likely due to continued worsening aspiration.        Acute on chronic Respiratory Failure with Hypoxia with sepsis sec to recurrent aspiration PNA  -- sputum culture from 4/20 growing ecoli  - Currently tolerating 2 LNC,titrate down as tolerate.  off bipap now.  bipap at night  - Nebs, Tapering dose of PO steroid   - CT chest reveals worsening infiltrate.    - Meropenem started by ID 4/21. will f/u rec about duration  - Aspiration is multifactorial 2/2 dysphagia and reflux from lack of free water flushing after tube feeds   - Maintain on aspiration precautions including HOB elevation   - f/u Pulmonary/id rec       Acute on chronic Anemia  -hb 7.8  -monitor h/h  - stool occult POSITIVE  -iron level stable  -PPI  -C/S GI eval  -stool AM brown    Hypernatremia  -improving  - 145  Added free water down the PEG.   Monitor daily       Laryngeal cancer s/p radiation with subsequent PEG tube placement due to dysphagia  - Leakage noted during hospital course and GI consult; now resolved   - CT neck does not reveal any laryngeal mass  - Outpatient follow up    MATHIEU, likely prerenal   - Resolved      Prediabetes / ?DM 2  - HbA1c 6.1  -bg high w/steroid   - Accu checks and ISS      HTN   -stable now. was soft yesterday  -will cut down bb 12.5 mg bid  with holding parameters   -continue ACEI      Hypothyroidism  - On Synthroid        Constipation  - Maintain on miralax and lactulose      Abnormal EEG  - Repeat EEG with mild to moderate nonspecific diffuse or multifocal cerebral dysfunction. No epileptiform pattern or seizure seen.  - No intervention as per Epilepsy    VTE ppx: Lovenox     Dispo:  Slow medical improvement.  Discussed need for LATA, likely discharge early next week  dw pt and daughter in detailed at bedside, all quesions answers best of my knowledge.  DOWNGRADE TO TELE   Daughter agreed- she will up date to her sister in [place] and mother.  morena chambers       77 y/o Romanian speaking M w/ history of Laryngeal Cancer s/p Chemo + RT, PEG Tube, COPD on home oxygen 2LNC, Carotid Stenosis, Non Obstructive CAD, HTN, HLD, Prediabetes / ? DM 2 and Hypothyroidism presented with unresponsiveness. Oxygen saturation at home in 30s. Intubated in ER and admitted to ICU with Hypoxic Respiratory Failure secondary. Found to be in Septic Shock secondary to Aspiration Pneumonia. Started on Levophed and later weaned off. He was extubated on 4/9/22. EEG preliminary report with potential multifocal epileptogenic foci. He was downgraded to Medicine Service on 4/10/22.  Since downgrade hospital course has been complicated by intermittent hypoxia likely due to continued worsening aspiration.        Acute on chronic Respiratory Failure with Hypoxia with sepsis sec to recurrent aspiration PNA    -E coli in sputum cx sent from 4/20/22  --pseudomonas 4/23/22 update from sputum   - had been on CTX and azithromycin /merrem  - Change back to Zosyn 4/24/22 x 7 days.   - NO ORAL option for pseudomonas - RESISTANT TO Levaquin and Ciproi  - Currently tolerating 2 LNC,titrate down as tolerate.  off bipap now.  bipap at night  - Nebs, Tapering dose of PO steroid   - CT chest reveals worsening infiltrate.    - Meropenem started by ID 4/21. will f/u rec about duration  - Aspiration is multifactorial 2/2 dysphagia and reflux from lack of free water flushing after tube feeds   - Maintain on aspiration precautions including HOB elevation   - f/u Pulmonary/id rec       Acute on chronic Anemia  -hb 7.8  -monitor h/h  - stool occult POSITIVE  -iron level stable  -PPI  -C/S GI eval  -stool AM brown    Hypernatremia  -improving  - 145  Added free water down the PEG.   Monitor daily       Laryngeal cancer s/p radiation with subsequent PEG tube placement due to dysphagia  - Leakage noted during hospital course and GI consult; now resolved   - CT neck does not reveal any laryngeal mass  - Outpatient follow up    MATHIEU, likely prerenal   - Resolved      Prediabetes / ?DM 2  - HbA1c 6.1  -bg high w/steroid   - Accu checks and ISS      HTN   -stable now. was soft yesterday  -will cut down bb 12.5 mg bid  with holding parameters   -continue ACEI      Hypothyroidism  - On Synthroid        Constipation  - Maintain on miralax and lactulose      Abnormal EEG  - Repeat EEG with mild to moderate nonspecific diffuse or multifocal cerebral dysfunction. No epileptiform pattern or seizure seen.  - No intervention as per Epilepsy    VTE ppx: Lovenox     Dispo:  Slow medical improvement.  Discussed need for LATA, likely discharge early next week  dw pt and daughter in detailed at bedside, all quesions answers best of my knowledge.  DOWNGRADE TO TELE   Daughter agreed- she will up date to her sister in [place] and mother.  morena chambers

## 2022-04-25 LAB
ANION GAP SERPL CALC-SCNC: 9 MMOL/L — SIGNIFICANT CHANGE UP (ref 5–17)
BUN SERPL-MCNC: 41.3 MG/DL — HIGH (ref 8–20)
CALCIUM SERPL-MCNC: 8.8 MG/DL — SIGNIFICANT CHANGE UP (ref 8.6–10.2)
CHLORIDE SERPL-SCNC: 100 MMOL/L — SIGNIFICANT CHANGE UP (ref 98–107)
CO2 SERPL-SCNC: 36 MMOL/L — HIGH (ref 22–29)
CREAT SERPL-MCNC: 0.71 MG/DL — SIGNIFICANT CHANGE UP (ref 0.5–1.3)
EGFR: 96 ML/MIN/1.73M2 — SIGNIFICANT CHANGE UP
GLUCOSE BLDC GLUCOMTR-MCNC: 100 MG/DL — HIGH (ref 70–99)
GLUCOSE BLDC GLUCOMTR-MCNC: 153 MG/DL — HIGH (ref 70–99)
GLUCOSE BLDC GLUCOMTR-MCNC: 157 MG/DL — HIGH (ref 70–99)
GLUCOSE BLDC GLUCOMTR-MCNC: 219 MG/DL — HIGH (ref 70–99)
GLUCOSE SERPL-MCNC: 86 MG/DL — SIGNIFICANT CHANGE UP (ref 70–99)
HCT VFR BLD CALC: 29.4 % — LOW (ref 39–50)
HGB BLD-MCNC: 8.6 G/DL — LOW (ref 13–17)
MCHC RBC-ENTMCNC: 28.2 PG — SIGNIFICANT CHANGE UP (ref 27–34)
MCHC RBC-ENTMCNC: 29.3 GM/DL — LOW (ref 32–36)
MCV RBC AUTO: 96.4 FL — SIGNIFICANT CHANGE UP (ref 80–100)
PLATELET # BLD AUTO: 271 K/UL — SIGNIFICANT CHANGE UP (ref 150–400)
POTASSIUM SERPL-MCNC: 3.7 MMOL/L — SIGNIFICANT CHANGE UP (ref 3.5–5.3)
POTASSIUM SERPL-SCNC: 3.7 MMOL/L — SIGNIFICANT CHANGE UP (ref 3.5–5.3)
RBC # BLD: 3.05 M/UL — LOW (ref 4.2–5.8)
RBC # FLD: 15.9 % — HIGH (ref 10.3–14.5)
SODIUM SERPL-SCNC: 145 MMOL/L — SIGNIFICANT CHANGE UP (ref 135–145)
WBC # BLD: 7.16 K/UL — SIGNIFICANT CHANGE UP (ref 3.8–10.5)
WBC # FLD AUTO: 7.16 K/UL — SIGNIFICANT CHANGE UP (ref 3.8–10.5)

## 2022-04-25 PROCEDURE — 99232 SBSQ HOSP IP/OBS MODERATE 35: CPT

## 2022-04-25 PROCEDURE — 99233 SBSQ HOSP IP/OBS HIGH 50: CPT

## 2022-04-25 RX ORDER — METOPROLOL TARTRATE 50 MG
12.5 TABLET ORAL
Refills: 0 | Status: DISCONTINUED | OUTPATIENT
Start: 2022-04-25 | End: 2022-04-27

## 2022-04-25 RX ORDER — LACTOBACILLUS ACIDOPHILUS 100MM CELL
1 CAPSULE ORAL
Refills: 0 | Status: DISCONTINUED | OUTPATIENT
Start: 2022-04-25 | End: 2022-04-27

## 2022-04-25 RX ORDER — LACTOBACILLUS ACIDOPHILUS 100MM CELL
1 CAPSULE ORAL
Refills: 0 | Status: DISCONTINUED | OUTPATIENT
Start: 2022-04-25 | End: 2022-04-25

## 2022-04-25 RX ORDER — METOPROLOL TARTRATE 50 MG
12.5 TABLET ORAL
Refills: 0 | Status: DISCONTINUED | OUTPATIENT
Start: 2022-04-25 | End: 2022-04-25

## 2022-04-25 RX ADMIN — Medication 2: at 18:15

## 2022-04-25 RX ADMIN — MORPHINE SULFATE 2 MILLIGRAM(S): 50 CAPSULE, EXTENDED RELEASE ORAL at 21:48

## 2022-04-25 RX ADMIN — Medication 1000 UNIT(S): at 12:22

## 2022-04-25 RX ADMIN — CHLORHEXIDINE GLUCONATE 15 MILLILITER(S): 213 SOLUTION TOPICAL at 18:02

## 2022-04-25 RX ADMIN — Medication 81 MILLIGRAM(S): at 12:23

## 2022-04-25 RX ADMIN — Medication 5 MILLIGRAM(S): at 18:02

## 2022-04-25 RX ADMIN — Medication 2.5 MILLIGRAM(S): at 06:52

## 2022-04-25 RX ADMIN — Medication 4: at 12:31

## 2022-04-25 RX ADMIN — PANTOPRAZOLE SODIUM 40 MILLIGRAM(S): 20 TABLET, DELAYED RELEASE ORAL at 06:47

## 2022-04-25 RX ADMIN — ALBUTEROL 2.5 MILLIGRAM(S): 90 AEROSOL, METERED ORAL at 15:45

## 2022-04-25 RX ADMIN — Medication 12.5 MILLIGRAM(S): at 06:47

## 2022-04-25 RX ADMIN — Medication 2: at 00:26

## 2022-04-25 RX ADMIN — Medication 1 TABLET(S): at 06:47

## 2022-04-25 RX ADMIN — Medication 300 MILLIGRAM(S): at 12:22

## 2022-04-25 RX ADMIN — ALBUTEROL 2.5 MILLIGRAM(S): 90 AEROSOL, METERED ORAL at 10:09

## 2022-04-25 RX ADMIN — Medication 112 MICROGRAM(S): at 06:48

## 2022-04-25 RX ADMIN — BUDESONIDE AND FORMOTEROL FUMARATE DIHYDRATE 2 PUFF(S): 160; 4.5 AEROSOL RESPIRATORY (INHALATION) at 21:34

## 2022-04-25 RX ADMIN — ATORVASTATIN CALCIUM 40 MILLIGRAM(S): 80 TABLET, FILM COATED ORAL at 21:47

## 2022-04-25 RX ADMIN — PIPERACILLIN AND TAZOBACTAM 25 GRAM(S): 4; .5 INJECTION, POWDER, LYOPHILIZED, FOR SOLUTION INTRAVENOUS at 06:44

## 2022-04-25 RX ADMIN — ALBUTEROL 2.5 MILLIGRAM(S): 90 AEROSOL, METERED ORAL at 03:55

## 2022-04-25 RX ADMIN — PANTOPRAZOLE SODIUM 40 MILLIGRAM(S): 20 TABLET, DELAYED RELEASE ORAL at 18:02

## 2022-04-25 RX ADMIN — TIOTROPIUM BROMIDE 1 CAPSULE(S): 18 CAPSULE ORAL; RESPIRATORY (INHALATION) at 10:45

## 2022-04-25 RX ADMIN — Medication 5 MILLIGRAM(S): at 06:48

## 2022-04-25 RX ADMIN — Medication 40 MILLIGRAM(S): at 06:50

## 2022-04-25 RX ADMIN — BUDESONIDE AND FORMOTEROL FUMARATE DIHYDRATE 2 PUFF(S): 160; 4.5 AEROSOL RESPIRATORY (INHALATION) at 10:46

## 2022-04-25 RX ADMIN — PIPERACILLIN AND TAZOBACTAM 25 GRAM(S): 4; .5 INJECTION, POWDER, LYOPHILIZED, FOR SOLUTION INTRAVENOUS at 21:47

## 2022-04-25 RX ADMIN — Medication 1 TABLET(S): at 18:02

## 2022-04-25 RX ADMIN — ENOXAPARIN SODIUM 40 MILLIGRAM(S): 100 INJECTION SUBCUTANEOUS at 18:02

## 2022-04-25 RX ADMIN — CHLORHEXIDINE GLUCONATE 15 MILLILITER(S): 213 SOLUTION TOPICAL at 06:47

## 2022-04-25 RX ADMIN — SENNA PLUS 2 TABLET(S): 8.6 TABLET ORAL at 21:47

## 2022-04-25 RX ADMIN — PIPERACILLIN AND TAZOBACTAM 25 GRAM(S): 4; .5 INJECTION, POWDER, LYOPHILIZED, FOR SOLUTION INTRAVENOUS at 15:14

## 2022-04-25 RX ADMIN — CHLORHEXIDINE GLUCONATE 1 APPLICATION(S): 213 SOLUTION TOPICAL at 06:48

## 2022-04-25 RX ADMIN — ALBUTEROL 2.5 MILLIGRAM(S): 90 AEROSOL, METERED ORAL at 21:33

## 2022-04-25 NOTE — PROGRESS NOTE ADULT - ASSESSMENT
Imp--COPD asp pneumonia  hypercapnic resp failure.  Plan--complete abx  lower O2 as evens  LATA being planned--will need O2 and BiPAP at LATA.

## 2022-04-25 NOTE — PHYSICAL THERAPY INITIAL EVALUATION ADULT - ORIENTATION, REHAB EVAL
oriented to person, place, time and situation
Pt weak and breathy, unable to verbalize well due to weakness and breathing issues/person/place/situation

## 2022-04-25 NOTE — PROGRESS NOTE ADULT - SUBJECTIVE AND OBJECTIVE BOX
[facility]                                                INFECTIOUS DISEASES  =======================================================                               Demetrius Daly MD#  Alan Sosa MD*                                     Leslie Mccurdy MD*    Carli Clements MD*            Diplomates American Board of Internal Medicine & Infectious Diseases                  # [place] Office - Appt - Tel  [phone] Fax [phone]                * [place] Office - Appt - Tel [phone] Fax [phone]                                  Hospital Consult line:  [phone]  =======================================================    [id number]  KIMBERLY LAI  follow up: COPD, SOB    remains on nasal cannula   2 liters sats of 98 percent    doing better  no new issues    I have personally reviewed the labs and data; pertinent labs and data are listed in this note; please see below.   =======================================================  Past Medical & Surgical Hx:  =====================  PAST MEDICAL & SURGICAL HISTORY:  Esophageal stricture  Prostate CA  History of carotid stenosis  Laryngeal carcinoma  COPD (chronic obstructive pulmonary disease)  Hypothyroidism  Aspiration pneumonia  Traumatic pneumothorax  Benign prostatic hyperplasia with urinary obstruction  Syncope and collapse  Microalbuminuria  Anemia  Hyperlipidemia, unspecified hyperlipidemia type  Femoral fracture  S/P percutaneous endoscopic gastrostomy (PEG) tube placement  Liver laceration  History of exploratory laparotomy  History of total bilateral knee replacement b/l femur    Problem List:  ==========  HEALTH ISSUES - PROBLEM Dx:  COPD (chronic obstructive pulmonary disease)  Aspiration pneumonia  Sepsis with acute hypoxic respiratory failure  Nocturnal hypoxemia due to obstructive chronic bronchitis  Chronic respiratory failure with hypoxia and hypercapnia  Leukocytosis  Abnormal CT scan of lung  Lung infiltrate on CT  SOB (shortness of breath)        Social Hx:  =======  no toxic habits currently    FAMILY HISTORY:  Family history of hypertension (Mother)  Family history of cerebrovascular accident (CVA) (Mother)   =======================================================    REVIEW OF SYSTEMS:  CONSTITUTIONAL:  No Fever or chills  HEENT:  No diplopia or blurred vision.  No earache, sore throat or runny nose.  CARDIOVASCULAR:  No pressure, squeezing, strangling, tightness, heaviness or aching about the chest, neck, axilla or epigastrium.  RESPIRATORY:  as per HPI  GASTROINTESTINAL:  No nausea, vomiting or diarrhea.  GENITOURINARY:  No dysuria, frequency or urgency. No Blood in urine  MUSCULOSKELETAL:  no joint aches, no muscle pain  SKIN:  No change in skin, hair or nails.  NEUROLOGIC:  No Headaches, seizures or weakness.  PSYCHIATRIC:  No disorder of thought or mood.  ENDOCRINE:  No heat or cold intolerance  HEMATOLOGICAL:  No easy bruising or bleeding.    =======================================================  Allergies  No Known Allergies       ======================================================  Physical Exam:  ============      General:  NO distress.  THIN Frail,    Eye: Pupils are equal, round and reactive to light, Extraocular movements are intact, Normal conjunctiva.  HENT: Normocephalic, Oral mucosa is dry;   nasal cannula in place  Neck: Supple, No lymphadenopathy.  Respiratory: Lungs with Fair air entry  Cardiovascular: Normal rate, Regular rhythm,   Gastrointestinal: Soft, Non-tender, Non-distended, Normal bowel sounds.  + PEG In place  Genitourinary: No costovertebral angle tenderness.  Lymphatics: No lymphadenopathy neck,   Musculoskeletal: Normal range of motion, Normal strength.  Integumentary: No rash.  Neurologic: Alert, Oriented,      =======================================================   Vitals:  ============  T(F): 97.5 (25 Apr 2022 08:00), Max: 98.4 (24 Apr 2022 15:46)  HR: 75 (25 Apr 2022 10:09)  BP: 101/57 (25 Apr 2022 10:00)  RR: 16 (25 Apr 2022 08:00)  SpO2: 97% (25 Apr 2022 10:09) (94% - 100%)  temp max in last 48H T(F): , Max: 98.5 (04-24-22 @ 00:00)    =======================================================  Current Antibiotics:  piperacillin/tazobactam IVPB.. 3.375 Gram(s) IV Intermittent every 8 hours    Other medications:  ALBUTerol    0.083% 2.5 milliGRAM(s) Nebulizer every 6 hours  aspirin enteric coated 81 milliGRAM(s) Oral daily  atorvastatin 40 milliGRAM(s) Oral at bedtime  budesonide 160 MICROgram(s)/formoterol 4.5 MICROgram(s) Inhaler 2 Puff(s) Inhalation two times a day  chlorhexidine 0.12% Liquid 15 milliLiter(s) Swish and Spit two times a day  chlorhexidine 2% Cloths 1 Application(s) Topical <User Schedule>  cholecalciferol 1000 Unit(s) Oral daily  dextrose 5%. 1000 milliLiter(s) IV Continuous <Continuous>  dextrose 5%. 1000 milliLiter(s) IV Continuous <Continuous>  dextrose 50% Injectable 25 Gram(s) IV Push once  dextrose 50% Injectable 12.5 Gram(s) IV Push once  dextrose 50% Injectable 25 Gram(s) IV Push once  enoxaparin Injectable 40 milliGRAM(s) SubCutaneous every 24 hours  ferrous    sulfate Liquid 300 milliGRAM(s) Enteral Tube daily  glucagon  Injectable 1 milliGRAM(s) IntraMuscular once  insulin lispro (ADMELOG) corrective regimen sliding scale   SubCutaneous every 6 hours  lactobacillus acidophilus 1 Tablet(s) Oral two times a day  levothyroxine 112 MICROGram(s) Oral daily  metoprolol tartrate 12.5 milliGRAM(s) Enteral Tube two times a day  morphine  - Injectable 2 milliGRAM(s) IV Push at bedtime  oxybutynin 5 milliGRAM(s) Oral two times a day  pantoprazole  Injectable 40 milliGRAM(s) IV Push every 12 hours  polyethylene glycol 3350 17 Gram(s) Oral daily  senna 2 Tablet(s) Oral at bedtime  tiotropium 18 MICROgram(s) Capsule 1 Capsule(s) Inhalation daily      =======================================================  Labs:                        8.6    7.16  )-----------( 271      ( 25 Apr 2022 06:13 )             29.4     04-25    145  |  100  |  41.3<H>  ----------------------------<  86  3.7   |  36.0<H>  |  0.71    Ca    8.8      25 Apr 2022 06:13        Culture - Blood (collected 04-22-22 @ 08:53)  Source: .Blood Blood-Peripheral    Culture - Blood (collected 04-22-22 @ 08:53)  Source: .Blood Blood-Peripheral    Culture - Sputum (collected 04-20-22 @ 12:30)  Source: .Sputum Sputum  Gram Stain (04-20-22 @ 15:09):    Rare polymorphonuclear leukocytes per low power field    Moderate Squamous epithelial cells per low power field    Few Gram Negative Rods per oil power field  Final Report (04-23-22 @ 12:11):    Numerous Escherichia coli    Moderate Pseudomonas aeruginosa    Normal Respiratory Britt absent  Organism: Escherichia coli  Pseudomonas aeruginosa (04-23-22 @ 12:11)  Organism: Pseudomonas aeruginosa (04-23-22 @ 12:11)    Sensitivities:      -  Amikacin: R >32      -  Aztreonam: S 8      -  Cefepime: I 16      -  Ceftazidime: S 4      -  Ciprofloxacin: R >2      -  Gentamicin: R >8      -  Imipenem: I 4      -  Levofloxacin: R >4      -  Meropenem: S <=1      -  Piperacillin/Tazobactam: S <=8      -  Tobramycin: S 4      Method Type: JAZIEL  Organism: Escherichia coli (04-23-22 @ 12:11)    Sensitivities:      -  Amikacin: S <=16      -  Amoxicillin/Clavulanic Acid: S <=8/4      -  Ampicillin: S <=8 These ampicillin results predict results for amoxicillin      -  Ampicillin/Sulbactam: S <=4/2 Enterobacter, Klebsiella aerogenes, Citrobacter, and Serratia may develop resistance during prolonged therapy (3-4 days)      -  Aztreonam: S <=4      -  Cefazolin: S <=2 Enterobacter, Klebsiella aerogenes, Citrobacter, and Serratia may develop resistance during prolonged therapy (3-4 days)      -  Cefepime: S <=2      -  Cefoxitin: S <=8      -  Ceftriaxone: S <=1 Enterobacter, Klebsiella aerogenes, Citrobacter, and Serratia may develop resistance during prolonged therapy      -  Ciprofloxacin: R >2      -  Ertapenem: S <=0.5      -  Gentamicin: S <=2      -  Imipenem: S <=1      -  Levofloxacin: R >4      -  Meropenem: S <=1      -  Piperacillin/Tazobactam: S <=8      -  Tobramycin: S <=2      -  Trimethoprim/Sulfamethoxazole: R >2/38      Method Type: JAZIEL    Culture - Blood (collected 04-14-22 @ 12:31)  Source: .Blood Blood-Peripheral  Final Report (04-19-22 @ 13:01):    No growth at 5 days.    Culture - Blood (collected 04-14-22 @ 12:31)  Source: .Blood Blood-Peripheral  Final Report (04-19-22 @ 13:01):    No growth at 5 days.    Culture - Blood (collected 04-12-22 @ 09:19)  Source: .Blood Blood  Final Report (04-17-22 @ 11:00):    No growth at 5 days.    Culture - Blood (collected 04-12-22 @ 09:19)  Source: .Blood Blood  Final Report (04-17-22 @ 11:00):    No growth at 5 days.         COVID-19 PCR: NotDetec (04-21-22 @ 04:39)  COVID-19 PCR: NotDetec (04-15-22 @ 10:18)  SARS-CoV-2: NotDetec (04-08-22 @ 09:01)      =======================================================      < from: CT Chest w/ IV Cont (04.20.22 @ 13:44) >    ACC: [id number] EXAM:  CT CHEST IC                          PROCEDURE DATE:  04/20/2022          INTERPRETATION:  HISTORY: Admitting Dxs: J96.01 RESPIRATORY DIS. Hypoxia.   Recent pneumonia. Worsening chest x-ray findings.    EXAMINATION: CT CHEST was performed with IV contrast.  CONTRAST/COMPLICATIONS:  IV Contrast: Omnipaque 300  97 cc administered   3 cc discarded  Oral Contrast: NONE  Complications: None reported at time of study completion    COMPARISON: 4/8/2022 CT chest.    FINDINGS:    AIRWAYS, LUNGS, PLEURA: Tracheal secretions. Biapical scarring/fibrosis   and peripheral left upper lobe subpleural thickening unchanged.    Multifocal left greater than right lower lobe consolidation, patchy   ground-glass opacities primarily in the upper lobes, and tree-in-bud   nodular opacities throughout the right middle lobe and bilateral lower   lobes.    With respect to 4/8/2022 left lower lobe multifocal consolidation and   superior segment ground-glass nodular opacities and lingular   consolidation appear worse.    Trace left pleural effusion.    MEDIASTINUM: Normal heart size. No pericardial effusion. Thoracic aorta   normal caliber.  Unchanged mediastinal and right hilar lymphadenopathy;   reference subcarinal 3.8 x 1.5 cm node in 1.3 x 1 cm node adjacent to the   descending thoracic aorta (image 1 1, series 3).    IMAGED ABDOMEN: Unchanged hyperdense material within the gallbladder,   likely stones. Subcentimeter left renal hypodense lesions too small to   characterize. Gastrostomy tube in place.    SOFT TISSUES: Unremarkable.    BONES: Unremarkable.      IMPRESSION:.    Findings likely reflect aspiration pneumonitis/infection and there has been interval worsening of airspace disease in the lingula and left lower lobe compared to 4/8/2022.    Unchanged mediastinal and right hilar lymphadenopathy.    --- End of Report ---         MILENA BLOCK MD; Attending Radiologist  This document has been electronically signed. Apr 20 2022  2:04PM    < end of copied text >

## 2022-04-25 NOTE — PHYSICAL THERAPY INITIAL EVALUATION ADULT - ADDITIONAL COMMENTS
Pt lives in a house with 3 steps to enter with  rails and 0 stairs inside with  rails.  Pt owns medical equipment: SAC, RW, Commode, SHower Chair  Pt lives with: Son and daughter   Someone is always available to provide assist.
Pt. lives in a house with his daughter and wife. Wife is at home 24/7 and is able to assist as needed. Pt. has 3 [address] with rail and no stairs inside that he needs to negotiate. Pt. was modified independent PTA with a SAC. Also owns a shower chair and RW.

## 2022-04-25 NOTE — PROGRESS NOTE ADULT - ASSESSMENT
75 y/o Kinyarwanda speaking M w/ history of Laryngeal Cancer s/p Chemo + RT, PEG Tube, COPD on home oxygen 2LNC, Carotid Stenosis, Non Obstructive CAD, HTN, HLD, Prediabetes / ? DM 2 and Hypothyroidism presented with unresponsiveness. Oxygen saturation at home in 30s. Intubated in ER and Initially admitted to ICU with Hypoxic Respiratory Failure.. Found to be in Septic Shock secondary to Aspiration Pneumonia. Started on Levophed and later weaned off. He was extubated on 4/9/22. EEG preliminary report with potential multifocal epileptogenic foci. He was downgraded to Medicine Service on 4/10/22.  Since downgrade hospital course has been complicated by intermittent hypoxia likely due to continued worsening aspiration. Pt is currently on zosyn . E coli in sputum cx sent from 4/20/22, pseudomonas 4/23/22 update from sputum .Change back to Zosyn 4/24/22 x 7 days. NO ORAL option for pseudomonas - RESISTANT TO Levaquin and Cipro. BP is soft. DC ACEI,cut down bb 12.5 mg bid hold parameter. Pt's stool positive blood,hb trended down. ASA chnage to 81 mg. Gi rec colonoscopy but pt refused any intervention,understood risk. On PPI bid. PT EVAL FOR DISPOSITION.      Acute on chronic Respiratory Failure with Hypoxia with sepsis sec to recurrent aspiration PNA    -E coli in sputum cx sent from 4/20/22  --pseudomonas 4/23/22 update from sputum   - had been on CTX and azithromycin /merrem  - Change back to Zosyn 4/24/22 x 7 days.   - NO ORAL option for pseudomonas - RESISTANT TO Levaquin and Ciproi  - Currently tolerating 2 LNC,titrate down as tolerate.  off bipap now.  bipap at night  - Nebs, Tapering dose of PO steroid   - CT chest reveals worsening infiltrate.    - Meropenem started by ID 4/21. will f/u rec about duration  - Aspiration is multifactorial 2/2 dysphagia and reflux from lack of free water flushing after tube feeds   - Maintain on aspiration precautions including HOB elevation   - f/u Pulmonary/id rec       Acute on chronic Anemia likely from GI bleed  -- Pt is seen by GI team. Last colonoscopy and EGD many years ago. Per GI, Patient refused rectal exam.  Patient also refusing endoscopy or colonoscopy at this time. Indications for having an endoscopic evaluation is explained in detail.  -hb 8.6. resume asa,cut down to 81 mg   -monitor h/h  - stool occult POSITIVE  -iron level stable  -PPI   -stool AM brown    Hypernatremia  -improving  - 145  Added free water down the PEG.   Monitor daily       Laryngeal cancer s/p radiation with subsequent PEG tube placement due to dysphagia  - Leakage noted during hospital course and GI consult; now resolved   - CT neck does not reveal any laryngeal mass  - Outpatient follow up    MATHIEU, likely prerenal   - Resolved      Prediabetes / ?DM 2  - HbA1c 6.1  -bg high w/steroid   - Accu checks and ISS      HTN   -stable now. was soft yesterday  -will cut down bb 12.5 mg bid  with holding parameters   -continue ACEI      Hypothyroidism  - On Synthroid        Constipation  - Maintain on miralax and lactulose      Abnormal EEG  - Repeat EEG with mild to moderate nonspecific diffuse or multifocal cerebral dysfunction. No epileptiform pattern or seizure seen.  - No intervention as per Epilepsy    VTE ppx: Lovenox     Dispo: Pending PT EVAL. Discussed with Family  need for [facility],  Pt prefers to go home.  Son at bedside  Update care of plan , will need iv abx on dc.  dc planning 24-48 hr  sw for safe discharge  covid angela berg rn       75 y/o Irish speaking M w/ history of Laryngeal Cancer s/p Chemo + RT, PEG Tube, COPD on home oxygen 2LNC, Carotid Stenosis, Non Obstructive CAD, HTN, HLD, Prediabetes / ? DM 2 and Hypothyroidism presented with unresponsiveness. Oxygen saturation at home in 30s. Intubated in ER and Initially admitted to ICU with Hypoxic Respiratory Failure.. Found to be in Septic Shock secondary to Aspiration Pneumonia. Started on Levophed and later weaned off. He was extubated on 4/9/22. EEG preliminary report with potential multifocal epileptogenic foci. He was downgraded to Medicine Service on 4/10/22.  Since downgrade hospital course has been complicated by intermittent hypoxia likely due to continued worsening aspiration. Pt is currently on zosyn . E coli in sputum cx sent from 4/20/22, pseudomonas 4/23/22 update from sputum .Change back to Zosyn 4/24/22 x 7 days. NO ORAL option for pseudomonas - RESISTANT TO Levaquin and Cipro. BP is soft. DC ACEI,cut down bb 12.5 mg bid hold parameter. Pt's stool positive blood,hb trended down. ASA chnage to 81 mg. Gi rec colonoscopy but pt refused any intervention,understood risk. On PPI bid. PT EVAL FOR DISPOSITION.      Acute on chronic Respiratory Failure with Hypoxia with sepsis sec to recurrent aspiration PNA    -E coli in sputum cx sent from 4/20/22  --pseudomonas 4/23/22 update from sputum   - had been on CTX and azithromycin /merrem  - Change back to Zosyn 4/24/22 x 7 days.   - NO ORAL option for pseudomonas - RESISTANT TO Levaquin and Ciproi  - Currently tolerating 2 LNC,titrate down as tolerate.  off bipap now.  bipap at night  - Nebs, Tapering dose of PO steroid   - CT chest reveals worsening infiltrate.    - Meropenem started by ID 4/21. will f/u rec about duration  - Aspiration is multifactorial 2/2 dysphagia and reflux from lack of free water flushing after tube feeds   - Maintain on aspiration precautions including HOB elevation   - f/u Pulmonary/id rec       Acute on chronic Anemia likely from GI bleed  -- Pt is seen by GI team. Last colonoscopy and EGD many years ago. Per GI, Patient refused rectal exam.  Patient also refusing endoscopy or colonoscopy at this time. Indications for having an endoscopic evaluation is explained in detail.  -hb 8.6. resume asa,cut down to 81 mg   -monitor h/h  - stool occult POSITIVE  -iron level stable  -PPI   -stool AM brown    Hypernatremia  -improving  - 145  Added free water down the PEG.   Monitor daily       Laryngeal cancer s/p radiation with subsequent PEG tube placement due to dysphagia  - Leakage noted during hospital course and GI consult; now resolved   - CT neck does not reveal any laryngeal mass  - Outpatient follow up    MATHIEU, likely prerenal   - Resolved      Prediabetes / ?DM 2  - HbA1c 6.1  -bg high w/steroid   - Accu checks and ISS      HTN   -stable now. was soft yesterday  -will cut down bb 12.5 mg bid  with holding parameters   -continue ACEI      Hypothyroidism  - On Synthroid        Constipation  - Maintain on miralax and lactulose      Abnormal EEG  - Repeat EEG with mild to moderate nonspecific diffuse or multifocal cerebral dysfunction. No epileptiform pattern or seizure seen.  - No intervention as per Epilepsy    VTE ppx: Lovenox     Dispo: Pending PT EVAL. Discussed with Family  need for [facility],  Pt prefers to go home.  Son at bedside  Update care of plan , will need iv abx till 04/30/22  dc planning 24 hr    sw for safe discharge. sw gave daughter lists of facility  I have long discussion with daughter about dc plan tomorrow. Daughter states she needs to visit the facility before make a decision. UPDATE current lab ,vital,meds and pt will complete rest of the course of abx at [facility].  Per SW -pt will need SERJIO. SW will reach out the daughter again today.    covid pcr   dw rn

## 2022-04-25 NOTE — PHYSICAL THERAPY INITIAL EVALUATION ADULT - GAIT TRAINING, PT EVAL
Time Frame: 3-5   days   Goal:   CGA with RW x 100 feet / Stairs: pt will navigate 3   stairs with 1 rail with CGA

## 2022-04-25 NOTE — PROGRESS NOTE ADULT - ASSESSMENT
This 77 y/o M former smoker, started as teenager quit 20 years ago, on home oxygen, COPD on home O2, B/l carotid artery stenosis, laryngeal CA s/p chemo and radiation, PEG tube, who was BIBA after being found unresponsive at home w/ SpO2 in the 30's, Intubated upon arrival to ED and started on propofol gtt for sedation. ICU consulted for hypoxic respiratory failure requiring intubation.  (08 Apr 2022 11:18)    on 4/8/22, he was BIBA after being found unresponsive at home w/ SpO2 in the 30's, Intubated upon arrival to ED and started on propofol gtt for sedation. ICU consulted for hypoxic respiratory failure requiring intubation. Started on Levophed to maintain MAP > 65, A line placed, titrated down now off pressors, off sedation. Extubated this morning. Now awake, alert, oriented. CT shows multifocal PNA, Sputum cultures showed PMNs, few gram-negative rods. Pt on zosyn for aspiration PNA. He was downgrade to the medical service on 4/9/22.    on 4/13/22, he had interval desaturation concerning for recurrence of aspiration.    Tube feeding was held on 4/13/22 for this, and steroids were also given.   he completed a course of zosyn for aspiration PNA from 4/8/22 thru 4/15/22.    ID was consulted on 4/20/22 given that patient still have poor respiratory status on BIPAP at FI 50 percent.       Impression:  SOB - resolved  WBC elevation - resolved  PNA  COPD - no longer on bipap  E coli lung infection  Pseudomonas Lung infection    Plan:  this could be recurrence of aspiration    Repeat imaging / CT scan on 4/20/22 showed:  Findings likely reflect aspiration pneumonitis/infection and there has been interval worsening of airspace disease in the lingula and left lower lobe compared to 4/8/2022.    WBC have since normalized    E coli in sputum cx sent from 4/20/22; had been on CTX and azithromycin   THEN on 4/23/22 update from sputum cx showed pseudomonas     he was Changed back to Zosyn x 7 days. on 4/23/22  - NO ORAL option for pseudomonas - RESISTANT TO Levaquin and Cipro, the only oral options.     HE will need to complete the full 7 days of Zosyn - ending on 4/29 - 4/30     consider d/c to Rehab for completion of antibiotics, requiring 3 doses per day, over 4 hr each dose.           Please call me back if I may be of further assistance.   Thank you.

## 2022-04-25 NOTE — PROGRESS NOTE ADULT - SUBJECTIVE AND OBJECTIVE BOX
PULMONARY PROGRESS NOTE      KIMBERLY LAIROD-[id number]    Patient is a 76y old  Male who presents with a chief complaint of Hypoxic respiratory failure (24 Apr 2022 18:35)      INTERVAL HPI/OVERNIGHT EVENTS:Awake, alert on VM.  Using noct BiPAP    MEDICATIONS  (STANDING):  ALBUTerol    0.083% 2.5 milliGRAM(s) Nebulizer every 6 hours  aspirin enteric coated 81 milliGRAM(s) Oral daily  atorvastatin 40 milliGRAM(s) Oral at bedtime  budesonide 160 MICROgram(s)/formoterol 4.5 MICROgram(s) Inhaler 2 Puff(s) Inhalation two times a day  chlorhexidine 0.12% Liquid 15 milliLiter(s) Swish and Spit two times a day  chlorhexidine 2% Cloths 1 Application(s) Topical <User Schedule>  cholecalciferol 1000 Unit(s) Oral daily  dextrose 5%. 1000 milliLiter(s) (50 mL/Hr) IV Continuous <Continuous>  dextrose 5%. 1000 milliLiter(s) (100 mL/Hr) IV Continuous <Continuous>  dextrose 50% Injectable 25 Gram(s) IV Push once  dextrose 50% Injectable 12.5 Gram(s) IV Push once  dextrose 50% Injectable 25 Gram(s) IV Push once  enoxaparin Injectable 40 milliGRAM(s) SubCutaneous every 24 hours  ferrous    sulfate Liquid 300 milliGRAM(s) Enteral Tube daily  glucagon  Injectable 1 milliGRAM(s) IntraMuscular once  insulin lispro (ADMELOG) corrective regimen sliding scale   SubCutaneous every 6 hours  lactated ringers. 1000 milliLiter(s) (75 mL/Hr) IV Continuous <Continuous>  lactobacillus acidophilus 1 Tablet(s) Oral two times a day  levothyroxine 112 MICROGram(s) Oral daily  metoprolol tartrate 12.5 milliGRAM(s) Oral two times a day  morphine  - Injectable 2 milliGRAM(s) IV Push at bedtime  oxybutynin 5 milliGRAM(s) Oral two times a day  pantoprazole  Injectable 40 milliGRAM(s) IV Push every 12 hours  piperacillin/tazobactam IVPB.. 3.375 Gram(s) IV Intermittent every 8 hours  polyethylene glycol 3350 17 Gram(s) Oral daily  senna 2 Tablet(s) Oral at bedtime  tiotropium 18 MICROgram(s) Capsule 1 Capsule(s) Inhalation daily      MEDICATIONS  (PRN):  acetaminophen     Tablet .. 650 milliGRAM(s) Oral every 6 hours PRN Temp greater or equal to 38C (100.4F)  aluminum hydroxide/magnesium hydroxide/simethicone Suspension 30 milliLiter(s) Oral every 4 hours PRN Dyspepsia  dextrose Oral Gel 15 Gram(s) Oral once PRN Blood Glucose LESS THAN 70 milliGRAM(s)/deciliter  guaiFENesin Oral Liquid (Sugar-Free) 100 milliGRAM(s) Oral every 6 hours PRN Cough  morphine  - Injectable 2 milliGRAM(s) IV Push every 8 hours PRN dyspnea or increased work of breathing      Allergies    No Known Allergies    Intolerances        PAST MEDICAL & SURGICAL HISTORY:  Esophageal stricture    Prostate CA    History of carotid stenosis    Laryngeal carcinoma    COPD (chronic obstructive pulmonary disease)    Hypothyroidism    Aspiration pneumonia    Traumatic pneumothorax    Benign prostatic hyperplasia with urinary obstruction    Syncope and collapse    Microalbuminuria    Anemia    Hyperlipidemia, unspecified hyperlipidemia type    Femoral fracture    S/P percutaneous endoscopic gastrostomy (PEG) tube placement    Liver laceration    History of exploratory laparotomy    History of total bilateral knee replacement  b/l femur        SOCIAL HISTORY  Smoking History:     Vital Signs Last 24 Hrs  T(C): 36.4 (25 Apr 2022 08:00), Max: 36.9 (24 Apr 2022 15:46)  T(F): 97.5 (25 Apr 2022 08:00), Max: 98.4 (24 Apr 2022 15:46)  HR: 73 (25 Apr 2022 10:00) (72 - 95)  BP: 101/57 (25 Apr 2022 10:00) (89/47 - 116/62)  BP(mean): --  RR: 16 (25 Apr 2022 08:00) (14 - 19)  SpO2: 97% (25 Apr 2022 08:00) (94% - 100%)    PHYSICAL EXAMINATION:    GENERAL: The patient is awake and alert in no apparent distress.     HEENT: Head is normocephalic and atraumatic. Extraocular muscles are intact. Mucous membranes are moist.    NECK: Supple.    LUNGS: rhonchi R base    HEART: Regular rate and rhythm without murmur.    ABDOMEN: Soft, nontender, and nondistended.      EXTREMITIES: Without any cyanosis, clubbing, rash, lesions or edema.    NEUROLOGIC: Grossly intact.    SKIN: No ulceration or induration present.      LABS:                        8.6    7.16  )-----------( 271      ( 25 Apr 2022 06:13 )             29.4     04-25    145  |  100  |  41.3<H>  ----------------------------<  86  3.7   |  36.0<H>  |  0.71    Ca    8.8      25 Apr 2022 06:13                          MICROBIOLOGY:Culture - Sputum . (04.20.22 @ 12:30)    -  Tobramycin: S 4    -  Tobramycin: S <=2    -  Trimethoprim/Sulfamethoxazole: R >2/38    -  Ciprofloxacin: R >2    -  Ciprofloxacin: R >2    Gram Stain:   Rare polymorphonuclear leukocytes per low power field  Moderate Squamous epithelial cells per low power field  Few Gram Negative Rods per oil power field    -  Amikacin: S <=16    -  Amikacin: R >32    -  Amoxicillin/Clavulanic Acid: S <=8/4    -  Ampicillin: S <=8 These ampicillin results predict results for amoxicillin    -  Ampicillin/Sulbactam: S <=4/2 Enterobacter, Klebsiella aerogenes, Citrobacter, and Serratia may develop resistance during prolonged therapy (3-4 days)    -  Aztreonam: S <=4    -  Aztreonam: S 8    -  Cefazolin: S <=2 Enterobacter, Klebsiella aerogenes, Citrobacter, and Serratia may develop resistance during prolonged therapy (3-4 days)    -  Cefepime: S <=2    -  Cefepime: I 16    -  Cefoxitin: S <=8    -  Ceftazidime: S 4    -  Ceftriaxone: S <=1 Enterobacter, Klebsiella aerogenes, Citrobacter, and Serratia may develop resistance during prolonged therapy    -  Ertapenem: S <=0.5    -  Gentamicin: S <=2    -  Gentamicin: R >8    -  Imipenem: I 4    -  Imipenem: S <=1    -  Levofloxacin: R >4    -  Levofloxacin: R >4    -  Meropenem: S <=1    -  Meropenem: S <=1    -  Piperacillin/Tazobactam: S <=8    -  Piperacillin/Tazobactam: S <=8    Specimen Source: .Sputum Sputum    Culture Results:   Numerous Escherichia coli  Moderate Pseudomonas aeruginosa  Normal Respiratory Britt absent    Organism Identification: Escherichia coli  Pseudomonas aeruginosa    Organism: Escherichia coli    Organism: Pseudomonas aeruginosa    Method Type: JAZIEL    Method Type: JAZIEL        RADIOLOGY & ADDITIONAL STUDIES:< from: CT Chest w/ IV Cont (04.20.22 @ 13:44) >    Findings likely reflect aspiration pneumonitis/infection and there has   been interval worsening of airspace disease in the lingula and left lower   lobe compared to 4/8/2022.    Unchanged mediastinal and right hilar lymphadenopathy.    < end of copied text >

## 2022-04-25 NOTE — PHYSICAL THERAPY INITIAL EVALUATION ADULT - PLANNED THERAPY INTERVENTIONS, PT EVAL
bed mobility training/gait training/strengthening/transfer training
bed mobility training/gait training/transfer training

## 2022-04-25 NOTE — PHYSICAL THERAPY INITIAL EVALUATION ADULT - PERTINENT HX OF CURRENT PROBLEM, REHAB EVAL
Acute Respirator Failure, Hypoxia
76 year old male with history of Laryngeal Cancer s/p Chemo + RT, PEG Tube, COPD on home oxygen, Carotid Stenosis, Non Obstructive CAD, HTN, HLD, Prediabetes / ? DM 2 and Hypothyroidism presented with unresponsiveness. Oxygen saturation at home in 30s. Intubated in ER and admitted to ICU with Hypoxic Respiratory Failure secondary. Found to be in Septic Shock secondary to Aspiration Pneumonia.

## 2022-04-25 NOTE — PHYSICAL THERAPY INITIAL EVALUATION ADULT - GENERAL OBSERVATIONS, REHAB EVAL
Pt. greeted resting OOB in chair + O2 via NC, monitor, IV lock, agreeable to PT
Pt received in bed + IV, + NCO2, + Tele

## 2022-04-25 NOTE — PROGRESS NOTE ADULT - SUBJECTIVE AND OBJECTIVE BOX
Patient is a 76y old  Male who presents with a chief complaint of Hypoxic respiratory failure (25 Apr 2022 10:37)  pt is feeling better. Denies cp,sob,cough,fever,chill,tolerating TF  REVIEW OF SYSTEMS: All systems are reviewed and found to be negative except above    MEDICATIONS  (STANDING):  ALBUTerol    0.083% 2.5 milliGRAM(s) Nebulizer every 6 hours  aspirin enteric coated 81 milliGRAM(s) Oral daily  atorvastatin 40 milliGRAM(s) Oral at bedtime  budesonide 160 MICROgram(s)/formoterol 4.5 MICROgram(s) Inhaler 2 Puff(s) Inhalation two times a day  chlorhexidine 0.12% Liquid 15 milliLiter(s) Swish and Spit two times a day  chlorhexidine 2% Cloths 1 Application(s) Topical <User Schedule>  cholecalciferol 1000 Unit(s) Oral daily  dextrose 5%. 1000 milliLiter(s) (100 mL/Hr) IV Continuous <Continuous>  dextrose 5%. 1000 milliLiter(s) (50 mL/Hr) IV Continuous <Continuous>  dextrose 50% Injectable 25 Gram(s) IV Push once  dextrose 50% Injectable 12.5 Gram(s) IV Push once  dextrose 50% Injectable 25 Gram(s) IV Push once  enoxaparin Injectable 40 milliGRAM(s) SubCutaneous every 24 hours  ferrous    sulfate Liquid 300 milliGRAM(s) Enteral Tube daily  glucagon  Injectable 1 milliGRAM(s) IntraMuscular once  insulin lispro (ADMELOG) corrective regimen sliding scale   SubCutaneous every 6 hours  lactobacillus acidophilus 1 Tablet(s) Oral two times a day  levothyroxine 112 MICROGram(s) Oral daily  metoprolol tartrate 12.5 milliGRAM(s) Enteral Tube two times a day  morphine  - Injectable 2 milliGRAM(s) IV Push at bedtime  oxybutynin 5 milliGRAM(s) Oral two times a day  pantoprazole  Injectable 40 milliGRAM(s) IV Push every 12 hours  piperacillin/tazobactam IVPB.. 3.375 Gram(s) IV Intermittent every 8 hours  polyethylene glycol 3350 17 Gram(s) Oral daily  senna 2 Tablet(s) Oral at bedtime  tiotropium 18 MICROgram(s) Capsule 1 Capsule(s) Inhalation daily    MEDICATIONS  (PRN):  acetaminophen     Tablet .. 650 milliGRAM(s) Oral every 6 hours PRN Temp greater or equal to 38C (100.4F)  aluminum hydroxide/magnesium hydroxide/simethicone Suspension 30 milliLiter(s) Oral every 4 hours PRN Dyspepsia  dextrose Oral Gel 15 Gram(s) Oral once PRN Blood Glucose LESS THAN 70 milliGRAM(s)/deciliter  guaiFENesin Oral Liquid (Sugar-Free) 100 milliGRAM(s) Oral every 6 hours PRN Cough  morphine  - Injectable 2 milliGRAM(s) IV Push every 8 hours PRN dyspnea or increased work of breathing      CAPILLARY BLOOD GLUCOSE      POCT Blood Glucose.: 100 mg/dL (25 Apr 2022 06:45)  POCT Blood Glucose.: 153 mg/dL (25 Apr 2022 00:00)  POCT Blood Glucose.: 182 mg/dL (24 Apr 2022 16:59)  POCT Blood Glucose.: 216 mg/dL (24 Apr 2022 12:36)    I&O's Summary    24 Apr 2022 07:01  -  25 Apr 2022 07:00  --------------------------------------------------------  IN: 180 mL / OUT: 0 mL / NET: 180 mL        PHYSICAL EXAM:  Vital Signs Last 24 Hrs  T(C): 36.4 (25 Apr 2022 08:00), Max: 36.9 (24 Apr 2022 15:46)  T(F): 97.5 (25 Apr 2022 08:00), Max: 98.4 (24 Apr 2022 15:46)  HR: 73 (25 Apr 2022 10:00) (72 - 95)  BP: 101/57 (25 Apr 2022 10:00) (89/47 - 116/62)  BP(mean): --  RR: 16 (25 Apr 2022 08:00) (14 - 19)  SpO2: 97% (25 Apr 2022 08:00) (94% - 100%)    CONSTITUTIONAL: NAD,  EYES: PERRLA; conjunctiva and sclera clear  ENMT: Moist oral mucosa,   RESPIRATORY: Normal respiratory effort; lungs are clear to auscultation bilaterally  CARDIOVASCULAR: Regular rate and rhythm, normal S1 and S2, no murmur   EXTS: No lower extremity edema; Peripheral pulses are 2+ bilaterally  ABDOMEN: Nontender to palpation, normoactive bowel sounds, no rebound/guarding;   MUSCLOSKELETAL:   no  cyanosis of digits; no joint swelling or tenderness to palpation  PSYCH: affect appropriate  NEUROLOGY: A+O to person, place, and time; CN 2-12 are intact and symmetric; no gross sensory/Motor deficits;       LABS:                        8.6    7.16  )-----------( 271      ( 25 Apr 2022 06:13 )             29.4     04-25    145  |  100  |  41.3<H>  ----------------------------<  86  3.7   |  36.0<H>  |  0.71    Ca    8.8      25 Apr 2022 06:13                  RADIOLOGY & ADDITIONAL TESTS:  Results Reviewed:

## 2022-04-25 NOTE — PHYSICAL THERAPY INITIAL EVALUATION ADULT - GAIT DEVIATIONS NOTED, PT EVAL
decreased activity tolerance/decreased step length/decreased stride length
decreased step length/decreased stride length

## 2022-04-26 LAB
ANION GAP SERPL CALC-SCNC: 10 MMOL/L — SIGNIFICANT CHANGE UP (ref 5–17)
BUN SERPL-MCNC: 47.9 MG/DL — HIGH (ref 8–20)
CALCIUM SERPL-MCNC: 9 MG/DL — SIGNIFICANT CHANGE UP (ref 8.6–10.2)
CHLORIDE SERPL-SCNC: 99 MMOL/L — SIGNIFICANT CHANGE UP (ref 98–107)
CO2 SERPL-SCNC: 34 MMOL/L — HIGH (ref 22–29)
CREAT SERPL-MCNC: 0.79 MG/DL — SIGNIFICANT CHANGE UP (ref 0.5–1.3)
EGFR: 93 ML/MIN/1.73M2 — SIGNIFICANT CHANGE UP
GLUCOSE BLDC GLUCOMTR-MCNC: 108 MG/DL — HIGH (ref 70–99)
GLUCOSE BLDC GLUCOMTR-MCNC: 120 MG/DL — HIGH (ref 70–99)
GLUCOSE BLDC GLUCOMTR-MCNC: 123 MG/DL — HIGH (ref 70–99)
GLUCOSE BLDC GLUCOMTR-MCNC: 125 MG/DL — HIGH (ref 70–99)
GLUCOSE BLDC GLUCOMTR-MCNC: 235 MG/DL — HIGH (ref 70–99)
GLUCOSE SERPL-MCNC: 104 MG/DL — HIGH (ref 70–99)
HCT VFR BLD CALC: 29.3 % — LOW (ref 39–50)
HGB BLD-MCNC: 8.5 G/DL — LOW (ref 13–17)
MCHC RBC-ENTMCNC: 28.3 PG — SIGNIFICANT CHANGE UP (ref 27–34)
MCHC RBC-ENTMCNC: 29 GM/DL — LOW (ref 32–36)
MCV RBC AUTO: 97.7 FL — SIGNIFICANT CHANGE UP (ref 80–100)
PLATELET # BLD AUTO: 289 K/UL — SIGNIFICANT CHANGE UP (ref 150–400)
POTASSIUM SERPL-MCNC: 3.8 MMOL/L — SIGNIFICANT CHANGE UP (ref 3.5–5.3)
POTASSIUM SERPL-SCNC: 3.8 MMOL/L — SIGNIFICANT CHANGE UP (ref 3.5–5.3)
RBC # BLD: 3 M/UL — LOW (ref 4.2–5.8)
RBC # FLD: 15.9 % — HIGH (ref 10.3–14.5)
SODIUM SERPL-SCNC: 143 MMOL/L — SIGNIFICANT CHANGE UP (ref 135–145)
WBC # BLD: 12.84 K/UL — HIGH (ref 3.8–10.5)
WBC # FLD AUTO: 12.84 K/UL — HIGH (ref 3.8–10.5)

## 2022-04-26 PROCEDURE — 99232 SBSQ HOSP IP/OBS MODERATE 35: CPT

## 2022-04-26 RX ORDER — ENOXAPARIN SODIUM 100 MG/ML
40 INJECTION SUBCUTANEOUS
Qty: 0 | Refills: 0 | DISCHARGE
Start: 2022-04-26

## 2022-04-26 RX ORDER — LACTOBACILLUS ACIDOPHILUS 100MM CELL
1 CAPSULE ORAL
Qty: 0 | Refills: 0 | DISCHARGE
Start: 2022-04-26

## 2022-04-26 RX ORDER — POLYETHYLENE GLYCOL 3350 17 G/17G
17 POWDER, FOR SOLUTION ORAL
Qty: 0 | Refills: 0 | DISCHARGE
Start: 2022-04-26

## 2022-04-26 RX ORDER — ATORVASTATIN CALCIUM 80 MG/1
1 TABLET, FILM COATED ORAL
Qty: 0 | Refills: 0 | DISCHARGE
Start: 2022-04-26

## 2022-04-26 RX ORDER — PIPERACILLIN AND TAZOBACTAM 4; .5 G/20ML; G/20ML
3.37 INJECTION, POWDER, LYOPHILIZED, FOR SOLUTION INTRAVENOUS
Qty: 0 | Refills: 0 | DISCHARGE
Start: 2022-04-26

## 2022-04-26 RX ORDER — OXYBUTYNIN CHLORIDE 5 MG
5 TABLET ORAL
Qty: 0 | Refills: 0 | DISCHARGE

## 2022-04-26 RX ORDER — DICLOFENAC SODIUM 30 MG/G
1 GEL TOPICAL
Qty: 0 | Refills: 0 | DISCHARGE

## 2022-04-26 RX ORDER — OXYBUTYNIN CHLORIDE 5 MG
1 TABLET ORAL
Qty: 0 | Refills: 0 | DISCHARGE
Start: 2022-04-26

## 2022-04-26 RX ORDER — TIOTROPIUM BROMIDE 18 UG/1
1 CAPSULE ORAL; RESPIRATORY (INHALATION)
Qty: 0 | Refills: 0 | DISCHARGE
Start: 2022-04-26

## 2022-04-26 RX ORDER — OMEGA-3 ACID ETHYL ESTERS 1 G
5 CAPSULE ORAL
Qty: 0 | Refills: 0 | DISCHARGE

## 2022-04-26 RX ORDER — FERROUS SULFATE 325(65) MG
5 TABLET ORAL
Qty: 0 | Refills: 0 | DISCHARGE

## 2022-04-26 RX ORDER — METOPROLOL TARTRATE 50 MG
12.5 TABLET ORAL
Qty: 0 | Refills: 0 | DISCHARGE
Start: 2022-04-26

## 2022-04-26 RX ORDER — LEVOTHYROXINE SODIUM 125 MCG
1 TABLET ORAL
Qty: 0 | Refills: 0 | DISCHARGE

## 2022-04-26 RX ORDER — SENNA PLUS 8.6 MG/1
2 TABLET ORAL
Qty: 0 | Refills: 0 | DISCHARGE
Start: 2022-04-26

## 2022-04-26 RX ORDER — METOPROLOL TARTRATE 50 MG
1 TABLET ORAL
Qty: 0 | Refills: 0 | DISCHARGE

## 2022-04-26 RX ORDER — TIOTROPIUM BROMIDE 18 UG/1
1 CAPSULE ORAL; RESPIRATORY (INHALATION)
Qty: 0 | Refills: 0 | DISCHARGE

## 2022-04-26 RX ORDER — LEVOTHYROXINE SODIUM 125 MCG
1 TABLET ORAL
Qty: 0 | Refills: 0 | DISCHARGE
Start: 2022-04-26

## 2022-04-26 RX ORDER — FERROUS SULFATE 325(65) MG
5 TABLET ORAL
Qty: 0 | Refills: 0 | DISCHARGE
Start: 2022-04-26

## 2022-04-26 RX ORDER — BUDESONIDE AND FORMOTEROL FUMARATE DIHYDRATE 160; 4.5 UG/1; UG/1
2 AEROSOL RESPIRATORY (INHALATION)
Qty: 0 | Refills: 0 | DISCHARGE
Start: 2022-04-26

## 2022-04-26 RX ORDER — ACETAMINOPHEN 500 MG
2 TABLET ORAL
Qty: 0 | Refills: 0 | DISCHARGE
Start: 2022-04-26

## 2022-04-26 RX ADMIN — ALBUTEROL 2.5 MILLIGRAM(S): 90 AEROSOL, METERED ORAL at 22:27

## 2022-04-26 RX ADMIN — ALBUTEROL 2.5 MILLIGRAM(S): 90 AEROSOL, METERED ORAL at 08:55

## 2022-04-26 RX ADMIN — TIOTROPIUM BROMIDE 1 CAPSULE(S): 18 CAPSULE ORAL; RESPIRATORY (INHALATION) at 08:55

## 2022-04-26 RX ADMIN — CHLORHEXIDINE GLUCONATE 1 APPLICATION(S): 213 SOLUTION TOPICAL at 05:32

## 2022-04-26 RX ADMIN — ATORVASTATIN CALCIUM 40 MILLIGRAM(S): 80 TABLET, FILM COATED ORAL at 22:10

## 2022-04-26 RX ADMIN — MORPHINE SULFATE 2 MILLIGRAM(S): 50 CAPSULE, EXTENDED RELEASE ORAL at 23:30

## 2022-04-26 RX ADMIN — Medication 4: at 12:49

## 2022-04-26 RX ADMIN — MORPHINE SULFATE 2 MILLIGRAM(S): 50 CAPSULE, EXTENDED RELEASE ORAL at 23:45

## 2022-04-26 RX ADMIN — ALBUTEROL 2.5 MILLIGRAM(S): 90 AEROSOL, METERED ORAL at 05:07

## 2022-04-26 RX ADMIN — BUDESONIDE AND FORMOTEROL FUMARATE DIHYDRATE 2 PUFF(S): 160; 4.5 AEROSOL RESPIRATORY (INHALATION) at 08:56

## 2022-04-26 RX ADMIN — PIPERACILLIN AND TAZOBACTAM 25 GRAM(S): 4; .5 INJECTION, POWDER, LYOPHILIZED, FOR SOLUTION INTRAVENOUS at 22:12

## 2022-04-26 RX ADMIN — PANTOPRAZOLE SODIUM 40 MILLIGRAM(S): 20 TABLET, DELAYED RELEASE ORAL at 17:41

## 2022-04-26 RX ADMIN — Medication 30 MILLIGRAM(S): at 05:21

## 2022-04-26 RX ADMIN — PIPERACILLIN AND TAZOBACTAM 25 GRAM(S): 4; .5 INJECTION, POWDER, LYOPHILIZED, FOR SOLUTION INTRAVENOUS at 13:01

## 2022-04-26 RX ADMIN — BUDESONIDE AND FORMOTEROL FUMARATE DIHYDRATE 2 PUFF(S): 160; 4.5 AEROSOL RESPIRATORY (INHALATION) at 22:27

## 2022-04-26 RX ADMIN — CHLORHEXIDINE GLUCONATE 15 MILLILITER(S): 213 SOLUTION TOPICAL at 05:32

## 2022-04-26 RX ADMIN — PIPERACILLIN AND TAZOBACTAM 25 GRAM(S): 4; .5 INJECTION, POWDER, LYOPHILIZED, FOR SOLUTION INTRAVENOUS at 05:32

## 2022-04-26 RX ADMIN — ALBUTEROL 2.5 MILLIGRAM(S): 90 AEROSOL, METERED ORAL at 14:57

## 2022-04-26 RX ADMIN — Medication 1000 UNIT(S): at 12:51

## 2022-04-26 RX ADMIN — PANTOPRAZOLE SODIUM 40 MILLIGRAM(S): 20 TABLET, DELAYED RELEASE ORAL at 05:26

## 2022-04-26 RX ADMIN — Medication 81 MILLIGRAM(S): at 12:51

## 2022-04-26 RX ADMIN — Medication 1 TABLET(S): at 17:40

## 2022-04-26 RX ADMIN — Medication 300 MILLIGRAM(S): at 12:51

## 2022-04-26 RX ADMIN — Medication 5 MILLIGRAM(S): at 05:23

## 2022-04-26 RX ADMIN — Medication 112 MICROGRAM(S): at 05:22

## 2022-04-26 RX ADMIN — Medication 5 MILLIGRAM(S): at 17:40

## 2022-04-26 RX ADMIN — ENOXAPARIN SODIUM 40 MILLIGRAM(S): 100 INJECTION SUBCUTANEOUS at 17:42

## 2022-04-26 RX ADMIN — CHLORHEXIDINE GLUCONATE 15 MILLILITER(S): 213 SOLUTION TOPICAL at 17:46

## 2022-04-26 RX ADMIN — Medication 1 TABLET(S): at 05:32

## 2022-04-26 NOTE — PROGRESS NOTE ADULT - SUBJECTIVE AND OBJECTIVE BOX
Patient is a 76y old  Male who presents with a chief complaint of Hypoxic respiratory failure (25 Apr 2022 11:02)    pt denies any sob,cp,abd pain,fever,chill,dizziness  REVIEW OF SYSTEMS: All systems are reviewed and found to be negative except above    MEDICATIONS  (STANDING):  ALBUTerol    0.083% 2.5 milliGRAM(s) Nebulizer every 6 hours  aspirin enteric coated 81 milliGRAM(s) Oral daily  atorvastatin 40 milliGRAM(s) Oral at bedtime  budesonide 160 MICROgram(s)/formoterol 4.5 MICROgram(s) Inhaler 2 Puff(s) Inhalation two times a day  chlorhexidine 0.12% Liquid 15 milliLiter(s) Swish and Spit two times a day  chlorhexidine 2% Cloths 1 Application(s) Topical <User Schedule>  cholecalciferol 1000 Unit(s) Oral daily  dextrose 5%. 1000 milliLiter(s) (100 mL/Hr) IV Continuous <Continuous>  dextrose 5%. 1000 milliLiter(s) (50 mL/Hr) IV Continuous <Continuous>  dextrose 50% Injectable 25 Gram(s) IV Push once  dextrose 50% Injectable 12.5 Gram(s) IV Push once  dextrose 50% Injectable 25 Gram(s) IV Push once  enoxaparin Injectable 40 milliGRAM(s) SubCutaneous every 24 hours  ferrous    sulfate Liquid 300 milliGRAM(s) Enteral Tube daily  glucagon  Injectable 1 milliGRAM(s) IntraMuscular once  insulin lispro (ADMELOG) corrective regimen sliding scale   SubCutaneous every 6 hours  lactobacillus acidophilus 1 Tablet(s) Oral two times a day  levothyroxine 112 MICROGram(s) Oral daily  metoprolol tartrate 12.5 milliGRAM(s) Enteral Tube two times a day  morphine  - Injectable 2 milliGRAM(s) IV Push at bedtime  oxybutynin 5 milliGRAM(s) Oral two times a day  pantoprazole  Injectable 40 milliGRAM(s) IV Push every 12 hours  piperacillin/tazobactam IVPB.. 3.375 Gram(s) IV Intermittent every 8 hours  polyethylene glycol 3350 17 Gram(s) Oral daily  predniSONE   Tablet 30 milliGRAM(s) Oral daily  senna 2 Tablet(s) Oral at bedtime  tiotropium 18 MICROgram(s) Capsule 1 Capsule(s) Inhalation daily    MEDICATIONS  (PRN):  acetaminophen     Tablet .. 650 milliGRAM(s) Oral every 6 hours PRN Temp greater or equal to 38C (100.4F)  aluminum hydroxide/magnesium hydroxide/simethicone Suspension 30 milliLiter(s) Oral every 4 hours PRN Dyspepsia  dextrose Oral Gel 15 Gram(s) Oral once PRN Blood Glucose LESS THAN 70 milliGRAM(s)/deciliter  guaiFENesin Oral Liquid (Sugar-Free) 100 milliGRAM(s) Oral every 6 hours PRN Cough  morphine  - Injectable 2 milliGRAM(s) IV Push every 8 hours PRN dyspnea or increased work of breathing      CAPILLARY BLOOD GLUCOSE      POCT Blood Glucose.: 108 mg/dL (26 Apr 2022 05:20)  POCT Blood Glucose.: 123 mg/dL (25 Apr 2022 23:59)  POCT Blood Glucose.: 157 mg/dL (25 Apr 2022 18:07)  POCT Blood Glucose.: 219 mg/dL (25 Apr 2022 12:26)    I&O's Summary      PHYSICAL EXAM:  Vital Signs Last 24 Hrs  T(C): 36.3 (26 Apr 2022 08:00), Max: 36.7 (25 Apr 2022 15:40)  T(F): 97.4 (26 Apr 2022 08:00), Max: 98.1 (25 Apr 2022 15:40)  HR: 83 (26 Apr 2022 09:01) (72 - 88)  BP: 90/52 (26 Apr 2022 08:00) (90/52 - 108/58)  BP(mean): --  RR: 18 (26 Apr 2022 08:00) (17 - 19)  SpO2: 96% (26 Apr 2022 09:02) (94% - 98%)    CONSTITUTIONAL: NAD,  EYES: PERRLA; conjunctiva and sclera clear  ENMT: Moist oral mucosa,   RESPIRATORY: Normal respiratory effort; lungs are clear to auscultation bilaterally  CARDIOVASCULAR:  normal S1 and S2, no murmur   EXTS: No lower extremity edema; Peripheral pulses are 2+ bilaterally  ABDOMEN: Nontender to palpation, normoactive bowel sounds, no rebound/guarding; PEG side : clear  MUSCLOSKELETAL:   no  cyanosis of digits; no joint swelling or tenderness to palpation  PSYCH: affect appropriate  NEUROLOGY: A+O to person, place, and time; CN 2-12 are intact and symmetric; no gross sensory/motor deficits;       LABS:                        8.5    12.84 )-----------( 289      ( 26 Apr 2022 05:47 )             29.3     04-26    143  |  99  |  47.9<H>  ----------------------------<  104<H>  3.8   |  34.0<H>  |  0.79    Ca    9.0      26 Apr 2022 05:47                  RADIOLOGY & ADDITIONAL TESTS:  Results Reviewed:

## 2022-04-26 NOTE — PROGRESS NOTE ADULT - ASSESSMENT
75 y/o Kyrgyz speaking M w/ history of Laryngeal Cancer s/p Chemo + RT, PEG Tube, COPD on home oxygen 2LNC, Carotid Stenosis, Non Obstructive CAD, HTN, HLD, Prediabetes / ? DM 2 and Hypothyroidism presented with unresponsiveness. Oxygen saturation at home in 30s. Intubated in ER and Initially admitted to ICU with Hypoxic Respiratory Failure.. Found to be in Septic Shock secondary to Aspiration Pneumonia. Started on Levophed and later weaned off. He was extubated on 4/9/22. EEG preliminary report with potential multifocal epileptogenic foci. He was downgraded to Medicine Service on 4/10/22.  Since downgrade hospital course has been complicated by intermittent hypoxia likely due to continued worsening aspiration. Pt is currently on zosyn . E coli in sputum cx sent from 4/20/22, pseudomonas 4/23/22 update from sputum .Change back to Zosyn 4/24/22 x 7 days. NO ORAL option for pseudomonas - RESISTANT TO Levaquin and Cipro. BP is soft. DC ACEI,cut down bb 12.5 mg bid hold parameter. Pt's stool positive blood,hb trended down. ASA chnage to 81 mg. Gi rec colonoscopy but pt refused any intervention,understood risk. On PPI bid. dc to [facility] -waiting for family to  facility      Acute on chronic Respiratory Failure with Hypoxia with sepsis sec to recurrent aspiration PNA    -E coli in sputum cx sent from 4/20/22  --pseudomonas 4/23/22 update from sputum   - had been on CTX and azithromycin /merrem  - Change back to Zosyn 4/24/22 x 7 days till 04/30  - NO ORAL option for pseudomonas - RESISTANT TO Levaquin and Ciproi  - Currently tolerating 2 LNC,titrate down as tolerate.  off bipap now.  bipap at night  - Nebs, Tapering dose of PO steroid   - CT chest reveals worsening infiltrate.    - Aspiration is multifactorial 2/2 dysphagia and reflux from lack of free water flushing after tube feeds   - Maintain on aspiration precautions including HOB elevation         Acute on chronic Anemia likely from GI bleed  -- Pt is seen by GI team. Last colonoscopy and EGD many years ago. Per GI, Patient refused rectal exam.  Patient also refusing endoscopy or colonoscopy at this time. Indications for having an endoscopic evaluation is explained in detail.  -hb 8.5. resume asa,cut down to 81 mg   -monitor h/h  - stool occult POSITIVE  -iron level stable  -PPI   -stool  brown    Hypernatremia  -improving  - 143  Added free water down the PEG.   Monitor daily       Laryngeal cancer s/p radiation with subsequent PEG tube placement due to dysphagia  - Leakage noted during hospital course and GI consult; now resolved   - CT neck does not reveal any laryngeal mass  - Outpatient follow up    MATHIEU, likely prerenal   - Resolved      Prediabetes / ?DM 2  - HbA1c 6.1  - Accu checks and ISS      HTN   - soft  - no bb given am  -will cut down bb 12.5 mg bid  with holding parameters   -dc  ACEI      Hypothyroidism  - On Synthroid        Constipation  - Maintain on miralax and lactulose      Abnormal EEG  - Repeat EEG with mild to moderate nonspecific diffuse or multifocal cerebral dysfunction. No epileptiform pattern or seizure seen.  - No intervention as per Epilepsy    VTE ppx: Lovenox     Dispo:  [facility],  Update care of plan to daughter and wife , will need iv abx till 04/30/22       for safe discharge. sw gave daughter lists of facility  Per  -pt will need SERJIO. SW will reach out the daughter again today.  covid pcr   dw rn

## 2022-04-26 NOTE — CHART NOTE - NSCHARTNOTESELECT_GEN_ALL_CORE
Event Note
Nutrition Services
Transfer Note
Event Note
Nutrition Services
Palliative/Event Note

## 2022-04-26 NOTE — CHART NOTE - NSCHARTNOTEFT_GEN_A_CORE
Will sign off. goals are addressed and symptoms managed, plans are for [facility]. For discharge, can change IVP morphine to Morphine concentrate 5mg PO Q 6 hours PRN shortness of breath

## 2022-04-26 NOTE — CHART NOTE - NSCHARTNOTEFT_GEN_A_CORE
Source: Patient [X]  Family [X]   other [X] nursing staff    77 y/o Mauritian speaking M w/ history of Laryngeal Cancer s/p Chemo + RT, PEG Tube, COPD on home oxygen, Carotid Stenosis, Non Obstructive CAD, HTN, HLD, Prediabetes / ? DM 2 and Hypothyroidism presented with unresponsiveness. Oxygen saturation at home in 30s. Intubated in ER and admitted to ICU with Hypoxic Respiratory Failure secondary. Found to be in Septic Shock secondary to Aspiration Pneumonia. Started on Levophed and later weaned off. He was extubated on 4/9/22. EEG preliminary report with potential multifocal epileptogenic foci. He was downgraded to Medicine Service on 4/10/22.  Since downgrade hospital course has been complicated by intermittent hypoxia likely due to continued worsening aspiration.  Pt will upgraded to SDU.   Acute on chronic Respiratory Failure with Hypoxia.    Current Diet: g Diet, NPO with Tube Feed  Tube Feeding Modality: Gastrostomy  Glucerna 1.5 Heath (GLUCERNA1.5)  Total Volume for 24 Hours (mL): 1200  BOLUS FEEDS  Total Volume of Bolus (mL):  300  Total # of Feeds: 4  Tube Feed Frequency: Every 6 hours  Tube Feed Start Time: 18:00  Bolus Feed Rate (mL per Hour): 300  Bolus Feed Duration (in Hours): 1  Free Water Flush  Total Volume per Flush (mL): 150   Frequency: Every 6 Hours   Total Daily Volume of Flush (mL): 600  Start Time: 19:00 (04-15-22 @ 18:05)    Enteral /Parenteral Nutrition:   Total mL: 1200mL, 1800kcal, 100gpro, 912mL free water via BOLUS FEEDS    Current Weight: 128.7lbs (4/23)    Weight history:   152lbs (4/15)  143lbs (4/9)    % Weight Change: decrease in weight noted. Bed Weight taken by RD: 132lbs (4/26)    Pertinent Medications: MEDICATIONS  (STANDING):  ALBUTerol    0.083% 2.5 milliGRAM(s) Nebulizer every 6 hours  aspirin enteric coated 81 milliGRAM(s) Oral daily  atorvastatin 40 milliGRAM(s) Oral at bedtime  budesonide 160 MICROgram(s)/formoterol 4.5 MICROgram(s) Inhaler 2 Puff(s) Inhalation two times a day  chlorhexidine 0.12% Liquid 15 milliLiter(s) Swish and Spit two times a day  chlorhexidine 2% Cloths 1 Application(s) Topical <User Schedule>  cholecalciferol 1000 Unit(s) Oral daily  dextrose 5%. 1000 milliLiter(s) (50 mL/Hr) IV Continuous <Continuous>  dextrose 5%. 1000 milliLiter(s) (100 mL/Hr) IV Continuous <Continuous>  dextrose 50% Injectable 25 Gram(s) IV Push once  dextrose 50% Injectable 12.5 Gram(s) IV Push once  dextrose 50% Injectable 25 Gram(s) IV Push once  enoxaparin Injectable 40 milliGRAM(s) SubCutaneous every 24 hours  ferrous    sulfate Liquid 300 milliGRAM(s) Enteral Tube daily  glucagon  Injectable 1 milliGRAM(s) IntraMuscular once  insulin lispro (ADMELOG) corrective regimen sliding scale   SubCutaneous every 6 hours  lactobacillus acidophilus 1 Tablet(s) Oral two times a day  levothyroxine 112 MICROGram(s) Oral daily  metoprolol tartrate 12.5 milliGRAM(s) Enteral Tube two times a day  morphine  - Injectable 2 milliGRAM(s) IV Push at bedtime  oxybutynin 5 milliGRAM(s) Oral two times a day  pantoprazole  Injectable 40 milliGRAM(s) IV Push every 12 hours  piperacillin/tazobactam IVPB.. 3.375 Gram(s) IV Intermittent every 8 hours  polyethylene glycol 3350 17 Gram(s) Oral daily  predniSONE   Tablet 30 milliGRAM(s) Oral daily  senna 2 Tablet(s) Oral at bedtime  tiotropium 18 MICROgram(s) Capsule 1 Capsule(s) Inhalation daily    MEDICATIONS  (PRN):  acetaminophen     Tablet .. 650 milliGRAM(s) Oral every 6 hours PRN Temp greater or equal to 38C (100.4F)  aluminum hydroxide/magnesium hydroxide/simethicone Suspension 30 milliLiter(s) Oral every 4 hours PRN Dyspepsia  dextrose Oral Gel 15 Gram(s) Oral once PRN Blood Glucose LESS THAN 70 milliGRAM(s)/deciliter  guaiFENesin Oral Liquid (Sugar-Free) 100 milliGRAM(s) Oral every 6 hours PRN Cough  morphine  - Injectable 2 milliGRAM(s) IV Push every 8 hours PRN dyspnea or increased work of breathing    Pertinent Labs: CBC Full  -  ( 26 Apr 2022 05:47 )  WBC Count : 12.84 K/uL  RBC Count : 3.00 M/uL  Hemoglobin : 8.5 g/dL  Hematocrit : 29.3 %  Platelet Count - Automated : 289 K/uL  Mean Cell Volume : 97.7 fl  Mean Cell Hemoglobin : 28.3 pg  Mean Cell Hemoglobin Concentration : 29.0 gm/dL    04-26 Na143 mmol/L Glu 104 mg/dL<H> K+ 3.8 mmol/L Cr  0.79 mg/dL BUN 47.9 mg/dL<H> Phos n/a   Alb n/a   PAB n/a       Nutrition focused physical exam conducted - found signs of malnutrition [ ]absent [ X]present    Subcutaneous fat loss: [ X] Orbital fat pads region, [X ]Buccal fat region, [ ]Triceps region,  [ ]Ribs region    Muscle wasting: [ X]Temples region, [X ]Clavicle region, [X ]Shoulder region, [ ]Scapula region, [ ]Interosseous region,  [ ]thigh region, [ ]Calf region    Estimated Needs:   [X ] no change since previous assessment  [ ] recalculated:     Current Nutrition Diagnosis: Pt remains at nutrition risk secondary to Malnutrition (chronic moderate) related to inadequate protein calorie intake in the setting of acute respiratory failure/ asp pna/ septic shock/ COPD as evidenced by moderate muscle mass depletion and subcutaneous fat loss.     Patient reports no N/V. Nursing reports tolerating TF at this time. Patient and family report feeling as if patient has lost weight since admission. Reported UBW ~ 135-140lbs. Obtained bedscale weight at ~132lbs. Weight history inconsistent. Current tube feeding order continues to meet estimated/increased nutrition needs at this time.     Recommendations:   1. Continue current bolus TF regimen as tolerated  2. Continue to monitor weights daily for accuracy  3. Continue to monitor electrolyte balance with relevant labs  4. If consistent weight loss is evident, recommend review of current TF formula and regimen    Monitoring and Evaluation:   [x ] PO intake [x ] Tolerance to diet prescription [X] Weights  [X] Follow up per protocol [X] Labs:

## 2022-04-27 ENCOUNTER — TRANSCRIPTION ENCOUNTER (OUTPATIENT)
Age: 77
End: 2022-04-27

## 2022-04-27 VITALS
HEART RATE: 80 BPM | DIASTOLIC BLOOD PRESSURE: 62 MMHG | OXYGEN SATURATION: 99 % | SYSTOLIC BLOOD PRESSURE: 119 MMHG | TEMPERATURE: 99 F | RESPIRATION RATE: 18 BRPM

## 2022-04-27 LAB
CULTURE RESULTS: SIGNIFICANT CHANGE UP
CULTURE RESULTS: SIGNIFICANT CHANGE UP
GLUCOSE BLDC GLUCOMTR-MCNC: 163 MG/DL — HIGH (ref 70–99)
GLUCOSE BLDC GLUCOMTR-MCNC: 221 MG/DL — HIGH (ref 70–99)
GLUCOSE BLDC GLUCOMTR-MCNC: 98 MG/DL — SIGNIFICANT CHANGE UP (ref 70–99)
SARS-COV-2 RNA SPEC QL NAA+PROBE: SIGNIFICANT CHANGE UP
SPECIMEN SOURCE: SIGNIFICANT CHANGE UP
SPECIMEN SOURCE: SIGNIFICANT CHANGE UP

## 2022-04-27 PROCEDURE — 84295 ASSAY OF SERUM SODIUM: CPT

## 2022-04-27 PROCEDURE — 74019 RADEX ABDOMEN 2 VIEWS: CPT

## 2022-04-27 PROCEDURE — 80053 COMPREHEN METABOLIC PANEL: CPT

## 2022-04-27 PROCEDURE — 95714 VEEG EA 12-26 HR UNMNTR: CPT

## 2022-04-27 PROCEDURE — 81003 URINALYSIS AUTO W/O SCOPE: CPT

## 2022-04-27 PROCEDURE — 71045 X-RAY EXAM CHEST 1 VIEW: CPT

## 2022-04-27 PROCEDURE — 71250 CT THORAX DX C-: CPT | Mod: MA

## 2022-04-27 PROCEDURE — 86901 BLOOD TYPING SEROLOGIC RH(D): CPT

## 2022-04-27 PROCEDURE — 36415 COLL VENOUS BLD VENIPUNCTURE: CPT

## 2022-04-27 PROCEDURE — 94640 AIRWAY INHALATION TREATMENT: CPT

## 2022-04-27 PROCEDURE — 87640 STAPH A DNA AMP PROBE: CPT

## 2022-04-27 PROCEDURE — 97110 THERAPEUTIC EXERCISES: CPT

## 2022-04-27 PROCEDURE — 82330 ASSAY OF CALCIUM: CPT

## 2022-04-27 PROCEDURE — 96374 THER/PROPH/DIAG INJ IV PUSH: CPT

## 2022-04-27 PROCEDURE — 97530 THERAPEUTIC ACTIVITIES: CPT

## 2022-04-27 PROCEDURE — 83880 ASSAY OF NATRIURETIC PEPTIDE: CPT

## 2022-04-27 PROCEDURE — 95700 EEG CONT REC W/VID EEG TECH: CPT

## 2022-04-27 PROCEDURE — 83550 IRON BINDING TEST: CPT

## 2022-04-27 PROCEDURE — 81001 URINALYSIS AUTO W/SCOPE: CPT

## 2022-04-27 PROCEDURE — 82803 BLOOD GASES ANY COMBINATION: CPT

## 2022-04-27 PROCEDURE — 86850 RBC ANTIBODY SCREEN: CPT

## 2022-04-27 PROCEDURE — 86900 BLOOD TYPING SEROLOGIC ABO: CPT

## 2022-04-27 PROCEDURE — 99239 HOSP IP/OBS DSCHRG MGMT >30: CPT

## 2022-04-27 PROCEDURE — 82435 ASSAY OF BLOOD CHLORIDE: CPT

## 2022-04-27 PROCEDURE — 82962 GLUCOSE BLOOD TEST: CPT

## 2022-04-27 PROCEDURE — 82947 ASSAY GLUCOSE BLOOD QUANT: CPT

## 2022-04-27 PROCEDURE — 72125 CT NECK SPINE W/O DYE: CPT | Mod: MA

## 2022-04-27 PROCEDURE — 84466 ASSAY OF TRANSFERRIN: CPT

## 2022-04-27 PROCEDURE — 83735 ASSAY OF MAGNESIUM: CPT

## 2022-04-27 PROCEDURE — 82272 OCCULT BLD FECES 1-3 TESTS: CPT

## 2022-04-27 PROCEDURE — 85014 HEMATOCRIT: CPT

## 2022-04-27 PROCEDURE — 83605 ASSAY OF LACTIC ACID: CPT

## 2022-04-27 PROCEDURE — 99285 EMERGENCY DEPT VISIT HI MDM: CPT | Mod: 25

## 2022-04-27 PROCEDURE — 95711 VEEG 2-12 HR UNMONITORED: CPT

## 2022-04-27 PROCEDURE — 87186 SC STD MICRODIL/AGAR DIL: CPT

## 2022-04-27 PROCEDURE — 93005 ELECTROCARDIOGRAM TRACING: CPT

## 2022-04-27 PROCEDURE — 87086 URINE CULTURE/COLONY COUNT: CPT

## 2022-04-27 PROCEDURE — U0003: CPT

## 2022-04-27 PROCEDURE — 84484 ASSAY OF TROPONIN QUANT: CPT

## 2022-04-27 PROCEDURE — 85025 COMPLETE CBC W/AUTO DIFF WBC: CPT

## 2022-04-27 PROCEDURE — 70491 CT SOFT TISSUE NECK W/DYE: CPT

## 2022-04-27 PROCEDURE — 85379 FIBRIN DEGRADATION QUANT: CPT

## 2022-04-27 PROCEDURE — 94660 CPAP INITIATION&MGMT: CPT

## 2022-04-27 PROCEDURE — 83540 ASSAY OF IRON: CPT

## 2022-04-27 PROCEDURE — 70450 CT HEAD/BRAIN W/O DYE: CPT | Mod: MA

## 2022-04-27 PROCEDURE — 71260 CT THORAX DX C+: CPT

## 2022-04-27 PROCEDURE — 96375 TX/PRO/DX INJ NEW DRUG ADDON: CPT

## 2022-04-27 PROCEDURE — 86703 HIV-1/HIV-2 1 RESULT ANTBDY: CPT

## 2022-04-27 PROCEDURE — 85027 COMPLETE CBC AUTOMATED: CPT

## 2022-04-27 PROCEDURE — 0225U NFCT DS DNA&RNA 21 SARSCOV2: CPT

## 2022-04-27 PROCEDURE — 83036 HEMOGLOBIN GLYCOSYLATED A1C: CPT

## 2022-04-27 PROCEDURE — 74176 CT ABD & PELVIS W/O CONTRAST: CPT | Mod: MA

## 2022-04-27 PROCEDURE — 95705 EEG W/O VID 2-12 HR UNMNTR: CPT

## 2022-04-27 PROCEDURE — 87040 BLOOD CULTURE FOR BACTERIA: CPT

## 2022-04-27 PROCEDURE — 87077 CULTURE AEROBIC IDENTIFY: CPT

## 2022-04-27 PROCEDURE — 94003 VENT MGMT INPAT SUBQ DAY: CPT

## 2022-04-27 PROCEDURE — 87641 MR-STAPH DNA AMP PROBE: CPT

## 2022-04-27 PROCEDURE — 87150 DNA/RNA AMPLIFIED PROBE: CPT

## 2022-04-27 PROCEDURE — 85018 HEMOGLOBIN: CPT

## 2022-04-27 PROCEDURE — 97163 PT EVAL HIGH COMPLEX 45 MIN: CPT

## 2022-04-27 PROCEDURE — 87070 CULTURE OTHR SPECIMN AEROBIC: CPT

## 2022-04-27 PROCEDURE — 84132 ASSAY OF SERUM POTASSIUM: CPT

## 2022-04-27 PROCEDURE — 94002 VENT MGMT INPAT INIT DAY: CPT

## 2022-04-27 PROCEDURE — 80048 BASIC METABOLIC PNL TOTAL CA: CPT

## 2022-04-27 PROCEDURE — 82728 ASSAY OF FERRITIN: CPT

## 2022-04-27 PROCEDURE — 97116 GAIT TRAINING THERAPY: CPT

## 2022-04-27 PROCEDURE — 94760 N-INVAS EAR/PLS OXIMETRY 1: CPT

## 2022-04-27 PROCEDURE — U0005: CPT

## 2022-04-27 PROCEDURE — 84100 ASSAY OF PHOSPHORUS: CPT

## 2022-04-27 PROCEDURE — 99233 SBSQ HOSP IP/OBS HIGH 50: CPT

## 2022-04-27 RX ORDER — SACCHAROMYCES BOULARDII 250 MG
1 POWDER IN PACKET (EA) ORAL
Qty: 0 | Refills: 0 | DISCHARGE

## 2022-04-27 RX ADMIN — BUDESONIDE AND FORMOTEROL FUMARATE DIHYDRATE 2 PUFF(S): 160; 4.5 AEROSOL RESPIRATORY (INHALATION) at 08:33

## 2022-04-27 RX ADMIN — Medication 12.5 MILLIGRAM(S): at 05:56

## 2022-04-27 RX ADMIN — Medication 4: at 11:59

## 2022-04-27 RX ADMIN — PIPERACILLIN AND TAZOBACTAM 25 GRAM(S): 4; .5 INJECTION, POWDER, LYOPHILIZED, FOR SOLUTION INTRAVENOUS at 05:54

## 2022-04-27 RX ADMIN — Medication 1000 UNIT(S): at 12:35

## 2022-04-27 RX ADMIN — CHLORHEXIDINE GLUCONATE 15 MILLILITER(S): 213 SOLUTION TOPICAL at 05:53

## 2022-04-27 RX ADMIN — ALBUTEROL 2.5 MILLIGRAM(S): 90 AEROSOL, METERED ORAL at 14:39

## 2022-04-27 RX ADMIN — ALBUTEROL 2.5 MILLIGRAM(S): 90 AEROSOL, METERED ORAL at 08:34

## 2022-04-27 RX ADMIN — Medication 300 MILLIGRAM(S): at 12:35

## 2022-04-27 RX ADMIN — ALBUTEROL 2.5 MILLIGRAM(S): 90 AEROSOL, METERED ORAL at 05:34

## 2022-04-27 RX ADMIN — PANTOPRAZOLE SODIUM 40 MILLIGRAM(S): 20 TABLET, DELAYED RELEASE ORAL at 05:52

## 2022-04-27 RX ADMIN — CHLORHEXIDINE GLUCONATE 1 APPLICATION(S): 213 SOLUTION TOPICAL at 06:09

## 2022-04-27 RX ADMIN — Medication 30 MILLIGRAM(S): at 05:53

## 2022-04-27 RX ADMIN — Medication 112 MICROGRAM(S): at 05:53

## 2022-04-27 RX ADMIN — PIPERACILLIN AND TAZOBACTAM 25 GRAM(S): 4; .5 INJECTION, POWDER, LYOPHILIZED, FOR SOLUTION INTRAVENOUS at 13:02

## 2022-04-27 RX ADMIN — Medication 5 MILLIGRAM(S): at 05:53

## 2022-04-27 RX ADMIN — Medication 81 MILLIGRAM(S): at 12:35

## 2022-04-27 RX ADMIN — TIOTROPIUM BROMIDE 1 CAPSULE(S): 18 CAPSULE ORAL; RESPIRATORY (INHALATION) at 08:33

## 2022-04-27 RX ADMIN — Medication 1 TABLET(S): at 05:53

## 2022-04-27 NOTE — PROGRESS NOTE ADULT - SUBJECTIVE AND OBJECTIVE BOX
PULMONARY PROGRESS NOTE      KIMBERLY LAI  MRN-[id number]    Patient is a 76y old  Male who presents with a chief complaint of Hypoxic respiratory failure (27 Apr 2022 10:19)        INTERVAL HPI/OVERNIGHT EVENTS:  Pt seen and examined at the bedside. Denies acute SOB.     MEDICATIONS  (STANDING):  ALBUTerol    0.083% 2.5 milliGRAM(s) Nebulizer every 6 hours  aspirin enteric coated 81 milliGRAM(s) Oral daily  atorvastatin 40 milliGRAM(s) Oral at bedtime  budesonide 160 MICROgram(s)/formoterol 4.5 MICROgram(s) Inhaler 2 Puff(s) Inhalation two times a day  chlorhexidine 0.12% Liquid 15 milliLiter(s) Swish and Spit two times a day  chlorhexidine 2% Cloths 1 Application(s) Topical <User Schedule>  cholecalciferol 1000 Unit(s) Oral daily  dextrose 5%. 1000 milliLiter(s) (50 mL/Hr) IV Continuous <Continuous>  dextrose 5%. 1000 milliLiter(s) (100 mL/Hr) IV Continuous <Continuous>  dextrose 50% Injectable 25 Gram(s) IV Push once  dextrose 50% Injectable 12.5 Gram(s) IV Push once  dextrose 50% Injectable 25 Gram(s) IV Push once  enoxaparin Injectable 40 milliGRAM(s) SubCutaneous every 24 hours  ferrous    sulfate Liquid 300 milliGRAM(s) Enteral Tube daily  glucagon  Injectable 1 milliGRAM(s) IntraMuscular once  insulin lispro (ADMELOG) corrective regimen sliding scale   SubCutaneous every 6 hours  lactobacillus acidophilus 1 Tablet(s) Oral two times a day  levothyroxine 112 MICROGram(s) Oral daily  metoprolol tartrate 12.5 milliGRAM(s) Enteral Tube two times a day  morphine  - Injectable 2 milliGRAM(s) IV Push at bedtime  oxybutynin 5 milliGRAM(s) Oral two times a day  pantoprazole  Injectable 40 milliGRAM(s) IV Push every 12 hours  piperacillin/tazobactam IVPB.. 3.375 Gram(s) IV Intermittent every 8 hours  polyethylene glycol 3350 17 Gram(s) Oral daily  predniSONE   Tablet 20 milliGRAM(s) Oral daily  senna 2 Tablet(s) Oral at bedtime  tiotropium 18 MICROgram(s) Capsule 1 Capsule(s) Inhalation daily    MEDICATIONS  (PRN):  acetaminophen     Tablet .. 650 milliGRAM(s) Oral every 6 hours PRN Temp greater or equal to 38C (100.4F)  aluminum hydroxide/magnesium hydroxide/simethicone Suspension 30 milliLiter(s) Oral every 4 hours PRN Dyspepsia  dextrose Oral Gel 15 Gram(s) Oral once PRN Blood Glucose LESS THAN 70 milliGRAM(s)/deciliter  guaiFENesin Oral Liquid (Sugar-Free) 100 milliGRAM(s) Oral every 6 hours PRN Cough  morphine  - Injectable 2 milliGRAM(s) IV Push every 8 hours PRN dyspnea or increased work of breathing    Allergies    No Known Allergies    Intolerances      PAST MEDICAL & SURGICAL HISTORY:  Esophageal stricture    Prostate CA    History of carotid stenosis    Laryngeal carcinoma    COPD (chronic obstructive pulmonary disease)    Hypothyroidism    Aspiration pneumonia    Traumatic pneumothorax    Benign prostatic hyperplasia with urinary obstruction    Syncope and collapse    Microalbuminuria    Anemia    Hyperlipidemia, unspecified hyperlipidemia type    Femoral fracture    S/P percutaneous endoscopic gastrostomy (PEG) tube placement    Liver laceration    History of exploratory laparotomy    History of total bilateral knee replacement  b/l femur          REVIEW OF SYSTEMS:  Negative except as noted in HPI      Vital Signs Last 24 Hrs  T(C): 36.3 (27 Apr 2022 08:00), Max: 36.8 (26 Apr 2022 16:41)  T(F): 97.4 (27 Apr 2022 08:00), Max: 98.3 (27 Apr 2022 04:36)  HR: 76 (27 Apr 2022 08:34) (76 - 98)  BP: 104/55 (27 Apr 2022 08:00) (103/58 - 156/77)  BP(mean): --  RR: 20 (27 Apr 2022 08:00) (18 - 22)  SpO2: 99% (27 Apr 2022 08:34) (95% - 99%)      PHYSICAL EXAMINATION:  GEN: In no apparent distress  HEENT: NC/AT  NECK: Supple, non-tender  CV: +S1, S2, RRR  RESPIRATORY: CTA B/L, good inspiratory effort, non-labored breathing  ABDOMEN: Soft, non-tender  EXTREMITIES: No pedal edema B/L  SKIN: No open wounds  PSYCH: Normal affect      LABS:                        8.5    12.84 )-----------( 289      ( 26 Apr 2022 05:47 )             29.3     04-26    143  |  99  |  47.9<H>  ----------------------------<  104<H>  3.8   |  34.0<H>  |  0.79    Ca    9.0      26 Apr 2022 05:47                          MICROBIOLOGY:  COVID-19 PCR (04.27.22 @ 06:54)    COVID-19 PCR: NotDetec: You can help in the fight against COVID-19. [facility] may contact  you to see if you are interested in voluntarily participating in one of  our clinical trials.  Testing is performed using polymerase chain reaction (PCR) or  transcription mediated amplification (TMA). This COVID-19 (SARS-CoV-2)  nucleic acid amplification test was validated by [facility] and is  in use under the FDA Emergency Use Authorization (EUA) for clinical labs  CLIA-certified to perform high complexity testing. Test results should be  correlated with clinical presentation, patient history, and epidemiology.          ~~~~~~~~~~~~~~~~~~~~~~~~~~~~~~~~~~~~~~~~~~~~~~~~~~~~~~~~~~~~~~      RADIOLOGY & ADDITIONAL STUDIES:    < from: CT Chest w/ IV Cont (04.20.22 @ 13:44) >    ACC: [id number] EXAM:  CT CHEST IC                          PROCEDURE DATE:  04/20/2022          INTERPRETATION:  HISTORY: Admitting Dxs: J96.01 RESPIRATORY DIS. Hypoxia.   Recent pneumonia. Worsening chest x-ray findings.    EXAMINATION: CT CHEST was performed with IV contrast.  CONTRAST/COMPLICATIONS:  IV Contrast: Omnipaque 300  97 cc administered   3 cc discarded  Oral Contrast: NONE  Complications: None reported at time of study completion    COMPARISON: 4/8/2022 CT chest.    FINDINGS:    AIRWAYS, LUNGS, PLEURA: Tracheal secretions. Biapical scarring/fibrosis   and peripheral left upper lobe subpleural thickening unchanged.    Multifocal left greater than right lower lobe consolidation, patchy   ground-glass opacities primarily in the upper lobes, and tree-in-bud   nodular opacities throughout the right middle lobe and bilateral lower   lobes.    With respect to 4/8/2022 left lower lobe multifocal consolidation and   superior segment ground-glass nodular opacities and lingular   consolidation appear worse.    Trace left pleural effusion.    MEDIASTINUM: Normal heart size. No pericardial effusion. Thoracic aorta   normal caliber.  Unchanged mediastinal and right hilar lymphadenopathy;   reference subcarinal 3.8 x 1.5 cm node in 1.3 x 1 cm node adjacent to the   descending thoracic aorta (image 1 1, series 3).    IMAGED ABDOMEN: Unchanged hyperdense material within the gallbladder,   likely stones. Subcentimeter left renal hypodense lesions too small to   characterize. Gastrostomy tube in place.    SOFT TISSUES: Unremarkable.    BONES: Unremarkable.      IMPRESSION:.    Findings likely reflect aspiration pneumonitis/infection and there has   been interval worsening of airspace disease in the lingula and left lower   lobe compared to 4/8/2022.    Unchanged mediastinal and right hilar lymphadenopathy.    --- End of Report ---            MILENA BLOCK MD; Attending Radiologist  This document has been electronically signed. Apr 20 2022  2:04PM    < end of copied text >    ~~~~~~~~~~~~~~~~~~~~~~~~~~~~~~~~~~~~~~~~~~~~~~~~~~~~~~~~~~~~~~    ECHO:  < from: TTE Echo Complete w/o Contrast w/ Doppler (07.07.21 @ 13:17) >  Summary:   1. Left ventricular ejection fraction, by visual estimation, is 60 to 65%.   2. Normal global left ventricular systolic function.   3. Spectral Doppler shows impaired relaxation pattern of left ventricular myocardial filling (Grade I diastolic dysfunction).   4. Mild mitral valve regurgitation.   5. Mild thickening of the anterior and posterior mitral valve leaflets.    MD Zoey Electronically signed on 7/7/2021 at 7:38:07 PM            *** Final ***              FOZIA SHIPLEY MD; Attending Cardiologist  This document has been electronically signed. Jul 7 2021  1:17PM    < end of copied text >

## 2022-04-27 NOTE — DISCHARGE NOTE NURSING/CASE MANAGEMENT/SOCIAL WORK - PATIENT PORTAL LINK FT
You can access the FollowMyHealth Patient Portal offered by [facility] by registering at the following website: [email or web address]. By joining [facility]’s FollowMyHealth portal, you will also be able to view your health information using other applications (apps) compatible with our system.

## 2022-04-27 NOTE — PROGRESS NOTE ADULT - SUBJECTIVE AND OBJECTIVE BOX
Patient is a 76y old  Male who presents with a chief complaint of Hypoxic respiratory failure (26 Apr 2022 12:22)  Pt seen and exam. No acute issues. Pt states he is breathing well,no sob,cp,cough,fever,chill,dizziness,voiding well, bm yesterday.  REVIEW OF SYSTEMS: All systems are reviewed and found to be negative except above    MEDICATIONS  (STANDING):  ALBUTerol    0.083% 2.5 milliGRAM(s) Nebulizer every 6 hours  aspirin enteric coated 81 milliGRAM(s) Oral daily  atorvastatin 40 milliGRAM(s) Oral at bedtime  budesonide 160 MICROgram(s)/formoterol 4.5 MICROgram(s) Inhaler 2 Puff(s) Inhalation two times a day  chlorhexidine 0.12% Liquid 15 milliLiter(s) Swish and Spit two times a day  chlorhexidine 2% Cloths 1 Application(s) Topical <User Schedule>  cholecalciferol 1000 Unit(s) Oral daily  dextrose 5%. 1000 milliLiter(s) (50 mL/Hr) IV Continuous <Continuous>  dextrose 5%. 1000 milliLiter(s) (100 mL/Hr) IV Continuous <Continuous>  dextrose 50% Injectable 25 Gram(s) IV Push once  dextrose 50% Injectable 12.5 Gram(s) IV Push once  dextrose 50% Injectable 25 Gram(s) IV Push once  enoxaparin Injectable 40 milliGRAM(s) SubCutaneous every 24 hours  ferrous    sulfate Liquid 300 milliGRAM(s) Enteral Tube daily  glucagon  Injectable 1 milliGRAM(s) IntraMuscular once  insulin lispro (ADMELOG) corrective regimen sliding scale   SubCutaneous every 6 hours  lactobacillus acidophilus 1 Tablet(s) Oral two times a day  levothyroxine 112 MICROGram(s) Oral daily  metoprolol tartrate 12.5 milliGRAM(s) Enteral Tube two times a day  morphine  - Injectable 2 milliGRAM(s) IV Push at bedtime  oxybutynin 5 milliGRAM(s) Oral two times a day  pantoprazole  Injectable 40 milliGRAM(s) IV Push every 12 hours  piperacillin/tazobactam IVPB.. 3.375 Gram(s) IV Intermittent every 8 hours  polyethylene glycol 3350 17 Gram(s) Oral daily  predniSONE   Tablet 20 milliGRAM(s) Oral daily  senna 2 Tablet(s) Oral at bedtime  tiotropium 18 MICROgram(s) Capsule 1 Capsule(s) Inhalation daily    MEDICATIONS  (PRN):  acetaminophen     Tablet .. 650 milliGRAM(s) Oral every 6 hours PRN Temp greater or equal to 38C (100.4F)  aluminum hydroxide/magnesium hydroxide/simethicone Suspension 30 milliLiter(s) Oral every 4 hours PRN Dyspepsia  dextrose Oral Gel 15 Gram(s) Oral once PRN Blood Glucose LESS THAN 70 milliGRAM(s)/deciliter  guaiFENesin Oral Liquid (Sugar-Free) 100 milliGRAM(s) Oral every 6 hours PRN Cough  morphine  - Injectable 2 milliGRAM(s) IV Push every 8 hours PRN dyspnea or increased work of breathing      CAPILLARY BLOOD GLUCOSE      POCT Blood Glucose.: 98 mg/dL (27 Apr 2022 05:51)  POCT Blood Glucose.: 120 mg/dL (26 Apr 2022 23:31)  POCT Blood Glucose.: 125 mg/dL (26 Apr 2022 17:32)  POCT Blood Glucose.: 235 mg/dL (26 Apr 2022 12:46)    I&O's Summary    26 Apr 2022 07:01  -  27 Apr 2022 07:00  --------------------------------------------------------  IN: 770 mL / OUT: 200 mL / NET: 570 mL        PHYSICAL EXAM:  Vital Signs Last 24 Hrs  T(C): 36.3 (27 Apr 2022 08:00), Max: 36.8 (26 Apr 2022 16:41)  T(F): 97.4 (27 Apr 2022 08:00), Max: 98.3 (27 Apr 2022 04:36)  HR: 76 (27 Apr 2022 08:34) (76 - 98)  BP: 104/55 (27 Apr 2022 08:00) (103/58 - 156/77)  BP(mean): --  RR: 20 (27 Apr 2022 08:00) (18 - 22)  SpO2: 99% (27 Apr 2022 08:34) (95% - 99%)    CONSTITUTIONAL: NAD,  EYES: PERRLA; conjunctiva and sclera clear  ENMT: Moist oral mucosa,   RESPIRATORY: Normal respiratory effort; lungs are clear to auscultation bilaterally  CARDIOVASCULAR: Regular rate and rhythm, normal S1 and S2, no murmur   EXTS: No lower extremity edema; Peripheral pulses are 2+ bilaterally  ABDOMEN: Nontender to palpation, normoactive bowel sounds, no rebound/guarding;   MUSCLOSKELETAL:   no  cyanosis of digits; no joint swelling or tenderness to palpation  PSYCH: affect appropriate  NEUROLOGY: A+O to person, place, and time; CN 2-12 are intact and symmetric; no gross sensory/motor deficits;       LABS:                        8.5    12.84 )-----------( 289      ( 26 Apr 2022 05:47 )             29.3     04-26    143  |  99  |  47.9<H>  ----------------------------<  104<H>  3.8   |  34.0<H>  |  0.79    Ca    9.0      26 Apr 2022 05:47                  RADIOLOGY & ADDITIONAL TESTS:  Results Reviewed:

## 2022-04-27 NOTE — PROGRESS NOTE ADULT - REASON FOR ADMISSION
Hypoxic respiratory failure

## 2022-04-27 NOTE — PROGRESS NOTE ADULT - ASSESSMENT
- Acute on chronic hypercapneic and hypoxic respiratory failure  - Acute on chronic aspiration pneumonitis - s/p intubation and extubation  - COPD  - Pseudomonas PNA  - H/o laryngeal CA with dysphagia s/p PEG    Recommendations:  C/w PO Prednisone taper. Continue BPAP QHS and PRN. Aspiration precautions, GI f/u. Zosyn for Pseudomonas PNA per ID. C/w PRN nebs. Monitor O2 requirements - he is resting comfortably on 2L NC. OOBTC, ambulate, PT/OT. Palliative care on board.      This patient presented with critically severe Chronic Hypercapnic Respiratory Failure secondary to COPD.  He demonstrates shortness of breath and accessory muscle use at rest and exertion. ABG shows PCO2 level elevated at 58mmHg despite treatment with BiPap on settings of 14/8. Patient requires volume augmented non-invasive ventilation, other therapies such as BiPAP or BiPAP ST, have been ruled out as they cannot adequately control the patient’s CO2 levels. Volume augmented non-invasive ventilation would be extremely beneficial in improving the patient’s ability to recover muscle de-conditioning and decrease the work of breathing, decrease the amount of CO2 retention, increase oxygenation and thus decrease hospital readmissions. Without volume augmented ventilation, he will likely suffer further medical complications and frequent hospital readmissions.        Alan Castorena M.D.  Pulmonary & Critical Care Medicine  [facility]  Pulmonary and Sleep Medicine at [place]  [address]  [place], [place] [address]  T: [phone]  F: [phone]

## 2022-04-27 NOTE — DISCHARGE NOTE NURSING/CASE MANAGEMENT/SOCIAL WORK - NSDCPEFALRISK_GEN_ALL_CORE
For information on Fall & Injury Prevention, visit: [email or web address] OR  [email or web address] OR  https://www.cdc.gov/steadi/patient.html

## 2022-04-27 NOTE — PROGRESS NOTE ADULT - NUTRITIONAL ASSESSMENT
This patient has been assessed with a concern for Malnutrition and has been determined to have a diagnosis/diagnoses of Moderate protein-calorie malnutrition.    This patient is being managed with:   Diet NPO with Tube Feed-  Tube Feeding Modality: Gastrostomy  Glucerna 1.5 Heath (GLUCERNA1.5)  Total Volume for 24 Hours (mL): 1200  Bolus  Total Volume of Bolus (mL):  300  Total # of Feeds: 4  Tube Feed Frequency: Every 6 hours   Tube Feed Start Time: 18:00  Bolus Feed Rate (mL per Hour): 300   Bolus Feed Duration (in Hours): 1  Free Water Flush  Bolus   Total Volume per Flush (mL): 150   Frequency: Every 6 Hours   Total Daily Volume of Flush (mL): 600    Start Time: 19:00  Entered: Apr 15 2022  5:53PM    
This patient has been assessed with a concern for Malnutrition and has been determined to have a diagnosis/diagnoses of Moderate protein-calorie malnutrition.    This patient is being managed with:   Diet NPO with Tube Feed-  Tube Feeding Modality: Gastrostomy  Glucerna 1.5 Heath (GLUCERNA1.5)  Total Volume for 24 Hours (mL): 1200  Bolus  Total Volume of Bolus (mL):  300  Total # of Feeds: 4  Tube Feed Frequency: Every 6 hours   Tube Feed Start Time: 12:00  Bolus Feed Rate (mL per Hour): 300   Bolus Feed Duration (in Hours): 1  Free Water Flush  Bolus   Total Volume per Flush (mL): 150   Frequency: Every 6 Hours   Total Daily Volume of Flush (mL): 600    Start Time: 13:00  Entered: Apr 10 2022 10:44AM    
This patient has been assessed with a concern for Malnutrition and has been determined to have a diagnosis/diagnoses of Moderate protein-calorie malnutrition.    This patient is being managed with:   Diet NPO with Tube Feed-  Tube Feeding Modality: Gastrostomy  Glucerna 1.5 Heath (GLUCERNA1.5)  Total Volume for 24 Hours (mL): 1200  Bolus  Total Volume of Bolus (mL):  300  Total # of Feeds: 4  Tube Feed Frequency: Every 6 hours   Tube Feed Start Time: 18:00  Bolus Feed Rate (mL per Hour): 300   Bolus Feed Duration (in Hours): 1  Free Water Flush  Bolus   Total Volume per Flush (mL): 150   Frequency: Every 6 Hours   Total Daily Volume of Flush (mL): 600    Start Time: 19:00  Entered: Apr 15 2022  5:53PM    

## 2022-04-27 NOTE — PROGRESS NOTE ADULT - ASSESSMENT
77 y/o Azeri speaking M w/ history of Laryngeal Cancer s/p Chemo + RT, PEG Tube, COPD on home oxygen 2LNC, Carotid Stenosis, Non Obstructive CAD, HTN, HLD, Prediabetes / ? DM 2 and Hypothyroidism presented with unresponsiveness. Oxygen saturation at home in 30s. Intubated in ER and Initially admitted to ICU with Hypoxic Respiratory Failure.. Found to be in Septic Shock secondary to Aspiration Pneumonia. Started on Levophed and later weaned off. He was extubated on 4/9/22. EEG preliminary report with potential multifocal epileptogenic foci. He was downgraded to Medicine Service on 4/10/22.  Since downgrade hospital course has been complicated by intermittent hypoxia likely due to continued worsening aspiration. Pt is currently on zosyn . E coli in sputum cx sent from 4/20/22, pseudomonas 4/23/22 update from sputum .Change back to Zosyn 4/24/22 x 7 days. NO ORAL option for pseudomonas - RESISTANT TO Levaquin and Cipro. BP is soft. DC ACEI,cut down bb 12.5 mg bid hold parameter. Pt's stool positive blood,hb trended down. ASA chnage to 81 mg. Gi rec colonoscopy but pt refused any intervention,understood risk. On PPI bid. dc to [facility] -waiting for family to  facility      Acute on chronic Respiratory Failure with Hypoxia with sepsis sec to recurrent aspiration PNA    -E coli in sputum cx sent from 4/20/22  --pseudomonas 4/23/22 update from sputum   - had been on CTX and azithromycin /merrem  - Change back to Zosyn 4/24/22 x 7 days till 04/30  - NO ORAL option for pseudomonas - RESISTANT TO Levaquin and Ciproi  - Currently tolerating 2 LNC,titrate down as tolerate.  off bipap now.  bipap at night  - Nebs, Tapering dose of PO steroid   - Aspiration is multifactorial 2/2 dysphagia and reflux from lack of free water flushing after tube feeds   - Maintain on aspiration precautions including HOB elevation         Acute on chronic Anemia likely from GI bleed  -- Pt is seen by GI team. Last colonoscopy and EGD many years ago. Per GI, Patient refused rectal exam.  Patient also refusing endoscopy or colonoscopy at this time. Indications for having an endoscopic evaluation is explained in detail.  -hb 8.5. resume asa 81 mg   -monitor h/h  - stool occult POSITIVE  -iron level stable  -PPI   -stool  brown    Hypernatremia  -improving  - 143  Added free water down the PEG.   Monitor daily       Laryngeal cancer s/p radiation with subsequent PEG tube placement due to dysphagia  - Leakage noted during hospital course and GI consult; now resolved   - CT neck does not reveal any laryngeal mass  - Outpatient follow up    MATHIEU, likely prerenal   - Resolved      Prediabetes / ?DM 2  - HbA1c 6.1  - Accu checks and ISS      HTN   - soft  - no bb given am  -will cut down bb 12.5 mg bid  with holding parameters   -dc  ACEI      Hypothyroidism  - On Synthroid        Constipation  - Maintain on miralax and lactulose      Abnormal EEG  - Repeat EEG with mild to moderate nonspecific diffuse or multifocal cerebral dysfunction. No epileptiform pattern or seizure seen.  - No intervention as per Epilepsy    VTE ppx: Lovenox     Dispo:  [facility],  Update care of plan to daughter and pt , will need iv abx till 04/30/22       for safe discharge. sw gave daughter lists of facility  Per SW -pending  [facility]. SW will reach out the daughter again today.  covid pcr   morena rn

## 2022-04-27 NOTE — PROGRESS NOTE ADULT - PROVIDER SPECIALTY LIST ADULT
Gastroenterology
Hospitalist
Hospitalist
Infectious Disease
Gastroenterology
Hospitalist
Infectious Disease
Palliative Care
Pulmonology
Hospitalist
Infectious Disease
Infectious Disease
Pulmonology
Pulmonology
Critical Care
Hospitalist
Pulmonology
Pulmonology
Hospitalist
Hospitalist
Pulmonology

## 2022-04-27 NOTE — PROGRESS NOTE ADULT - TIME BILLING
Review of hospital charts, including PACS and labs, and office and outside records if applicable.  Discussion of plans with referring service, and adjusting appropriate antibiotics.    d/w family member (Kelly Caldera) via phone  on need for adjustment of antibiotics due to pseudomonas in cultures
reviewing labs, notes, orders, radiographic studies, as well as counseling and coordinating care with the relevant multidisciplinary team, including with the primary and consulting providers.
reviewing labs, notes, orders, radiographic studies, as well as counseling and coordinating care with the relevant multidisciplinary team, including with the primary and consulting providers.
chart reviewed patient    wife and daughters bedside      hospitalist Dr Cai
reviewing labs, radiology, d/w dtr
Review of hospital charts, including PACS and labs, and office and outside records if applicable.  Discussion of plans with referring service, and adjusting appropriate antibiotics.    d/w family member (Kelly Caldera) via phone  on need for adjustment of antibiotics due to pseudomonas in cultures
reviewing labs, notes, orders, radiographic studies, as well as counseling and coordinating care with the relevant multidisciplinary team, including with the primary and consulting providers.
Discussed with family present and on phone
reviewing labs, radiology, d/w pt and 2 dtrs at bedside

## 2022-06-04 NOTE — HISTORY OF PRESENT ILLNESS
[facility]        NAME: Rehan Pettit is a 34 y o  male  : 1992    MRN: [id number]  DATE: 2022  TIME: 12:10 PM    Assessment and Plan   Acute bronchitis, unspecified organism [J20 [id number]]  1  Acute bronchitis, unspecified organism  predniSONE 20 mg tablet    brompheniramine-pseudoephedrine-DM 30-2-10 MG/5ML syrup   2  COVID-19  brompheniramine-pseudoephedrine-DM 30-2-10 MG/5ML syrup       Educated that symptoms may take 1-3 weeks to resolve  For cough continue warm fluids, OTC medications such as Mucinex, and throat lozenges  Given bromfed for symptoms  Patient Instructions     Bronchitis typically resolves in 1-3 weeks without any medications  For cough continue warm fluids, OTC medications such as Mucinex, throat lozenges and any prescribed medications if given in office  Antibiotics are rarely needed  Follow up with PCP in 3-5 days  Proceed to  ER if symptoms worsen  Chief Complaint     Chief Complaint   Patient presents with   Emery Sable Like Symptoms     Started Saturday, with the symptoms   did a take home test, and came out positive  Started, to feel better  Just has a cough that cant shake off  History of Present Illness       Started with symptoms 6 days ago  Home COVID test was positive  He started to feel better but continues with a dry cough that is not going away  History of asthma - controlled  Denies shortness of breath or chest pain  Review of Systems   Review of Systems   Constitutional: Negative for chills and fever  HENT: Negative for ear pain and sore throat  Respiratory: Positive for cough  Negative for shortness of breath  Cardiovascular: Negative for chest pain and palpitations  Gastrointestinal: Negative for abdominal pain, constipation, diarrhea, nausea and vomiting  Genitourinary: Negative for dysuria  Musculoskeletal: Negative for myalgias  Skin: Negative for color change and rash     Neurological: Negative for [de-identified] : pt in long term f/u.  On oxygen.  Has laryngeal stnosis and dysphagia. TL offered in past but refused.  dizziness  Psychiatric/Behavioral: Negative for confusion  All other systems reviewed and are negative  Current Medications       Current Outpatient Medications:     brompheniramine-pseudoephedrine-DM 30-2-10 MG/5ML syrup, Take 5 mL by mouth 4 (four) times a day as needed for allergies, Disp: 120 mL, Rfl: 0    predniSONE 20 mg tablet, Take 2 tablets (40 mg total) by mouth daily for 5 days, Disp: 10 tablet, Rfl: 0    albuterol (PROVENTIL HFA,VENTOLIN HFA) 90 mcg/act inhaler, Inhale 1 puff every 6 (six) hours as needed, Disp: , Rfl:     naproxen (NAPROSYN) 500 mg tablet, Take 1 tablet (500 mg total) by mouth 2 (two) times a day with meals, Disp: 60 tablet, Rfl: 0    ondansetron (ZOFRAN-ODT) 4 mg disintegrating tablet, Take 1 tablet (4 mg total) by mouth every 8 (eight) hours as needed for nausea or vomiting, Disp: 15 tablet, Rfl: 0    Current Allergies     Allergies as of 06/04/2022    (No Known Allergies)            The following portions of the patient's history were reviewed and updated as appropriate: allergies, current medications, past family history, past medical history, past social history, past surgical history and problem list      Past Medical History:   Diagnosis Date    Asthma        Past Surgical History:   Procedure Laterality Date    FL INJECTION RIGHT SHOULDER (ARTHROGRAM)  8/5/2019    FL INJECTION RIGHT SHOULDER (ARTHROGRAM)  7/27/2020    MD SHLDR ARTHROSCOP,SURG,REPAIR,SLAP LESION Right 9/14/2020    Procedure: SHOULDER ARTHROSCOPY, POSTERIOR & SUPERIOR LABRAL REPAIR;  Surgeon: Adalgisa Bray MD;  Location: [facility];  Service: Orthopedics       Family History   Problem Relation Age of Onset    No Known Problems Mother     No Known Problems Father          Medications have been verified          Objective   /69   Pulse 67   Temp 98 1 °F (36 7 °C)   Resp 20   Ht 5' 10" (1 778 m)   Wt 79 6 kg (175 lb 6 4 oz)   SpO2 98%   BMI 25 17 kg/m²        Physical Exam Physical Exam  Vitals reviewed  Constitutional:       General: He is not in acute distress  Appearance: Normal appearance  HENT:      Right Ear: Tympanic membrane, ear canal and external ear normal       Left Ear: Tympanic membrane, ear canal and external ear normal       Nose: Nose normal       Mouth/Throat:      Mouth: Mucous membranes are moist       Pharynx: No posterior oropharyngeal erythema  Eyes:      Conjunctiva/sclera: Conjunctivae normal    Cardiovascular:      Rate and Rhythm: Normal rate and regular rhythm  Pulses: Normal pulses  Heart sounds: Normal heart sounds  No murmur heard  Pulmonary:      Effort: Pulmonary effort is normal  No respiratory distress  Breath sounds: Normal breath sounds  Musculoskeletal:         General: Normal range of motion  Skin:     General: Skin is warm and dry  Neurological:      General: No focal deficit present  Mental Status: He is alert and oriented to person, place, and time     Psychiatric:         Mood and Affect: Mood normal          Behavior: Behavior normal

## 2022-06-14 ENCOUNTER — APPOINTMENT (OUTPATIENT)
Dept: OTOLARYNGOLOGY | Facility: CLINIC | Age: 77
End: 2022-06-14

## 2022-07-18 NOTE — DIETITIAN NUTRITION RISK NOTIFICATION - INADEQUATE ENERGY INTAKE
Upcoming apt 08/24/22  Patient last saw you in the office on 05/25/22.  Last refill was baclofen 08/02/21 #120 with 3 refills.   < 75% for >/= 3 months

## 2023-02-17 NOTE — PROGRESS NOTE ADULT - SUBJECTIVE AND OBJECTIVE BOX
Acute respiratory failure with hypoxia    HPI:  Pt is a 75 y/o M pmhx of COPD on home O2, B/l carotid artery stenosis, laryngeal CA s/p chemo and radiation, PEG tube, who was BIBA after being found unresponsive at home w/ SpO2 in the 30's, Intubated upon arrival to ED and started on propofol gtt for sedation. ICU consulted for hypoxic respiratory failure requiring intubation.  (08 Apr 2022 11:18)    Interval History:  Patient was seen and examined at bedside around 8:30 am and again around 9:30 am.  Was desatting overnight so he was placed on venti mask.   Was given neb treatment this morning and was restated on steroids however he started desatting on venti mask after tube feeding -- likely had aspiration event.   Feels tired and feels short of breath.   Denies chest pain, palpitations, nausea, vomiting or abdominal pain.    ROS:  As per interval history otherwise unremarkable.    PHYSICAL EXAM:  Vital Signs   T(C): 36.6 (13 Apr 2022 04:37), Max: 37 (12 Apr 2022 16:39)  T(F): 97.9 (13 Apr 2022 04:37), Max: 98.6 (12 Apr 2022 16:39)  HR: 89 (13 Apr 2022 09:21) (75 - 98)  BP: 130/50 (13 Apr 2022 09:21) (118/60 - 162/64)  BP(mean): 97 (13 Apr 2022 03:15) (79 - 97)  RR: 18 (13 Apr 2022 09:21) (18 - 19)  SpO2: 91% (13 Apr 2022 09:21) (91% - 98%)  General: Elderly male sitting in bed comfortably. No acute distress but looks tired.   HEENT: EOMI. Clear conjunctivae. Moist mucus membrane.   Neck: Supple.   Chest: Decreased air entry at bases. Diffuse rhonchi. No wheezing or rales. No chest wall tenderness.  Heart: Normal S1 & S2. RRR.   Abdomen: Soft. Non-tender. Non-distended. + BS. G-tube in place.   Ext: No pedal edema. No calf tenderness   Neuro: Awake and alert. No focal deficit. Speech soft.   Skin: Warm and Dry  Psychiatry: Normal mood and affect    I&O's Summary    12 Apr 2022 07:01  -  13 Apr 2022 07:00  --------------------------------------------------------  IN: 1300 mL / OUT: 0 mL / NET: 1300 mL    LABS:  CAPILLARY BLOOD GLUCOSE  POCT Blood Glucose.: 139 mg/dL (13 Apr 2022 05:45)  POCT Blood Glucose.: 196 mg/dL (12 Apr 2022 23:28)  POCT Blood Glucose.: 149 mg/dL (12 Apr 2022 18:10)  POCT Blood Glucose.: 124 mg/dL (12 Apr 2022 13:18)                       9.5    11.56 )-----------( 249      ( 13 Apr 2022 05:58 )             33.0     04-13    143  |  97<L>  |  37.2<H>  ----------------------------<  135<H>  4.0   |  36.0<H>  |  0.89    Ca    8.8      13 Apr 2022 05:58  Mg     2.1     04-13    Blood Culture: 04-08 @ 21:50  Organism Escherichia coli  Gram Stain Blood -- Gram Stain   Numerous polymorphonuclear leukocytes per low power field  Rare Squamous epithelial cells per low power field  Few Gram Negative Rods seen per oil power field  Specimen Source .Sputum Sputum  Culture-Blood --    04-08 @ 16:21  Organism --  Gram Stain Blood -- Gram Stain --  Specimen Source Clean Catch Clean Catch (Midstream)  Culture-Blood --    RADIOLOGY & ADDITIONAL STUDIES:  Reviewed     MEDICATIONS  (STANDING):  acetylcysteine 10%  Inhalation 4 milliLiter(s) Inhalation every 6 hours  albuterol/ipratropium for Nebulization 3 milliLiter(s) Nebulizer every 6 hours  aspirin  chewable 81 milliGRAM(s) Oral daily  atorvastatin 40 milliGRAM(s) Oral at bedtime  chlorhexidine 2% Cloths 1 Application(s) Topical <User Schedule>  chlorhexidine 2% Cloths 1 Application(s) Topical daily  cholecalciferol 1000 Unit(s) Oral daily  dextrose 5%. 1000 milliLiter(s) (50 mL/Hr) IV Continuous <Continuous>  dextrose 5%. 1000 milliLiter(s) (100 mL/Hr) IV Continuous <Continuous>  dextrose 50% Injectable 25 Gram(s) IV Push once  dextrose 50% Injectable 12.5 Gram(s) IV Push once  dextrose 50% Injectable 25 Gram(s) IV Push once  ferrous    sulfate Liquid 300 milliGRAM(s) Enteral Tube daily  glucagon  Injectable 1 milliGRAM(s) IntraMuscular once  heparin   Injectable 5000 Unit(s) SubCutaneous every 12 hours  insulin lispro (ADMELOG) corrective regimen sliding scale   SubCutaneous every 6 hours  lactobacillus acidophilus 1 Tablet(s) Oral two times a day  levothyroxine 112 MICROGram(s) Oral daily  methylPREDNISolone sodium succinate Injectable 40 milliGRAM(s) IV Push every 8 hours  metoprolol tartrate 25 milliGRAM(s) Oral every 8 hours  multiple electrolytes Injection Type 1 1000 milliLiter(s) (75 mL/Hr) IV Continuous <Continuous>  oxybutynin 5 milliGRAM(s) Oral two times a day  pantoprazole  Injectable 40 milliGRAM(s) IV Push daily  piperacillin/tazobactam IVPB.. 3.375 Gram(s) IV Intermittent every 8 hours    MEDICATIONS  (PRN):  acetaminophen     Tablet .. 650 milliGRAM(s) Oral every 6 hours PRN Temp greater or equal to 38C (100.4F)  dextrose Oral Gel 15 Gram(s) Oral once PRN Blood Glucose LESS THAN 70 milliGRAM(s)/deciliter  hydrALAZINE Injectable 10 milliGRAM(s) IV Push every 6 hours PRN If SBP > 160  sodium chloride 0.9% lock flush 10 milliLiter(s) IV Push every 1 hour PRN Pre/post blood products, medications, blood draw, and to maintain line patency             n/a

## 2023-06-27 NOTE — PROGRESS NOTE ADULT - NS PANP COMMENT GEN_ALL_CORE FT
77 yo male with hx of laryngeal CA s/p chemo/RT, dysphagia s/p G-tube, carotid stenosis, now presented with aspiration pneumonia, acute respiratory failure requiring intubation.  Patient intubated on arrival 4/8, extubated successfully 4/9.  Daughter and wife updated extensively at bedside.
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

## 2023-08-30 NOTE — PATIENT PROFILE ADULT - NSPROPTRIGHTBILLOFRIGHTS_GEN_A_NUR
"ANTICOAGULATION  MANAGEMENT: NEW REFERRAL      SUBJECTIVE/OBJECTIVE     Ricky Brown, a 72 year old male  is newly referred to [facility].    Anticoagulation:    Previously on warfarin: No, has been on Apixaban (Eliquis); transitioning to warfarin.  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    Goal Range: 2.0-3.0   Anticoagulation Bridge/Overlap: Yes, overlapping with Apixaban (Eliquis) until INR >= 2.3 or INR >= 2.0 x 2 (ACC protocol goal INR 2-3 new start)   Referring provider: from PCP    General Dietary/Social Hx:    Typical vitamin K intake: low; consistent     Other dietary considerations: None     Social History:   Social History     Tobacco Use    Smoking status: Former     Packs/day: 0.50     Years: 2.00     Pack years: 1.00     Types: Cigarettes     Quit date: 1996     Years since quittin.7    Smokeless tobacco: Never   Vaping Use    Vaping Use: Never used   Substance Use Topics    Alcohol use: Yes     Alcohol/week: 8.3 standard drinks of alcohol     Comment: Evening Marie    Drug use: No       In the past 2 weeks, patient estimates taking medications as instructed % of time: 100    Results:        No results for input(s): INR, [id number], F2, ALMWH in the last 168 hours.    Wt Readings from Last 2 Encounters:   23 75.3 kg (166 lb)   23 76.8 kg (169 lb 4.8 oz)      Estimated body mass index is 26 kg/m  as calculated from the following:    Height as of 23: 1.702 m (5' 7\").    Weight as of 23: 75.3 kg (166 lb).  Lab Results   Component Value Date    AST 23 2023     Lab Results   Component Value Date    CR 0.74 2023     Estimated Creatinine Clearance: 98.9 mL/min (based on SCr of 0.74 mg/dL).    ASSESSMENT     Goal INR 2-3, standard for indication(s) above  Establishing initial warfarin maintenance dose " (on warfarin < 30 days)   Starting warfarin dose is appropriate for patient's anticipated sensitivity to warfarin    PLAN     Dosing Instructions:  Start taking warfarin 2.5 mg daily, overlap with Eliquis  with INR in 2 days  . Prescription sent to patient's pharmacy, he will  the warfarin and start this evening.         Education provided:   Taking warfarin: purpose of warfarin and how it works, take warfarin at same time each day; preferably in the evening, prescribed tablet strength and color, importance of following ACC instructions vs instructions on the prescription bottle, and Importance of taking warfarin as instructed  Goal range and lab monitoring: frequency of lab work when starting warfarin and importance of following up when instructed (extends after stability established)  Dietary considerations: importance of consistent vitamin K intake and importance of notifying [facility] to changes in diet  Healthy lifestyle considerations: potential interaction between warfarin and alcohol, limit alcohol intake to no more than 1 to 2 drinks in 24 hours if you choose to drink alcohol, and avoid excessive alcohol drinking (binge drinking) while on warfarin due to increased risk of bleeding from effect on INR and fall risk  Symptom monitoring: monitoring for bleeding signs and symptoms, monitoring for clotting signs and symptoms, monitoring for stroke signs and symptoms, and when to seek medical attention/emergency care  Importance of notifying anticoagulation clinic for: changes in medications; a sooner lab recheck maybe needed, diarrhea, nausea/vomiting, reduced intake, cold/flu, and/or infections; a sooner lab recheck maybe needed, upcoming surgeries and procedures 2 weeks in advance, and if you did not receive dosing instructions on the same day as your labs were checked  Contact [phone]  with any changes, questions or concerns.     Education still needed:   Will mail out patient education  packet      Telephone call with Ricky who verbalizes understanding and agrees to plan    Lab visit scheduled    Standing orders placed in Epic: Point of Care INR (Lab 5000)    Plan made with [facility] Pharmacist Deidra Steel, RN  Anticoagulation Clinic  8/30/2023                     patient

## 2024-05-31 NOTE — PROGRESS NOTE ADULT - SUBJECTIVE AND OBJECTIVE BOX
Acute respiratory failure with hypoxia    HPI:  Pt is a 75 y/o M pmhx of COPD on home O2, B/l carotid artery stenosis, laryngeal CA s/p chemo and radiation, PEG tube, who was BIBA after being found unresponsive at home w/ SpO2 in the 30's, Intubated upon arrival to ED and started on propofol gtt for sedation. ICU consulted for hypoxic respiratory failure requiring intubation.  (08 Apr 2022 11:18)    Interval History:  Patient was seen and examined at bedside around 8:30 am with .  Was desatting overnight due to mucous plugging.   Was feeling OK at the time of encounter.   Breathing is improving.   Denies chest pain, palpitations, headache, dizziness, visual symptoms, nausea, vomiting or abdominal pain.    ROS:  As per interval history otherwise unremarkable.    PHYSICAL EXAM:  Vital Signs   T(C): 37.1 (12 Apr 2022 11:00), Max: 37.1 (12 Apr 2022 04:00)  T(F): 98.7 (12 Apr 2022 11:00), Max: 98.8 (12 Apr 2022 04:00)  HR: 75 (12 Apr 2022 15:35) (75 - 109)  BP: 95/57 (12 Apr 2022 11:00) (95/57 - 159/73)  RR: 91 (12 Apr 2022 11:00) (18 - 91)  SpO2: 96% (12 Apr 2022 09:56) (90% - 96%)  General: Elderly male sitting in bed comfortably. No acute distress  HEENT: EOMI. Clear conjunctivae. Moist mucus membrane.   Neck: Supple.   Chest: Air entry improving at bases. Fine rhonchi. No wheezing or rales. No chest wall tenderness.  Heart: Normal S1 & S2. RRR.   Abdomen: Soft. Non-tender. Non-distended. + BS. G-tube in place.   Ext: No pedal edema. No calf tenderness   Neuro: Awake and alert. No focal deficit. Speech soft.   Skin: Warm and Dry  Psychiatry: Normal mood and affect    I&O's Summary    11 Apr 2022 07:01  -  12 Apr 2022 07:00  --------------------------------------------------------  IN: 300 mL / OUT: 500 mL / NET: -200 mL    LABS:  CAPILLARY BLOOD GLUCOSE  POCT Blood Glucose.: 124 mg/dL (12 Apr 2022 13:18)  POCT Blood Glucose.: 173 mg/dL (12 Apr 2022 05:14)  POCT Blood Glucose.: 152 mg/dL (12 Apr 2022 00:03)  POCT Blood Glucose.: 139 mg/dL (11 Apr 2022 18:25)                      8.9    7.60  )-----------( 198      ( 11 Apr 2022 05:43 )             29.2     04-12    145  |  101  |  38.3<H>  ----------------------------<  163<H>  4.2   |  34.0<H>  |  0.93    Ca    8.9      12 Apr 2022 06:04  Mg     1.9     04-12    Blood Culture: 04-08 @ 21:50  Organism Escherichia coli  Gram Stain Blood -- Gram Stain   Numerous polymorphonuclear leukocytes per low power field  Rare Squamous epithelial cells per low power field  Few Gram Negative Rods seen per oil power field  Specimen Source .Sputum Sputum  Culture-Blood --    04-08 @ 16:21  Organism --  Gram Stain Blood -- Gram Stain --  Specimen Source Clean Catch Clean Catch (Midstream)  Culture-Blood --    04-08 @ 10:13  Organism --  Gram Stain Blood -- Gram Stain   Growth in aerobic bottle: Gram Positive Rods  Gram Stain performed by:  [facility]  [address] [place] [id number]  ,  TYPE: (C=Critical, N=Notification, A=Abnormal) c  TESTS:  _ gs  DATE/TIME CALLED: _ 04/11/2022 14:50:11  CALLED TO: Iraida gomez rn  READ BACK (2 Patient Identifiers)(Y/N): _ y  READ BACK VALUES (Y/N): _ y  CALLED BY: Iraida crandall  Specimen Source .Blood Blood-Peripheral  Culture-Blood --    04-08 @ 10:12  Organism Blood Culture PCR  Gram Stain Blood -- Gram Stain   Growth in aerobic bottle: Gram Positive Cocci in Clusters  ***Blood Panel PCR results on this specimen are available  approximately 3 hours after the Gram stain result.***  Gram stain, PCR, and/or culture results may not always  correspond due to difference in methodologies.  ************************************************************  This PCR assay was performed using [employer].  The following targets are tested for: Enterococcus,  vancomycin resistant enterococci, Listeria monocytogenes,  coagulase negative staphylococci, S. aureus,  methicillin resistant S. aureus, Streptococcus agalactiae  (Group B), S. pneumoniae, S. pyogenes (Group A),  Acinetobacter baumannii, Enterobacter cloacae, E. coli,  Klebsiella oxytoca, K. pneumoniae, Proteus sp.,  Serratia marcescens, Haemophilus influenzae,  Neisseria meningitidis, Pseudomonas aeruginosa, Candida  albicans, C. glabrata, C krusei, C parapsilosis,  C. tropicalis and the KPC resistance gene.  Gram Stain and BCID performed by:  [facility]  [address] [place] [address]  .  TYPE: (C=Critical, N=Notification, A=Abnormal) C  TESTS:  _ GS  DATE/TIME CALLED: _ 04/10/2022 09:12:58  CALLED TO: _ Mode Gomez RN  READ BACK (2 Patient Identifiers)(Y/N): _ Y  READ BACK VALUES (Y/N): _ Y  CALLED BY: Iraida Negron  Specimen Source .Blood Blood-Peripheral  Culture-Blood --    RADIOLOGY & ADDITIONAL STUDIES:  Reviewed     MEDICATIONS  (STANDING):  acetylcysteine 10%  Inhalation 4 milliLiter(s) Inhalation every 6 hours  ALBUTerol    0.083% 2.5 milliGRAM(s) Nebulizer every 6 hours  aspirin  chewable 81 milliGRAM(s) Oral daily  atorvastatin 40 milliGRAM(s) Oral at bedtime  chlorhexidine 2% Cloths 1 Application(s) Topical <User Schedule>  chlorhexidine 2% Cloths 1 Application(s) Topical daily  cholecalciferol 1000 Unit(s) Oral daily  dextrose 5%. 1000 milliLiter(s) (50 mL/Hr) IV Continuous <Continuous>  dextrose 5%. 1000 milliLiter(s) (100 mL/Hr) IV Continuous <Continuous>  dextrose 50% Injectable 25 Gram(s) IV Push once  dextrose 50% Injectable 12.5 Gram(s) IV Push once  dextrose 50% Injectable 25 Gram(s) IV Push once  ferrous    sulfate Liquid 300 milliGRAM(s) Enteral Tube daily  glucagon  Injectable 1 milliGRAM(s) IntraMuscular once  heparin   Injectable 5000 Unit(s) SubCutaneous every 12 hours  insulin lispro (ADMELOG) corrective regimen sliding scale   SubCutaneous every 6 hours  levothyroxine 112 MICROGram(s) Oral daily  metoprolol tartrate 25 milliGRAM(s) Oral every 8 hours  multiple electrolytes Injection Type 1 1000 milliLiter(s) (75 mL/Hr) IV Continuous <Continuous>  oxybutynin 5 milliGRAM(s) Oral two times a day  pantoprazole   Suspension 40 milliGRAM(s) Oral daily  piperacillin/tazobactam IVPB.. 3.375 Gram(s) IV Intermittent every 8 hours    MEDICATIONS  (PRN):  acetaminophen     Tablet .. 650 milliGRAM(s) Oral every 6 hours PRN Temp greater or equal to 38C (100.4F)  dextrose Oral Gel 15 Gram(s) Oral once PRN Blood Glucose LESS THAN 70 milliGRAM(s)/deciliter  hydrALAZINE Injectable 10 milliGRAM(s) IV Push every 6 hours PRN If SBP > 160  sodium chloride 0.9% lock flush 10 milliLiter(s) IV Push every 1 hour PRN Pre/post blood products, medications, blood draw, and to maintain line patency           yes